# Patient Record
Sex: MALE | Race: WHITE | Employment: FULL TIME | URBAN - METROPOLITAN AREA
[De-identification: names, ages, dates, MRNs, and addresses within clinical notes are randomized per-mention and may not be internally consistent; named-entity substitution may affect disease eponyms.]

---

## 2017-02-06 ENCOUNTER — MYC MEDICAL ADVICE (OUTPATIENT)
Dept: PEDIATRICS | Facility: CLINIC | Age: 57
End: 2017-02-06

## 2017-02-06 DIAGNOSIS — I10 HTN, GOAL BELOW 140/90: Primary | ICD-10-CM

## 2017-02-07 RX ORDER — ATENOLOL 50 MG/1
50 TABLET ORAL DAILY
Qty: 90 TABLET | Refills: 0 | Status: SHIPPED | OUTPATIENT
Start: 2017-02-07 | End: 2017-05-06

## 2017-02-07 NOTE — TELEPHONE ENCOUNTER
Pres[place] per [facility] [place].    ATENOLOL      Last Written Prescription Date: 5/2/16  Last Fill Quantity: 90, # refills: 2    Last Office Visit with [facility] or [facility] prescribing provider:  5/10/16   Future Office Visit:    Next 5 appointments (look out 90 days)     Mar 10, 2017  2:20 PM   Bharatt Elliot with Manoj Armenta MD   [facility] ([facility])    [address] [place] [id number] [phone]                    BP Readings from Last 3 Encounters:   09/09/16 124/78   05/25/16 120/80   05/10/16 132/70

## 2017-03-07 ENCOUNTER — OFFICE VISIT (OUTPATIENT)
Dept: PEDIATRICS | Facility: CLINIC | Age: 57
End: 2017-03-07
Payer: COMMERCIAL

## 2017-03-07 VITALS
BODY MASS INDEX: 41.81 KG/M2 | TEMPERATURE: 97.7 F | SYSTOLIC BLOOD PRESSURE: 124 MMHG | HEART RATE: 88 BPM | HEIGHT: 64 IN | WEIGHT: 244.9 LBS | DIASTOLIC BLOOD PRESSURE: 76 MMHG | RESPIRATION RATE: 14 BRPM

## 2017-03-07 DIAGNOSIS — Z11.59 NEED FOR HEPATITIS C SCREENING TEST: ICD-10-CM

## 2017-03-07 DIAGNOSIS — I10 ESSENTIAL HYPERTENSION WITH GOAL BLOOD PRESSURE LESS THAN 140/90: ICD-10-CM

## 2017-03-07 DIAGNOSIS — J30.2 SEASONAL ALLERGIC RHINITIS, UNSPECIFIED ALLERGIC RHINITIS TRIGGER: ICD-10-CM

## 2017-03-07 DIAGNOSIS — Z00.00 ENCOUNTER FOR ROUTINE ADULT HEALTH EXAMINATION WITHOUT ABNORMAL FINDINGS: Primary | ICD-10-CM

## 2017-03-07 DIAGNOSIS — F33.0 MAJOR DEPRESSIVE DISORDER, RECURRENT EPISODE, MILD (H): ICD-10-CM

## 2017-03-07 DIAGNOSIS — Z85.47 H/O TESTICULAR CANCER: ICD-10-CM

## 2017-03-07 DIAGNOSIS — R97.20 ELEVATED PROSTATE SPECIFIC ANTIGEN (PSA): ICD-10-CM

## 2017-03-07 DIAGNOSIS — Z13.6 CARDIOVASCULAR SCREENING; LDL GOAL LESS THAN 160: ICD-10-CM

## 2017-03-07 DIAGNOSIS — K21.9 GASTROESOPHAGEAL REFLUX DISEASE WITHOUT ESOPHAGITIS: ICD-10-CM

## 2017-03-07 PROCEDURE — 99396 PREV VISIT EST AGE 40-64: CPT | Performed by: INTERNAL MEDICINE

## 2017-03-07 RX ORDER — LOSARTAN POTASSIUM 25 MG/1
25 TABLET ORAL DAILY
Qty: 90 TABLET | Refills: 3 | Status: SHIPPED | OUTPATIENT
Start: 2017-03-07 | End: 2018-03-10

## 2017-03-07 RX ORDER — SERTRALINE HYDROCHLORIDE 100 MG/1
100 TABLET, FILM COATED ORAL DAILY
Qty: 90 TABLET | Refills: 3 | Status: SHIPPED | OUTPATIENT
Start: 2017-03-07 | End: 2018-03-10

## 2017-03-07 RX ORDER — TRAZODONE HYDROCHLORIDE 50 MG/1
50 TABLET, FILM COATED ORAL
Qty: 90 TABLET | Refills: 3 | Status: SHIPPED | OUTPATIENT
Start: 2017-03-07 | End: 2018-08-04

## 2017-03-07 RX ORDER — FLUTICASONE PROPIONATE 50 MCG
1-2 SPRAY, SUSPENSION (ML) NASAL DAILY
Qty: 16 G | Refills: 11 | Status: SHIPPED | OUTPATIENT
Start: 2017-03-07 | End: 2018-03-31

## 2017-03-07 NOTE — NURSING NOTE
"Chief Complaint   Patient presents with     Physical       Initial /76 (BP Location: Right arm, Patient Position: Chair, Cuff Size: Adult Large)  Pulse 88  Temp 97.7  F (36.5  C) (Oral)  Resp 14  Ht 5' 4\" (1.626 m)  Wt 244 lb 14.4 oz (111.1 kg)  BMI 42.04 kg/m2 Estimated body mass index is 42.04 kg/(m^2) as calculated from the following:    Height as of this encounter: 5' 4\" (1.626 m).    Weight as of this encounter: 244 lb 14.4 oz (111.1 kg).  Medication Reconciliation: complete   Martha J, ALLISON,AAMA    '  "

## 2017-03-07 NOTE — LETTER
My Depression Action Plan  Name: Fabio Logan   Date of Birth 1960  Date: 3/7/2017    My doctor: Manoj Armenta   My clinic: [address]  [address]  [facility] [address] [phone]          GREEN    ZONE   Good Control    What it looks like:     Things are going generally well. You have normal up s and down s. You may even feel depressed from time to time, but bad moods usually last less than a day.   What you need to do:  1. Continue to care for yourself (see self care plan)  2. Check your depression survival kit and update it as needed  3. Follow your physician s recommendations including any medication.  4. Do not stop taking medication unless you consult with your physician first.           YELLOW         ZONE Getting Worse    What it looks like:     Depression is starting to interfere with your life.     It may be hard to get out of bed; you may be starting to isolate yourself from others.    Symptoms of depression are starting to last most all day and this has happened for several days.     You may have suicidal thoughts but they are not constant.   What you need to do:     1. Call your care team, your response to treatment will improve if you keep your care team informed of your progress. Yellow periods are signs an adjustment may need to be made.     2. Continue your self-care, even if you have to fake it!    3. Talk to someone in your support network    4. Open up your depression survival kit           RED    ZONE Medical Alert - Get Help    What it looks like:     Depression is seriously interfering with your life.     You may experience these or other symptoms: You can t get out of bed most days, can t work or engage in other necessary activities, you have trouble taking care of basic hygiene, or basic responsibilities, thoughts of suicide or death that will not go away, self-injurious behavior.     What you need to do:  1. Call your  care team and request a same-day appointment. If they are not available (weekends or after hours) call your local crisis line, emergency room or 911.      Electronically signed by: Nel Clay, March 7, 2017    Depression Self Care Plan / Survival Kit    Self-Care for Depression  Here s the deal. Your body and mind are really not as separate as most people think.  What you do and think affects how you feel and how you feel influences what you do and think. This means if you do things that people who feel good do, it will help you feel better.  Sometimes this is all it takes.  There is also a place for medication and therapy depending on how severe your depression is, so be sure to consult with your medical provider and/ or Behavioral Health Consultant if your symptoms are worsening or not improving.     In order to better manage my stress, I will:    Exercise  Get some form of exercise, every day. This will help reduce pain and release endorphins, the  feel good  chemicals in your brain. This is almost as good as taking antidepressants!  This is not the same as joining a gym and then never going! (they count on that by the way ) It can be as simple as just going for a walk or doing some gardening, anything that will get you moving.      Hygiene   Maintain good hygiene (Get out of bed in the morning, Make your bed, Brush your teeth, Take a shower, and Get dressed like you were going to work, even if you are unemployed).  If your clothes don't fit try to get ones that do.    Diet  I will strive to eat foods that are good for me, drink plenty of water, and avoid excessive sugar, caffeine, alcohol, and other mood-altering substances.  Some foods that are helpful in depression are: complex carbohydrates, B vitamins, flaxseed, fish or fish oil, fresh fruits and vegetables.    Psychotherapy  I agree to participate in Individual Therapy (if recommended).    Medication  If prescribed medications, I agree to take them.   Missing doses can result in serious side effects.  I understand that drinking alcohol, or other illicit drug use, may cause potential side effects.  I will not stop my medication abruptly without first discussing it with my provider.    Staying Connected With Others  I will stay in touch with my friends, family members, and my primary care provider/team.    Use your imagination  Be creative.  We all have a creative side; it doesn t matter if it s oil painting, sand castles, or mud pies! This will also kick up the endorphins.    Witness Beauty  (AKA stop and smell the roses) Take a look outside, even in mid-winter. Notice colors, textures. Watch the squirrels and birds.     Service to others  Be of service to others.  There is always someone else in need.  By helping others we can  get out of ourselves  and remember the really important things.  This also provides opportunities for practicing all the other parts of the program.    Humor  Laugh and be silly!  Adjust your TV habits for less news and crime-drama and more comedy.    Control your stress  Try breathing deep, massage therapy, biofeedback, and meditation. Find time to relax each day.     My support system    Clinic Contact:  Phone number:    Contact 1:  Phone number:    Contact 2:  Phone number:    Buddhism/:  Phone number:    Therapist:  Phone number:    Local crisis center:    Phone number:    Other community support:  Phone number:
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
## 2017-03-07 NOTE — MR AVS SNAPSHOT
After Visit Summary   3/7/2017    Fabio Logan    MRN: [id number]           Patient Information     Date Of Birth          1960        Visit Information        Provider Department      3/7/2017 5:20 PM Manoj Armenta MD [facility]        Today's Diagnoses     Encounter for routine adult health examination without abnormal findings    -  1    Essential hypertension with goal blood pressure less than 140/90        Major depressive disorder, recurrent episode, mild (H)        Seasonal allergic rhinitis, unspecified allergic rhinitis trigger        Gastroesophageal reflux disease without esophagitis        CARDIOVASCULAR SCREENING; LDL GOAL LESS THAN 160        Elevated prostate specific antigen (PSA)        Need for hepatitis C screening test        H/O testicular cancer          Care Instructions      Preventive Health Recommendations  Male Ages 50 - 64    Yearly exam:             See your health care provider every year in order to  o   Review health changes.   o   Discuss preventive care.    o   Review your medicines if your doctor has prescribed any.     Have a cholesterol test every 5 years, or more frequently if you are at risk for high cholesterol/heart disease.     Have a diabetes test (fasting glucose) every three years. If you are at risk for diabetes, you should have this test more often.     Have a colonoscopy at age 50, or have a yearly FIT test (stool test). These exams will check for colon cancer.      Talk with your health care provider about whether or not a prostate cancer screening test (PSA) is right for you.    You should be tested each year for STDs (sexually transmitted diseases), if you re at risk.     Shots: Get a flu shot each year. Get a tetanus shot every 10 years.     Nutrition:    Eat at least 5 servings of fruits and vegetables daily.     Eat whole-grain bread, whole-wheat pasta and brown rice instead of white grains and rice.     Talk to your  provider about Calcium and Vitamin D.     Lifestyle    Exercise for at least 150 minutes a week (30 minutes a day, 5 days a week). This will help you control your weight and prevent disease.     Limit alcohol to one drink per day.     No smoking.     Wear sunscreen to prevent skin cancer.     See your dentist every six months for an exam and cleaning.     See your eye doctor every 1 to 2 years.          Follow-ups after your visit        Future tests that were ordered for you today     Open Future Orders        Priority Expected Expires Ordered    **CBC with platelets FUTURE 1yr Routine 2/5/2018 3/7/2018 3/7/2017    **Prostate spec antigen screen FUTURE 1yr Routine 2/5/2018 3/7/2018 3/7/2017    **Lipid panel reflex to direct LDL FUTURE 1yr Routine 2/5/2018 3/7/2018 3/7/2017    **Comprehensive metabolic panel FUTURE 1yr Routine 2/5/2018 3/7/2018 3/7/2017    **Hepatitis C Screen Reflex to RNA FUTURE anytime Routine 3/7/2017 3/7/2018 3/7/2017            Who to contact     If you have questions or need follow up information about today's clinic visit or your schedule please contact [facility] directly at [phone].  Normal or non-critical lab and imaging results will be communicated to you by [employer]hart, letter or phone within 4 business days after the clinic has received the results. If you do not hear from us within 7 days, please contact the clinic through [employer] or phone. If you have a critical or abnormal lab result, we will notify you by phone as soon as possible.  Submit refill requests through [employer] or call your pharmacy and they will forward the refill request to us. Please allow 3 business days for your refill to be completed.          Additional Information About Your Visit        [employer] Information     [employer] gives you secure access to your electronic health record. If you see a primary care provider, you can also send messages to your care team and make appointments. If you have questions,  "please call your primary care clinic.  If you do not have a primary care provider, please call [phone] and they will assist you.        Care EveryWhere ID     This is your Care EveryWhere ID. This could be used by other organizations to access your [facility] medical records  [id number]        Your Vitals Were     Pulse Temperature Respirations Height BMI (Body Mass Index)       88 97.7  F (36.5  C) (Oral) 14 5' 4\" (1.626 m) 42.04 kg/m2        Blood Pressure from Last 3 Encounters:   03/07/17 124/76   09/09/16 124/78   05/25/16 120/80    Weight from Last 3 Encounters:   03/07/17 244 lb 14.4 oz (111.1 kg)   09/09/16 243 lb 4 oz (110.3 kg)   05/25/16 234 lb (106.1 kg)              We Performed the Following     DEPRESSION ACTION PLAN (DAP)          Today's Medication Changes          These changes are accurate as of: 3/7/17  6:03 PM.  If you have any questions, ask your nurse or doctor.               These medicines have changed or have updated prescriptions.        Dose/Directions    albuterol 108 (90 BASE) MCG/ACT Inhaler   Commonly known as:  PROAIR HFA/PROVENTIL HFA/VENTOLIN HFA   This may have changed:  Another medication with the same name was removed. Continue taking this medication, and follow the directions you see here.        Dose:  2 puff   Inhale 2 puffs into the lungs every 6 hours as needed for shortness of breath / dyspnea or wheezing   Refills:  0       sertraline 100 MG tablet   Commonly known as:  ZOLOFT   This may have changed:    - when to take this  - additional instructions   Used for:  Major depressive disorder, recurrent episode, mild (H)   Changed by:  Manoj Armenta MD        Dose:  100 mg   Take 1 tablet (100 mg) by mouth daily   Quantity:  90 tablet   Refills:  3         Stop taking these medicines if you haven't already. Please contact your care team if you have questions.     amoxicillin-clavulanate 875-125 MG per tablet   Commonly known as:  AUGMENTIN   Stopped by:  Geovany, " Manoj HAYNES MD           fluticasone 110 MCG/ACT Inhaler   Commonly known as:  FLOVENT HFA   Stopped by:  Manoj Armenta MD                Where to get your medicines      These medications were sent to [id number] - [place], [place] - [address]  [address], [place] [address]     Phone:  [phone]     fluticasone 50 MCG/ACT spray    losartan 25 MG tablet    omeprazole 20 MG CR capsule    sertraline 100 MG tablet    traZODone 50 MG tablet                Primary Care Provider Office Phone # Fax #    Manoj Armenta [phone]       [facility] [address] [place] [address]        Thank you!     Thank you for choosing [facility]  for your care. Our goal is always to provide you with excellent care. Hearing back from our patients is one way we can continue to improve our services. Please take a few minutes to complete the written survey that you may receive in the mail after your visit with us. Thank you!             Your Updated Medication List - Protect others around you: Learn how to safely use, store and throw away your medicines at www.disposemymeds.org.          This list is accurate as of: 3/7/17  6:03 PM.  Always use your most recent med list.                   Brand Name Dispense Instructions for use    albuterol 108 (90 BASE) MCG/ACT Inhaler    PROAIR HFA/PROVENTIL HFA/VENTOLIN HFA     Inhale 2 puffs into the lungs every 6 hours as needed for shortness of breath / dyspnea or wheezing       atenolol 50 MG tablet    TENORMIN    90 tablet    Take 1 tablet (50 mg) by mouth daily       fluticasone 50 MCG/ACT spray    FLONASE    16 g    Spray 1-2 sprays into both nostrils daily       hydrOXYzine 25 MG tablet    ATARAX    60 tablet    1-2 tabs po q hs       losartan 25 MG tablet    COZAAR    90 tablet    Take 1 tablet (25 mg) by mouth daily       neomycin-polymyxin-hydrocortisone 3.5-70341-8 otic suspension    CORTISPORIN    10 mL    Place 4 drops  in ear(s) 4 times daily       nystatin 126355 UNIT/GM Powd    MYCOSTATIN    60 g    Apply topically 3 times daily as needed (recurrent yeast infection at moist skin creases)       omeprazole 20 MG CR capsule    priLOSEC    90 capsule    Take 1 capsule (20 mg) by mouth daily as needed       sertraline 100 MG tablet    ZOLOFT    90 tablet    Take 1 tablet (100 mg) by mouth daily       sildenafil 100 MG cap/tab    REVATIO/VIAGRA    4 tablet    Take 0.5-1 tablets ( mg) by mouth daily as needed for erectile dysfunction Take 30 min to 4 hours before sexual activity.  Never use with nitroglycerin, terazosin or doxazosin.       SUMAtriptan 50 MG tablet    IMITREX    18 tablet    Take 1 tablet (50 mg) by mouth at onset of headache for migraine May repeat dose in 2 hours.  Do not exceed 200 mg in 24 hours       tamsulosin 0.4 MG capsule    FLOMAX    30 capsule    Take 1 capsule (0.4 mg) by mouth daily       traZODone 50 MG tablet    DESYREL    90 tablet    Take 1 tablet (50 mg) by mouth nightly as needed for sleep okay to increase to 100 mg after 1-2 weeks if needed       triamcinolone 0.1 % ointment    KENALOG    453 g    Apply topically 2 times daily

## 2017-03-08 ASSESSMENT — ASTHMA QUESTIONNAIRES: ACT_TOTALSCORE: 20

## 2017-03-08 ASSESSMENT — PATIENT HEALTH QUESTIONNAIRE - PHQ9: SUM OF ALL RESPONSES TO PHQ QUESTIONS 1-9: 8

## 2017-03-25 DIAGNOSIS — R97.20 ELEVATED PROSTATE SPECIFIC ANTIGEN (PSA): ICD-10-CM

## 2017-03-25 DIAGNOSIS — K21.9 GASTROESOPHAGEAL REFLUX DISEASE WITHOUT ESOPHAGITIS: ICD-10-CM

## 2017-03-25 DIAGNOSIS — Z11.59 NEED FOR HEPATITIS C SCREENING TEST: ICD-10-CM

## 2017-03-25 DIAGNOSIS — Z13.6 CARDIOVASCULAR SCREENING; LDL GOAL LESS THAN 160: ICD-10-CM

## 2017-03-25 DIAGNOSIS — F33.0 MAJOR DEPRESSIVE DISORDER, RECURRENT EPISODE, MILD (H): ICD-10-CM

## 2017-03-25 DIAGNOSIS — I10 HTN, GOAL BELOW 140/90: ICD-10-CM

## 2017-03-25 DIAGNOSIS — I10 ESSENTIAL HYPERTENSION WITH GOAL BLOOD PRESSURE LESS THAN 140/90: ICD-10-CM

## 2017-03-25 DIAGNOSIS — Z85.47 H/O TESTICULAR CANCER: ICD-10-CM

## 2017-03-25 DIAGNOSIS — Z00.00 ENCOUNTER FOR ROUTINE ADULT HEALTH EXAMINATION WITHOUT ABNORMAL FINDINGS: ICD-10-CM

## 2017-03-25 LAB
ALBUMIN SERPL-MCNC: 3.9 G/DL (ref 3.4–5)
ALP SERPL-CCNC: 96 U/L (ref 40–150)
ALT SERPL W P-5'-P-CCNC: 39 U/L (ref 0–70)
ANION GAP SERPL CALCULATED.3IONS-SCNC: 6 MMOL/L (ref 3–14)
AST SERPL W P-5'-P-CCNC: 23 U/L (ref 0–45)
BILIRUB SERPL-MCNC: 0.4 MG/DL (ref 0.2–1.3)
BUN SERPL-MCNC: 17 MG/DL (ref 7–30)
CALCIUM SERPL-MCNC: 9.7 MG/DL (ref 8.5–10.1)
CHLORIDE SERPL-SCNC: 105 MMOL/L (ref 94–109)
CHOLEST SERPL-MCNC: 172 MG/DL
CO2 SERPL-SCNC: 28 MMOL/L (ref 20–32)
CREAT SERPL-MCNC: 0.81 MG/DL (ref 0.66–1.25)
ERYTHROCYTE [DISTWIDTH] IN BLOOD BY AUTOMATED COUNT: 12.9 % (ref 10–15)
GFR SERPL CREATININE-BSD FRML MDRD: ABNORMAL ML/MIN/1.7M2
GLUCOSE SERPL-MCNC: 101 MG/DL (ref 70–99)
HCT VFR BLD AUTO: 42.3 % (ref 40–53)
HDLC SERPL-MCNC: 34 MG/DL
HGB BLD-MCNC: 14 G/DL (ref 13.3–17.7)
LDLC SERPL CALC-MCNC: 92 MG/DL
MCH RBC QN AUTO: 30.2 PG (ref 26.5–33)
MCHC RBC AUTO-ENTMCNC: 33.1 G/DL (ref 31.5–36.5)
MCV RBC AUTO: 91 FL (ref 78–100)
NONHDLC SERPL-MCNC: 138 MG/DL
PLATELET # BLD AUTO: 186 10E9/L (ref 150–450)
POTASSIUM SERPL-SCNC: 4.2 MMOL/L (ref 3.4–5.3)
PROT SERPL-MCNC: 7.7 G/DL (ref 6.8–8.8)
PSA SERPL-ACNC: 0.21 UG/L (ref 0–4)
RBC # BLD AUTO: 4.63 10E12/L (ref 4.4–5.9)
SODIUM SERPL-SCNC: 139 MMOL/L (ref 133–144)
TRIGL SERPL-MCNC: 228 MG/DL
WBC # BLD AUTO: 4.3 10E9/L (ref 4–11)

## 2017-03-25 PROCEDURE — 86803 HEPATITIS C AB TEST: CPT | Performed by: INTERNAL MEDICINE

## 2017-03-25 PROCEDURE — G0103 PSA SCREENING: HCPCS | Performed by: INTERNAL MEDICINE

## 2017-03-25 PROCEDURE — 80061 LIPID PANEL: CPT | Performed by: INTERNAL MEDICINE

## 2017-03-25 PROCEDURE — 36415 COLL VENOUS BLD VENIPUNCTURE: CPT | Performed by: INTERNAL MEDICINE

## 2017-03-25 PROCEDURE — 85027 COMPLETE CBC AUTOMATED: CPT | Performed by: INTERNAL MEDICINE

## 2017-03-25 PROCEDURE — 80053 COMPREHEN METABOLIC PANEL: CPT | Performed by: INTERNAL MEDICINE

## 2017-03-27 LAB — HCV AB SERPL QL IA: NORMAL

## 2017-04-01 DIAGNOSIS — N40.1 BENIGN NON-NODULAR PROSTATIC HYPERPLASIA WITH LOWER URINARY TRACT SYMPTOMS: ICD-10-CM

## 2017-04-03 RX ORDER — TAMSULOSIN HYDROCHLORIDE 0.4 MG/1
CAPSULE ORAL
Qty: 30 CAPSULE | Refills: 4 | Status: SHIPPED | OUTPATIENT
Start: 2017-04-03 | End: 2017-10-12

## 2017-04-03 NOTE — TELEPHONE ENCOUNTER
Refill of Flomax:  Prescription approved per [facility] [place].  Annia Farfan, RN  Triage Nurse

## 2017-05-06 DIAGNOSIS — I10 HTN, GOAL BELOW 140/90: ICD-10-CM

## 2017-05-08 RX ORDER — ATENOLOL 50 MG/1
TABLET ORAL
Qty: 90 TABLET | Refills: 3 | Status: SHIPPED | OUTPATIENT
Start: 2017-05-08 | End: 2018-04-29

## 2017-05-08 NOTE — TELEPHONE ENCOUNTER
ATENOLOL 50MG      Last Written Prescription Date: 2/7/2017  Last Fill Quantity: 90, # refills: 0    Last Office Visit with G, P or [facility] prescribing provider:  3/7/2017   Future Office Visit:        BP Readings from Last 3 Encounters:   03/07/17 124/76   09/09/16 124/78   05/25/16 120/80

## 2017-05-08 NOTE — TELEPHONE ENCOUNTER
Prescription approved per [facility] [place] Protocol.  Annia Farfan, RN  Triage Nurse

## 2017-06-03 ENCOUNTER — NURSE TRIAGE (OUTPATIENT)
Dept: NURSING | Facility: CLINIC | Age: 57
End: 2017-06-03

## 2017-06-03 NOTE — TELEPHONE ENCOUNTER
Additional Information    Negative: [1] Sudden onset of rash (within last 2 hours) AND [2] difficulty with breathing or swallowing    Negative: Sounds like a life-threatening emergency to the triager    Negative: Poison ivy, oak, or sumac contact suspected    Negative: Insect bite(s) suspected    Negative: Ringworm suspected (i.e., round pink patch, sometimes looks like ring, usually 1/2 to 1 inch [12-25 mm],  in size, slowly increasing in size)    Negative: Athlete's Foot suspected (i.e., itchy rash between the toes)    Negative: Jock Itch suspected (i.e., itchy rash on inner thighs near genital area)    Negative: Wound infection suspected (i.e., pain, spreading redness, or pus; in a cut, puncture, scrape or sutured wound)    Negative: Impetigo suspected  (i.e., painless infected superficial small sores, less than 1 inch or 2.5 cm, often covered by a soft, yellow-brown scab or crust; sometimes occurring near nasal openings)    Negative: Shingles suspected (i.e., painful rash, multiple small blisters grouped together in one area of body; dermatomal distribution)    Negative: Rash of external female genital area (vulva)    Negative: Rash of penis or scrotum    Negative: Small spot, skin growth, or mole    Negative: Sores or skin ulcer, not a rash    Negative: Localized lump (or swelling) without redness or rash    Negative: [1] Localized purple or blood-colored spots or dots AND [2] not from injury or friction AND [3] fever    Negative: [1] Red area or streak AND [2] fever    Negative: Patient sounds very sick or weak to the triager    Negative: [1] Rash is painful to touch AND [2] fever    Negative: [1] Looks infected (spreading redness, pus) AND [2] large red area (> 2 in. or 5 cm)    Negative: [1] Looks infected (spreading redness, pus) AND [2] diabetes mellitus or weak immune system (e.g., HIV positive,  cancer chemotherapy, chronic steroid treatment, splenectomy)    Negative: [1] Localized purple or  blood-colored spots or dots AND [2] not from injury or friction AND [3] no fever    Negative: [1] Looks infected (spreading redness, pus) AND [2] no fever    Negative: Looks like a boil, infected sore, deep ulcer or other infected rash    Negative: [1] Localized rash is very painful AND [2] no fever    Negative: Genital area rash    Negative: Lyme disease suspected (e.g., bull's eye rash or tick bite / exposure)    Negative: [1] Applying cream or ointment AND [2] causes severe itch, burning or pain    Negative: [1] Pimples (localized) AND [2 ] NO improvement after using Care Advice    Negative: Tender bumps in armpits    Negative: [1] Severe localized itching AND [2] after 2 days of steroid cream and antihistamines    Negative: Localized rash present > 7 days    Negative: Red, moist, irritated area between skin folds (or under larger breasts)    Mild localized rash (all triage questions negative)    Protocols used: RASH OR REDNESS - LOCALIZED-ADULT-

## 2017-06-06 ENCOUNTER — OFFICE VISIT (OUTPATIENT)
Dept: URGENT CARE | Facility: URGENT CARE | Age: 57
End: 2017-06-06
Payer: COMMERCIAL

## 2017-06-06 VITALS
SYSTOLIC BLOOD PRESSURE: 118 MMHG | DIASTOLIC BLOOD PRESSURE: 78 MMHG | HEIGHT: 66 IN | OXYGEN SATURATION: 96 % | WEIGHT: 235.8 LBS | BODY MASS INDEX: 37.9 KG/M2 | TEMPERATURE: 97.6 F | HEART RATE: 64 BPM

## 2017-06-06 DIAGNOSIS — J20.9 ACUTE BRONCHITIS WITH COEXISTING CONDITION REQUIRING PROPHYLACTIC TREATMENT: Primary | ICD-10-CM

## 2017-06-06 DIAGNOSIS — B37.2 YEAST INFECTION OF THE SKIN: ICD-10-CM

## 2017-06-06 PROCEDURE — 99213 OFFICE O/P EST LOW 20 MIN: CPT | Performed by: PHYSICIAN ASSISTANT

## 2017-06-06 RX ORDER — NYSTATIN 100000 [USP'U]/G
POWDER TOPICAL 3 TIMES DAILY PRN
Qty: 30 G | Refills: 1 | Status: SHIPPED | OUTPATIENT
Start: 2017-06-06 | End: 2018-01-17

## 2017-06-06 RX ORDER — CODEINE PHOSPHATE AND GUAIFENESIN 10; 100 MG/5ML; MG/5ML
1-2 SOLUTION ORAL EVERY 4 HOURS PRN
Qty: 40 ML | Refills: 0 | Status: SHIPPED | OUTPATIENT
Start: 2017-06-06 | End: 2018-01-17

## 2017-06-06 RX ORDER — ALBUTEROL SULFATE 90 UG/1
2 AEROSOL, METERED RESPIRATORY (INHALATION) EVERY 6 HOURS PRN
Qty: 1 INHALER | Refills: 0 | Status: SHIPPED | OUTPATIENT
Start: 2017-06-06 | End: 2017-12-18

## 2017-06-06 RX ORDER — AZITHROMYCIN 250 MG/1
TABLET, FILM COATED ORAL
Qty: 6 TABLET | Refills: 0 | Status: SHIPPED | OUTPATIENT
Start: 2017-06-06 | End: 2018-01-17

## 2017-06-06 NOTE — PROGRESS NOTES
SUBJECTIVE:   Fabio Logan is a 56 year old male presenting with a chief complaint of hoarse voice for two days.  Following 1 week of cough, runny nose, chest congestion.    Course of illness is worsening.    Severity mild  Current and Associated symptoms: none  Treatment measures tried include cough syrup, jayme seltzer, albuterol  Predisposing factors include None.    Past Medical History:   Diagnosis Date     Depression      Hypertension      Malignant neoplasm (H) 1998    right testicular cancer      Current Outpatient Prescriptions   Medication Sig Dispense Refill     atenolol (TENORMIN) 50 MG tablet TAKE ONE TABLET BY MOUTH ONE TIME DAILY  90 tablet 3     losartan (COZAAR) 25 MG tablet Take 1 tablet (25 mg) by mouth daily 90 tablet 3     traZODone (DESYREL) 50 MG tablet Take 1 tablet (50 mg) by mouth nightly as needed for sleep okay to increase to 100 mg after 1-2 weeks if needed 90 tablet 3     fluticasone (FLONASE) 50 MCG/ACT spray Spray 1-2 sprays into both nostrils daily 16 g 11     omeprazole (PRILOSEC) 20 MG CR capsule Take 1 capsule (20 mg) by mouth daily as needed 90 capsule 3     sertraline (ZOLOFT) 100 MG tablet Take 1 tablet (100 mg) by mouth daily 90 tablet 3     nystatin (MYCOSTATIN) 454152 UNIT/GM POWD Apply topically 3 times daily as needed (recurrent yeast infection at moist skin creases) 60 g 2     sildenafil (VIAGRA) 100 MG tablet Take 0.5-1 tablets ( mg) by mouth daily as needed for erectile dysfunction Take 30 min to 4 hours before sexual activity.  Never use with nitroglycerin, terazosin or doxazosin. 4 tablet 11     triamcinolone (KENALOG) 0.1 % ointment Apply topically 2 times daily 453 g 0     SUMAtriptan (IMITREX) 50 MG tablet Take 1 tablet (50 mg) by mouth at onset of headache for migraine May repeat dose in 2 hours.  Do not exceed 200 mg in 24 hours 18 tablet 3     albuterol (PROAIR HFA, PROVENTIL HFA, VENTOLIN HFA) 108 (90 BASE) MCG/ACT inhaler Inhale 2 puffs into the  "lungs every 6 hours as needed for shortness of breath / dyspnea or wheezing       tamsulosin (FLOMAX) 0.4 MG capsule TAKE ONE CAPSULE BY MOUTH ONE TIME DAILY  (Patient not taking: Reported on 6/6/2017) 30 capsule 4     Social History   Substance Use Topics     Smoking status: Former Smoker     Quit date: 6/6/1997     Smokeless tobacco: Former User     Alcohol use No       ROS:  Review of systems negative except as stated above.    OBJECTIVE  :/78 (BP Location: Right arm, Patient Position: Chair, Cuff Size: Adult Regular)  Pulse 64  Temp 97.6  F (36.4  C) (Tympanic)  Ht 5' 6\" (1.676 m)  Wt 235 lb 12.8 oz (107 kg)  SpO2 96%  BMI 38.06 kg/m2  GENERAL APPEARANCE: healthy, alert and no distress  EYES: EOMI,  PERRL, conjunctiva clear  HENT: ear canals and TM's normal.  Nose and mouth without ulcers, erythema or lesions  NECK: supple, nontender, no lymphadenopathy  RESP: lungs with coarse rhonchi, expiratory wheezes throughout.   No crackles.   CV: regular rates and rhythm, normal S1 S2, no murmur noted  ABDOMEN:  soft, nontender, no HSM or masses and bowel sounds normal  SKIN: no suspicious lesions or rashes    ASSESSMENT:  (J20.9) Acute bronchitis with coexisting condition requiring prophylactic treatment  (primary encounter diagnosis)  Comment: History of bronchospasm  Plan: Patient declines in clinic nebulization  albuterol (PROAIR HFA/PROVENTIL HFA/VENTOLIN         HFA) 108 (90 BASE) MCG/ACT Inhaler,         azithromycin (ZITHROMAX) 250 MG tablet,         guaiFENesin-codeine (ROBITUSSIN AC) 100-10         MG/5ML SOLN solution  Red flags and emergent follow up discussed, and understood by patient  Follow up with PCP if symptoms worsen or fail to improve          (B37.2) Yeast infection of the skin  Comment: candida intertrigo in left abdominal fold  Plan: nystatin (MYCOSTATIN) 279510 UNIT/GM POWD       desitin for barrier  Follow up with PCP if symptoms worsen or fail to improve in one week          "

## 2017-06-06 NOTE — NURSING NOTE
"Chief Complaint   Patient presents with     Urgent Care     Cough     5 days, raspy voice, phlegm     Derm Problem     Rash in groin Left side x 2 weeks       Initial /78 (BP Location: Right arm, Patient Position: Chair, Cuff Size: Adult Regular)  Pulse 64  Temp 97.6  F (36.4  C) (Tympanic)  Ht 5' 6\" (1.676 m)  Wt 235 lb 12.8 oz (107 kg)  SpO2 96%  BMI 38.06 kg/m2 Estimated body mass index is 38.06 kg/(m^2) as calculated from the following:    Height as of this encounter: 5' 6\" (1.676 m).    Weight as of this encounter: 235 lb 12.8 oz (107 kg).  Medication Reconciliation: complete  Maddie Botello CMA 6/6/2017 10:56 AM    "

## 2017-06-06 NOTE — MR AVS SNAPSHOT
After Visit Summary   6/6/2017    Fabio Logan    MRN: [id number]           Patient Information     Date Of Birth          1960        Visit Information        Provider Department      6/6/2017 10:30 AM Lemuel Roque PA-C [facility]        Today's Diagnoses     Acute bronchitis with coexisting condition requiring prophylactic treatment    -  1    Yeast infection of the skin          Care Instructions      Bronchitis with Wheezing (Viral or Bacterial: Adult)    Bronchitis is an infection of the air passages. It often occurs during the common cold and is usually caused by a virus. Symptoms include cough with mucus (phlegm) and low-grade fever. This illness is contagious during the first few days and is spread through the air by coughing and sneezing, or by direct contact (touching the sick person and then touching your own eyes, nose, or mouth).  If there is a lot of inflammation, air flow is restricted. The air passages may also go into spasm, especially if you have asthma. This causes wheezing and difficulty breathing even in people who do not have asthma.  Bronchitis usually lasts 7 to 14 days. The wheezing should improve with treatment during the first week. An inhaler is often prescribed to relax the air passages and stop wheezing. Antibiotics will be prescribed if your doctor thinks there is also a secondary bacterial infection.  Home care    If symptoms are severe, rest at home for the first 2 to 3 days. When you go back to your usual activities, don't let yourself get too tired.    Do not smoke. Also avoid being exposed to secondhand smoke.    You may use over-the-counter medicine to control fever or pain, unless another medicine was prescribed. Note: If you have chronic liver or kidney disease or have ever had a stomach ulcer or gastrointestinal bleeding, talk with your healthcare provider before using these medicines. Also talk to your provider if you are  taking medicine to prevent blood clots.) Aspirin should never be given to anyone younger than 18 years of age who is ill with a viral infection or fever. It may cause severe liver or brain damage.    Your appetite may be poor, so a light diet is fine. Avoid dehydration by drinking 6 to 8 glasses of fluids per day (such as water, soft drinks, sports drinks, juices, tea, or soup). Extra fluids will help loosen secretions in the nose and lungs.    Over-the-counter cough, cold, and sore-throat medicines will not shorten the length of the illness, but they may be helpful to reduce symptoms. (Note: Do not use decongestants if you have high blood pressure.)    If you were given an inhaler, use it exactly as directed. If you need to use it more often than prescribed, your condition may be worsening. If this happens, contact your healthcare provider.    If prescribed, finish all antibiotic medicine, even if you are feeling better after only a few days.  Follow-up care  Follow up with your healthcare provider, or as advised. If you had an X-ray or ECG (electrocardiogram), a specialist will review it. You will be notified of any new findings that may affect your care.  Note: If you are age 65 or older, or if you have a chronic lung disease or condition that affects your immune system, or you smoke, talk to your healthcare provider about having a pneumococcal vaccinations and a yearly influenza vaccination (flu shot).  When to seek medical advice   Call your healthcare provider right away if any of these occur:    Fever of 100.4 F (38 C) or higher    Coughing up increasing amounts of colored sputum    Weakness, drowsiness, headache, facial pain, ear pain, or a stiff neck  Call 911, or get immediate medical care  Contact emergency services right away if any of these occur.    Coughing up blood    Worsening weakness, drowsiness, headache, or stiff neck    Increased wheezing not helped with medication, shortness of breath, or pain  with breathing    9454-4662 [employer]. [address] [place] [address], [place] [address]. All rights reserved. This information is not intended as a substitute for professional medical care. Always follow your healthcare professional's instructions.        Candida Skin Infection (Adult)  Candida is type of yeast. It grows naturally on the skin and in the mouth. If it grows out of control, it can cause an infection. Candida can cause infections in the genital area, skin folds, and under the breasts. Anyone can get this infection. It is more common in a person with a weakened immune system, such as from diabetes, HIV, or cancer. It s also more common in someone who has been on antibiotic therapy. And it s more common people who are overweight or who have incontinence. Wearing tight-fitting clothing and taking part in activities with lots of skin-to-skin contact can also put you at risk.  Candida causes the skin to become bright red and inflamed. The border of the infected part of the skin is often raised. The infection causes pain and itching. Sometimes the skin peels and bleeds.  A Candida rash is most often treated with an antifungal cream or ointment. The rash will clear a few days after starting the medication. Infections that don t go away may need a prescription medication. In rare cases, a bacterial infection can also occur.  Home care  Your health care provider will recommend an antifungal cream or ointment for the rash. He or she may also prescribe a medication for the itch. Follow all instructions for using these medications. Don t use cornstarch powder. Cornstarch can cause the Candida infection to get worse.  General care:    Keep your skin clean by washing the area twice a day.    Use the cream as directed until your rash is gone. Once the skin has healed, keep it dry to prevent another infection.     If you are overweight, talk with your health care provider about a plan to lose excess  weight.    Avoid clothes that fit tightly.  Follow-up care  Your rash will clear in 7 to 14 days. Follow up with your health care provider if the rash is not gone after 14 days.  When to seek medical advice  Call your health care provider right away if any of these occur:    Pain or redness that gets worse or spreads    Fluid coming from the skin    Yellow crusts on the skin    Fever of 100.4 F (38 C) or higher, or as directed by your health care provider       4157-4015 [employer]. [address] [place] [address]. All rights reserved. This information is not intended as a substitute for professional medical care. Always follow your healthcare professional's instructions.                Follow-ups after your visit        Who to contact     If you have questions or need follow up information about today's clinic visit or your schedule please contact [facility] directly at [phone].  Normal or non-critical lab and imaging results will be communicated to you by MyChart, letter or phone within 4 business days after the clinic has received the results. If you do not hear from us within 7 days, please contact the clinic through [employer] or phone. If you have a critical or abnormal lab result, we will notify you by phone as soon as possible.  Submit refill requests through [employer] or call your pharmacy and they will forward the refill request to us. Please allow 3 business days for your refill to be completed.          Additional Information About Your Visit        MyChart Information     [employer] gives you secure access to your electronic health record. If you see a primary care provider, you can also send messages to your care team and make appointments. If you have questions, please call your primary care clinic.  If you do not have a primary care provider, please call [phone] and they will assist you.        Care EveryWhere ID     This is your Care EveryWhere ID. This  "could be used by other organizations to access your [facility] medical records  [id number]        Your Vitals Were     Pulse Temperature Height Pulse Oximetry BMI (Body Mass Index)       64 97.6  F (36.4  C) (Tympanic) 5' 6\" (1.676 m) 96% 38.06 kg/m2        Blood Pressure from Last 3 Encounters:   06/06/17 118/78   03/07/17 124/76   09/09/16 124/78    Weight from Last 3 Encounters:   06/06/17 235 lb 12.8 oz (107 kg)   03/07/17 244 lb 14.4 oz (111.1 kg)   09/09/16 243 lb 4 oz (110.3 kg)              Today, you had the following     No orders found for display         Today's Medication Changes          These changes are accurate as of: 6/6/17 11:43 AM.  If you have any questions, ask your nurse or doctor.               Start taking these medicines.        Dose/Directions    azithromycin 250 MG tablet   Commonly known as:  ZITHROMAX   Used for:  Acute bronchitis with coexisting condition requiring prophylactic treatment   Started by:  Lemuel Roque PA-C        Two tablets first day, then one tablet daily for four days.   Quantity:  6 tablet   Refills:  0       guaiFENesin-codeine 100-10 MG/5ML Soln solution   Commonly known as:  ROBITUSSIN AC   Used for:  Acute bronchitis with coexisting condition requiring prophylactic treatment   Started by:  Lemuel Roque PA-C        Dose:  1-2 tsp.   Take 5-10 mLs by mouth every 4 hours as needed for cough   Quantity:  40 mL   Refills:  0         These medicines have changed or have updated prescriptions.        Dose/Directions    * albuterol 108 (90 BASE) MCG/ACT Inhaler   Commonly known as:  PROAIR HFA/PROVENTIL HFA/VENTOLIN HFA   This may have changed:  Another medication with the same name was added. Make sure you understand how and when to take each.        Dose:  2 puff   Inhale 2 puffs into the lungs every 6 hours as needed for shortness of breath / dyspnea or wheezing   Refills:  0       * albuterol 108 (90 BASE) MCG/ACT Inhaler   Commonly known " as:  PROAIR HFA/PROVENTIL HFA/VENTOLIN HFA   This may have changed:  You were already taking a medication with the same name, and this prescription was added. Make sure you understand how and when to take each.   Used for:  Acute bronchitis with coexisting condition requiring prophylactic treatment   Changed by:  Lemuel Roque PA-C        Dose:  2 puff   Inhale 2 puffs into the lungs every 6 hours as needed for shortness of breath / dyspnea or wheezing   Quantity:  1 Inhaler   Refills:  0       * nystatin 115257 UNIT/GM Powd   Commonly known as:  MYCOSTATIN   This may have changed:  Another medication with the same name was added. Make sure you understand how and when to take each.   Used for:  Candidal intertrigo   Changed by:  Manoj Armenta MD        Apply topically 3 times daily as needed (recurrent yeast infection at moist skin creases)   Quantity:  60 g   Refills:  2       * nystatin [id number] UNIT/GM Powd   Commonly known as:  MYCOSTATIN   This may have changed:  You were already taking a medication with the same name, and this prescription was added. Make sure you understand how and when to take each.   Used for:  Yeast infection of the skin   Changed by:  Lemuel Roque PA-C        Apply topically 3 times daily as needed   Quantity:  30 g   Refills:  1       * Notice:  This list has 4 medication(s) that are the same as other medications prescribed for you. Read the directions carefully, and ask your doctor or other care provider to review them with you.         Where to get your medicines      These medications were sent to [facility] - [address]   [address]  [address], [place] [address]     Phone:  [phone]     albuterol 108 (90 BASE) MCG/ACT Inhaler    azithromycin 250 MG tablet    nystatin [id number] UNIT/GM Powd         Some of these will need a paper prescription and others can be bought over the counter.  Ask your  nurse if you have questions.     Bring a paper prescription for each of these medications     guaiFENesin-codeine 100-10 MG/5ML Soln solution                Primary Care Provider Office Phone # Fax #    Manoj Armenta [phone]       [address] [place] [address]        Thank you!     Thank you for choosing [facility]  for your care. Our goal is always to provide you with excellent care. Hearing back from our patients is one way we can continue to improve our services. Please take a few minutes to complete the written survey that you may receive in the mail after your visit with us. Thank you!             Your Updated Medication List - Protect others around you: Learn how to safely use, store and throw away your medicines at www.disposemymeds.org.          This list is accurate as of: 6/6/17 11:43 AM.  Always use your most recent med list.                   Brand Name Dispense Instructions for use    * albuterol 108 (90 BASE) MCG/ACT Inhaler    PROAIR HFA/PROVENTIL HFA/VENTOLIN HFA     Inhale 2 puffs into the lungs every 6 hours as needed for shortness of breath / dyspnea or wheezing       * albuterol 108 (90 BASE) MCG/ACT Inhaler    PROAIR HFA/PROVENTIL HFA/VENTOLIN HFA    1 Inhaler    Inhale 2 puffs into the lungs every 6 hours as needed for shortness of breath / dyspnea or wheezing       atenolol 50 MG tablet    TENORMIN    90 tablet    TAKE ONE TABLET BY MOUTH ONE TIME DAILY       azithromycin 250 MG tablet    ZITHROMAX    6 tablet    Two tablets first day, then one tablet daily for four days.       fluticasone 50 MCG/ACT spray    FLONASE    16 g    Spray 1-2 sprays into both nostrils daily       guaiFENesin-codeine 100-10 MG/5ML Soln solution    ROBITUSSIN AC    40 mL    Take 5-10 mLs by mouth every 4 hours as needed for cough       losartan 25 MG tablet    COZAAR    90 tablet    Take 1 tablet (25 mg) by mouth daily       * nystatin  [id number] UNIT/GM Powd    MYCOSTATIN    60 g    Apply topically 3 times daily as needed (recurrent yeast infection at moist skin creases)       * nystatin [id number] UNIT/GM Powd    MYCOSTATIN    30 g    Apply topically 3 times daily as needed       omeprazole 20 MG CR capsule    priLOSEC    90 capsule    Take 1 capsule (20 mg) by mouth daily as needed       sertraline 100 MG tablet    ZOLOFT    90 tablet    Take 1 tablet (100 mg) by mouth daily       sildenafil 100 MG cap/tab    REVATIO/VIAGRA    4 tablet    Take 0.5-1 tablets ( mg) by mouth daily as needed for erectile dysfunction Take 30 min to 4 hours before sexual activity.  Never use with nitroglycerin, terazosin or doxazosin.       SUMAtriptan 50 MG tablet    IMITREX    18 tablet    Take 1 tablet (50 mg) by mouth at onset of headache for migraine May repeat dose in 2 hours.  Do not exceed 200 mg in 24 hours       tamsulosin 0.4 MG capsule    FLOMAX    30 capsule    TAKE ONE CAPSULE BY MOUTH ONE TIME DAILY       traZODone 50 MG tablet    DESYREL    90 tablet    Take 1 tablet (50 mg) by mouth nightly as needed for sleep okay to increase to 100 mg after 1-2 weeks if needed       triamcinolone 0.1 % ointment    KENALOG    453 g    Apply topically 2 times daily       * Notice:  This list has 4 medication(s) that are the same as other medications prescribed for you. Read the directions carefully, and ask your doctor or other care provider to review them with you.

## 2017-06-06 NOTE — PATIENT INSTRUCTIONS
Bronchitis with Wheezing (Viral or Bacterial: Adult)    Bronchitis is an infection of the air passages. It often occurs during the common cold and is usually caused by a virus. Symptoms include cough with mucus (phlegm) and low-grade fever. This illness is contagious during the first few days and is spread through the air by coughing and sneezing, or by direct contact (touching the sick person and then touching your own eyes, nose, or mouth).  If there is a lot of inflammation, air flow is restricted. The air passages may also go into spasm, especially if you have asthma. This causes wheezing and difficulty breathing even in people who do not have asthma.  Bronchitis usually lasts 7 to 14 days. The wheezing should improve with treatment during the first week. An inhaler is often prescribed to relax the air passages and stop wheezing. Antibiotics will be prescribed if your doctor thinks there is also a secondary bacterial infection.  Home care    If symptoms are severe, rest at home for the first 2 to 3 days. When you go back to your usual activities, don't let yourself get too tired.    Do not smoke. Also avoid being exposed to secondhand smoke.    You may use over-the-counter medicine to control fever or pain, unless another medicine was prescribed. Note: If you have chronic liver or kidney disease or have ever had a stomach ulcer or gastrointestinal bleeding, talk with your healthcare provider before using these medicines. Also talk to your provider if you are taking medicine to prevent blood clots.) Aspirin should never be given to anyone younger than 18 years of age who is ill with a viral infection or fever. It may cause severe liver or brain damage.    Your appetite may be poor, so a light diet is fine. Avoid dehydration by drinking 6 to 8 glasses of fluids per day (such as water, soft drinks, sports drinks, juices, tea, or soup). Extra fluids will help loosen secretions in the nose  and lungs.    Over-the-counter cough, cold, and sore-throat medicines will not shorten the length of the illness, but they may be helpful to reduce symptoms. (Note: Do not use decongestants if you have high blood pressure.)    If you were given an inhaler, use it exactly as directed. If you need to use it more often than prescribed, your condition may be worsening. If this happens, contact your healthcare provider.    If prescribed, finish all antibiotic medicine, even if you are feeling better after only a few days.  Follow-up care  Follow up with your healthcare provider, or as advised. If you had an X-ray or ECG (electrocardiogram), a specialist will review it. You will be notified of any new findings that may affect your care.  Note: If you are age 65 or older, or if you have a chronic lung disease or condition that affects your immune system, or you smoke, talk to your healthcare provider about having a pneumococcal vaccinations and a yearly influenza vaccination (flu shot).  When to seek medical advice   Call your healthcare provider right away if any of these occur:    Fever of 100.4 F (38 C) or higher    Coughing up increasing amounts of colored sputum    Weakness, drowsiness, headache, facial pain, ear pain, or a stiff neck  Call 911, or get immediate medical care  Contact emergency services right away if any of these occur.    Coughing up blood    Worsening weakness, drowsiness, headache, or stiff neck    Increased wheezing not helped with medication, shortness of breath, or pain with breathing    3484-5222 [employer]. [address] [place] [address]. All rights reserved. This information is not intended as a substitute for professional medical care. Always follow your healthcare professional's instructions.        Candida Skin Infection (Adult)  Candida is type of yeast. It grows naturally on the skin and in the mouth. If it grows out of control, it can cause an infection. Candida  can cause infections in the genital area, skin folds, and under the breasts. Anyone can get this infection. It is more common in a person with a weakened immune system, such as from diabetes, HIV, or cancer. It s also more common in someone who has been on antibiotic therapy. And it s more common people who are overweight or who have incontinence. Wearing tight-fitting clothing and taking part in activities with lots of skin-to-skin contact can also put you at risk.  Candida causes the skin to become bright red and inflamed. The border of the infected part of the skin is often raised. The infection causes pain and itching. Sometimes the skin peels and bleeds.  A Candida rash is most often treated with an antifungal cream or ointment. The rash will clear a few days after starting the medication. Infections that don t go away may need a prescription medication. In rare cases, a bacterial infection can also occur.  Home care  Your health care provider will recommend an antifungal cream or ointment for the rash. He or she may also prescribe a medication for the itch. Follow all instructions for using these medications. Don t use cornstarch powder. Cornstarch can cause the Candida infection to get worse.  General care:    Keep your skin clean by washing the area twice a day.    Use the cream as directed until your rash is gone. Once the skin has healed, keep it dry to prevent another infection.     If you are overweight, talk with your health care provider about a plan to lose excess weight.    Avoid clothes that fit tightly.  Follow-up care  Your rash will clear in 7 to 14 days. Follow up with your health care provider if the rash is not gone after 14 days.  When to seek medical advice  Call your health care provider right away if any of these occur:    Pain or redness that gets worse or spreads    Fluid coming from the skin    Yellow crusts on the skin    Fever of 100.4 F (38 C) or higher, or as directed by your health  care provider       7300-6085 [employer]. [address] [place] [address], [place] [address]. All rights reserved. This information is not intended as a substitute for professional medical care. Always follow your healthcare professional's instructions.

## 2017-10-12 DIAGNOSIS — N40.1 BENIGN NON-NODULAR PROSTATIC HYPERPLASIA WITH LOWER URINARY TRACT SYMPTOMS: ICD-10-CM

## 2017-10-12 NOTE — TELEPHONE ENCOUNTER
tamsulosin (FLOMAX) 0.4 MG capsule         Last Written Prescription Date: 4/3/2017  Last Fill Quantity: 30, # refills: 4    Last Office Visit with [facility], [facility] or [facility] prescribing provider:  3/7/2017   Future Office Visit:      BP Readings from Last 3 Encounters:   06/06/17 118/78   03/07/17 124/76   09/09/16 124/78

## 2017-10-13 RX ORDER — TAMSULOSIN HYDROCHLORIDE 0.4 MG/1
CAPSULE ORAL
Qty: 30 CAPSULE | Refills: 3 | Status: SHIPPED | OUTPATIENT
Start: 2017-10-13 | End: 2018-03-23

## 2017-10-13 NOTE — TELEPHONE ENCOUNTER
Routing refill request to provider for review/approval because:  Per protocol, if patient on treatment for ED, route to provider. Patient has Viagra on med list.

## 2017-10-24 DIAGNOSIS — G43.809 OTHER MIGRAINE WITHOUT STATUS MIGRAINOSUS, NOT INTRACTABLE: ICD-10-CM

## 2017-10-25 NOTE — TELEPHONE ENCOUNTER
Routing refill request to provider for review/approval because:  Not filled since 2015. Please sign if ok.  Annia Farfan, RN  Triage Nurse

## 2017-10-27 RX ORDER — SUMATRIPTAN 50 MG/1
TABLET, FILM COATED ORAL
Qty: 18 TABLET | Refills: 2 | Status: SHIPPED | OUTPATIENT
Start: 2017-10-27 | End: 2019-04-26

## 2017-12-18 DIAGNOSIS — J20.9 ACUTE BRONCHITIS WITH COEXISTING CONDITION REQUIRING PROPHYLACTIC TREATMENT: ICD-10-CM

## 2017-12-21 DIAGNOSIS — J45.21 MILD INTERMITTENT ASTHMA WITH (ACUTE) EXACERBATION: Primary | ICD-10-CM

## 2017-12-21 RX ORDER — ALBUTEROL SULFATE 90 UG/1
2 AEROSOL, METERED RESPIRATORY (INHALATION) EVERY 6 HOURS PRN
Qty: 1 INHALER | Refills: 0 | Status: SHIPPED | OUTPATIENT
Start: 2017-12-21 | End: 2022-10-29

## 2017-12-21 NOTE — TELEPHONE ENCOUNTER
Requested Prescriptions   Pending Prescriptions Disp Refills     albuterol (PROAIR HFA/PROVENTIL HFA/VENTOLIN HFA) 108 (90 BASE) MCG/ACT Inhaler 1 Inhaler 0     Sig: Inhale 2 puffs into the lungs every 6 hours as needed for shortness of breath / dyspnea or wheezing    Asthma Maintenance Inhalers - Anticholinergics Failed    12/18/2017  9:23 AM       Failed - Asthma control test score is 20 or greater in last 6 months    Please review ACT score.   ACT score 20 on 3/7/17.  Pt on yearly visits per asthma action plan.       Failed - Recent (6 mo) or future visit with authorizing provider's specialty    Patient had office visit in the last 6 months or has a visit in the next 30 days with authorizing provider.  See chart review.   Pt LOV 3/7/17.  Pt on yearly visits per asthma action plan.  Pt due to be seen 3/8/18         Passed - Patient is age 12 years or older        Medication is being filled for 1 time refill only due to:  pt will be due for physical 3/7/18      Isabel Zhang RN  Triage Nurse

## 2017-12-21 NOTE — TELEPHONE ENCOUNTER
DUPLICATE.     Requested Prescriptions   Pending Prescriptions Disp Refills     PROAIR  (90 BASE) MCG/ACT inhaler [Pharmacy Med Name: ProAir HFA Inhalation Aerosol Solution 108 (90 Base) MCG/ACT]    Last Written Prescription Date:  12/21/2017  Last Fill Quantity: 1,  # refills: 0   Last Office Visit with [facility] or [facility] prescribing provider:  3/7/2017   Future Office Visit:      8.5 g 0     Sig: INHALE 2 PUFFS INTO THE LUNGS EVERY 6 HOURS AS NEEDED FOR SHORTNESS OF BREATH / DYSPNEA OR WHEEZING    Asthma Maintenance Inhalers - Anticholinergics Failed    12/21/2017  3:45 PM       Failed - Asthma control test score is 20 or greater in last 6 months    Please review ACT score.          Failed - Recent (6 mo) or future visit with authorizing provider's specialty    Patient had office visit in the last 6 months or has a visit in the next 30 days with authorizing provider.  See chart review.            Passed - Patient is age 12 years or older        FLOVENT  MCG/ACT Inhaler [Pharmacy Med Name: Flovent HFA Inhalation Aerosol 110 MCG/ACT]    Last Written Prescription Date:  3/7/2017  Last Fill Quantity: 16 G,  # refills: 11   [place] [facility] [facility] or [facility] prescribing provider:  3/7/2017   Future Office Visit:      12 g 0     Sig: INHALE 2 PUFFS INTO THE LUNGS 2 TIMES DAILY    There is no refill protocol information for this order

## 2017-12-27 RX ORDER — DEXAMETHASONE 4 MG/1
TABLET ORAL
Qty: 12 G | Refills: 0 | Status: SHIPPED | OUTPATIENT
Start: 2017-12-27 | End: 2018-01-17

## 2017-12-27 RX ORDER — ALBUTEROL SULFATE 90 UG/1
AEROSOL, METERED RESPIRATORY (INHALATION)
Qty: 8.5 G | Refills: 0 | OUTPATIENT
Start: 2017-12-27

## 2017-12-27 NOTE — TELEPHONE ENCOUNTER
Pro-Air sent back as duplicate.  Flovent not on active medication list.     Please sign if ok.   ACT out dated.   Annia Farfan, JUDITH  Triage Nurse

## 2018-01-17 ENCOUNTER — OFFICE VISIT (OUTPATIENT)
Dept: PEDIATRICS | Facility: CLINIC | Age: 58
End: 2018-01-17
Payer: COMMERCIAL

## 2018-01-17 VITALS
TEMPERATURE: 97.9 F | HEIGHT: 66 IN | HEART RATE: 73 BPM | WEIGHT: 252 LBS | BODY MASS INDEX: 40.5 KG/M2 | DIASTOLIC BLOOD PRESSURE: 62 MMHG | SYSTOLIC BLOOD PRESSURE: 106 MMHG | OXYGEN SATURATION: 96 %

## 2018-01-17 DIAGNOSIS — R07.9 ACUTE CHEST PAIN: ICD-10-CM

## 2018-01-17 DIAGNOSIS — F32.5 MAJOR DEPRESSION IN COMPLETE REMISSION (H): ICD-10-CM

## 2018-01-17 DIAGNOSIS — J45.21 MILD INTERMITTENT ASTHMA WITH (ACUTE) EXACERBATION: Primary | ICD-10-CM

## 2018-01-17 PROCEDURE — 99214 OFFICE O/P EST MOD 30 MIN: CPT | Performed by: INTERNAL MEDICINE

## 2018-01-17 RX ORDER — DOXYCYCLINE 100 MG/1
100 CAPSULE ORAL 2 TIMES DAILY
Qty: 20 CAPSULE | Refills: 0 | Status: SHIPPED | OUTPATIENT
Start: 2018-01-17 | End: 2018-05-18

## 2018-01-17 RX ORDER — FLUTICASONE PROPIONATE AND SALMETEROL 113; 14 UG/1; UG/1
1 POWDER, METERED RESPIRATORY (INHALATION) 2 TIMES DAILY
Qty: 1 EACH | Refills: 3 | Status: SHIPPED | OUTPATIENT
Start: 2018-01-17 | End: 2018-08-04

## 2018-01-17 RX ORDER — PREDNISONE 20 MG/1
40 TABLET ORAL DAILY
Qty: 10 TABLET | Refills: 0 | Status: SHIPPED | OUTPATIENT
Start: 2018-01-17 | End: 2018-01-22

## 2018-01-17 ASSESSMENT — ANXIETY QUESTIONNAIRES
7. FEELING AFRAID AS IF SOMETHING AWFUL MIGHT HAPPEN: SEVERAL DAYS
3. WORRYING TOO MUCH ABOUT DIFFERENT THINGS: NOT AT ALL
5. BEING SO RESTLESS THAT IT IS HARD TO SIT STILL: NOT AT ALL
2. NOT BEING ABLE TO STOP OR CONTROL WORRYING: NOT AT ALL
GAD7 TOTAL SCORE: 1
IF YOU CHECKED OFF ANY PROBLEMS ON THIS QUESTIONNAIRE, HOW DIFFICULT HAVE THESE PROBLEMS MADE IT FOR YOU TO DO YOUR WORK, TAKE CARE OF THINGS AT HOME, OR GET ALONG WITH OTHER PEOPLE: SOMEWHAT DIFFICULT
1. FEELING NERVOUS, ANXIOUS, OR ON EDGE: NOT AT ALL
6. BECOMING EASILY ANNOYED OR IRRITABLE: NOT AT ALL

## 2018-01-17 ASSESSMENT — PATIENT HEALTH QUESTIONNAIRE - PHQ9
SUM OF ALL RESPONSES TO PHQ QUESTIONS 1-9: 5
5. POOR APPETITE OR OVEREATING: NOT AT ALL

## 2018-01-17 NOTE — PROGRESS NOTES
SUBJECTIVE:   Fabio Logan is a 57 year old male who presents to clinic today for the following health issues:      RESPIRATORY SYMPTOMS      Duration: 2-3 weeks     Description  nasal congestion, rhinorrhea, facial pain/pressure, cough and wheezing    Severity: moderate    Accompanying signs and symptoms: salty taste in his mouth.     History (predisposing factors):  none    Precipitating or alleviating factors: Asthma    Therapies tried and outcome:  OTC  meds-not effective     Has been noting some increased wheezing, has had some increased shortness of breath with exertion before this started. Having some chest pain with coughing with this episode. No chest pain with exertion. NO palpitations. Has been using albuterol, has not been on flovent due to cost concerns.     No fevers. Is feeling that there is some slight swelling in legs. Noted family history of CAD, had stress test in 2015, which was normal.    Notes that he is exposed to dust and metal shavings at work. Has been trying to wear a mask.    Mood has been going well.  PHQ-9 SCORE 9/9/2016 3/7/2017 1/17/2018   Total Score - - -   Total Score 2 8 5     OSMAN-7 SCORE 9/9/2016 1/17/2018   Total Score 2 1             Problem list and histories reviewed & adjusted, as indicated.  Additional history: as documented    Patient Active Problem List   Diagnosis     CARDIOVASCULAR SCREENING; LDL GOAL LESS THAN 160     Mild major depression (H)     Obese     HTN, goal below 140/90     H/O testicular cancer     Diverticulosis of large intestine     Esophageal reflux     Chest pain     Migraine     Skin cancer screening     Skin eruption     Lentigines     Chronic dermatitis     ACP (advance care planning)     Mild intermittent asthma with (acute) exacerbation     Essential hypertension with goal blood pressure less than 140/90     Past Surgical History:   Procedure Laterality Date     COLONOSCOPY  8/22/2014    Dr. Garcia [facility]     COLONOSCOPY N/A 8/22/2014     "Procedure: COLONOSCOPY;  Surgeon: Joe Garcia MD;  Location:  GI     ENT SURGERY      tonsilectomy     GENITOURINARY SURGERY  1998    right orchidectomy       Social History   Substance Use Topics     Smoking status: Former Smoker     Quit date: 6/6/1997     Smokeless tobacco: Former User     Alcohol use No     Family History   Problem Relation Age of Onset     CEREBROVASCULAR DISEASE Mother      Hypertension Mother      HEART DISEASE Father      had 2 open heart surgery     Lipids Father      Hypertension Father      CANCER Maternal Grandfather      skin cancer     Skin Cancer Maternal Grandfather      skin cancer     HEART DISEASE Sister 45     heart attack     CANCER Sister 46     breast cancer     Skin Cancer Sister      BCC             Reviewed and updated as needed this visit by clinical staff     Reviewed and updated as needed this visit by Provider         ROS:  Constitutional, HEENT, cardiovascular, pulmonary, GI, , musculoskeletal, neuro, skin, endocrine and psych systems are negative, except as otherwise noted.      OBJECTIVE:   /62 (BP Location: Right arm, Cuff Size: Adult Large)  Pulse 73  Temp 97.9  F (36.6  C) (Oral)  Ht 5' 6\" (1.676 m)  Wt 252 lb (114.3 kg)  SpO2 96%  BMI 40.67 kg/m2  Body mass index is 40.67 kg/(m^2).   Wt Readings from Last 4 Encounters:   01/17/18 252 lb (114.3 kg)   06/06/17 235 lb 12.8 oz (107 kg)   03/07/17 244 lb 14.4 oz (111.1 kg)   09/09/16 243 lb 4 oz (110.3 kg)       GENERAL: fatigued but in no acute distress  HENT: ear canals and TM's normal, nose and mouth without ulcers or lesions  NECK: no adenopathy, no asymmetry, masses, or scars and thyroid normal to palpation  RESP: bilateral wheezing noted with prolonged expiratory phase, no focal crackles, easy work of breathing  CV: regular rate and rhythm, normal S1 S2, no S3 or S4, no murmur, click or rub, trace peripheral edema and peripheral pulses strong  ABDOMEN: soft, nontender, no " hepatosplenomegaly, no masses and bowel sounds normal  MS: no gross musculoskeletal defects noted, no edema  SKIN: no suspicious lesions or rashes  NEURO: Normal strength and tone, mentation intact and speech normal    Diagnostic Test Results:  none     ASSESSMENT/PLAN:       ICD-10-CM    1. Mild intermittent asthma with (acute) exacerbation J45.21 Fluticasone-Salmeterol 113-14 MCG/ACT AEPB     predniSONE (DELTASONE) 20 MG tablet     doxycycline (VIBRAMYCIN) 100 MG capsule   2. Acute chest pain R07.9 Exercise Stress Echocardiogram   3. Major depression in complete remission (H) F32.5      Will treat for asthma flare right now with bronchitis. Discussed warning signs for evaluation in the ER with chest pain, is reproduced with palpation but given baseline symptoms of VELOZ, will consider stress test if ongoing.    Patient Instructions   1) Albuterol every 4 hours for wheezing    2) Prednisone 40 mg per day for 5 days    3) Doxycycline twice per day for 10 days    4) Mucinex can help break up mucous if needed    5) Start the Airduo if able. If still having shortness of breath after starting this we should do the stress testing as we discussed. This has been ordered for you. Please call [facility] : [phone] or toll-free [phone] ? [facility] [address] [place] [address]    We will get a chest xray if symptoms not improving. If you have chest pain that comes on and does not go away- you need to be seen ASAP.      MD Eligio Pike MD, MD  [facility] [place]

## 2018-01-17 NOTE — MR AVS SNAPSHOT
After Visit Summary   1/17/2018    Fabio Logan    MRN: [id number]           Patient Information     Date Of Birth          1960        Visit Information        Provider Department      1/17/2018 11:50 AM Eligio Bah MD [facility]        Today's Diagnoses     Mild intermittent asthma with (acute) exacerbation    -  1    Acute chest pain          Care Instructions    1) Albuterol every 4 hours for wheezing    2) Prednisone 40 mg per day for 5 days    3) Doxycycline twice per day for 10 days    4) Mucinex can help break up mucous if needed    5) Start the Airduo if able. If still having shortness of breath after starting this we should do the stress testing as we discussed. This has been ordered for you. Please call  [facility] : [phone] or toll-free [phone] ? [facility] [address] [place] [address]    We will get a chest xray if symptoms not improving. If you have chest pain that comes on and does not go away- you need to be seen ASAP.      Eligio Bah MD          Follow-ups after your visit        Future tests that were ordered for you today     Open Future Orders        Priority Expected Expires Ordered    Exercise Stress Echocardiogram Routine  1/17/2019 1/17/2018            Who to contact     If you have questions or need follow up information about today's clinic visit or your schedule please contact [facility] directly at [phone].  Normal or non-critical lab and imaging results will be communicated to you by MyChart, letter or phone within 4 business days after the clinic has received the results. If you do not hear from us within 7 days, please contact the clinic through MyChart or phone. If you have a critical or abnormal lab result, we will notify you by phone as soon as possible.  Submit refill requests through [employer] or call your pharmacy and they will forward the refill request to us. Please  "allow 3 business days for your refill to be completed.          Additional Information About Your Visit        [employer] Information     [employer] gives you secure access to your electronic health record. If you see a primary care provider, you can also send messages to your care team and make appointments. If you have questions, please call your primary care clinic.  If you do not have a primary care provider, please call [phone] and they will assist you.        Care EveryWhere ID     This is your Care EveryWhere ID. This could be used by other organizations to access your [facility] medical records  [id number]        Your Vitals Were     Pulse Temperature Height Pulse Oximetry BMI (Body Mass Index)       73 97.9  F (36.6  C) (Oral) 5' 6\" (1.676 m) 96% 40.67 kg/m2        Blood Pressure from Last 3 Encounters:   01/17/18 106/62   06/06/17 118/78   03/07/17 124/76    Weight from Last 3 Encounters:   01/17/18 252 lb (114.3 kg)   06/06/17 235 lb 12.8 oz (107 kg)   03/07/17 244 lb 14.4 oz (111.1 kg)              We Performed the Following     Asthma Action Plan (AAP)          Today's Medication Changes          These changes are accurate as of: 1/17/18 12:13 PM.  If you have any questions, ask your nurse or doctor.               Start taking these medicines.        Dose/Directions    doxycycline 100 MG capsule   Commonly known as:  VIBRAMYCIN   Used for:  Mild intermittent asthma with (acute) exacerbation   Started by:  Eligio Bah MD        Dose:  100 mg   Take 1 capsule (100 mg) by mouth 2 times daily   Quantity:  20 capsule   Refills:  0       Fluticasone-Salmeterol 113-14 MCG/ACT Aepb   Used for:  Mild intermittent asthma with (acute) exacerbation   Started by:  Eligio Bah MD        Dose:  1 puff   Inhale 1 puff into the lungs 2 times daily   Quantity:  1 each   Refills:  3       predniSONE 20 MG tablet   Commonly known as:  DELTASONE   Used for:  Mild intermittent asthma with (acute) exacerbation "   Started by:  Eligio Bah MD        Dose:  40 mg   Take 2 tablets (40 mg) by mouth daily for 5 days   Quantity:  10 tablet   Refills:  0         These medicines have changed or have updated prescriptions.        Dose/Directions    albuterol 108 (90 BASE) MCG/ACT Inhaler   Commonly known as:  PROAIR HFA/PROVENTIL HFA/VENTOLIN HFA   This may have changed:  Another medication with the same name was removed. Continue taking this medication, and follow the directions you see here.   Used for:  Acute bronchitis with coexisting condition requiring prophylactic treatment   Changed by:  Manoj Armenta MD        Dose:  2 puff   Inhale 2 puffs into the lungs every 6 hours as needed for shortness of breath / dyspnea or wheezing   Quantity:  1 Inhaler   Refills:  0       nystatin [id number] UNIT/GM Powd   Commonly known as:  MYCOSTATIN   This may have changed:  Another medication with the same name was removed. Continue taking this medication, and follow the directions you see here.   Used for:  Candidal intertrigo   Changed by:  Manoj Armenta MD        Apply topically 3 times daily as needed (recurrent yeast infection at moist skin creases)   Quantity:  60 g   Refills:  2         Stop taking these medicines if you haven't already. Please contact your care team if you have questions.     FLOVENT  MCG/ACT Inhaler   Generic drug:  fluticasone   Stopped by:  Eligio Bah MD           guaiFENesin-codeine 100-10 MG/5ML Soln solution   Commonly known as:  ROBITUSSIN AC   Stopped by:  Eligio Bah MD                Where to get your medicines      These medications were sent to [facility] - [address]   [address]  [address], [place] [address]     Phone:  [phone]     doxycycline 100 MG capsule    Fluticasone-Salmeterol 113-14 MCG/ACT Aepb    predniSONE 20 MG tablet                Primary Care Provider Office Phone # Fax #    Manoj HAYNES  MD Geovany [phone] [address] [place] [address]        Equal Access to Services     [facility]: Hadii cristiana steven liz Pizarro, wacameliada lunilda, kyata kakathrineda zarina, betito fortune. So [facility] [phone].    ATENCIÓN: Si habla español, tiene a mae disposición servicios gratuitos de asistencia lingüística. Llame al [phone].    We comply with applicable federal civil rights laws and [place] laws. We do not discriminate on the basis of race, color, national origin, age, disability, sex, sexual orientation, or gender identity.            Thank you!     Thank you for choosing [facility] [place] [facility]  for your care. Our goal is always to provide you with excellent care. Hearing back from our patients is one way we can continue to improve our services. Please take a few minutes to complete the written survey that you may receive in the mail after your visit with us. Thank you!             Your Updated Medication List - Protect others around you: Learn how to safely use, store and throw away your medicines at www.disposemymeds.org.          This list is accurate as of: 1/17/18 12:13 PM.  Always use your most recent med list.                   Brand Name Dispense Instructions for use Diagnosis    albuterol 108 (90 BASE) MCG/ACT Inhaler    PROAIR HFA/PROVENTIL HFA/VENTOLIN HFA    1 Inhaler    Inhale 2 puffs into the lungs every 6 hours as needed for shortness of breath / dyspnea or wheezing    Acute bronchitis with coexisting condition requiring prophylactic treatment       atenolol 50 MG tablet    TENORMIN    90 tablet    TAKE ONE TABLET BY MOUTH ONE TIME DAILY    HTN, goal below 140/90       doxycycline 100 MG capsule    VIBRAMYCIN    20 capsule    Take 1 capsule (100 mg) by mouth 2 times daily    Mild intermittent asthma with (acute) exacerbation       fluticasone 50 MCG/ACT spray    FLONASE    16 g    Spray 1-2 sprays into both nostrils daily     Seasonal allergic rhinitis, unspecified allergic rhinitis trigger       Fluticasone-Salmeterol 113-14 MCG/ACT Aepb     1 each    Inhale 1 puff into the lungs 2 times daily    Mild intermittent asthma with (acute) exacerbation       losartan 25 MG tablet    COZAAR    90 tablet    Take 1 tablet (25 mg) by mouth daily    Essential hypertension with goal blood pressure less than 140/90       nystatin [id number] UNIT/GM Powd    MYCOSTATIN    60 g    Apply topically 3 times daily as needed (recurrent yeast infection at moist skin creases)    Candidal intertrigo       omeprazole 20 MG CR capsule    priLOSEC    90 capsule    Take 1 capsule (20 mg) by mouth daily as needed    Gastroesophageal reflux disease without esophagitis       predniSONE 20 MG tablet    DELTASONE    10 tablet    Take 2 tablets (40 mg) by mouth daily for 5 days    Mild intermittent asthma with (acute) exacerbation       sertraline 100 MG tablet    ZOLOFT    90 tablet    Take 1 tablet (100 mg) by mouth daily    Major depressive disorder, recurrent episode, mild (H)       sildenafil 100 MG tablet    VIAGRA    4 tablet    Take 0.5-1 tablets ( mg) by mouth daily as needed for erectile dysfunction Take 30 min to 4 hours before sexual activity.  Never use with nitroglycerin, terazosin or doxazosin.    Erectile dysfunction, unspecified erectile dysfunction type       SUMAtriptan 50 MG tablet    IMITREX    18 tablet    TAKE 1 TABLET (50 MG) BY MOUTH AT ONSET OF HEADACHE FOR MIGRAINE MAY REPEAT DOSE IN 2 HOURS.  DO NOT EXCEED 200 MG IN 24 HOURS    Other migraine without status migrainosus, not intractable       tamsulosin 0.4 MG capsule    FLOMAX    30 capsule    TAKE ONE CAPSULE BY MOUTH ONE TIME DAILY    Benign non-nodular prostatic hyperplasia with lower urinary tract symptoms       traZODone 50 MG tablet    DESYREL    90 tablet    Take 1 tablet (50 mg) by mouth nightly as needed for sleep okay to increase to 100 mg after 1-2 weeks if needed    Major  depressive disorder, recurrent episode, mild (H)       triamcinolone 0.1 % ointment    KENALOG    453 g    Apply topically 2 times daily    Skin eruption

## 2018-01-17 NOTE — NURSING NOTE
"Chief Complaint   Patient presents with     URI       Initial /62 (BP Location: Right arm, Cuff Size: Adult Large)  Pulse 73  Temp 97.9  F (36.6  C) (Oral)  Ht 5' 6\" (1.676 m)  Wt 252 lb (114.3 kg)  SpO2 96%  BMI 40.67 kg/m2 Estimated body mass index is 40.67 kg/(m^2) as calculated from the following:    Height as of this encounter: 5' 6\" (1.676 m).    Weight as of this encounter: 252 lb (114.3 kg).  Medication Reconciliation: complete   Radha Carranza MA    "

## 2018-01-17 NOTE — PATIENT INSTRUCTIONS
1) Albuterol every 4 hours for wheezing    2) Prednisone 40 mg per day for 5 days    3) Doxycycline twice per day for 10 days    4) Mucinex can help break up mucous if needed    5) Start the Airduo if able. If still having shortness of breath after starting this we should do the stress testing as we discussed. This has been ordered for you. Please call [facility] : [phone] or toll-free [phone] ? [address] [place] [address]    We will get a chest xray if symptoms not improving. If you have chest pain that comes on and does not go away- you need to be seen ASAP.      Eligio Bah MD

## 2018-01-17 NOTE — LETTER
My Asthma Action Plan  Name: Fabio Logan   YOB: 1960  Date: 1/17/2018   My doctor: Eligio Bah MD, MD   My clinic: [facility]   Good Control    I feel good    No cough or wheeze    Can work, sleep and play without asthma symptoms       Take your asthma control medicine every day.     1. If exercise triggers your asthma, take your rescue medication    15 minutes before exercise or sports, and    During exercise if you have asthma symptoms  2. Spacer to use with inhaler: If you have a spacer, make sure to use it with your inhaler             YELLOW ZONE Getting Worse  I have ANY of these:    I do not feel good    Cough or wheeze    Chest feels tight    Wake up at night   1. Keep taking your Green Zone medications  2. Start taking your rescue medicine:    every 20 minutes for up to 1 hour. Then every 4 hours for 24-48 hours.  3. If you stay in the Yellow Zone for more than 12-24 hours, contact your doctor.  4. If you do not return to the Green Zone in 12-24 hours or you get worse, start taking your oral steroid medicine if prescribed by your provider.           RED ZONE Medical Alert - Get Help  I have ANY of these:    I feel awful    Medicine is not helping    Breathing getting harder    Trouble walking or talking    Nose opens wide to breathe       1. Take your rescue medicine NOW  2. If your provider has prescribed an oral steroid medicine, start taking it NOW  3. Call your doctor NOW  4. If you are still in the Red Zone after 20 minutes and you have not reached your doctor:    Take your rescue medicine again and    Call 911 or go to the emergency room right away    See your regular doctor within 2 weeks of an Emergency Room or Urgent Care visit for follow-up treatment.        Electronically signed by: Radha Carranza, January 17, 2018    Annual Reminders:  Meet with Asthma Educator,  Flu Shot in the Fall, consider Pneumonia Vaccination for  patients with asthma (aged 19 and older).    Pharmacy: [id number] - [place], [place] - [address]                    Asthma Triggers  How To Control Things That Make Your Asthma Worse    Triggers are things that make your asthma worse.  Look at the list below to help you find your triggers and what you can do about them.  You can help prevent asthma flare-ups by staying away from your triggers.      Trigger                                                          What you can do   Cigarette Smoke  Tobacco smoke can make asthma worse. Do not allow smoking in your home, car or around you.  Be sure no one smokes at a child s day care or school.  If you smoke, ask your health care provider for ways to help you quit.  Ask family members to quit too.  Ask your health care provider for a referral to Quit Plan to help you quit smoking, or call 5-678-750-PLAN.     Colds, Flu, Bronchitis  These are common triggers of asthma. Wash your hands often.  Don t touch your eyes, nose or mouth.  Get a flu shot every year.     Dust Mites  These are tiny bugs that live in cloth or carpet. They are too small to see. Wash sheets and blankets in hot water every week.   Encase pillows and mattress in dust mite proof covers.  Avoid having carpet if you can. If you have carpet, vacuum weekly.   Use a dust mask and HEPA vacuum.   Pollen and Outdoor Mold  Some people are allergic to trees, grass, or weed pollen, or molds. Try to keep your windows closed.  Limit time out doors when pollen count is high.   Ask you health care provider about taking medicine during allergy season.     Animal Dander  Some people are allergic to skin flakes, urine or saliva from pets with fur or feathers. Keep pets with fur or feathers out of your home.    If you can t keep the pet outdoors, then keep the pet out of your bedroom.  Keep the bedroom door closed.  Keep pets off cloth furniture and away from stuffed toys.     Mice, Rats, and Cockroaches  Some  people are allergic to the waste from these pests.   Cover food and garbage.  Clean up spills and food crumbs.  Store grease in the refrigerator.   Keep food out of the bedroom.   Indoor Mold  This can be a trigger if your home has high moisture. Fix leaking faucets, pipes, or other sources of water.   Clean moldy surfaces.  Dehumidify basement if it is damp and smelly.   Smoke, Strong Odors, and Sprays  These can reduce air quality. Stay away from strong odors and sprays, such as perfume, powder, hair spray, paints, smoke incense, paint, cleaning products, candles and new carpet.   Exercise or Sports  Some people with asthma have this trigger. Be active!  Ask your doctor about taking medicine before sports or exercise to prevent symptoms.    Warm up for 5-10 minutes before and after sports or exercise.     Other Triggers of Asthma  Cold air:  Cover your nose and mouth with a scarf.  Sometimes laughing or crying can be a trigger.  Some medicines and food can trigger asthma.

## 2018-01-19 ASSESSMENT — ANXIETY QUESTIONNAIRES: GAD7 TOTAL SCORE: 1

## 2018-01-19 ASSESSMENT — ASTHMA QUESTIONNAIRES: ACT_TOTALSCORE: 11

## 2018-03-23 DIAGNOSIS — N40.1 BENIGN NON-NODULAR PROSTATIC HYPERPLASIA WITH LOWER URINARY TRACT SYMPTOMS: ICD-10-CM

## 2018-03-23 NOTE — TELEPHONE ENCOUNTER
"Requested Prescriptions   Pending Prescriptions Disp Refills     tamsulosin (FLOMAX) 0.4 MG capsule [Pharmacy Med Name: Tamsulosin HCl Oral Capsule 0.4 MG]    Last Written Prescription Date:  10/13/2017  Last Fill Quantity: 30,  # refills: 3   Last office visit: 1/17/2018 with prescribing provider:  Eligio Bah MD      Future Office Visit:     30 capsule 2     Sig: TAKE ONE CAPSULE BY MOUTH ONE TIME DAILY    Alpha Blockers Failed    3/23/2018  7:00 AM       Failed - Patient does not have Tadalafil, Vardenafil, or Sildenafil on their medication list       Passed - Blood pressure under 140/90 in past 12 months    BP Readings from Last 3 Encounters:   01/17/18 106/62   06/06/17 118/78   03/07/17 124/76                Passed - Recent (12 mo) or future (30 days) visit within the authorizing provider's specialty    Patient had office visit in the last 12 months or has a visit in the next 30 days with authorizing provider or within the authorizing provider's specialty.  See \"Patient Info\" tab in inbasket, or \"Choose Columns\" in Meds & Orders section of the refill encounter.           Passed - Patient is 18 years of age or older          "

## 2018-03-26 RX ORDER — TAMSULOSIN HYDROCHLORIDE 0.4 MG/1
CAPSULE ORAL
Qty: 30 CAPSULE | Refills: 2 | Status: SHIPPED | OUTPATIENT
Start: 2018-03-26 | End: 2018-05-18

## 2018-03-31 DIAGNOSIS — J30.2 SEASONAL ALLERGIC RHINITIS: ICD-10-CM

## 2018-03-31 NOTE — TELEPHONE ENCOUNTER
"Requested Prescriptions   Pending Prescriptions Disp Refills     fluticasone (FLONASE) 50 MCG/ACT spray [Pharmacy Med Name: Fluticasone Propionate Nasal Suspension 50 MCG/ACT]  Last Written Prescription Date:  3/7/17  Last Fill Quantity: 16g,  # refills: 11   Last office visit: 1/17/2018 with prescribing provider:  1/17/18   Future Office Visit:     16 g 10     Sig: SPRAY 1 TO 2 SPRAYS INTO BOTH NOSTRISL DAILY    Inhaled Steroids Protocol Failed    3/31/2018  1:03 PM       Failed - Asthma control assessment score within normal limits in last 6 months    Please review ACT score.          Passed - Patient is age 12 or older       Passed - Recent (6 mo) or future (30 days) visit within the authorizing provider's specialty    Patient had office visit in the last 6 months or has a visit in the next 30 days with authorizing provider or within the authorizing provider's specialty.  See \"Patient Info\" tab in inbasket, or \"Choose Columns\" in Meds & Orders section of the refill encounter.              "

## 2018-04-02 ENCOUNTER — OFFICE VISIT (OUTPATIENT)
Dept: FAMILY MEDICINE | Facility: CLINIC | Age: 58
End: 2018-04-02
Payer: COMMERCIAL

## 2018-04-02 ENCOUNTER — TRANSFERRED RECORDS (OUTPATIENT)
Dept: HEALTH INFORMATION MANAGEMENT | Facility: CLINIC | Age: 58
End: 2018-04-02

## 2018-04-02 VITALS
HEART RATE: 68 BPM | RESPIRATION RATE: 12 BRPM | SYSTOLIC BLOOD PRESSURE: 130 MMHG | DIASTOLIC BLOOD PRESSURE: 72 MMHG | BODY MASS INDEX: 40.84 KG/M2 | WEIGHT: 253 LBS | OXYGEN SATURATION: 97 % | TEMPERATURE: 98.1 F

## 2018-04-02 DIAGNOSIS — L20.9 ATOPIC DERMATITIS, UNSPECIFIED TYPE: ICD-10-CM

## 2018-04-02 DIAGNOSIS — T14.8XXA WOUND OF SKIN: Primary | ICD-10-CM

## 2018-04-02 DIAGNOSIS — L98.9 SKIN LESION: ICD-10-CM

## 2018-04-02 DIAGNOSIS — R60.0 PERIPHERAL EDEMA: ICD-10-CM

## 2018-04-02 DIAGNOSIS — F32.0 MILD MAJOR DEPRESSION (H): ICD-10-CM

## 2018-04-02 PROCEDURE — 99214 OFFICE O/P EST MOD 30 MIN: CPT | Performed by: FAMILY MEDICINE

## 2018-04-02 NOTE — LETTER
My Depression Action Plan  Name: Fabio Logan   Date of Birth 1960  Date: 4/2/2018    My doctor: Manoj Armenta   My clinic: [address] [address] [phone]          GREEN    ZONE   Good Control    What it looks like:     Things are going generally well. You have normal up s and down s. You may even feel depressed from time to time, but bad moods usually last less than a day.   What you need to do:  1. Continue to care for yourself (see self care plan)  2. Check your depression survival kit and update it as needed  3. Follow your physician s recommendations including any medication.  4. Do not stop taking medication unless you consult with your physician first.           YELLOW         ZONE Getting Worse    What it looks like:     Depression is starting to interfere with your life.     It may be hard to get out of bed; you may be starting to isolate yourself from others.    Symptoms of depression are starting to last most all day and this has happened for several days.     You may have suicidal thoughts but they are not constant.   What you need to do:     1. Call your care team, your response to treatment will improve if you keep your care team informed of your progress. Yellow periods are signs an adjustment may need to be made.     2. Continue your self-care, even if you have to fake it!    3. Talk to someone in your support network    4. Open up your depression survival kit           RED    ZONE Medical Alert - Get Help    What it looks like:     Depression is seriously interfering with your life.     You may experience these or other symptoms: You can t get out of bed most days, can t work or engage in other necessary activities, you have trouble taking care of basic hygiene, or basic responsibilities, thoughts of suicide or death that will not go away, self-injurious behavior.     What you need to do:  1. Call your care team  and request a same-day appointment. If they are not available (weekends or after hours) call your local crisis line, emergency room or 911.            Depression Self Care Plan / Survival Kit    Self-Care for Depression  Here s the deal. Your body and mind are really not as separate as most people think.  What you do and think affects how you feel and how you feel influences what you do and think. This means if you do things that people who feel good do, it will help you feel better.  Sometimes this is all it takes.  There is also a place for medication and therapy depending on how severe your depression is, so be sure to consult with your medical provider and/ or Behavioral Health Consultant if your symptoms are worsening or not improving.     In order to better manage my stress, I will:    Exercise  Get some form of exercise, every day. This will help reduce pain and release endorphins, the  feel good  chemicals in your brain. This is almost as good as taking antidepressants!  This is not the same as joining a gym and then never going! (they count on that by the way ) It can be as simple as just going for a walk or doing some gardening, anything that will get you moving.      Hygiene   Maintain good hygiene (Get out of bed in the morning, Make your bed, Brush your teeth, Take a shower, and Get dressed like you were going to work, even if you are unemployed).  If your clothes don't fit try to get ones that do.    Diet  I will strive to eat foods that are good for me, drink plenty of water, and avoid excessive sugar, caffeine, alcohol, and other mood-altering substances.  Some foods that are helpful in depression are: complex carbohydrates, B vitamins, flaxseed, fish or fish oil, fresh fruits and vegetables.    Psychotherapy  I agree to participate in Individual Therapy (if recommended).    Medication  If prescribed medications, I agree to take them.  Missing doses can result in serious side effects.  I understand  that drinking alcohol, or other illicit drug use, may cause potential side effects.  I will not stop my medication abruptly without first discussing it with my provider.    Staying Connected With Others  I will stay in touch with my friends, family members, and my primary care provider/team.    Use your imagination  Be creative.  We all have a creative side; it doesn t matter if it s oil painting, sand castles, or mud pies! This will also kick up the endorphins.    Witness Beauty  (AKA stop and smell the roses) Take a look outside, even in mid-winter. Notice colors, textures. Watch the squirrels and birds.     Service to others  Be of service to others.  There is always someone else in need.  By helping others we can  get out of ourselves  and remember the really important things.  This also provides opportunities for practicing all the other parts of the program.    Humor  Laugh and be silly!  Adjust your TV habits for less news and crime-drama and more comedy.    Control your stress  Try breathing deep, massage therapy, biofeedback, and meditation. Find time to relax each day.     My support system    Clinic Contact:  Phone number:    Contact 1:  Phone number:    Contact 2:  Phone number:    Mu-ism/:  Phone number:    Therapist:  Phone number:    Local crisis center:    Phone number:    Other community support:  Phone number:

## 2018-04-02 NOTE — PATIENT INSTRUCTIONS
1. Continue to apply antibiotic ointment to the spot on your foot.    2. Elevate your legs to reduce the swelling in your legs.  3. Schedule with dermatology  4. Schedule follow up with your primary care clinic next week.   5. If you are noticing increasing redness around your wound, increasing pain, fever, you should be seen right away and in the ER if after clinic hours.

## 2018-04-02 NOTE — PROGRESS NOTES
"  SUBJECTIVE:   Fabio Logan is a 57 year old male who presents to clinic today for the following health issues:      Foot Sore       Duration: 1 week     Description  Location: left foot     Intensity:  moderate    Accompanying signs and symptoms: Throbbing pain , redness     History  Previous similar problem: no   Previous evaluation:  none    Precipitating or alleviating factors:  Trauma or overuse: no   Aggravating factors include: none    Therapies tried and outcome: rest/inactivity and OTC Neosporin     Problem list and histories reviewed & adjusted, as indicated.  Additional history: as documented    Patient reports to clinic concerning left foot pain that started several weeks ago. At onset it was an itchy rash that he scratched/rubbed at a bit. He has tried applying athlete's foot treatment, neosporin, triamcinolone, fungal cream, alcohol, and compressing the area. The triamcinolone made the skin \"burn\".  About 3-4 days ago it opened up like a sore and has since grown in size. He notes that is painful to touch and that his pain feel better while lying down. He reports that it doesn't hurt to stand on his foot and that is it no longer itchy. He is currently continuing neosporin. He reports that his leg swelling has been normal for him for a \"long time\". He denies numbness or tingling in his feet.     He reports that he has a migraine today and that he has been getting winded walking lately and has not scheduled his stress test yet.     Denies a family history of diabetes but worried about the skin lesion being a diabetic skin wound as his last CMP on 4/11/17 had slightly elevated glucose (101).    Patient Active Problem List   Diagnosis     CARDIOVASCULAR SCREENING; LDL GOAL LESS THAN 160     Mild major depression (H)     Obese     HTN, goal below 140/90     H/O testicular cancer     Diverticulosis of large intestine     Esophageal reflux     Chest pain     Migraine     Skin cancer screening     Skin " eruption     Lentigines     Chronic dermatitis     ACP (advance care planning)     Mild intermittent asthma with (acute) exacerbation     Essential hypertension with goal blood pressure less than 140/90     Major depression in complete remission (H)     Past Surgical History:   Procedure Laterality Date     COLONOSCOPY  8/22/2014    Dr. Garcia [facility]     COLONOSCOPY N/A 8/22/2014    Procedure: COLONOSCOPY;  Surgeon: Joe Garcia MD;  Location:  GI     ENT SURGERY      tonsilectomy     GENITOURINARY SURGERY  1998    right orchidectomy       Social History   Substance Use Topics     Smoking status: Former Smoker     Quit date: 6/6/1997     Smokeless tobacco: Former User     Alcohol use No     Family History   Problem Relation Age of Onset     CEREBROVASCULAR DISEASE Mother      Hypertension Mother      HEART DISEASE Father      had 2 open heart surgery     Lipids Father      Hypertension Father      CANCER Maternal Grandfather      skin cancer     Skin Cancer Maternal Grandfather      skin cancer     HEART DISEASE Sister 45     heart attack     CANCER Sister 46     breast cancer     Skin Cancer Sister      BCC         Current Outpatient Prescriptions   Medication Sig Dispense Refill     tamsulosin (FLOMAX) 0.4 MG capsule TAKE ONE CAPSULE BY MOUTH ONE TIME DAILY 30 capsule 2     sertraline (ZOLOFT) 100 MG tablet TAKE ONE TABLET BY MOUTH ONE TIME DAILY  30 tablet 0     losartan (COZAAR) 25 MG tablet TAKE ONE TABLET BY MOUTH ONE TIME DAILY  30 tablet 0     Fluticasone-Salmeterol 113-14 MCG/ACT AEPB Inhale 1 puff into the lungs 2 times daily 1 each 3     doxycycline (VIBRAMYCIN) 100 MG capsule Take 1 capsule (100 mg) by mouth 2 times daily 20 capsule 0     albuterol (PROAIR HFA/PROVENTIL HFA/VENTOLIN HFA) 108 (90 BASE) MCG/ACT Inhaler Inhale 2 puffs into the lungs every 6 hours as needed for shortness of breath / dyspnea or wheezing 1 Inhaler 0     SUMAtriptan (IMITREX) 50 MG tablet TAKE 1 TABLET (50 MG) BY MOUTH  AT ONSET OF HEADACHE FOR MIGRAINE MAY REPEAT DOSE IN 2 HOURS.  DO NOT EXCEED 200 MG IN 24 HOURS 18 tablet 2     atenolol (TENORMIN) 50 MG tablet TAKE ONE TABLET BY MOUTH ONE TIME DAILY  90 tablet 3     traZODone (DESYREL) 50 MG tablet Take 1 tablet (50 mg) by mouth nightly as needed for sleep okay to increase to 100 mg after 1-2 weeks if needed 90 tablet 3     fluticasone (FLONASE) 50 MCG/ACT spray Spray 1-2 sprays into both nostrils daily 16 g 11     omeprazole (PRILOSEC) 20 MG CR capsule Take 1 capsule (20 mg) by mouth daily as needed 90 capsule 3     nystatin (MYCOSTATIN) 158930 UNIT/GM POWD Apply topically 3 times daily as needed (recurrent yeast infection at moist skin creases) 60 g 2     sildenafil (VIAGRA) 100 MG tablet Take 0.5-1 tablets ( mg) by mouth daily as needed for erectile dysfunction Take 30 min to 4 hours before sexual activity.  Never use with nitroglycerin, terazosin or doxazosin. 4 tablet 11     triamcinolone (KENALOG) 0.1 % ointment Apply topically 2 times daily 453 g 0     Allergies   Allergen Reactions     Iodine-131 Shortness Of Breath     Internal iodine     Recent Labs   Lab Test  03/25/17   0921  07/18/15   0953  05/16/15   0957  05/08/15   2105  07/19/14   0924   A1C   --    --    --    --   5.4   LDL  92   --   103   --   133*   HDL  34*   --   40*   --   31*   TRIG  228*   --   256*   --   215*   ALT  39   --    --   40  35   CR  0.81  0.80   --   0.92  0.82   GFRESTIMATED  >90  Non  GFR Calc    >90  Non  GFR Calc     --   86  >90   GFRESTBLACK  >90   GFR Calc    >90   GFR Calc     --   >90   GFR Calc    >90   POTASSIUM  4.2  3.9   --   3.4  4.0   TSH   --    --    --   1.63   --       BP Readings from Last 3 Encounters:   04/02/18 130/72   01/17/18 106/62   06/06/17 118/78    Wt Readings from Last 3 Encounters:   04/02/18 114.8 kg (253 lb)   01/17/18 114.3 kg (252 lb)   06/06/17 107 kg (235 lb 12.8  heron)        Reviewed and updated as needed this visit by clinical staff  Tobacco  Allergies  Meds  Med Hx  Surg Hx  Fam Hx  Soc Hx      Reviewed and updated as needed this visit by Provider       ROS:  ROS:  E: NEGATIVE for vision changes   R: NEGATIVE for increasing SOB, but has had some VELOZ in recent months and was supposed to follow up with his primary if not improving after recent bronchitis  CV: NEGATIVE for chest pain   N: positive for migraine headache, feels like typical migraine per pt.       This document serves as a record of the services and decisions personally performed and made by Sayra Briggs MD. It was created on his/her behalf by Alina Louis, trained medical scribe. The creation of this document is based the provider's statements to the medical scribes.    Scribe Alina Louis, April 2, 2018  OBJECTIVE:     /72  Pulse 68  Temp 98.1  F (36.7  C) (Oral)  Resp 12  Wt 114.8 kg (253 lb)  SpO2 97%  BMI 40.84 kg/m2  Body mass index is 40.84 kg/(m^2).     GENERAL: obese, alert and no distress  Skin: left foot there is  thickened, slightly pink dry macular area top of the foot and at the proximal portion is an open area of abraded looking skin, darker red, slight weeping of serous fluid, no pus drainage, no surrounding appearance of erythema to suggest cellulitis at this point.     Diagnostic Test Results:  No results found for this or any previous visit (from the past 24 hour(s)).    ASSESSMENT/PLAN:     1.  Skin wound  Area of abraded looking skin I suspect secondary to rubbing the area with his other foot due to irritation from dermatitis. Recommended antibiotic ointment and keeping covered, cleansing 1-2 times daily and reapplying bandage. Advised stopping athlete's foot cream and no triamcinolone cream to the open area. He will schedule with derm to follow up this and his continued dermatitis, which was the original issue. He also has BLE peripheral edema present. This is  unchanged for a long time, he reports. I recommended scheduling with his PCP to discuss this and to elevate his legs when he is able. Discussed when to seek care urgently.     2. Atopic dermatitis, unspecified type  - DERMATOLOGY REFERRAL  - SKIN CARE REFERRAL    3. Migraine headache  Has history of these and one today. No different from his usual migraine, but is wondering if they are coming more frequently. He will discuss further with PCP at follow up.     4. Major depression  Continues on sertraline 100mg daily. Given depression action plan today.     5. Bilateral peripheral edema  Pt reports longstanding and unchanged  Recommended elevating his legs when he is able and schedule with pcp for follow up.     6. MAGDIEL  Still has some VELOZ and has not scheduled stress test or followed up with his PCP. No worsening or complaint of chest pain.  It is unchanged over the past few months.      1. Continue to apply antibiotic ointment to the spot on your foot.    2. Elevate your legs to reduce the swelling in your legs.  3. Schedule with dermatology  4. Schedule follow up with your primary care clinic next week.   5. If you are noticing increasing redness around your wound, increasing pain, fever, you should be seen right away and in the ER if after clinic hours.     The information in this document, created by the medical scribe for me, accurately reflects the services I personally performed and the decisions made by me. I have reviewed and approved this document for accuracy. 04/02/18    Sayra Briggs MD  [facility] [place]

## 2018-04-02 NOTE — MR AVS SNAPSHOT
After Visit Summary   4/2/2018    Fabio Logan    MRN: [id number]           Patient Information     Date Of Birth          1960        Visit Information        Provider Department      4/2/2018 11:00 AM Sayra Briggs MD [facility]        Today's Diagnoses     Mild major depression (H)    -  1    Skin lesion        Atopic dermatitis, unspecified type          Care Instructions    1. Continue to apply antibiotic ointment to the spot on your foot.    2. Elevate your legs to reduce the swelling in your legs.  3. Schedule with dermatology  4. Schedule follow up with your primary care clinic next week.   5. If you are noticing increasing redness around your wound, increasing pain, fever, you should be seen right away and in the ER if after clinic hours.           Follow-ups after your visit        Additional Services     DERMATOLOGY REFERRAL       Your provider has referred you to: [facility]: [facility] [place] [phone]   [email or web address]    Please be aware that coverage of these services is subject to the terms and limitations of your health insurance plan.  Call member services at your health plan with any benefit or coverage questions.      Please bring the following with you to your appointment:    (1) Any X-Rays, CTs or MRIs which have been performed.  Contact the facility where they were done to arrange for  prior to your scheduled appointment.    (2) List of current medications  (3) This referral request   (4) Any documents/labs given to you for this referral            SKIN CARE REFERRAL       Your provider has referred you to: [facility]: [facility] [place] [phone]  [email or web address]     Please be aware that coverage of these services is subject to the terms and limitations of your health insurance plan.  Please check with your insurance prior to the appointment to ensure  appropriate coverage for any services considered cosmetic in nature or not medically necessary.    Please bring the following with you to your appointment:    (1) Any X-Rays, CTs or MRIs which have been performed.  Contact the facility where they were done to arrange for  prior to your scheduled appointment.  Any new CT, MRI or other procedures ordered by your specialist must be performed at a [facility] facility or coordinated by your clinic's referral office.  (2) List of current medications  (3) This referral request   (4) Any documents/labs given to you for this referral                  Who to contact     If you have questions or need follow up information about today's clinic visit or your schedule please contact [facility] directly at [phone].  Normal or non-critical lab and imaging results will be communicated to you by MyChart, letter or phone within 4 business days after the clinic has received the results. If you do not hear from us within 7 days, please contact the clinic through [employer] or phone. If you have a critical or abnormal lab result, we will notify you by phone as soon as possible.  Submit refill requests through [employer] or call your pharmacy and they will forward the refill request to us. Please allow 3 business days for your refill to be completed.          Additional Information About Your Visit        [employer] Information     [employer] gives you secure access to your electronic health record. If you see a primary care provider, you can also send messages to your care team and make appointments. If you have questions, please call your primary care clinic.  If you do not have a primary care provider, please call [phone] and they will assist you.        Care EveryWhere ID     This is your Care EveryWhere ID. This could be used by other organizations to access your [facility] medical records  [id number]        Your Vitals Were     Pulse Temperature Respirations Pulse  Oximetry BMI (Body Mass Index)       68 98.1  F (36.7  C) (Oral) 12 97% 40.84 kg/m2        Blood Pressure from Last 3 Encounters:   04/02/18 130/72   01/17/18 106/62   06/06/17 118/78    Weight from Last 3 Encounters:   04/02/18 253 lb (114.8 kg)   01/17/18 252 lb (114.3 kg)   06/06/17 235 lb 12.8 oz (107 kg)              We Performed the Following     DEPRESSION ACTION PLAN (DAP)     DERMATOLOGY REFERRAL     SKIN CARE REFERRAL        Primary Care Provider Office Phone # Fax #    Manoj Armenta [phone] [address] [place] [address]        Equal Access to Services     [facility]: Hadii cristiana steven hadasho Sosilvia, waaxda luqadaha, qaybta kaalmada adeegyada, betito eid . So [facility] [phone].    ATENCIÓN: Si habla español, tiene a mae disposición servicios gratuitos de asistencia lingüística. LlSelect [facility] [phone].    We comply with applicable federal civil rights laws and [place] laws. We do not discriminate on the basis of race, color, national origin, age, disability, sex, sexual orientation, or gender identity.            Thank you!     Thank you for choosing [facility]  for your care. Our goal is always to provide you with excellent care. Hearing back from our patients is one way we can continue to improve our services. Please take a few minutes to complete the written survey that you may receive in the mail after your visit with us. Thank you!             Your Updated Medication List - Protect others around you: Learn how to safely use, store and throw away your medicines at www.disposemymeds.org.          This list is accurate as of 4/2/18 11:53 AM.  Always use your most recent med list.                   Brand Name Dispense Instructions for use Diagnosis    albuterol 108 (90 BASE) MCG/ACT Inhaler    PROAIR HFA/PROVENTIL HFA/VENTOLIN HFA    1 Inhaler    Inhale 2 puffs into the lungs every 6 hours as needed for shortness of  breath / dyspnea or wheezing    Acute bronchitis with coexisting condition requiring prophylactic treatment       atenolol 50 MG tablet    TENORMIN    90 tablet    TAKE ONE TABLET BY MOUTH ONE TIME DAILY    HTN, goal below 140/90       doxycycline 100 MG capsule    VIBRAMYCIN    20 capsule    Take 1 capsule (100 mg) by mouth 2 times daily    Mild intermittent asthma with (acute) exacerbation       fluticasone 50 MCG/ACT spray    FLONASE    16 g    Spray 1-2 sprays into both nostrils daily    Seasonal allergic rhinitis, unspecified allergic rhinitis trigger       Fluticasone-Salmeterol 113-14 MCG/ACT Aepb inhaler    AIRDUO RESPICLICK    1 each    Inhale 1 puff into the lungs 2 times daily    Mild intermittent asthma with (acute) exacerbation       losartan 25 MG tablet    COZAAR    30 tablet    TAKE ONE TABLET BY MOUTH ONE TIME DAILY    Essential hypertension with goal blood pressure less than 140/90       nystatin 281922 UNIT/GM Powd    MYCOSTATIN    60 g    Apply topically 3 times daily as needed (recurrent yeast infection at moist skin creases)    Candidal intertrigo       omeprazole 20 MG CR capsule    priLOSEC    90 capsule    Take 1 capsule (20 mg) by mouth daily as needed    Gastroesophageal reflux disease without esophagitis       sertraline 100 MG tablet    ZOLOFT    30 tablet    TAKE ONE TABLET BY MOUTH ONE TIME DAILY    Major depressive disorder, recurrent episode, mild (H)       sildenafil 100 MG tablet    VIAGRA    4 tablet    Take 0.5-1 tablets ( mg) by mouth daily as needed for erectile dysfunction Take 30 min to 4 hours before sexual activity.  Never use with nitroglycerin, terazosin or doxazosin.    Erectile dysfunction, unspecified erectile dysfunction type       SUMAtriptan 50 MG tablet    IMITREX    18 tablet    TAKE 1 TABLET (50 MG) BY MOUTH AT ONSET OF HEADACHE FOR MIGRAINE MAY REPEAT DOSE IN 2 HOURS.  DO NOT EXCEED 200 MG IN 24 HOURS    Other migraine without status migrainosus, not  intractable       tamsulosin 0.4 MG capsule    FLOMAX    30 capsule    TAKE ONE CAPSULE BY MOUTH ONE TIME DAILY    Benign non-nodular prostatic hyperplasia with lower urinary tract symptoms       traZODone 50 MG tablet    DESYREL    90 tablet    Take 1 tablet (50 mg) by mouth nightly as needed for sleep okay to increase to 100 mg after 1-2 weeks if needed    Major depressive disorder, recurrent episode, mild (H)       triamcinolone 0.1 % ointment    KENALOG    453 g    Apply topically 2 times daily    Skin eruption

## 2018-04-03 RX ORDER — FLUTICASONE PROPIONATE 50 MCG
SPRAY, SUSPENSION (ML) NASAL
Qty: 16 G | Refills: 8 | Status: SHIPPED | OUTPATIENT
Start: 2018-04-03 | End: 2018-08-04

## 2018-04-03 NOTE — TELEPHONE ENCOUNTER
Prescription approved per [facility] Refill Protocol.    Dee Dee Redmond RN -- [facility] Work[facility]

## 2018-04-16 DIAGNOSIS — F33.0 MAJOR DEPRESSIVE DISORDER, RECURRENT EPISODE, MILD (H): ICD-10-CM

## 2018-04-16 NOTE — TELEPHONE ENCOUNTER
"Requested Prescriptions   Pending Prescriptions Disp Refills     sertraline (ZOLOFT) 100 MG tablet [Pharmacy Med Name: Sertraline HCl Oral Tablet 100 MG]    Last Written Prescription Date:  3/13/2018  Last Fill Quantity: 30,  # refills: 0   Last office visit: 1/17/2018 with prescribing provider:  Sayra Briggs MD    Future Office Visit:     30 tablet 0     Sig: TAKE ONE TABLET (100MG) BY MOUTH ONE TIME DAILY    SSRIs Protocol Failed    4/16/2018  6:58 AM       Failed - PHQ-9 score less than 5 in past 6 months    Please review last PHQ-9 score.     PHQ-9 SCORE 9/9/2016 3/7/2017 1/17/2018   Total Score - - -   Total Score 2 8 5     OSMAN-7 SCORE 9/9/2016 1/17/2018   Total Score 2 1                  Passed - Patient is age 18 or older       Passed - Recent (6 mo) or future (30 days) visit within the authorizing provider's specialty    Patient had office visit in the last 6 months or has a visit in the next 30 days with authorizing provider or within the authorizing provider's specialty.  See \"Patient Info\" tab in inbasket, or \"Choose Columns\" in Meds & Orders section of the refill encounter.              "

## 2018-04-18 RX ORDER — SERTRALINE HYDROCHLORIDE 100 MG/1
TABLET, FILM COATED ORAL
Qty: 90 TABLET | Refills: 0 | Status: SHIPPED | OUTPATIENT
Start: 2018-04-18 | End: 2018-05-18

## 2018-04-18 NOTE — TELEPHONE ENCOUNTER
Prescription approved per [facility] Refill Protocol.  Odilia Meyer, RN, BSN

## 2018-04-21 DIAGNOSIS — I10 ESSENTIAL HYPERTENSION WITH GOAL BLOOD PRESSURE LESS THAN 140/90: ICD-10-CM

## 2018-04-21 NOTE — TELEPHONE ENCOUNTER
"Requested Prescriptions   Pending Prescriptions Disp Refills     losartan (COZAAR) 25 MG tablet [Pharmacy Med Name: Losartan Potassium Oral Tablet 25 MG]  Last Written Prescription Date:  03/13/2018  Last Fill Quantity: 30 tablet,  # refills: 0   Last office visit: 1/17/2018 with prescribing provider:  Eligio Bah MD    Future Office Visit:     30 tablet 0     Sig: TAKE ONE TABLET (25MG) BY MOUTH ONE TIME DAILY    Angiotensin-II Receptors Failed    4/21/2018  9:20 AM       Failed - Normal serum creatinine on file in past 12 months    Recent Labs   Lab Test  03/25/17   0921   CR  0.81            Failed - Normal serum potassium on file in past 12 months    Recent Labs   Lab Test  03/25/17   0921   POTASSIUM  4.2                   Passed - Blood pressure under 140/90 in past 12 months    BP Readings from Last 3 Encounters:   04/02/18 130/72   01/17/18 106/62   06/06/17 118/78                Passed - Recent (12 mo) or future (30 days) visit within the authorizing provider's specialty    Patient had office visit in the last 12 months or has a visit in the next 30 days with authorizing provider or within the authorizing provider's specialty.  See \"Patient Info\" tab in inbasket, or \"Choose Columns\" in Meds & Orders section of the refill encounter.           Passed - Patient is age 18 or older          "

## 2018-04-24 RX ORDER — LOSARTAN POTASSIUM 25 MG/1
TABLET ORAL
Qty: 30 TABLET | Refills: 0
Start: 2018-04-24

## 2018-04-26 ENCOUNTER — MYC REFILL (OUTPATIENT)
Dept: PEDIATRICS | Facility: CLINIC | Age: 58
End: 2018-04-26

## 2018-04-26 DIAGNOSIS — I10 ESSENTIAL HYPERTENSION WITH GOAL BLOOD PRESSURE LESS THAN 140/90: ICD-10-CM

## 2018-04-27 RX ORDER — LOSARTAN POTASSIUM 25 MG/1
25 TABLET ORAL DAILY
Qty: 30 TABLET | Refills: 0 | Status: SHIPPED | OUTPATIENT
Start: 2018-04-27 | End: 2018-05-18

## 2018-04-27 NOTE — TELEPHONE ENCOUNTER
Huddled with Dr. Geovany Matthews for refill x1. Needs office visit for further refills.       BP Readings from Last 3 Encounters:   04/02/18 130/72   01/17/18 106/62   06/06/17 118/78       Last Basic Metabolic Panel:  Lab Results   Component Value Date     03/25/2017      Lab Results   Component Value Date    POTASSIUM 4.2 03/25/2017     Lab Results   Component Value Date    CHLORIDE 105 03/25/2017     Lab Results   Component Value Date    DEBI 9.7 03/25/2017     Lab Results   Component Value Date    CO2 28 03/25/2017     Lab Results   Component Value Date    BUN 17 03/25/2017     Lab Results   Component Value Date    CR 0.81 03/25/2017     Lab Results   Component Value Date     03/25/2017

## 2018-04-27 NOTE — TELEPHONE ENCOUNTER
Message from MyChart:  Original authorizing provider: MD Nicolas Noe GRETCHENGreta Logan would like a refill of the following medications:  losartan (COZAAR) 25 MG tablet [Manoj Armenta MD]    Preferred pharmacy: [id number] - [place], [place] - [address]    Comment:  Could you please refill my Losartan Potassium My [facility] [address] [place] has sent 2 requests for renewal and refill. I'm out and they have fronted me for four days Thankyou

## 2018-04-28 ENCOUNTER — MYC MEDICAL ADVICE (OUTPATIENT)
Dept: PEDIATRICS | Facility: CLINIC | Age: 58
End: 2018-04-28

## 2018-04-29 DIAGNOSIS — I10 HTN, GOAL BELOW 140/90: ICD-10-CM

## 2018-04-30 RX ORDER — ATENOLOL 50 MG/1
TABLET ORAL
Qty: 90 TABLET | Refills: 2 | Status: SHIPPED | OUTPATIENT
Start: 2018-04-30 | End: 2019-01-27

## 2018-05-11 DIAGNOSIS — Z12.5 SCREENING FOR PROSTATE CANCER: ICD-10-CM

## 2018-05-11 DIAGNOSIS — Z00.00 ROUTINE HEALTH MAINTENANCE: ICD-10-CM

## 2018-05-11 DIAGNOSIS — I10 HTN, GOAL BELOW 140/90: ICD-10-CM

## 2018-05-11 DIAGNOSIS — Z13.6 CARDIOVASCULAR SCREENING; LDL GOAL LESS THAN 160: ICD-10-CM

## 2018-05-11 LAB
ALBUMIN SERPL-MCNC: 3.7 G/DL (ref 3.4–5)
ALP SERPL-CCNC: 86 U/L (ref 40–150)
ALT SERPL W P-5'-P-CCNC: 34 U/L (ref 0–70)
ANION GAP SERPL CALCULATED.3IONS-SCNC: 6 MMOL/L (ref 3–14)
AST SERPL W P-5'-P-CCNC: 17 U/L (ref 0–45)
BILIRUB SERPL-MCNC: 0.4 MG/DL (ref 0.2–1.3)
BUN SERPL-MCNC: 17 MG/DL (ref 7–30)
CALCIUM SERPL-MCNC: 9.5 MG/DL (ref 8.5–10.1)
CHLORIDE SERPL-SCNC: 105 MMOL/L (ref 94–109)
CHOLEST SERPL-MCNC: 177 MG/DL
CO2 SERPL-SCNC: 28 MMOL/L (ref 20–32)
CREAT SERPL-MCNC: 0.83 MG/DL (ref 0.66–1.25)
ERYTHROCYTE [DISTWIDTH] IN BLOOD BY AUTOMATED COUNT: 12.8 % (ref 10–15)
GFR SERPL CREATININE-BSD FRML MDRD: >90 ML/MIN/1.7M2
GLUCOSE SERPL-MCNC: 100 MG/DL (ref 70–99)
HCT VFR BLD AUTO: 42.8 % (ref 40–53)
HDLC SERPL-MCNC: 35 MG/DL
HGB BLD-MCNC: 13.9 G/DL (ref 13.3–17.7)
LDLC SERPL CALC-MCNC: 88 MG/DL
MCH RBC QN AUTO: 30 PG (ref 26.5–33)
MCHC RBC AUTO-ENTMCNC: 32.5 G/DL (ref 31.5–36.5)
MCV RBC AUTO: 92 FL (ref 78–100)
NONHDLC SERPL-MCNC: 142 MG/DL
PLATELET # BLD AUTO: 192 10E9/L (ref 150–450)
POTASSIUM SERPL-SCNC: 4.3 MMOL/L (ref 3.4–5.3)
PROT SERPL-MCNC: 7.5 G/DL (ref 6.8–8.8)
PSA SERPL-ACNC: 0.14 UG/L (ref 0–4)
RBC # BLD AUTO: 4.63 10E12/L (ref 4.4–5.9)
SODIUM SERPL-SCNC: 139 MMOL/L (ref 133–144)
TRIGL SERPL-MCNC: 270 MG/DL
WBC # BLD AUTO: 5.1 10E9/L (ref 4–11)

## 2018-05-11 PROCEDURE — 36415 COLL VENOUS BLD VENIPUNCTURE: CPT | Performed by: INTERNAL MEDICINE

## 2018-05-11 PROCEDURE — 85027 COMPLETE CBC AUTOMATED: CPT | Performed by: INTERNAL MEDICINE

## 2018-05-11 PROCEDURE — G0103 PSA SCREENING: HCPCS | Performed by: INTERNAL MEDICINE

## 2018-05-11 PROCEDURE — 80061 LIPID PANEL: CPT | Performed by: INTERNAL MEDICINE

## 2018-05-11 PROCEDURE — 80053 COMPREHEN METABOLIC PANEL: CPT | Performed by: INTERNAL MEDICINE

## 2018-05-18 ENCOUNTER — OFFICE VISIT (OUTPATIENT)
Dept: PEDIATRICS | Facility: CLINIC | Age: 58
End: 2018-05-18
Payer: COMMERCIAL

## 2018-05-18 VITALS
HEART RATE: 72 BPM | RESPIRATION RATE: 16 BRPM | TEMPERATURE: 98 F | SYSTOLIC BLOOD PRESSURE: 118 MMHG | HEIGHT: 64 IN | WEIGHT: 246 LBS | DIASTOLIC BLOOD PRESSURE: 74 MMHG | BODY MASS INDEX: 42 KG/M2

## 2018-05-18 DIAGNOSIS — N39.43 POST-VOID DRIBBLING: ICD-10-CM

## 2018-05-18 DIAGNOSIS — R07.9 CHEST PAIN ON EXERTION: ICD-10-CM

## 2018-05-18 DIAGNOSIS — R68.89 DECREASED EXERCISE TOLERANCE: ICD-10-CM

## 2018-05-18 DIAGNOSIS — E66.01 MORBID OBESITY (H): ICD-10-CM

## 2018-05-18 DIAGNOSIS — I10 ESSENTIAL HYPERTENSION WITH GOAL BLOOD PRESSURE LESS THAN 140/90: ICD-10-CM

## 2018-05-18 DIAGNOSIS — R73.01 IMPAIRED FASTING GLUCOSE: ICD-10-CM

## 2018-05-18 DIAGNOSIS — B37.2 CANDIDAL INTERTRIGO: ICD-10-CM

## 2018-05-18 DIAGNOSIS — C62.90 MALIGNANT NEOPLASM OF TESTIS, UNSPECIFIED LATERALITY, UNSPECIFIED WHETHER DESCENDED OR UNDESCENDED: ICD-10-CM

## 2018-05-18 DIAGNOSIS — K21.9 GASTROESOPHAGEAL REFLUX DISEASE WITHOUT ESOPHAGITIS: ICD-10-CM

## 2018-05-18 DIAGNOSIS — N40.1 BENIGN NON-NODULAR PROSTATIC HYPERPLASIA WITH LOWER URINARY TRACT SYMPTOMS: ICD-10-CM

## 2018-05-18 DIAGNOSIS — Z00.00 ENCOUNTER FOR ROUTINE ADULT HEALTH EXAMINATION WITHOUT ABNORMAL FINDINGS: Primary | ICD-10-CM

## 2018-05-18 DIAGNOSIS — F33.0 MAJOR DEPRESSIVE DISORDER, RECURRENT EPISODE, MILD (H): ICD-10-CM

## 2018-05-18 PROCEDURE — 99214 OFFICE O/P EST MOD 30 MIN: CPT | Mod: 25 | Performed by: INTERNAL MEDICINE

## 2018-05-18 PROCEDURE — 99396 PREV VISIT EST AGE 40-64: CPT | Performed by: INTERNAL MEDICINE

## 2018-05-18 RX ORDER — NYSTATIN 100000 [USP'U]/G
POWDER TOPICAL 3 TIMES DAILY PRN
Qty: 60 G | Refills: 2 | Status: SHIPPED | OUTPATIENT
Start: 2018-05-18 | End: 2018-08-04

## 2018-05-18 RX ORDER — SERTRALINE HYDROCHLORIDE 100 MG/1
100 TABLET, FILM COATED ORAL DAILY
Qty: 90 TABLET | Refills: 3 | Status: SHIPPED | OUTPATIENT
Start: 2018-05-18 | End: 2019-06-11

## 2018-05-18 RX ORDER — LOSARTAN POTASSIUM 25 MG/1
25 TABLET ORAL DAILY
Qty: 90 TABLET | Refills: 3 | Status: SHIPPED | OUTPATIENT
Start: 2018-05-18 | End: 2019-05-19

## 2018-05-18 RX ORDER — TAMSULOSIN HYDROCHLORIDE 0.4 MG/1
0.4 CAPSULE ORAL DAILY
Qty: 90 CAPSULE | Refills: 3 | Status: ON HOLD | OUTPATIENT
Start: 2018-05-18 | End: 2018-09-02

## 2018-05-18 ASSESSMENT — ENCOUNTER SYMPTOMS
NAUSEA: 1
WEAKNESS: 1
FREQUENCY: 1
PALPITATIONS: 1
HEADACHES: 1
SHORTNESS OF BREATH: 1

## 2018-05-18 NOTE — PROGRESS NOTES
SUBJECTIVE:   CC: Fabio Logan is an 57 year old male who presents for preventative health visit.     Physical   Annual:     Getting at least 3 servings of Calcium per day::  Yes    Bi-annual eye exam::  Yes    Dental care twice a year::  Yes    Sleep apnea or symptoms of sleep apnea::  Excessive snoring    Diet::  Regular (no restrictions) and Breakfast skipped    Frequency of exercise::  2-3 days/week    Duration of exercise::  45-60 minutes    Taking medications regularly::  Yes              rash      Duration: 1-2 months, but has been off/on for years    Description (location/character/radiation): rash undr pannus and in groin area at times    Intensity:  moderate    Accompanying signs and symptoms: painful and red    History (similar episodes/previous evaluation): been treated before in past with similar Sx    Precipitating or alleviating factors: None    Therapies tried and outcome: Microguard 2%, nistatin is somewhat effective       Patient here for RHm exam ,overall doing well. trying to work on diet. mood has been pretty good, tasking all medications and no side effects noted. patient does not occasional post void dribbling, does have history of testicular cancer and s/p xrt and has been followed by urology in the past. Also notes some chest pain, seems like his exercise tolerance has decreased, only with exertion, was scheduled ot have echo done earlier this year but decided not to do it.     Today's PHQ-2 Score:   PHQ-2 ( 1999 [employer]) 5/18/2018   Q1: Little interest or pleasure in doing things 1   Q2: Feeling down, depressed or hopeless 1   PHQ-2 Score 2   Q1: Little interest or pleasure in doing things Several days   Q2: Feeling down, depressed or hopeless Several days   PHQ-2 Score 2     PHQ-9 SCORE 3/7/2017 1/17/2018 5/18/2018   Total Score - - -   Total Score 8 5 6     OSMAN-7 SCORE 9/9/2016 1/17/2018   Total Score 2 1       Abuse: Current or Past(Physical, Sexual or Emotional)- No  Do you feel  "safe in your environment - Yes    Social History   Substance Use Topics     Smoking status: Former Smoker     Quit date: 6/6/1997     Smokeless tobacco: Former User     Alcohol use No     Alcohol Use 5/18/2018   If you drink alcohol do you typically have greater than 3 drinks per day OR greater than 7 drinks per week? No       Last PSA:   PSA   Date Value Ref Range Status   05/11/2018 0.14 0 - 4 ug/L Final     Comment:     Assay Method:  Chemiluminescence using Siemens Vista analyzer       Reviewed orders with patient. Reviewed health maintenance and updated orders accordingly - Yes  BP Readings from Last 3 Encounters:   05/18/18 118/74   04/02/18 130/72   01/17/18 106/62    Wt Readings from Last 3 Encounters:   05/18/18 246 lb (111.6 kg)   04/02/18 253 lb (114.8 kg)   01/17/18 252 lb (114.3 kg)                    Reviewed and updated as needed this visit by clinical staff  Tobacco  Allergies  Meds  Med Hx  Surg Hx  Fam Hx  Soc Hx        Reviewed and updated as needed this visit by Provider            Review of Systems   Respiratory: Positive for shortness of breath.    Cardiovascular: Positive for chest pain, palpitations and peripheral edema.   Gastrointestinal: Positive for nausea.   Genitourinary: Positive for frequency, impotence and urgency. Negative for discharge.   Skin: Positive for rash.   Neurological: Positive for weakness and headaches.         OBJECTIVE:   /74  Pulse 72  Temp 98  F (36.7  C) (Oral)  Resp 16  Ht 5' 4\" (1.626 m)  Wt 246 lb (111.6 kg)  BMI 42.23 kg/m2  Wt Readings from Last 5 Encounters:   05/18/18 246 lb (111.6 kg)   04/02/18 253 lb (114.8 kg)   01/17/18 252 lb (114.3 kg)   06/06/17 235 lb 12.8 oz (107 kg)   03/07/17 244 lb 14.4 oz (111.1 kg)       Physical Exam  GENERAL: healthy, alert and no distress  EYES: Eyes grossly normal to inspection, PERRL and conjunctivae and sclerae normal  HENT: ear canals and TM's normal, nose and mouth without ulcers or lesions  NECK: no " adenopathy, no asymmetry, masses, or scars and thyroid normal to palpation  RESP: lungs clear to auscultation - no rales, rhonchi or wheezes  CV: regular rate and rhythm, normal S1 S2, no S3 or S4, no murmur, click or rub, no peripheral edema and peripheral pulses strong  ABDOMEN: soft, nontender, no hepatosplenomegaly, no masses and bowel sounds normal  MS: no gross musculoskeletal defects noted, no edema  SKIN: no suspicious lesions or rashes noted, did not examin intriginous areas today  NEURO: Normal strength and tone, mentation intact and speech normal  PSYCH: mentation appears normal, affect normal/bright    ASSESSMENT/PLAN:   1. Encounter for routine adult health examination without abnormal findings  d/w pt preventative care measures including seat belt use, bike helmet, moderation of EtOH, avoiding tobacco, avoiding excessive sun exposure/sunscreen, wt management or wt loss if BMI > 30, need to screen for lipid disorders, mood disorders, CAD risk factors, etc. Also discussed accident prevention and future RHM schedule.   - tamsulosin (FLOMAX) 0.4 MG capsule; Take 1 capsule (0.4 mg) by mouth daily  Dispense: 90 capsule; Refill: 3  - sertraline (ZOLOFT) 100 MG tablet; Take 1 tablet (100 mg) by mouth daily  Dispense: 90 tablet; Refill: 3  - omeprazole (PRILOSEC) 20 MG CR capsule; Take 1 capsule (20 mg) by mouth daily as needed  Dispense: 90 capsule; Refill: 3  - losartan (COZAAR) 25 MG tablet; Take 1 tablet (25 mg) by mouth daily  Dispense: 90 tablet; Refill: 3  - nystatin (MYCOSTATIN) 205895 UNIT/GM POWD; Apply topically 3 times daily as needed (recurrent yeast infection at moist skin creases)  Dispense: 60 g; Refill: 2  - Exercise Stress Echocardiogram; Future    2. Benign non-nodular prostatic hyperplasia with lower urinary tract symptoms  discussed with patient (or patient's parents/caregiver) pathophysiology of condition and treatment options.  Well controlled on current medication(s). but med may be  contriuting to poist void dribbling, recommned urlogy evaluation again given complex history, patient will consider.   - tamsulosin (FLOMAX) 0.4 MG capsule; Take 1 capsule (0.4 mg) by mouth daily  Dispense: 90 capsule; Refill: 3    3. Major depressive disorder, recurrent episode, mild (H)  Well controlled on current medication(s). Reviewed concept of depression as function of biochemical imbalance of neurotransmitters/rationale for treatment.  Risks and benefits of medication(s) reviewed with patient.  Questions answered.   - sertraline (ZOLOFT) 100 MG tablet; Take 1 tablet (100 mg) by mouth daily  Dispense: 90 tablet; Refill: 3    4. Essential hypertension with goal blood pressure less than 140/90  Well controlled on current medication(s). discussed with patient (or patient's parents/caregiver) pathophysiology of condition and treatment options.  reviewed labs, medications, diet ,etc.    - losartan (COZAAR) 25 MG tablet; Take 1 tablet (25 mg) by mouth daily  Dispense: 90 tablet; Refill: 3    5. Morbid obesity (H)  discussed with patient (or patient's parents/caregiver) pathophysiology of condition and treatment options.  patient ntoes decreased exercise tolerance and some chest discomfrt, strongly encouraged patient t get stress test, he is worried about cost but I did let him know wek need to be able to evaluation if his issue are from suboptimal coronary blood flow. lso d/w pt signs/symptoms of worsening of condition and need for urgent ED evaluation. Patient verbalized understanding and is agreeable to this plan.    - Exercise Stress Echocardiogram; Future    6. Malignant neoplasm of testis, unspecified laterality, unspecified whether descended or undescended (H)  discussed with patient (or patient's parents/caregiver) pathophysiology of condition and treatment options.  as above, given complex history would want urology input if things persisit. Patient verbalized understanding and is agreeable to this plan.  "    7. Post-void dribbling  as above    8. Candidal intertrigo  Well controlled on current medication(s). discussed with patient (or patient's parents/caregiver) pathophysiology of condition and treatment options.  reviewed labs, medications, diet ,etc.    - nystatin (MYCOSTATIN) 815097 UNIT/GM POWD; Apply topically 3 times daily as needed (recurrent yeast infection at moist skin creases)  Dispense: 60 g; Refill: 2    9. Chest pain on exertion  as above, need stress test. continue medications. Also d/w pt signs/symptoms of worsening of condition and need for urgent ED evaluation. Patient verbalized understanding and is agreeable to this plan.    - Exercise Stress Echocardiogram; Future    10. Decreased exercise tolerance  as above  - Exercise Stress Echocardiogram; Future    11. Impaired fasting glucose  as above, reviwed RFs for CAD  - Exercise Stress Echocardiogram; Future    12. Gastroesophageal reflux disease without esophagitis  Well controlled on current medication(s). discussed with patient (or patient's parents/caregiver) pathophysiology of condition and treatment options.  reviewed labs, medications, diet ,etc.    - omeprazole (PRILOSEC) 20 MG CR capsule; Take 1 capsule (20 mg) by mouth daily as needed  Dispense: 90 capsule; Refill: 3    COUNSELING:   Reviewed preventive health counseling, as reflected in patient instructions       reports that he quit smoking about 20 years ago. He has quit using smokeless tobacco.    Estimated body mass index is 40.84 kg/(m^2) as calculated from the following:    Height as of 1/17/18: 5' 6\" (1.676 m).    Weight as of 4/2/18: 253 lb (114.8 kg).       Counseling Resources:  ATP IV Guidelines  Pooled Cohorts Equation Calculator  FRAX Risk Assessment  ICSI Preventive Guidelines  Dietary Guidelines for Americans, 2010  USDA's MyPlate  ASA Prophylaxis  Lung CA Screening    I have discussed with patient the risks, benefits, medications, treatment options and modalities.   I have " instructed the patient to call or schedule a follow-up appointment if any problems or failure to improve.       Manoj Armenta MD  [facility]        Answers for HPI/ROS submitted by the patient on 5/18/2018   PHQ-2 Score: 2

## 2018-05-18 NOTE — PATIENT INSTRUCTIONS
Preventive Health Recommendations  Male Ages 50   64    Yearly exam:             See your health care provider every year in order to  o   Review health changes.   o   Discuss preventive care.    o   Review your medicines if your doctor has prescribed any.     Have a cholesterol test every 5 years, or more frequently if you are at risk for high cholesterol/heart disease.     Have a diabetes test (fasting glucose) every three years. If you are at risk for diabetes, you should have this test more often.     Have a colonoscopy at age 50, or have a yearly FIT test (stool test). These exams will check for colon cancer.      Talk with your health care provider about whether or not a prostate cancer screening test (PSA) is right for you.    You should be tested each year for STDs (sexually transmitted diseases), if you re at risk.     Shots: Get a flu shot each year. Get a tetanus shot every 10 years.     Nutrition:    Eat at least 5 servings of fruits and vegetables daily.     Eat whole-grain bread, whole-wheat pasta and brown rice instead of white grains and rice.     Talk to your provider about Calcium and Vitamin D.     Lifestyle    Exercise for at least 150 minutes a week (30 minutes a day, 5 days a week). This will help you control your weight and prevent disease.     Limit alcohol to one drink per day.     No smoking.     Wear sunscreen to prevent skin cancer.     See your dentist every six months for an exam and cleaning.     See your eye doctor every 1 to 2 years.      You will be called to schedule the stress test    HOLD your Flomax (tumulsosin) for 5-7 days and see if that helps the urine issue, if not I will refer you back to urology for evaluation.

## 2018-05-19 ASSESSMENT — PATIENT HEALTH QUESTIONNAIRE - PHQ9: SUM OF ALL RESPONSES TO PHQ QUESTIONS 1-9: 6

## 2018-06-04 ENCOUNTER — HOSPITAL ENCOUNTER (OUTPATIENT)
Dept: CARDIOLOGY | Facility: CLINIC | Age: 58
Discharge: HOME | End: 2018-06-04
Attending: INTERNAL MEDICINE | Admitting: INTERNAL MEDICINE
Payer: COMMERCIAL

## 2018-06-04 DIAGNOSIS — R07.9 CHEST PAIN ON EXERTION: ICD-10-CM

## 2018-06-04 DIAGNOSIS — Z00.00 ENCOUNTER FOR ROUTINE ADULT HEALTH EXAMINATION WITHOUT ABNORMAL FINDINGS: ICD-10-CM

## 2018-06-04 DIAGNOSIS — R68.89 DECREASED EXERCISE TOLERANCE: ICD-10-CM

## 2018-06-04 DIAGNOSIS — R73.01 IMPAIRED FASTING GLUCOSE: ICD-10-CM

## 2018-06-04 DIAGNOSIS — E66.01 MORBID OBESITY (H): ICD-10-CM

## 2018-06-04 PROCEDURE — 25500064 ZZH RX 255 OP 636: Performed by: INTERNAL MEDICINE

## 2018-06-04 PROCEDURE — 93018 CV STRESS TEST I&R ONLY: CPT | Performed by: INTERNAL MEDICINE

## 2018-06-04 PROCEDURE — 93350 STRESS TTE ONLY: CPT | Mod: TC

## 2018-06-04 PROCEDURE — 93016 CV STRESS TEST SUPVJ ONLY: CPT | Performed by: INTERNAL MEDICINE

## 2018-06-04 PROCEDURE — 93325 DOPPLER ECHO COLOR FLOW MAPG: CPT | Mod: 26 | Performed by: INTERNAL MEDICINE

## 2018-06-04 PROCEDURE — 93350 STRESS TTE ONLY: CPT | Mod: 26 | Performed by: INTERNAL MEDICINE

## 2018-06-04 PROCEDURE — 93321 DOPPLER ECHO F-UP/LMTD STD: CPT | Mod: 26 | Performed by: INTERNAL MEDICINE

## 2018-06-04 RX ADMIN — HUMAN ALBUMIN MICROSPHERES AND PERFLUTREN 3 ML: 10; .22 INJECTION, SOLUTION INTRAVENOUS at 10:45

## 2018-06-09 ENCOUNTER — MYC MEDICAL ADVICE (OUTPATIENT)
Dept: PEDIATRICS | Facility: CLINIC | Age: 58
End: 2018-06-09

## 2018-08-04 ENCOUNTER — OFFICE VISIT (OUTPATIENT)
Dept: URGENT CARE | Facility: URGENT CARE | Age: 58
End: 2018-08-04
Payer: COMMERCIAL

## 2018-08-04 VITALS
HEART RATE: 65 BPM | SYSTOLIC BLOOD PRESSURE: 126 MMHG | DIASTOLIC BLOOD PRESSURE: 78 MMHG | TEMPERATURE: 98.2 F | RESPIRATION RATE: 16 BRPM | OXYGEN SATURATION: 96 %

## 2018-08-04 DIAGNOSIS — J98.8 WHEEZING-ASSOCIATED RESPIRATORY INFECTION (WARI): Primary | ICD-10-CM

## 2018-08-04 DIAGNOSIS — J45.901 EXACERBATION OF ASTHMA, UNSPECIFIED ASTHMA SEVERITY, UNSPECIFIED WHETHER PERSISTENT: ICD-10-CM

## 2018-08-04 PROCEDURE — 99214 OFFICE O/P EST MOD 30 MIN: CPT | Mod: 25 | Performed by: PHYSICIAN ASSISTANT

## 2018-08-04 PROCEDURE — 94640 AIRWAY INHALATION TREATMENT: CPT | Performed by: PHYSICIAN ASSISTANT

## 2018-08-04 RX ORDER — AZITHROMYCIN 250 MG/1
TABLET, FILM COATED ORAL
Qty: 6 TABLET | Refills: 0 | Status: SHIPPED | OUTPATIENT
Start: 2018-08-04 | End: 2018-08-19

## 2018-08-04 RX ORDER — IPRATROPIUM BROMIDE AND ALBUTEROL SULFATE 2.5; .5 MG/3ML; MG/3ML
1 SOLUTION RESPIRATORY (INHALATION) ONCE
Qty: 3 ML | Refills: 0 | Status: ON HOLD | OUTPATIENT
Start: 2018-08-04 | End: 2018-09-02

## 2018-08-04 RX ORDER — PREDNISONE 20 MG/1
40 TABLET ORAL DAILY
Qty: 10 TABLET | Refills: 0 | Status: SHIPPED | OUTPATIENT
Start: 2018-08-04 | End: 2018-08-09

## 2018-08-04 NOTE — PROGRESS NOTES
SUBJECTIVE:    Fabio Logan, with a pmhx that includes intermittent asthma,  presents to clinic today for evaluation of nasal congestion, rhinorrhea and a sometimes dry/sometimes productive cough x 2 weeks duration.  Over past one week, patient reports worsening of cough, SOB and wheezing. Denies any fever, chills or lethargy.       ROS:   EYES: Denies any change in visual acuity or eye pain   ENT: Positive nasal congestion with clear rhinorrhea. Denies any ST or ear pain   RESP: Positive cough and wheezing as per above. Despite cough, denies any severe SOB.  Patient states she has been able to work and do all ADL's despite sxs   CARDIAC: Denies any CP, racing heartbeats or irregular heartbeats   GI: Denies any vomiting or diarrhea.No abdominal pain. Normal BM's  SKIN: Denies rash  NEURO: Denies any HA, stiff neck  or lethargy   MUS/SKEL: Denies any myalgias   PSYH: Denies any anxiety   ENDOCRINE: Denies any hx of DM      Past Medical History:   Diagnosis Date     Depression      Hypertension      Malignant neoplasm (H) 1998    right testicular cancer      Patient Active Problem List   Diagnosis     CARDIOVASCULAR SCREENING; LDL GOAL LESS THAN 160     Mild major depression (H)     Obese     HTN, goal below 140/90     H/O testicular cancer     Diverticulosis of large intestine     Esophageal reflux     Chest pain     Migraine     Skin cancer screening     Skin eruption     Lentigines     Chronic dermatitis     ACP (advance care planning)     Mild intermittent asthma with (acute) exacerbation     Essential hypertension with goal blood pressure less than 140/90     Major depression in complete remission (H)     Impaired fasting glucose     Malignant neoplasm of testis, unspecified laterality, unspecified whether descended or undescended (H)         Current Outpatient Prescriptions   Medication     albuterol (PROAIR HFA/PROVENTIL HFA/VENTOLIN HFA) 108 (90 BASE) MCG/ACT Inhaler     atenolol (TENORMIN) 50 MG  tablet     azithromycin (ZITHROMAX) 250 MG tablet     ipratropium - albuterol 0.5 mg/2.5 mg/3 mL (DUONEB) 0.5-2.5 (3) MG/3ML neb solution     losartan (COZAAR) 25 MG tablet     omeprazole (PRILOSEC) 20 MG CR capsule     predniSONE (DELTASONE) 20 MG tablet     sertraline (ZOLOFT) 100 MG tablet     sildenafil (VIAGRA) 100 MG tablet     SUMAtriptan (IMITREX) 50 MG tablet     tamsulosin (FLOMAX) 0.4 MG capsule     triamcinolone (KENALOG) 0.1 % ointment     No current facility-administered medications for this visit.        Allergies   Allergen Reactions     Iodine-131 Shortness Of Breath     Internal iodine         OBJECTIVE:  /78 (Cuff Size: Adult Large)  Pulse 65  Temp 98.2  F (36.8  C) (Oral)  Resp 16  SpO2 96%        General appearance: alert and no apparent distress  Skin color is uniform in color and without rash.  HEENT:   Conjunctiva not injected.  Sclera clear.  Left TM is normal: no effusions, no erythema, and normal landmarks.  Right TM is normal: no effusions, no erythema, and normal landmarks.  Nasal mucosa is congested  Oropharyngeal exam is unremarkable. No erythema.  No plaque, exudate, lesions, or ulcers.   Neck is supple, FROM. No pain or stiffness with ROM. No adenopathy  CARDIAC:NORMAL - regular rate and rhythm without murmur.  RESP:  No increased WOB. No retractions. No stridor.  Pre-Neb: Positive for diffuse wheezes. No rales or rhonchi. Somewhat decreased sounds in bases.   Post-Neb: Wheezing decreased but scattered wheezes still present. No rales or rhonchi. Now moving air into bases bilaterally.   ABDOMEN: Abdomen soft, non-tender. BS normal. No masses, organomegaly  NEURO: Alert and oriented.  Normal speech and mentation.  CN II/XII grossly intact.  Gait within normal limits.    EXTREMITIES:  Free of edema        ASSESSMENT/PLAN:      (J98.8) Wheezing-associated respiratory infection (WARI)  (primary encounter diagnosis)    MDM: Prolonged URI with secondary asthma exacerbation. Pt had  "partial response  (both subjectively and objectively) to single duoneb given here today.  VS are stable. No tachypnea, tachycardia, fever, chills, hypoxia or rales on exam to suggest pneumonia. No evidence of respiratory distress.  Plan is as outlined below with low threshold to follow-up with PC or UC if any sudden worsening after below tx started.      Plan: azithromycin (ZITHROMAX) 250 MG tablet,         predniSONE (DELTASONE) 20 MG tablet      1. Zpack as per above   2. Prednisone   3. Follow-up with PCP if sxs change, worsen or fail to fully resolve with above tx.  4.  In addition to the above, asthma and bronchitis \"red flag\" signs and sxs are reviewed with pt both verbally and by way of printed educational material for home review.  Pt verbalizes understanding of and agrees to the above plan.       (J45.901) Exacerbation of asthma, unspecified asthma severity, unspecified whether persistent  Plan: ipratropium - albuterol 0.5 mg/2.5 mg/3 mL         (DUONEB) 0.5-2.5 (3) MG/3ML neb solution,         predniSONE (DELTASONE) 20 MG tablet,         ALBUTEROL/IPRATROPIUM 3ML NEB - .001,         INHALATION/NEBULIZER TREATMENT, INITIAL            "

## 2018-08-04 NOTE — PATIENT INSTRUCTIONS
Viral or Bacterial Bronchitis with Wheezing (Adult)    Bronchitis is an infection of the air passages. It often occurs during a cold and is usually caused by a virus. Symptoms include cough with mucus (phlegm) and low-grade fever. This illness is contagious during the first few days and is spread through the air by coughing and sneezing, or by direct contact (touching the sick person and then touching your own eyes, nose, or mouth).  If there is a lot of inflammation, air flow is restricted. The air passages may also go into spasm, especially if you have asthma. This causes wheezing and difficulty breathing even in people who do not have asthma.  Bronchitis usually lasts 7 to 14 days. The wheezing should improve with treatment during the first week. An inhaler is often prescribed to relax the air passages and stop wheezing. Antibiotics will be prescribed if your doctor thinks there is also a secondary bacterial infection.  Home care    If symptoms are severe, rest at home for the first 2 to 3 days. When you go back to your usual activities, don't let yourself get too tired.    Do not smoke. Also avoid being exposed to secondhand smoke.    You may use over-the-counter medicine to control fever or pain, unless another medicine was prescribed. Note: If you have chronic liver or kidney disease or have ever had a stomach ulcer or gastrointestinal bleeding, talk with your healthcare provider before using these medicines. Also talk to your provider if you are taking medicine to prevent blood clots.) Aspirin should never be given to anyone younger than 18 years of age who is ill with a viral infection or fever. It may cause severe liver or brain damage.    Your appetite may be poor, so a light diet is fine. Avoid dehydration by drinking 6 to 8 glasses of fluids per day (such as water, soft drinks, sports drinks, juices, tea, or soup). Extra fluids will help loosen secretions in the nose and lungs.    Over-the-counter  cough, cold, and sore-throat medicines will not shorten the length of the illness, but they may be helpful to reduce symptoms. (Note: Do not use decongestants if you have high blood pressure.)    If you were given an inhaler, use it exactly as directed. If you need to use it more often than prescribed, your condition may be worsening. If this happens, contact your healthcare provider.    If prescribed, finish all antibiotic medicine, even if you are feeling better after only a few days.  Follow-up care  Follow up with your healthcare provider, or as advised. If you had an X-ray or ECG (electrocardiogram), a specialist will review it. You will be notified of any new findings that may affect your care.  If you are age 65 or older, or if you have a chronic lung disease or condition that affects your immune system, or you smoke, ask your healthcare provider about getting a pneumococcal vaccine and a yearly flu shot (influenza vaccine).  When to seek medical advice  Call your healthcare provider right away if any of these occur:    Fever of 100.4 F (38 C) or higher, or as directed by your healthcare provider    Coughing up increasing amounts of colored sputum    Weakness, drowsiness, headache, facial pain, ear pain, or a stiff neck  Call 911  Call 911 if any of these occur.    Coughing up blood    Worsening weakness, drowsiness, headache, or stiff neck    Increased wheezing not helped with medication, shortness of breath, or pain with breathing  Date Last Reviewed: 9/13/2015 2000-2017 [employer]. [address] [place] [address]. All rights reserved. This information is not intended as a substitute for professional medical care. Always follow your healthcare professional's instructions.

## 2018-08-04 NOTE — MR AVS SNAPSHOT
After Visit Summary   8/4/2018    Fabio Logan    MRN: [id number]           Patient Information     Date Of Birth          1960        Visit Information        Provider Department      8/4/2018 9:35 AM Navin Murrieta PA-C [facility]        Today's Diagnoses     Exacerbation of asthma, unspecified asthma severity, unspecified whether persistent    -  1    Wheezing-associated respiratory infection (WARI)          Care Instructions      Viral or Bacterial Bronchitis with Wheezing (Adult)    Bronchitis is an infection of the air passages. It often occurs during a cold and is usually caused by a virus. Symptoms include cough with mucus (phlegm) and low-grade fever. This illness is contagious during the first few days and is spread through the air by coughing and sneezing, or by direct contact (touching the sick person and then touching your own eyes, nose, or mouth).  If there is a lot of inflammation, air flow is restricted. The air passages may also go into spasm, especially if you have asthma. This causes wheezing and difficulty breathing even in people who do not have asthma.  Bronchitis usually lasts 7 to 14 days. The wheezing should improve with treatment during the first week. An inhaler is often prescribed to relax the air passages and stop wheezing. Antibiotics will be prescribed if your doctor thinks there is also a secondary bacterial infection.  Home care    If symptoms are severe, rest at home for the first 2 to 3 days. When you go back to your usual activities, don't let yourself get too tired.    Do not smoke. Also avoid being exposed to secondhand smoke.    You may use over-the-counter medicine to control fever or pain, unless another medicine was prescribed. Note: If you have chronic liver or kidney disease or have ever had a stomach ulcer or gastrointestinal bleeding, talk with your healthcare provider before using these medicines. Also talk to  your provider if you are taking medicine to prevent blood clots.) Aspirin should never be given to anyone younger than 18 years of age who is ill with a viral infection or fever. It may cause severe liver or brain damage.    Your appetite may be poor, so a light diet is fine. Avoid dehydration by drinking 6 to 8 glasses of fluids per day (such as water, soft drinks, sports drinks, juices, tea, or soup). Extra fluids will help loosen secretions in the nose and lungs.    Over-the-counter cough, cold, and sore-throat medicines will not shorten the length of the illness, but they may be helpful to reduce symptoms. (Note: Do not use decongestants if you have high blood pressure.)    If you were given an inhaler, use it exactly as directed. If you need to use it more often than prescribed, your condition may be worsening. If this happens, contact your healthcare provider.    If prescribed, finish all antibiotic medicine, even if you are feeling better after only a few days.  Follow-up care  Follow up with your healthcare provider, or as advised. If you had an X-ray or ECG (electrocardiogram), a specialist will review it. You will be notified of any new findings that may affect your care.  If you are age 65 or older, or if you have a chronic lung disease or condition that affects your immune system, or you smoke, ask your healthcare provider about getting a pneumococcal vaccine and a yearly flu shot (influenza vaccine).  When to seek medical advice  Call your healthcare provider right away if any of these occur:    Fever of 100.4 F (38 C) or higher, or as directed by your healthcare provider    Coughing up increasing amounts of colored sputum    Weakness, drowsiness, headache, facial pain, ear pain, or a stiff neck  Call 911  Call 911 if any of these occur.    Coughing up blood    Worsening weakness, drowsiness, headache, or stiff neck    Increased wheezing not helped with medication, shortness of breath, or pain with  breathing  Date Last Reviewed: 9/13/2015 2000-2017 [employer]. [address] [place] [address], [place] [address]. All rights reserved. This information is not intended as a substitute for professional medical care. Always follow your healthcare professional's instructions.                Follow-ups after your visit        Who to contact     If you have questions or need follow up information about today's clinic visit or your schedule please contact [facility] directly at [phone].  Normal or non-critical lab and imaging results will be communicated to you by meebeehart, letter or phone within 4 business days after the clinic has received the results. If you do not hear from us within 7 days, please contact the clinic through [employer] or phone. If you have a critical or abnormal lab result, we will notify you by phone as soon as possible.  Submit refill requests through [employer] or call your pharmacy and they will forward the refill request to us. Please allow 3 business days for your refill to be completed.          Additional Information About Your Visit        meebeehart Information     [employer] gives you secure access to your electronic health record. If you see a primary care provider, you can also send messages to your care team and make appointments. If you have questions, please call your primary care clinic.  If you do not have a primary care provider, please call [phone] and they will assist you.        Care EveryWhere ID     This is your Care EveryWhere ID. This could be used by other organizations to access your [facility] medical records  [id number]        Your Vitals Were     Pulse Temperature Respirations Pulse Oximetry          65 98.2  F (36.8  C) (Oral) 16 96%         Blood Pressure from Last 3 Encounters:   08/04/18 126/78   05/18/18 118/74   04/02/18 130/72    Weight from Last 3 Encounters:   05/18/18 246 lb (111.6 kg)   04/02/18 253 lb (114.8 kg)   01/17/18 252 lb  (114.3 kg)              Today, you had the following     No orders found for display         Today's Medication Changes          These changes are accurate as of 8/4/18 10:54 AM.  If you have any questions, ask your nurse or doctor.               Start taking these medicines.        Dose/Directions    azithromycin 250 MG tablet   Commonly known as:  ZITHROMAX   Used for:  Wheezing-associated respiratory infection (WARI)   Started by:  Navin Murrieta PA-C        Two tablets first day, then one tablet daily for four days.   Quantity:  6 tablet   Refills:  0       ipratropium - albuterol 0.5 mg/2.5 mg/3 mL 0.5-2.5 (3) MG/3ML neb solution   Commonly known as:  DUONEB   Used for:  Exacerbation of asthma, unspecified asthma severity, unspecified whether persistent   Started by:  Navin Murrieta PA-C        Dose:  1 vial   Take 1 vial (3 mLs) by nebulization once for 1 dose   Quantity:  3 mL   Refills:  0       predniSONE 20 MG tablet   Commonly known as:  DELTASONE   Used for:  Wheezing-associated respiratory infection (WARI), Exacerbation of asthma, unspecified asthma severity, unspecified whether persistent   Started by:  Navin Murrieta PA-C        Dose:  40 mg   Take 2 tablets (40 mg) by mouth daily for 5 days   Quantity:  10 tablet   Refills:  0            Where to get your medicines      These medications were sent to [facility] - [address]   [address], [place] [address]     Phone:  [phone]     azithromycin 250 MG tablet    ipratropium - albuterol 0.5 mg/2.5 mg/3 mL 0.5-2.5 (3) MG/3ML neb solution    predniSONE 20 MG tablet                Primary Care Provider Office Phone # Fax #    Manoj Armenta [phone] [address] [place] [address]        Equal Access to Services     BABITA ODELL AH: Candida Pizarro, rosendo borja, misty  betito delgadoin hayaan adeeg kharash la'aan ahGreta Doe [facility] [phone].    ATENCIÓN: Si cam gil, tiene a mae disposición servicios gratuitos de asistencia lingüística. Cheryl al [phone].    We comply with applicable federal civil rights laws and [place] laws. We do not discriminate on the basis of race, color, national origin, age, disability, sex, sexual orientation, or gender identity.            Thank you!     Thank you for choosing [facility]  for your care. Our goal is always to provide you with excellent care. Hearing back from our patients is one way we can continue to improve our services. Please take a few minutes to complete the written survey that you may receive in the mail after your visit with us. Thank you!             Your Updated Medication List - Protect others around you: Learn how to safely use, store and throw away your medicines at www.disposemymeds.org.          This list is accurate as of 8/4/18 10:54 AM.  Always use your most recent med list.                   Brand Name Dispense Instructions for use Diagnosis    albuterol 108 (90 Base) MCG/ACT Inhaler    PROAIR HFA/PROVENTIL HFA/VENTOLIN HFA    1 Inhaler    Inhale 2 puffs into the lungs every 6 hours as needed for shortness of breath / dyspnea or wheezing    Acute bronchitis with coexisting condition requiring prophylactic treatment       atenolol 50 MG tablet    TENORMIN    90 tablet    TAKE ONE TABLET BY MOUTH ONE TIME DAILY    HTN, goal below 140/90       azithromycin 250 MG tablet    ZITHROMAX    6 tablet    Two tablets first day, then one tablet daily for four days.    Wheezing-associated respiratory infection (WARI)       ipratropium - albuterol 0.5 mg/2.5 mg/3 mL 0.5-2.5 (3) MG/3ML neb solution    DUONEB    3 mL    Take 1 vial (3 mLs) by nebulization once for 1 dose    Exacerbation of asthma, unspecified asthma severity, unspecified whether persistent       losartan 25 MG tablet    COZAAR    90  tablet    Take 1 tablet (25 mg) by mouth daily    Essential hypertension with goal blood pressure less than 140/90, Encounter for routine adult health examination without abnormal findings       omeprazole 20 MG CR capsule    priLOSEC    90 capsule    Take 1 capsule (20 mg) by mouth daily as needed    Gastroesophageal reflux disease without esophagitis, Encounter for routine adult health examination without abnormal findings       predniSONE 20 MG tablet    DELTASONE    10 tablet    Take 2 tablets (40 mg) by mouth daily for 5 days    Wheezing-associated respiratory infection (WARI), Exacerbation of asthma, unspecified asthma severity, unspecified whether persistent       sertraline 100 MG tablet    ZOLOFT    90 tablet    Take 1 tablet (100 mg) by mouth daily    Major depressive disorder, recurrent episode, mild (H), Encounter for routine adult health examination without abnormal findings       sildenafil 100 MG tablet    VIAGRA    4 tablet    Take 0.5-1 tablets ( mg) by mouth daily as needed for erectile dysfunction Take 30 min to 4 hours before sexual activity.  Never use with nitroglycerin, terazosin or doxazosin.    Erectile dysfunction, unspecified erectile dysfunction type       SUMAtriptan 50 MG tablet    IMITREX    18 tablet    TAKE 1 TABLET (50 MG) BY MOUTH AT ONSET OF HEADACHE FOR MIGRAINE MAY REPEAT DOSE IN 2 HOURS.  DO NOT EXCEED 200 MG IN 24 HOURS    Other migraine without status migrainosus, not intractable       tamsulosin 0.4 MG capsule    FLOMAX    90 capsule    Take 1 capsule (0.4 mg) by mouth daily    Benign non-nodular prostatic hyperplasia with lower urinary tract symptoms, Encounter for routine adult health examination without abnormal findings       triamcinolone 0.1 % ointment    KENALOG    453 g    Apply topically 2 times daily    Skin eruption

## 2018-08-04 NOTE — NURSING NOTE
Chief Complaint   Patient presents with     Urgent Care     Cough     for over 2 weeks.         MAGDA MCGRATH, [place]

## 2018-08-19 ENCOUNTER — OFFICE VISIT (OUTPATIENT)
Dept: URGENT CARE | Facility: URGENT CARE | Age: 58
End: 2018-08-19
Payer: COMMERCIAL

## 2018-08-19 ENCOUNTER — NURSE TRIAGE (OUTPATIENT)
Dept: NURSING | Facility: CLINIC | Age: 58
End: 2018-08-19

## 2018-08-19 VITALS
OXYGEN SATURATION: 98 % | SYSTOLIC BLOOD PRESSURE: 119 MMHG | WEIGHT: 244.9 LBS | DIASTOLIC BLOOD PRESSURE: 75 MMHG | TEMPERATURE: 98.2 F | BODY MASS INDEX: 42.04 KG/M2 | HEART RATE: 68 BPM

## 2018-08-19 DIAGNOSIS — L03.115 CELLULITIS OF RIGHT LOWER EXTREMITY: Primary | ICD-10-CM

## 2018-08-19 PROBLEM — E66.01 MORBID OBESITY (H): Status: ACTIVE | Noted: 2018-08-19

## 2018-08-19 PROCEDURE — 96372 THER/PROPH/DIAG INJ SC/IM: CPT | Performed by: PHYSICIAN ASSISTANT

## 2018-08-19 PROCEDURE — 99214 OFFICE O/P EST MOD 30 MIN: CPT | Mod: 25 | Performed by: PHYSICIAN ASSISTANT

## 2018-08-19 RX ORDER — SULFAMETHOXAZOLE/TRIMETHOPRIM 800-160 MG
1 TABLET ORAL 2 TIMES DAILY
Qty: 20 TABLET | Refills: 0 | Status: SHIPPED | OUTPATIENT
Start: 2018-08-19 | End: 2018-09-02

## 2018-08-19 RX ORDER — CEFTRIAXONE 1 G/1
1000 INJECTION, POWDER, FOR SOLUTION INTRAMUSCULAR; INTRAVENOUS ONCE
Qty: 10 ML | Refills: 0 | OUTPATIENT
Start: 2018-08-19 | End: 2018-08-19

## 2018-08-19 RX ORDER — SULFAMETHOXAZOLE/TRIMETHOPRIM 800-160 MG
1 TABLET ORAL 2 TIMES DAILY
Qty: 20 TABLET | Refills: 0 | Status: SHIPPED | OUTPATIENT
Start: 2018-08-19 | End: 2018-08-19

## 2018-08-19 NOTE — PROGRESS NOTES
"Right foot--dorsum  9cmx 12 cm sunburn marked     SUBJECTIVE:  Fabio Logan  presents to the clinic today for evaluation of possible infected blister right foot. Patient tells me he developed a severe, blistering, sunburn while on vacation in [place] approximately one week ago (including blisters on dorsum of right foot).  Over past several days, he reports increased pain, swelling and redness over the \"top\" of his right foot. He tried OTC topical abx but notes sxs are worsening so presents here now for further evaluation.   Denies any fever, weakness or other systemic sxs. Denies any past hx of known MRSA.        Associated symptoms include: Denies any fever, throat tightness, wheezing, cough, tongue/lip swelling, shortness of breath, joint pain, nausea and vomiting.      Past Medical History:   Diagnosis Date     Depression      Hypertension      Malignant neoplasm (H) 1998    right testicular cancer      Current Outpatient Prescriptions   Medication     Acetaminophen (TYLENOL PO)     albuterol (PROAIR HFA/PROVENTIL HFA/VENTOLIN HFA) 108 (90 BASE) MCG/ACT Inhaler     atenolol (TENORMIN) 50 MG tablet     losartan (COZAAR) 25 MG tablet     omeprazole (PRILOSEC) 20 MG CR capsule     sertraline (ZOLOFT) 100 MG tablet     sildenafil (VIAGRA) 100 MG tablet     SUMAtriptan (IMITREX) 50 MG tablet     tamsulosin (FLOMAX) 0.4 MG capsule     triamcinolone (KENALOG) 0.1 % ointment     ipratropium - albuterol 0.5 mg/2.5 mg/3 mL (DUONEB) 0.5-2.5 (3) MG/3ML neb solution     No current facility-administered medications for this visit.          Allergies   Allergen Reactions     Iodine-131 Shortness Of Breath     Internal iodine       ROS:    INTEGUMENTARY/SKIN: As per above. NO unexplained bruising or bleeding   EYES: NEGATIVE for photophobia, vision changes or irritation  ENT/MOUTH: Negative for any ST, tongue, mouth or throat swelling. Negative for any difficulty breathing or swallowing   RESP:NEGATIVE for cough, wheezing " or SOB   CV: NEGATIVE for chest pain, palpitations or peripheral edema  GI: NEGATIVE for nausea, abdominal pain, heartburn, or change in bowel habits  MUSCULOSKELETAL: NEGATIVE for significant arthralgias or myalgia  NEURO: NEGATIVE HA, weakness, dizziness or paresthesias  HEME/ALLERGY/IMMUNE: Denies any past hx of unexplained systemic rashes. No prior hx of anaphylaxis. NO unexplained bruising or bleeding   RHEUM: Negative for any red, warm, swollen joints. Negative for any pmhx of rheumatologic disorders     EXAM:   /75 (BP Location: Right arm, Patient Position: Chair)  Pulse 68  Temp 98.2  F (36.8  C) (Oral)  Wt 244 lb 14.4 oz (111.1 kg)  SpO2 98%  BMI 42.04 kg/m2      GENERAL: alert, no acute distress.  SKIN: Positive for 9 cm x 12 cm, well demarcated area of swelling, erythema and heat over dorsum of right foot extending to ankle area. Positive for open blisters in center of above. No fluctuant pockets. No plantar surface involvement.   HEENT:   Conjunctiva without infection.  Sclera clear.  NECK: supple, non-tender to palpation. No anterior cervical adenopathy. No posterior cervical adenopathy   LYMPH: No axillary, supraclavicular or inguinal adenopathy.  RESP: lungs clear to auscultation - no rales, rhonchi or wheezes  CV: regular rate and rhythm, normal S1 S2, no murmur noted  EXTREMITIES:  Positive for swelling dorsum of right foot as per above. Free of and lower leg or posterior calf edema  NEURO: Alert and oriented.  Normal speech and mentation.  CN II/XII grossly intact.  Gait within normal limits.    PSYCH: No anxious or apprehensive mood noted     ASSESSMENT/PLAN:    (L03.115) Cellulitis of right lower extremity  (primary encounter diagnosis)  MDM: Cellulitis right foot very likely related to infected blister from recent sunburn. Patient is given Rocephin IM 1 gram here today. He is started on Bactrim DS bid x 10 days. I personally outlined area of involvement with wound marking pen today.   "I have advised he see PCP team tomorrow or Tuesday for a recheck and to assess for possible need of another injection of Rocephin.  In addition to the above, cellulitis \"red flag\" signs and sxs are reviewed with pt both verbally and by way of printed educational material for home review.  Pt verbalizes understanding of and agrees to the above plan.       Plan: cefTRIAXone (ROCEPHIN) 1 GM vial,         sulfamethoxazole-trimethoprim (BACTRIM         DS/SEPTRA DS) 800-160 MG per tablet,         CEFTRIAXONE NA INJ /250MG, INJECTION         INTRAMUSCULAR OR SUB-Q, DISCONTINUED:         sulfamethoxazole-trimethoprim (BACTRIM         DS/SEPTRA DS) 800-160 MG per tablet        "

## 2018-08-19 NOTE — PATIENT INSTRUCTIONS
Discharge Instructions for Cellulitis  You have been diagnosed with cellulitis. This is an infection in the deepest layer of the skin. In some cases, the infection also affects the muscle. Cellulitis is caused by bacteria. The bacteria can enter the body through broken skin. This can happen with a cut, scratch, animal bite, or an insect bite that has been scratched. You may have been treated in the hospital with antibiotics and fluids. You will likely be given a prescription for antibiotics to take at home. This sheet will help you take care of yourself at home.  Home care  When you are home:    Take the prescribed antibiotic medicine you are given as directed until it is gone. Take it even if you feel better. It treats the infection and stops it from returning. Not taking all the medicine can make future infections hard to treat.    Keep the infected area clean.    When possible, raise the infected area above the level of your heart. This helps keep swelling down.    Talk with your healthcare provider if you are in pain. Ask what kind of over-the-counter medicine you can take for pain.    Apply clean bandages as advised.    Take your temperature once a day for a week.    Wash your hands often to prevent spreading the infection.  In the future, wash your hands before and after you touch cuts, scratches, or bandages. This will help prevent infection.   When to call your healthcare provider  Call your healthcare provider immediately if you have any of the following:    Difficulty or pain when moving the joints above or below the infected area    Discharge or pus draining from the area    Fever of 100.4 F (38 C) or higher, or as directed by your healthcare provider    Pain that gets worse in or around the infected     Redness that gets worse in or around the infected area, particularly if the area of redness expands to a wider area    Shaking chills    Swelling of the infected area    Vomiting   Date Last Reviewed:  8/1/2016 2000-2017 [employer]. [address] [place] [address], [place] [address]. All rights reserved. This information is not intended as a substitute for professional medical care. Always follow your healthcare professional's instructions.

## 2018-08-19 NOTE — TELEPHONE ENCOUNTER
FNA Triage Call  Presenting Problem: Pt called.  Sunburn Blisters for the last week : on  shoulder , thrunk  and arms that have healed but  Popped a  Large  Blister on   R foot   On  8/13/18  And now having  increase swelling , red  and open sore 1 x 2  inches . Currently :  Denies  fever or pus .    Guideline Used: Blister- Foot and hand - Adult   Patient Recommendations/Teaching: See provider in 4 hours and Pt thinking about [place]  today as option.   .Gabbi Roberts RN [place] nurse advisors.           Reason for Disposition    [1] Looks infected AND [2] large red area (> 2 in. or 5 cm)    Additional Information    Negative: Sounds like a life-threatening emergency to the triager    Negative: Followed a thermal burn (e.g., hot object, scald from boiling water)    Negative: Followed a chemical burn    Negative: Followed frostbite    Negative: Poison ivy, oak, or sumac contact suspected    Negative: Shingles suspected (i.e., painful rash, multiple small blisters grouped together in one area of body; dermatomal distribution)    Negative: [1] Diabetes mellitus AND [2] boil, ulcer, or infection of foot    Negative: Blisters on other parts of the body (besides hands and feet)    Negative: Patient sounds very sick or weak to the triager    Negative: [1] Looks infected (spreading redness, red streak, or pus) AND [2] fever    Protocols used: BLISTER - FOOT AND HAND-ADULT-

## 2018-08-19 NOTE — MR AVS SNAPSHOT
After Visit Summary   8/19/2018    Faibo Logan    MRN: [id number]           Patient Information     Date Of Birth          1960        Visit Information        Provider Department      8/19/2018 2:05 PM Navin Murrieta PA-C [facility]        Today's Diagnoses     Cellulitis of right lower extremity    -  1      Care Instructions      Discharge Instructions for Cellulitis  You have been diagnosed with cellulitis. This is an infection in the deepest layer of the skin. In some cases, the infection also affects the muscle. Cellulitis is caused by bacteria. The bacteria can enter the body through broken skin. This can happen with a cut, scratch, animal bite, or an insect bite that has been scratched. You may have been treated in the hospital with antibiotics and fluids. You will likely be given a prescription for antibiotics to take at home. This sheet will help you take care of yourself at home.  Home care  When you are home:    Take the prescribed antibiotic medicine you are given as directed until it is gone. Take it even if you feel better. It treats the infection and stops it from returning. Not taking all the medicine can make future infections hard to treat.    Keep the infected area clean.    When possible, raise the infected area above the level of your heart. This helps keep swelling down.    Talk with your healthcare provider if you are in pain. Ask what kind of over-the-counter medicine you can take for pain.    Apply clean bandages as advised.    Take your temperature once a day for a week.    Wash your hands often to prevent spreading the infection.  In the future, wash your hands before and after you touch cuts, scratches, or bandages. This will help prevent infection.   When to call your healthcare provider  Call your healthcare provider immediately if you have any of the following:    Difficulty or pain when moving the joints above or below the  infected area    Discharge or pus draining from the area    Fever of 100.4 F (38 C) or higher, or as directed by your healthcare provider    Pain that gets worse in or around the infected     Redness that gets worse in or around the infected area, particularly if the area of redness expands to a wider area    Shaking chills    Swelling of the infected area    Vomiting   Date Last Reviewed: 8/1/2016 2000-2017 [employer]. [address] [place] [address], [place] [address]. All rights reserved. This information is not intended as a substitute for professional medical care. Always follow your healthcare professional's instructions.                Follow-ups after your visit        Who to contact     If you have questions or need follow up information about today's clinic visit or your schedule please contact [facility] directly at [phone].  Normal or non-critical lab and imaging results will be communicated to you by [employer], letter or phone within 4 business days after the clinic has received the results. If you do not hear from us within 7 days, please contact the clinic through [employer] or phone. If you have a critical or abnormal lab result, we will notify you by phone as soon as possible.  Submit refill requests through [employer] or call your pharmacy and they will forward the refill request to us. Please allow 3 business days for your refill to be completed.          Additional Information About Your Visit        [employer] Information     [employer] gives you secure access to your electronic health record. If you see a primary care provider, you can also send messages to your care team and make appointments. If you have questions, please call your primary care clinic.  If you do not have a primary care provider, please call [phone] and they will assist you.        Care EveryWhere ID     This is your Care EveryWhere ID. This could be used by other organizations to access your [place]  medical records  [id number]        Your Vitals Were     Pulse Temperature Pulse Oximetry BMI (Body Mass Index)          68 98.2  F (36.8  C) (Oral) 98% 42.04 kg/m2         Blood Pressure from Last 3 Encounters:   08/19/18 119/75   08/04/18 126/78   05/18/18 118/74    Weight from Last 3 Encounters:   08/19/18 244 lb 14.4 oz (111.1 kg)   05/18/18 246 lb (111.6 kg)   04/02/18 253 lb (114.8 kg)              Today, you had the following     No orders found for display         Today's Medication Changes          These changes are accurate as of 8/19/18  4:01 PM.  If you have any questions, ask your nurse or doctor.               Start taking these medicines.        Dose/Directions    cefTRIAXone 1 GM vial   Commonly known as:  ROCEPHIN   Used for:  Cellulitis of right lower extremity   Started by:  Navin Murrieta PA-C        Dose:  1000 mg   Inject 1 g (1,000 mg) into the muscle once for 1 dose   Quantity:  10 mL   Refills:  0       sulfamethoxazole-trimethoprim 800-160 MG per tablet   Commonly known as:  BACTRIM DS/SEPTRA DS   Used for:  Cellulitis of right lower extremity   Started by:  Navin Murrieta PA-C        Dose:  1 tablet   Take 1 tablet by mouth 2 times daily   Quantity:  20 tablet   Refills:  0            Where to get your medicines      These medications were sent to [id number] - [place], [place] - [address]  [address] [address]     Phone:  [phone]     sulfamethoxazole-trimethoprim 800-160 MG per tablet         Some of these will need a paper prescription and others can be bought over the counter.  Ask your nurse if you have questions.     You don't need a prescription for these medications     cefTRIAXone 1 GM vial                Primary Care Provider Office Phone # Fax #    Manoj Armenta [phone] [address] [place] [address]        Equal Access to Services     BABITA ODELL AH: Dialloii cristiana steven  liz Piazrro, wacameliada luqadaha, qajamaalta kakathrineda zarina, betito strickland lazachtravis tong. So [facility] [phone].    ATENCIÓN: Si lavonnela jeffery, tiene a mae disposición servicios gratuitos de asistencia lingüística. Cheryl al [phone].    We comply with applicable federal civil rights laws and [place] laws. We do not discriminate on the basis of race, color, national origin, age, disability, sex, sexual orientation, or gender identity.            Thank you!     Thank you for choosing [facility]  for your care. Our goal is always to provide you with excellent care. Hearing back from our patients is one way we can continue to improve our services. Please take a few minutes to complete the written survey that you may receive in the mail after your visit with us. Thank you!             Your Updated Medication List - Protect others around you: Learn how to safely use, store and throw away your medicines at www.disposemymeds.org.          This list is accurate as of 8/19/18  4:01 PM.  Always use your most recent med list.                   Brand Name Dispense Instructions for use Diagnosis    albuterol 108 (90 Base) MCG/ACT inhaler    PROAIR HFA/PROVENTIL HFA/VENTOLIN HFA    1 Inhaler    Inhale 2 puffs into the lungs every 6 hours as needed for shortness of breath / dyspnea or wheezing    Acute bronchitis with coexisting condition requiring prophylactic treatment       atenolol 50 MG tablet    TENORMIN    90 tablet    TAKE ONE TABLET BY MOUTH ONE TIME DAILY    HTN, goal below 140/90       cefTRIAXone 1 GM vial    ROCEPHIN    10 mL    Inject 1 g (1,000 mg) into the muscle once for 1 dose    Cellulitis of right lower extremity       ipratropium - albuterol 0.5 mg/2.5 mg/3 mL 0.5-2.5 (3) MG/3ML neb solution    DUONEB    3 mL    Take 1 vial (3 mLs) by nebulization once for 1 dose    Exacerbation of asthma, unspecified asthma severity, unspecified whether persistent       losartan 25 MG tablet     COZAAR    90 tablet    Take 1 tablet (25 mg) by mouth daily    Essential hypertension with goal blood pressure less than 140/90, Encounter for routine adult health examination without abnormal findings       omeprazole 20 MG CR capsule    priLOSEC    90 capsule    Take 1 capsule (20 mg) by mouth daily as needed    Gastroesophageal reflux disease without esophagitis, Encounter for routine adult health examination without abnormal findings       sertraline 100 MG tablet    ZOLOFT    90 tablet    Take 1 tablet (100 mg) by mouth daily    Major depressive disorder, recurrent episode, mild (H), Encounter for routine adult health examination without abnormal findings       sildenafil 100 MG tablet    VIAGRA    4 tablet    Take 0.5-1 tablets ( mg) by mouth daily as needed for erectile dysfunction Take 30 min to 4 hours before sexual activity.  Never use with nitroglycerin, terazosin or doxazosin.    Erectile dysfunction, unspecified erectile dysfunction type       sulfamethoxazole-trimethoprim 800-160 MG per tablet    BACTRIM DS/SEPTRA DS    20 tablet    Take 1 tablet by mouth 2 times daily    Cellulitis of right lower extremity       SUMAtriptan 50 MG tablet    IMITREX    18 tablet    TAKE 1 TABLET (50 MG) BY MOUTH AT ONSET OF HEADACHE FOR MIGRAINE MAY REPEAT DOSE IN 2 HOURS.  DO NOT EXCEED 200 MG IN 24 HOURS    Other migraine without status migrainosus, not intractable       tamsulosin 0.4 MG capsule    FLOMAX    90 capsule    Take 1 capsule (0.4 mg) by mouth daily    Benign non-nodular prostatic hyperplasia with lower urinary tract symptoms, Encounter for routine adult health examination without abnormal findings       triamcinolone 0.1 % ointment    KENALOG    453 g    Apply topically 2 times daily    Skin eruption       TYLENOL PO      Take 500 mg by mouth

## 2018-08-20 ENCOUNTER — OFFICE VISIT (OUTPATIENT)
Dept: PEDIATRICS | Facility: CLINIC | Age: 58
End: 2018-08-20
Payer: COMMERCIAL

## 2018-08-20 VITALS
WEIGHT: 244 LBS | SYSTOLIC BLOOD PRESSURE: 116 MMHG | TEMPERATURE: 97.7 F | DIASTOLIC BLOOD PRESSURE: 70 MMHG | HEART RATE: 60 BPM | BODY MASS INDEX: 41.88 KG/M2

## 2018-08-20 DIAGNOSIS — L03.115 CELLULITIS OF RIGHT FOOT: Primary | ICD-10-CM

## 2018-08-20 PROCEDURE — 99213 OFFICE O/P EST LOW 20 MIN: CPT | Performed by: INTERNAL MEDICINE

## 2018-08-20 NOTE — MR AVS SNAPSHOT
After Visit Summary   8/20/2018    Fabio Logan    MRN: [id number]           Patient Information     Date Of Birth          1960        Visit Information        Provider Department      8/20/2018 4:00 PM Kristian Delatorre MD [facility]        Care Instructions    Keep foot elevated, and continue antibiotics for full course.    Keep It covered with nonadherent bandage.    Follow-up for worsening, or any fevers or pus discharge.    Kristian Delatorre MD  Internal Medicine and Pediatrics             Follow-ups after your visit        Follow-up notes from your care team     Return in about 6 months (around 2/20/2019).      Who to contact     If you have questions or need follow up information about today's clinic visit or your schedule please contact [facility] directly at [phone].  Normal or non-critical lab and imaging results will be communicated to you by MyChart, letter or phone within 4 business days after the clinic has received the results. If you do not hear from us within 7 days, please contact the clinic through MyChart or phone. If you have a critical or abnormal lab result, we will notify you by phone as soon as possible.  Submit refill requests through [employer] or call your pharmacy and they will forward the refill request to us. Please allow 3 business days for your refill to be completed.          Additional Information About Your Visit        MyChart Information     [employer] gives you secure access to your electronic health record. If you see a primary care provider, you can also send messages to your care team and make appointments. If you have questions, please call your primary care clinic.  If you do not have a primary care provider, please call [phone] and they will assist you.        Care EveryWhere ID     This is your Care EveryWhere ID. This could be used by other organizations to access your [place] medical records  [id number]        Your Vitals  Were     Pulse Temperature BMI (Body Mass Index)             60 97.7  F (36.5  C) (Oral) 41.88 kg/m2          Blood Pressure from Last 3 Encounters:   08/20/18 116/70   08/19/18 119/75   08/04/18 126/78    Weight from Last 3 Encounters:   08/20/18 244 lb (110.7 kg)   08/19/18 244 lb 14.4 oz (111.1 kg)   05/18/18 246 lb (111.6 kg)              Today, you had the following     No orders found for display       Primary Care Provider Office Phone # Fax #    Manoj Armenta [phone] [address] [place] [address]        Equal Access to Services     [facility]: Hadii cristiana Pizarro, wacameliada jonh, qaybta kaalmada zarina, betito eid . So [facility] [phone].    ATENCIÓN: Si habla español, tiene a mae disposición servicios gratuitos de asistencia lingüística. Llame al [phone].    We comply with applicable federal civil rights laws and [place] laws. We do not discriminate on the basis of race, color, national origin, age, disability, sex, sexual orientation, or gender identity.            Thank you!     Thank you for choosing [facility]  for your care. Our goal is always to provide you with excellent care. Hearing back from our patients is one way we can continue to improve our services. Please take a few minutes to complete the written survey that you may receive in the mail after your visit with us. Thank you!             Your Updated Medication List - Protect others around you: Learn how to safely use, store and throw away your medicines at www.disposemymeds.org.          This list is accurate as of 8/20/18  4:24 PM.  Always use your most recent med list.                   Brand Name Dispense Instructions for use Diagnosis    albuterol 108 (90 Base) MCG/ACT inhaler    PROAIR HFA/PROVENTIL HFA/VENTOLIN HFA    1 Inhaler    Inhale 2 puffs into the lungs every 6 hours as needed for shortness of breath / dyspnea or wheezing     Acute bronchitis with coexisting condition requiring prophylactic treatment       atenolol 50 MG tablet    TENORMIN    90 tablet    TAKE ONE TABLET BY MOUTH ONE TIME DAILY    HTN, goal below 140/90       ipratropium - albuterol 0.5 mg/2.5 mg/3 mL 0.5-2.5 (3) MG/3ML neb solution    DUONEB    3 mL    Take 1 vial (3 mLs) by nebulization once for 1 dose    Exacerbation of asthma, unspecified asthma severity, unspecified whether persistent       losartan 25 MG tablet    COZAAR    90 tablet    Take 1 tablet (25 mg) by mouth daily    Essential hypertension with goal blood pressure less than 140/90, Encounter for routine adult health examination without abnormal findings       omeprazole 20 MG CR capsule    priLOSEC    90 capsule    Take 1 capsule (20 mg) by mouth daily as needed    Gastroesophageal reflux disease without esophagitis, Encounter for routine adult health examination without abnormal findings       sertraline 100 MG tablet    ZOLOFT    90 tablet    Take 1 tablet (100 mg) by mouth daily    Major depressive disorder, recurrent episode, mild (H), Encounter for routine adult health examination without abnormal findings       sildenafil 100 MG tablet    VIAGRA    4 tablet    Take 0.5-1 tablets ( mg) by mouth daily as needed for erectile dysfunction Take 30 min to 4 hours before sexual activity.  Never use with nitroglycerin, terazosin or doxazosin.    Erectile dysfunction, unspecified erectile dysfunction type       sulfamethoxazole-trimethoprim 800-160 MG per tablet    BACTRIM DS/SEPTRA DS    20 tablet    Take 1 tablet by mouth 2 times daily    Cellulitis of right lower extremity       SUMAtriptan 50 MG tablet    IMITREX    18 tablet    TAKE 1 TABLET (50 MG) BY MOUTH AT ONSET OF HEADACHE FOR MIGRAINE MAY REPEAT DOSE IN 2 HOURS.  DO NOT EXCEED 200 MG IN 24 HOURS    Other migraine without status migrainosus, not intractable       tamsulosin 0.4 MG capsule    FLOMAX    90 capsule    Take 1 capsule (0.4 mg)  by mouth daily    Benign non-nodular prostatic hyperplasia with lower urinary tract symptoms, Encounter for routine adult health examination without abnormal findings       triamcinolone 0.1 % ointment    KENALOG    453 g    Apply topically 2 times daily    Skin eruption       TYLENOL PO      Take 500 mg by mouth

## 2018-08-20 NOTE — PATIENT INSTRUCTIONS
Keep foot elevated, and continue antibiotics for full course.    Keep It covered with nonadherent bandage.    Follow-up for worsening, or any fevers or pus discharge.    Kristian Delatorre MD  Internal Medicine and Pediatrics

## 2018-08-20 NOTE — PROGRESS NOTES
SUBJECTIVE:   Fabio Logan is a 57 year old male who presents to clinic today for the following health issues:      ED/UC Followup:    Facility:  [facility]  Date of visit: 8/19/18  Reason for visit: cellulitis right foot  Current Status: better         Sunburn last week; had some worsening of his right foot, and seen at urgent care; due to pain, came into urgent care; was diagnosed with cellulitis and was given both rocephin and then oral bactrim.    Since yesterday, notes less pain.  Notes that went to work today, and his facial lesions have worsened some.    No fevers noted.   Some congestion and cold/cough before his trip to [place], and was given azithromycin.             Problem list and histories reviewed & adjusted, as indicated.  Additional history: as documented    Patient Active Problem List   Diagnosis     CARDIOVASCULAR SCREENING; LDL GOAL LESS THAN 160     Mild major depression (H)     Obese     HTN, goal below 140/90     H/O testicular cancer     Diverticulosis of large intestine     Esophageal reflux     Chest pain     Migraine     Skin cancer screening     Skin eruption     Lentigines     Chronic dermatitis     ACP (advance care planning)     Mild intermittent asthma with (acute) exacerbation     Essential hypertension with goal blood pressure less than 140/90     Major depression in complete remission (H)     Impaired fasting glucose     Malignant neoplasm of testis, unspecified laterality, unspecified whether descended or undescended (H)     Morbid obesity (H)     Past Surgical History:   Procedure Laterality Date     COLONOSCOPY  8/22/2014    Dr. Garcia [facility]     COLONOSCOPY N/A 8/22/2014    Procedure: COLONOSCOPY;  Surgeon: Joe Garcia MD;  Location:  GI     ENT SURGERY      tonsilectomy     GENITOURINARY SURGERY  1998    right orchidectomy       Social History   Substance Use Topics     Smoking status: Former Smoker     Quit date: 6/6/1997     Smokeless tobacco: Former User     Alcohol  use No     Family History   Problem Relation Age of Onset     Cerebrovascular Disease Mother      Hypertension Mother      HEART DISEASE Father      had 2 open heart surgery     Lipids Father      Hypertension Father      Cancer Maternal Grandfather      skin cancer     Skin Cancer Maternal Grandfather      skin cancer     HEART DISEASE Sister 45     heart attack     Cancer Sister 46     breast cancer     Skin Cancer Sister      BCC         Current Outpatient Prescriptions   Medication Sig Dispense Refill     Acetaminophen (TYLENOL PO) Take 500 mg by mouth       albuterol (PROAIR HFA/PROVENTIL HFA/VENTOLIN HFA) 108 (90 BASE) MCG/ACT Inhaler Inhale 2 puffs into the lungs every 6 hours as needed for shortness of breath / dyspnea or wheezing 1 Inhaler 0     atenolol (TENORMIN) 50 MG tablet TAKE ONE TABLET BY MOUTH ONE TIME DAILY  90 tablet 2     ipratropium - albuterol 0.5 mg/2.5 mg/3 mL (DUONEB) 0.5-2.5 (3) MG/3ML neb solution Take 1 vial (3 mLs) by nebulization once for 1 dose 3 mL 0     losartan (COZAAR) 25 MG tablet Take 1 tablet (25 mg) by mouth daily 90 tablet 3     omeprazole (PRILOSEC) 20 MG CR capsule Take 1 capsule (20 mg) by mouth daily as needed 90 capsule 3     sertraline (ZOLOFT) 100 MG tablet Take 1 tablet (100 mg) by mouth daily 90 tablet 3     sildenafil (VIAGRA) 100 MG tablet Take 0.5-1 tablets ( mg) by mouth daily as needed for erectile dysfunction Take 30 min to 4 hours before sexual activity.  Never use with nitroglycerin, terazosin or doxazosin. 4 tablet 11     sulfamethoxazole-trimethoprim (BACTRIM DS/SEPTRA DS) 800-160 MG per tablet Take 1 tablet by mouth 2 times daily 20 tablet 0     SUMAtriptan (IMITREX) 50 MG tablet TAKE 1 TABLET (50 MG) BY MOUTH AT ONSET OF HEADACHE FOR MIGRAINE MAY REPEAT DOSE IN 2 HOURS.  DO NOT EXCEED 200 MG IN 24 HOURS 18 tablet 2     tamsulosin (FLOMAX) 0.4 MG capsule Take 1 capsule (0.4 mg) by mouth daily 90 capsule 3     triamcinolone (KENALOG) 0.1 % ointment  Apply topically 2 times daily 453 g 0     Allergies   Allergen Reactions     Iodine-131 Shortness Of Breath     Internal iodine     BP Readings from Last 3 Encounters:   08/20/18 116/70   08/19/18 119/75   08/04/18 126/78    Wt Readings from Last 3 Encounters:   08/20/18 244 lb (110.7 kg)   08/19/18 244 lb 14.4 oz (111.1 kg)   05/18/18 246 lb (111.6 kg)                  Labs reviewed in EPIC    Reviewed and updated as needed this visit by clinical staff  Tobacco       Reviewed and updated as needed this visit by Provider         ROS:  CONSTITUTIONAL: NEGATIVE for fever, chills, change in weight  ENT/MOUTH: NEGATIVE for ear, mouth and throat problems  RESP: NEGATIVE for significant cough or SOB  CV: NEGATIVE for chest pain, palpitations or peripheral edema    OBJECTIVE:                                                    /70 (Cuff Size: Adult Large)  Pulse 60  Temp 97.7  F (36.5  C) (Oral)  Wt 244 lb (110.7 kg)  BMI 41.88 kg/m2  Body mass index is 41.88 kg/(m^2).   GENERAL: healthy, alert, well nourished, well hydrated, no distress  HENT: ear canals- normal; TMs- normal; Nose- normal; Mouth- no ulcers, no lesions  NECK: no tenderness, no adenopathy, no asymmetry, no masses, no stiffness; thyroid- normal to palpation  RESP: lungs clear to auscultation - no rales, no rhonchi, no wheezes  CV: regular rates and rhythm, normal S1 S2, no S3 or S4 and no murmur, no click or rub -  ABDOMEN: soft, no tenderness, no  hepatosplenomegaly, no masses, normal bowel sounds  EXTR: see below.  Intact pulses and no significant warmth.  Intact range of motion.        Diagnostic test results:  Diagnostic Test Results:  none      ASSESSMENT/PLAN:                                                    Cellulitis:  Likely from infected blister, s/p severe sunburn.  Also has some cold sores that seem to have come out after significant sun exposure near lips.   Improved, subjectively.  Will follow up if any worsening.  No signs of  sepsis or bacteremia.     Patient Instructions   Keep foot elevated, and continue antibiotics for full course.    Keep It covered with nonadherent bandage.    Follow-up for worsening, or any fevers or pus discharge.    Kristian Delatorre MD  Internal Medicine and Pediatrics          See Patient Instructions    Kristian Delatorre MD  [facility]

## 2018-09-02 ENCOUNTER — APPOINTMENT (OUTPATIENT)
Dept: ULTRASOUND IMAGING | Facility: CLINIC | Age: 58
End: 2018-09-02
Attending: EMERGENCY MEDICINE
Payer: COMMERCIAL

## 2018-09-02 ENCOUNTER — APPOINTMENT (OUTPATIENT)
Dept: GENERAL RADIOLOGY | Facility: CLINIC | Age: 58
End: 2018-09-02
Attending: EMERGENCY MEDICINE
Payer: COMMERCIAL

## 2018-09-02 ENCOUNTER — ANESTHESIA (OUTPATIENT)
Dept: SURGERY | Facility: CLINIC | Age: 58
End: 2018-09-02
Payer: COMMERCIAL

## 2018-09-02 ENCOUNTER — HOSPITAL ENCOUNTER (OUTPATIENT)
Facility: CLINIC | Age: 58
Setting detail: OBSERVATION
Discharge: HOME | End: 2018-09-02
Attending: EMERGENCY MEDICINE | Admitting: SURGERY
Payer: COMMERCIAL

## 2018-09-02 ENCOUNTER — ANESTHESIA EVENT (OUTPATIENT)
Dept: SURGERY | Facility: CLINIC | Age: 58
End: 2018-09-02
Payer: COMMERCIAL

## 2018-09-02 VITALS
OXYGEN SATURATION: 95 % | DIASTOLIC BLOOD PRESSURE: 81 MMHG | TEMPERATURE: 97.4 F | RESPIRATION RATE: 15 BRPM | SYSTOLIC BLOOD PRESSURE: 147 MMHG

## 2018-09-02 DIAGNOSIS — K80.20 SYMPTOMATIC CHOLELITHIASIS: ICD-10-CM

## 2018-09-02 LAB
ALBUMIN SERPL-MCNC: 3.7 G/DL (ref 3.4–5)
ALP SERPL-CCNC: 96 U/L (ref 40–150)
ALT SERPL W P-5'-P-CCNC: 32 U/L (ref 0–70)
ANION GAP SERPL CALCULATED.3IONS-SCNC: 4 MMOL/L (ref 3–14)
AST SERPL W P-5'-P-CCNC: 27 U/L (ref 0–45)
BASOPHILS # BLD AUTO: 0.1 10E9/L (ref 0–0.2)
BASOPHILS NFR BLD AUTO: 0.8 %
BILIRUB SERPL-MCNC: 0.2 MG/DL (ref 0.2–1.3)
BUN SERPL-MCNC: 18 MG/DL (ref 7–30)
CALCIUM SERPL-MCNC: 9.2 MG/DL (ref 8.5–10.1)
CHLORIDE SERPL-SCNC: 106 MMOL/L (ref 94–109)
CO2 SERPL-SCNC: 29 MMOL/L (ref 20–32)
CREAT SERPL-MCNC: 1.18 MG/DL (ref 0.66–1.25)
DIFFERENTIAL METHOD BLD: NORMAL
EOSINOPHIL # BLD AUTO: 0.1 10E9/L (ref 0–0.7)
EOSINOPHIL NFR BLD AUTO: 1.5 %
ERYTHROCYTE [DISTWIDTH] IN BLOOD BY AUTOMATED COUNT: 13.1 % (ref 10–15)
GFR SERPL CREATININE-BSD FRML MDRD: 63 ML/MIN/1.7M2
GLUCOSE SERPL-MCNC: 99 MG/DL (ref 70–99)
HCT VFR BLD AUTO: 42.1 % (ref 40–53)
HGB BLD-MCNC: 13.7 G/DL (ref 13.3–17.7)
IMM GRANULOCYTES # BLD: 0.1 10E9/L (ref 0–0.4)
IMM GRANULOCYTES NFR BLD: 0.8 %
INTERPRETATION ECG - MUSE: NORMAL
LIPASE SERPL-CCNC: 158 U/L (ref 73–393)
LYMPHOCYTES # BLD AUTO: 2.6 10E9/L (ref 0.8–5.3)
LYMPHOCYTES NFR BLD AUTO: 43.3 %
MCH RBC QN AUTO: 29.8 PG (ref 26.5–33)
MCHC RBC AUTO-ENTMCNC: 32.5 G/DL (ref 31.5–36.5)
MCV RBC AUTO: 92 FL (ref 78–100)
MONOCYTES # BLD AUTO: 0.6 10E9/L (ref 0–1.3)
MONOCYTES NFR BLD AUTO: 10.5 %
NEUTROPHILS # BLD AUTO: 2.6 10E9/L (ref 1.6–8.3)
NEUTROPHILS NFR BLD AUTO: 43.1 %
NRBC # BLD AUTO: 0 10*3/UL
NRBC BLD AUTO-RTO: 0 /100
PLATELET # BLD AUTO: 215 10E9/L (ref 150–450)
POTASSIUM SERPL-SCNC: 3.9 MMOL/L (ref 3.4–5.3)
PROT SERPL-MCNC: 7.6 G/DL (ref 6.8–8.8)
RBC # BLD AUTO: 4.6 10E12/L (ref 4.4–5.9)
SODIUM SERPL-SCNC: 139 MMOL/L (ref 133–144)
TROPONIN I SERPL-MCNC: <0.015 UG/L (ref 0–0.04)
WBC # BLD AUTO: 6 10E9/L (ref 4–11)

## 2018-09-02 PROCEDURE — 84484 ASSAY OF TROPONIN QUANT: CPT | Performed by: EMERGENCY MEDICINE

## 2018-09-02 PROCEDURE — 83690 ASSAY OF LIPASE: CPT | Performed by: EMERGENCY MEDICINE

## 2018-09-02 PROCEDURE — 93005 ELECTROCARDIOGRAM TRACING: CPT | Mod: 59

## 2018-09-02 PROCEDURE — 96365 THER/PROPH/DIAG IV INF INIT: CPT

## 2018-09-02 PROCEDURE — 71000012 ZZH RECOVERY PHASE 1 LEVEL 1 FIRST HR: Performed by: SURGERY

## 2018-09-02 PROCEDURE — 25000566 ZZH SEVOFLURANE, EA 15 MIN: Performed by: SURGERY

## 2018-09-02 PROCEDURE — 96361 HYDRATE IV INFUSION ADD-ON: CPT | Mod: 59

## 2018-09-02 PROCEDURE — G0378 HOSPITAL OBSERVATION PER HR: HCPCS

## 2018-09-02 PROCEDURE — 27210794 ZZH OR GENERAL SUPPLY STERILE: Performed by: SURGERY

## 2018-09-02 PROCEDURE — 36000058 ZZH SURGERY LEVEL 3 EA 15 ADDTL MIN: Performed by: SURGERY

## 2018-09-02 PROCEDURE — 37000008 ZZH ANESTHESIA TECHNICAL FEE, 1ST 30 MIN: Performed by: SURGERY

## 2018-09-02 PROCEDURE — 25000131 ZZH RX MED GY IP 250 OP 636 PS 637: Performed by: NURSE ANESTHETIST, CERTIFIED REGISTERED

## 2018-09-02 PROCEDURE — 88304 TISSUE EXAM BY PATHOLOGIST: CPT | Mod: 26 | Performed by: SURGERY

## 2018-09-02 PROCEDURE — 25000128 H RX IP 250 OP 636: Performed by: EMERGENCY MEDICINE

## 2018-09-02 PROCEDURE — 36000060 ZZH SURGERY LEVEL 3 W FLUORO 1ST 30 MIN: Performed by: SURGERY

## 2018-09-02 PROCEDURE — 25000132 ZZH RX MED GY IP 250 OP 250 PS 637: Performed by: PHYSICIAN ASSISTANT

## 2018-09-02 PROCEDURE — 37000009 ZZH ANESTHESIA TECHNICAL FEE, EACH ADDTL 15 MIN: Performed by: SURGERY

## 2018-09-02 PROCEDURE — 47562 LAPAROSCOPIC CHOLECYSTECTOMY: CPT | Mod: AS | Performed by: PHYSICIAN ASSISTANT

## 2018-09-02 PROCEDURE — 71000027 ZZH RECOVERY PHASE 2 EACH 15 MINS: Performed by: SURGERY

## 2018-09-02 PROCEDURE — 25000125 ZZHC RX 250: Performed by: NURSE ANESTHETIST, CERTIFIED REGISTERED

## 2018-09-02 PROCEDURE — 99203 OFFICE O/P NEW LOW 30 MIN: CPT | Mod: 57 | Performed by: SURGERY

## 2018-09-02 PROCEDURE — 25000132 ZZH RX MED GY IP 250 OP 250 PS 637: Performed by: SURGERY

## 2018-09-02 PROCEDURE — 76705 ECHO EXAM OF ABDOMEN: CPT

## 2018-09-02 PROCEDURE — 40000306 ZZH STATISTIC PRE PROC ASSESS II: Performed by: SURGERY

## 2018-09-02 PROCEDURE — 25000128 H RX IP 250 OP 636: Performed by: NURSE ANESTHETIST, CERTIFIED REGISTERED

## 2018-09-02 PROCEDURE — 25000128 H RX IP 250 OP 636: Performed by: ANESTHESIOLOGY

## 2018-09-02 PROCEDURE — 71000013 ZZH RECOVERY PHASE 1 LEVEL 1 EA ADDTL HR: Performed by: SURGERY

## 2018-09-02 PROCEDURE — 96375 TX/PRO/DX INJ NEW DRUG ADDON: CPT | Mod: 59

## 2018-09-02 PROCEDURE — 99207 ZZC CDG-MDM COMPONENT: MEETS LOW - DOWN CODED: CPT | Performed by: PHYSICIAN ASSISTANT

## 2018-09-02 PROCEDURE — 25000128 H RX IP 250 OP 636: Performed by: PHYSICIAN ASSISTANT

## 2018-09-02 PROCEDURE — 99285 EMERGENCY DEPT VISIT HI MDM: CPT | Mod: 25

## 2018-09-02 PROCEDURE — 25000125 ZZHC RX 250: Performed by: SURGERY

## 2018-09-02 PROCEDURE — 88304 TISSUE EXAM BY PATHOLOGIST: CPT | Performed by: SURGERY

## 2018-09-02 PROCEDURE — 71046 X-RAY EXAM CHEST 2 VIEWS: CPT

## 2018-09-02 PROCEDURE — 96376 TX/PRO/DX INJ SAME DRUG ADON: CPT | Mod: 59

## 2018-09-02 PROCEDURE — 85025 COMPLETE CBC W/AUTO DIFF WBC: CPT | Performed by: EMERGENCY MEDICINE

## 2018-09-02 PROCEDURE — 47562 LAPAROSCOPIC CHOLECYSTECTOMY: CPT | Performed by: SURGERY

## 2018-09-02 PROCEDURE — 99219 ZZC INITIAL OBSERVATION CARE,LEVL II: CPT | Performed by: PHYSICIAN ASSISTANT

## 2018-09-02 PROCEDURE — 96375 TX/PRO/DX INJ NEW DRUG ADDON: CPT

## 2018-09-02 PROCEDURE — 80053 COMPREHEN METABOLIC PANEL: CPT | Performed by: EMERGENCY MEDICINE

## 2018-09-02 PROCEDURE — 25800025 ZZH RX 258: Performed by: SURGERY

## 2018-09-02 RX ORDER — DIMENHYDRINATE 50 MG/ML
25 INJECTION, SOLUTION INTRAMUSCULAR; INTRAVENOUS
Status: DISCONTINUED | OUTPATIENT
Start: 2018-09-02 | End: 2018-09-02 | Stop reason: HOSPADM

## 2018-09-02 RX ORDER — AMOXICILLIN 250 MG
1 CAPSULE ORAL 2 TIMES DAILY PRN
Status: DISCONTINUED | OUTPATIENT
Start: 2018-09-02 | End: 2018-09-02 | Stop reason: HOSPADM

## 2018-09-02 RX ORDER — PROCHLORPERAZINE MALEATE 5 MG
10 TABLET ORAL EVERY 6 HOURS PRN
Status: DISCONTINUED | OUTPATIENT
Start: 2018-09-02 | End: 2018-09-02 | Stop reason: HOSPADM

## 2018-09-02 RX ORDER — ONDANSETRON 2 MG/ML
4 INJECTION INTRAMUSCULAR; INTRAVENOUS EVERY 6 HOURS PRN
Status: DISCONTINUED | OUTPATIENT
Start: 2018-09-02 | End: 2018-09-02 | Stop reason: HOSPADM

## 2018-09-02 RX ORDER — MEPERIDINE HYDROCHLORIDE 25 MG/ML
12.5 INJECTION INTRAMUSCULAR; INTRAVENOUS; SUBCUTANEOUS
Status: DISCONTINUED | OUTPATIENT
Start: 2018-09-02 | End: 2018-09-02 | Stop reason: HOSPADM

## 2018-09-02 RX ORDER — IBUPROFEN 600 MG/1
600 TABLET, FILM COATED ORAL EVERY 6 HOURS PRN
Status: DISCONTINUED | OUTPATIENT
Start: 2018-09-02 | End: 2018-09-02 | Stop reason: HOSPADM

## 2018-09-02 RX ORDER — OXYCODONE HYDROCHLORIDE 5 MG/1
10 TABLET ORAL
Status: COMPLETED | OUTPATIENT
Start: 2018-09-02 | End: 2018-09-02

## 2018-09-02 RX ORDER — FENTANYL CITRATE 50 UG/ML
25-50 INJECTION, SOLUTION INTRAMUSCULAR; INTRAVENOUS
Status: DISCONTINUED | OUTPATIENT
Start: 2018-09-02 | End: 2018-09-02 | Stop reason: HOSPADM

## 2018-09-02 RX ORDER — AMOXICILLIN 250 MG
2 CAPSULE ORAL 2 TIMES DAILY PRN
Status: DISCONTINUED | OUTPATIENT
Start: 2018-09-02 | End: 2018-09-02 | Stop reason: HOSPADM

## 2018-09-02 RX ORDER — ONDANSETRON 4 MG/1
4 TABLET, ORALLY DISINTEGRATING ORAL EVERY 30 MIN PRN
Status: DISCONTINUED | OUTPATIENT
Start: 2018-09-02 | End: 2018-09-02 | Stop reason: HOSPADM

## 2018-09-02 RX ORDER — NEOSTIGMINE METHYLSULFATE 1 MG/ML
VIAL (ML) INJECTION PRN
Status: DISCONTINUED | OUTPATIENT
Start: 2018-09-02 | End: 2018-09-02

## 2018-09-02 RX ORDER — GLYCOPYRROLATE 0.2 MG/ML
INJECTION, SOLUTION INTRAMUSCULAR; INTRAVENOUS PRN
Status: DISCONTINUED | OUTPATIENT
Start: 2018-09-02 | End: 2018-09-02

## 2018-09-02 RX ORDER — ACETAMINOPHEN 325 MG/1
650 TABLET ORAL EVERY 4 HOURS PRN
Status: DISCONTINUED | OUTPATIENT
Start: 2018-09-02 | End: 2018-09-02 | Stop reason: HOSPADM

## 2018-09-02 RX ORDER — BUPIVACAINE HYDROCHLORIDE 2.5 MG/ML
INJECTION, SOLUTION EPIDURAL; INFILTRATION; INTRACAUDAL PRN
Status: DISCONTINUED | OUTPATIENT
Start: 2018-09-02 | End: 2018-09-02 | Stop reason: HOSPADM

## 2018-09-02 RX ORDER — PROCHLORPERAZINE 25 MG
25 SUPPOSITORY, RECTAL RECTAL EVERY 12 HOURS PRN
Status: DISCONTINUED | OUTPATIENT
Start: 2018-09-02 | End: 2018-09-02 | Stop reason: HOSPADM

## 2018-09-02 RX ORDER — HYDRALAZINE HYDROCHLORIDE 20 MG/ML
2.5-5 INJECTION INTRAMUSCULAR; INTRAVENOUS EVERY 10 MIN PRN
Status: DISCONTINUED | OUTPATIENT
Start: 2018-09-02 | End: 2018-09-02 | Stop reason: HOSPADM

## 2018-09-02 RX ORDER — ONDANSETRON 2 MG/ML
4 INJECTION INTRAMUSCULAR; INTRAVENOUS
Status: COMPLETED | OUTPATIENT
Start: 2018-09-02 | End: 2018-09-02

## 2018-09-02 RX ORDER — LABETALOL HYDROCHLORIDE 5 MG/ML
10 INJECTION, SOLUTION INTRAVENOUS
Status: DISCONTINUED | OUTPATIENT
Start: 2018-09-02 | End: 2018-09-02 | Stop reason: HOSPADM

## 2018-09-02 RX ORDER — ACETAMINOPHEN 325 MG/1
650 TABLET ORAL
Status: DISCONTINUED | OUTPATIENT
Start: 2018-09-02 | End: 2018-09-02 | Stop reason: HOSPADM

## 2018-09-02 RX ORDER — LIDOCAINE HYDROCHLORIDE 10 MG/ML
INJECTION, SOLUTION INFILTRATION; PERINEURAL PRN
Status: DISCONTINUED | OUTPATIENT
Start: 2018-09-02 | End: 2018-09-02

## 2018-09-02 RX ORDER — DEXAMETHASONE SODIUM PHOSPHATE 4 MG/ML
4 INJECTION, SOLUTION INTRA-ARTICULAR; INTRALESIONAL; INTRAMUSCULAR; INTRAVENOUS; SOFT TISSUE EVERY 10 MIN PRN
Status: DISCONTINUED | OUTPATIENT
Start: 2018-09-02 | End: 2018-09-02 | Stop reason: HOSPADM

## 2018-09-02 RX ORDER — KETOROLAC TROMETHAMINE 30 MG/ML
30 INJECTION, SOLUTION INTRAMUSCULAR; INTRAVENOUS EVERY 6 HOURS PRN
Status: DISCONTINUED | OUTPATIENT
Start: 2018-09-02 | End: 2018-09-02 | Stop reason: HOSPADM

## 2018-09-02 RX ORDER — OXYCODONE HYDROCHLORIDE 5 MG/1
5-10 TABLET ORAL
Qty: 20 TABLET | Refills: 0 | Status: SHIPPED | OUTPATIENT
Start: 2018-09-02 | End: 2018-09-13

## 2018-09-02 RX ORDER — TAMSULOSIN HYDROCHLORIDE 0.4 MG/1
0.4 CAPSULE ORAL
Status: COMPLETED | OUTPATIENT
Start: 2018-09-02 | End: 2018-09-02

## 2018-09-02 RX ORDER — HYDROMORPHONE HYDROCHLORIDE 1 MG/ML
.3-.5 INJECTION, SOLUTION INTRAMUSCULAR; INTRAVENOUS; SUBCUTANEOUS EVERY 10 MIN PRN
Status: DISCONTINUED | OUTPATIENT
Start: 2018-09-02 | End: 2018-09-02 | Stop reason: HOSPADM

## 2018-09-02 RX ORDER — ONDANSETRON 2 MG/ML
4 INJECTION INTRAMUSCULAR; INTRAVENOUS EVERY 30 MIN PRN
Status: DISCONTINUED | OUTPATIENT
Start: 2018-09-02 | End: 2018-09-02 | Stop reason: HOSPADM

## 2018-09-02 RX ORDER — SODIUM CHLORIDE, SODIUM LACTATE, POTASSIUM CHLORIDE, CALCIUM CHLORIDE 600; 310; 30; 20 MG/100ML; MG/100ML; MG/100ML; MG/100ML
INJECTION, SOLUTION INTRAVENOUS CONTINUOUS
Status: DISCONTINUED | OUTPATIENT
Start: 2018-09-02 | End: 2018-09-02 | Stop reason: HOSPADM

## 2018-09-02 RX ORDER — SODIUM CHLORIDE 9 MG/ML
INJECTION, SOLUTION INTRAVENOUS CONTINUOUS
Status: DISCONTINUED | OUTPATIENT
Start: 2018-09-02 | End: 2018-09-02 | Stop reason: HOSPADM

## 2018-09-02 RX ORDER — HYDRALAZINE HYDROCHLORIDE 20 MG/ML
10 INJECTION INTRAMUSCULAR; INTRAVENOUS ONCE
Status: COMPLETED | OUTPATIENT
Start: 2018-09-02 | End: 2018-09-02

## 2018-09-02 RX ORDER — MORPHINE SULFATE 4 MG/ML
4 INJECTION, SOLUTION INTRAMUSCULAR; INTRAVENOUS
Status: DISCONTINUED | OUTPATIENT
Start: 2018-09-02 | End: 2018-09-02

## 2018-09-02 RX ORDER — OXYCODONE HYDROCHLORIDE 5 MG/1
5-10 TABLET ORAL
Status: DISCONTINUED | OUTPATIENT
Start: 2018-09-02 | End: 2018-09-02 | Stop reason: HOSPADM

## 2018-09-02 RX ORDER — LABETALOL HYDROCHLORIDE 5 MG/ML
INJECTION, SOLUTION INTRAVENOUS PRN
Status: DISCONTINUED | OUTPATIENT
Start: 2018-09-02 | End: 2018-09-02

## 2018-09-02 RX ORDER — ACETAMINOPHEN 650 MG/1
650 SUPPOSITORY RECTAL EVERY 4 HOURS PRN
Status: DISCONTINUED | OUTPATIENT
Start: 2018-09-02 | End: 2018-09-02 | Stop reason: HOSPADM

## 2018-09-02 RX ORDER — HYDROMORPHONE HYDROCHLORIDE 1 MG/ML
0.3 INJECTION, SOLUTION INTRAMUSCULAR; INTRAVENOUS; SUBCUTANEOUS
Status: DISCONTINUED | OUTPATIENT
Start: 2018-09-02 | End: 2018-09-02 | Stop reason: HOSPADM

## 2018-09-02 RX ORDER — LIDOCAINE 40 MG/G
CREAM TOPICAL
Status: DISCONTINUED | OUTPATIENT
Start: 2018-09-02 | End: 2018-09-02 | Stop reason: HOSPADM

## 2018-09-02 RX ORDER — ONDANSETRON 2 MG/ML
INJECTION INTRAMUSCULAR; INTRAVENOUS PRN
Status: DISCONTINUED | OUTPATIENT
Start: 2018-09-02 | End: 2018-09-02

## 2018-09-02 RX ORDER — METOCLOPRAMIDE HYDROCHLORIDE 5 MG/ML
10 INJECTION INTRAMUSCULAR; INTRAVENOUS EVERY 6 HOURS PRN
Status: DISCONTINUED | OUTPATIENT
Start: 2018-09-02 | End: 2018-09-02 | Stop reason: HOSPADM

## 2018-09-02 RX ORDER — SENNOSIDES A AND B 8.6 MG/1
1-2 TABLET, FILM COATED ORAL DAILY
Qty: 30 TABLET | Refills: 0 | Status: SHIPPED | OUTPATIENT
Start: 2018-09-02 | End: 2019-07-03

## 2018-09-02 RX ORDER — FENTANYL CITRATE 50 UG/ML
INJECTION, SOLUTION INTRAMUSCULAR; INTRAVENOUS PRN
Status: DISCONTINUED | OUTPATIENT
Start: 2018-09-02 | End: 2018-09-02

## 2018-09-02 RX ORDER — KETOROLAC TROMETHAMINE 30 MG/ML
INJECTION, SOLUTION INTRAMUSCULAR; INTRAVENOUS PRN
Status: DISCONTINUED | OUTPATIENT
Start: 2018-09-02 | End: 2018-09-02

## 2018-09-02 RX ORDER — DEXAMETHASONE SODIUM PHOSPHATE 4 MG/ML
INJECTION, SOLUTION INTRA-ARTICULAR; INTRALESIONAL; INTRAMUSCULAR; INTRAVENOUS; SOFT TISSUE PRN
Status: DISCONTINUED | OUTPATIENT
Start: 2018-09-02 | End: 2018-09-02

## 2018-09-02 RX ORDER — NALOXONE HYDROCHLORIDE 0.4 MG/ML
.1-.4 INJECTION, SOLUTION INTRAMUSCULAR; INTRAVENOUS; SUBCUTANEOUS
Status: DISCONTINUED | OUTPATIENT
Start: 2018-09-02 | End: 2018-09-02 | Stop reason: HOSPADM

## 2018-09-02 RX ORDER — PROPOFOL 10 MG/ML
INJECTION, EMULSION INTRAVENOUS PRN
Status: DISCONTINUED | OUTPATIENT
Start: 2018-09-02 | End: 2018-09-02

## 2018-09-02 RX ORDER — ONDANSETRON 4 MG/1
4 TABLET, ORALLY DISINTEGRATING ORAL EVERY 6 HOURS PRN
Status: DISCONTINUED | OUTPATIENT
Start: 2018-09-02 | End: 2018-09-02 | Stop reason: HOSPADM

## 2018-09-02 RX ORDER — METOCLOPRAMIDE 10 MG/1
10 TABLET ORAL EVERY 6 HOURS PRN
Status: DISCONTINUED | OUTPATIENT
Start: 2018-09-02 | End: 2018-09-02 | Stop reason: HOSPADM

## 2018-09-02 RX ORDER — CEFTRIAXONE 2 G/1
2 INJECTION, POWDER, FOR SOLUTION INTRAMUSCULAR; INTRAVENOUS ONCE
Status: COMPLETED | OUTPATIENT
Start: 2018-09-02 | End: 2018-09-02

## 2018-09-02 RX ORDER — TRIAMCINOLONE ACETONIDE 1 MG/G
OINTMENT TOPICAL 2 TIMES DAILY PRN
COMMUNITY

## 2018-09-02 RX ADMIN — MORPHINE SULFATE 4 MG: 4 INJECTION INTRAVENOUS at 03:48

## 2018-09-02 RX ADMIN — OXYCODONE HYDROCHLORIDE 5 MG: 5 TABLET ORAL at 18:25

## 2018-09-02 RX ADMIN — SODIUM CHLORIDE, POTASSIUM CHLORIDE, SODIUM LACTATE AND CALCIUM CHLORIDE: 600; 310; 30; 20 INJECTION, SOLUTION INTRAVENOUS at 15:22

## 2018-09-02 RX ADMIN — TAMSULOSIN HYDROCHLORIDE 0.4 MG: 0.4 CAPSULE ORAL at 14:32

## 2018-09-02 RX ADMIN — SODIUM CHLORIDE: 9 INJECTION, SOLUTION INTRAVENOUS at 08:14

## 2018-09-02 RX ADMIN — Medication 0.5 MG: at 16:28

## 2018-09-02 RX ADMIN — Medication 3 MG: at 15:53

## 2018-09-02 RX ADMIN — ONDANSETRON 4 MG: 2 INJECTION INTRAMUSCULAR; INTRAVENOUS at 16:26

## 2018-09-02 RX ADMIN — DEXAMETHASONE SODIUM PHOSPHATE 4 MG: 4 INJECTION, SOLUTION INTRA-ARTICULAR; INTRALESIONAL; INTRAMUSCULAR; INTRAVENOUS; SOFT TISSUE at 15:01

## 2018-09-02 RX ADMIN — Medication 0.3 MG: at 08:29

## 2018-09-02 RX ADMIN — MORPHINE SULFATE 4 MG: 4 INJECTION INTRAVENOUS at 04:40

## 2018-09-02 RX ADMIN — SODIUM CHLORIDE, POTASSIUM CHLORIDE, SODIUM LACTATE AND CALCIUM CHLORIDE: 600; 310; 30; 20 INJECTION, SOLUTION INTRAVENOUS at 14:38

## 2018-09-02 RX ADMIN — ROCURONIUM BROMIDE 10 MG: 10 INJECTION INTRAVENOUS at 15:11

## 2018-09-02 RX ADMIN — ROCURONIUM BROMIDE 40 MG: 10 INJECTION INTRAVENOUS at 15:01

## 2018-09-02 RX ADMIN — GLYCOPYRROLATE 0.4 MG: 0.2 INJECTION, SOLUTION INTRAMUSCULAR; INTRAVENOUS at 15:53

## 2018-09-02 RX ADMIN — FENTANYL CITRATE 50 MCG: 50 INJECTION INTRAMUSCULAR; INTRAVENOUS at 17:08

## 2018-09-02 RX ADMIN — GLYCOPYRROLATE 0.2 MG: 0.2 INJECTION, SOLUTION INTRAMUSCULAR; INTRAVENOUS at 15:01

## 2018-09-02 RX ADMIN — ONDANSETRON HYDROCHLORIDE 4 MG: 2 INJECTION, SOLUTION INTRAMUSCULAR; INTRAVENOUS at 03:48

## 2018-09-02 RX ADMIN — ACETAMINOPHEN 650 MG: 325 TABLET, FILM COATED ORAL at 11:54

## 2018-09-02 RX ADMIN — FENTANYL CITRATE 25 MCG: 50 INJECTION INTRAMUSCULAR; INTRAVENOUS at 16:54

## 2018-09-02 RX ADMIN — KETOROLAC TROMETHAMINE 30 MG: 30 INJECTION, SOLUTION INTRAMUSCULAR at 15:53

## 2018-09-02 RX ADMIN — LABETALOL HYDROCHLORIDE 10 MG: 5 INJECTION INTRAVENOUS at 15:58

## 2018-09-02 RX ADMIN — ONDANSETRON 4 MG: 2 INJECTION INTRAMUSCULAR; INTRAVENOUS at 15:53

## 2018-09-02 RX ADMIN — FENTANYL CITRATE 25 MCG: 50 INJECTION INTRAMUSCULAR; INTRAVENOUS at 16:44

## 2018-09-02 RX ADMIN — MIDAZOLAM 2 MG: 1 INJECTION INTRAMUSCULAR; INTRAVENOUS at 14:56

## 2018-09-02 RX ADMIN — PROPOFOL 200 MG: 10 INJECTION, EMULSION INTRAVENOUS at 15:01

## 2018-09-02 RX ADMIN — FENTANYL CITRATE 150 MCG: 50 INJECTION, SOLUTION INTRAMUSCULAR; INTRAVENOUS at 15:01

## 2018-09-02 RX ADMIN — HYDRALAZINE HYDROCHLORIDE 10 MG: 20 INJECTION INTRAMUSCULAR; INTRAVENOUS at 16:34

## 2018-09-02 RX ADMIN — LABETALOL HYDROCHLORIDE 15 MG: 5 INJECTION INTRAVENOUS at 15:20

## 2018-09-02 RX ADMIN — FENTANYL CITRATE 100 MCG: 50 INJECTION, SOLUTION INTRAMUSCULAR; INTRAVENOUS at 15:20

## 2018-09-02 RX ADMIN — SODIUM CHLORIDE 1000 ML: 9 INJECTION, SOLUTION INTRAVENOUS at 03:48

## 2018-09-02 RX ADMIN — LIDOCAINE HYDROCHLORIDE 50 MG: 10 INJECTION, SOLUTION INFILTRATION; PERINEURAL at 15:01

## 2018-09-02 RX ADMIN — CEFTRIAXONE SODIUM 2 G: 2 INJECTION, POWDER, FOR SOLUTION INTRAMUSCULAR; INTRAVENOUS at 05:23

## 2018-09-02 ASSESSMENT — PAIN DESCRIPTION - DESCRIPTORS: DESCRIPTORS: DULL

## 2018-09-02 NOTE — ANESTHESIA POSTPROCEDURE EVALUATION
Patient: Fabio Logan    Procedure(s):  LAPAROSCOPIC CHOLECYSTECTOMY  - Wound Class: III-Contaminated    Diagnosis:UNKNOWN  Diagnosis Additional Information: Acute Cholecystitis.     Anesthesia Type:  General, ETT    Note:  Anesthesia Post Evaluation    Patient location during evaluation: PACU  Patient participation: Able to fully participate in evaluation  Level of consciousness: awake and alert  Pain management: adequate  Airway patency: patent  Cardiovascular status: acceptable  Respiratory status: acceptable  Hydration status: acceptable  PONV: controlled     Anesthetic complications: None          Last vitals:  Vitals:    09/02/18 1645 09/02/18 1700 09/02/18 1715   BP: (!) 164/94 150/74 142/83   Resp: 12 11 9   Temp:      SpO2: 100% 99% 98%         Electronically Signed By: Shayan Borja MD  September 2, 2018  5:39 PM

## 2018-09-02 NOTE — PHARMACY-ADMISSION MEDICATION HISTORY
Admission medication history interview status for this patient is complete. See [facility] admission navigator for allergy information, prior to admission medications and immunization status.     Medication history interview source(s):Patient  Medication history resources (including written lists, pill bottles, clinic record):[facility] List  Primary pharmacy[facility]    Changes made to PTA medication list:  Added: None  Deleted: Duoneb, flomax  Changed: triamcinolone from bid to prn    Actions taken by pharmacist (provider contacted, etc):None     Additional medication history information:None    Medication reconciliation/reorder completed by provider prior to medication history? No    Do you take OTC medications (eg tylenol, ibuprofen, fish oil, eye/ear drops, etc)? N    Prior to Admission medications    Medication Sig Last Dose Taking? Auth Provider   atenolol (TENORMIN) 50 MG tablet TAKE ONE TABLET BY MOUTH ONE TIME DAILY  9/1/2018 at am Yes Manoj Armenta MD   losartan (COZAAR) 25 MG tablet Take 1 tablet (25 mg) by mouth daily 9/1/2018 at am Yes Manoj Armenta MD   sertraline (ZOLOFT) 100 MG tablet Take 1 tablet (100 mg) by mouth daily 9/1/2018 at am Yes Manoj Armenta MD   sildenafil (VIAGRA) 100 MG tablet Take 0.5-1 tablets ( mg) by mouth daily as needed for erectile dysfunction Take 30 min to 4 hours before sexual activity.  Never use with nitroglycerin, terazosin or doxazosin. Past Month at Unknown time Yes Manoj Armenta MD   albuterol (PROAIR HFA/PROVENTIL HFA/VENTOLIN HFA) 108 (90 BASE) MCG/ACT Inhaler Inhale 2 puffs into the lungs every 6 hours as needed for shortness of breath / dyspnea or wheezing Unknown at Unknown time  Manoj Armenta MD   omeprazole (PRILOSEC) 20 MG CR capsule Take 1 capsule (20 mg) by mouth daily as needed Unknown at Unknown time  Manoj Armenta MD   SUMAtriptan (IMITREX) 50 MG tablet TAKE 1 TABLET (50 MG) BY MOUTH AT ONSET OF HEADACHE FOR MIGRAINE MAY  REPEAT DOSE IN 2 HOURS.  DO NOT EXCEED 200 MG IN 24 HOURS Unknown at Unknown time  Manoj Armenta MD   triamcinolone (KENALOG) 0.1 % ointment Apply topically 2 times daily as needed for irritation Unknown at Unknown time  Unknown, Entered By History

## 2018-09-02 NOTE — ANESTHESIA CARE TRANSFER NOTE
Patient: Fabio Logan    Procedure(s):  LAPAROSCOPIC CHOLECYSTECTOMY  - Wound Class: III-Contaminated    Diagnosis: UNKNOWN  Diagnosis Additional Information: No value filed.    Anesthesia Type:   General, ETT     Note:  Airway :Face Mask  Patient transferred to:PACU  Comments: Brenda Report: Identifed the Patient, Identified the Reponsible Provider, Reviewed the pertinent medical history, Discussed the surgical course, Reviewed Intra-OP anesthesia mangement and issues during anesthesia, Set expectations for post-procedure period and Allowed opportunity for questions and acknowledgement of understanding      Vitals: (Last set prior to Anesthesia Care Transfer)    CRNA VITALS  9/2/2018 1532 - 9/2/2018 1607      9/2/2018             Resp Rate (observed): (!)  7                Electronically Signed By: HERMILA Granger CRNA  September 2, 2018  4:07 PM

## 2018-09-02 NOTE — PLAN OF CARE
Problem: Patient Care Overview  Goal: Plan of Care/Patient Progress Review  ROOM # 203    Living Situation (if not independent, order SW consult): Ind with girlfriend   [facility] name:  : [facility]     Activity level at baseline: Ind   Activity level on admit: SBA      Patient registered to observation; given Patient Bill of Rights; given the opportunity to ask questions about observation status and their plan of care.  Patient has been oriented to the observation room, bathroom and call light is in place.    Discussed discharge goals and expectations with patient/family.

## 2018-09-02 NOTE — ANESTHESIA PREPROCEDURE EVALUATION
Anesthesia Evaluation     . Pt has had prior anesthetic. Type: General           ROS/MED HX    ENT/Pulmonary:     (+)Intermittent asthma Treatment: Inhaler prn,  , . .    Neurologic:  - neg neurologic ROS     Cardiovascular:     (+) hypertension----. : . . . :. .       METS/Exercise Tolerance:     Hematologic:  - neg hematologic  ROS       Musculoskeletal:  - neg musculoskeletal ROS       GI/Hepatic:     (+) GERD Asymptomatic on medication, cholecystitis/cholelithiasis,       Renal/Genitourinary:  - ROS Renal section negative       Endo:     (+) Obesity, .      Psychiatric:     (+) psychiatric history depression      Infectious Disease:  - neg infectious disease ROS       Malignancy:      - no malignancy   Other:    (+) No chance of pregnancy C-spine cleared: N/A, no H/O Chronic Pain,no other significant disability   - neg other ROS                 Physical Exam  Normal systems: cardiovascular, pulmonary and dental    Airway   Mallampati: II  TM distance: >3 FB  Neck ROM: full    Dental     Cardiovascular       Pulmonary                     Anesthesia Plan      History & Physical Review  History and physical reviewed and following examination; no interval change.    ASA Status:  2 .    NPO Status:  > 8 hours    Plan for General and ETT with Intravenous induction. Maintenance will be Balanced.    PONV prophylaxis:  Ondansetron (or other 5HT-3) and Dexamethasone or Solumedrol       Postoperative Care  Postoperative pain management:  IV analgesics.      Consents  Anesthetic plan, risks, benefits and alternatives discussed with:  Patient.  Use of blood products discussed: Yes.   Use of blood products discussed with Patient.  Consented to blood products.  .                          .

## 2018-09-02 NOTE — CONSULTS
SURGICAL CONSULTATION   REQUESTING LIYAH:  Argentina Sheppard PA-C   HISTORY OF PRESENT ILLNESS:  I was asked by Argentina Sheppard PA-C  to see Fabio Logan who is a 58 year old-year-old male with a 10 hours history of abdominal pain which is located in the RUQ.    The pain does radiate to his back.  It does wake him from sleep.  It is associated with  nausea, bloating and belching. He does have a history of fatty food intolerance with more mild episodes over the last week or so.   He denies a history of jaundice or prior episodes of pancreatitis. Prior abdominal surgery includes left orchectomy and abdominal radiation.   PAST MEDICAL HISTORY:  has a past medical history of Anxiety; Depression; Hypertension; and Testicular cancer - Right (1998).  Smoking History:  quit smoking some time ago.    PAST SURGICAL HISTORY:    Past Surgical History:   Procedure Laterality Date     COLONOSCOPY N/A 8/22/2014    Procedure: COLONOSCOPY;  Surgeon: Joe Garcia MD;  Location:  GI     ORCHIECTOMY NOS Right 1998     TONSILLECTOMY       MEDICATIONS:   No current facility-administered medications on file prior to encounter.   Current Outpatient Prescriptions on File Prior to Encounter:  atenolol (TENORMIN) 50 MG tablet TAKE ONE TABLET BY MOUTH ONE TIME DAILY    losartan (COZAAR) 25 MG tablet Take 1 tablet (25 mg) by mouth daily   sertraline (ZOLOFT) 100 MG tablet Take 1 tablet (100 mg) by mouth daily   sildenafil (VIAGRA) 100 MG tablet Take 0.5-1 tablets ( mg) by mouth daily as needed for erectile dysfunction Take 30 min to 4 hours before sexual activity.  Never use with nitroglycerin, terazosin or doxazosin.   albuterol (PROAIR HFA/PROVENTIL HFA/VENTOLIN HFA) 108 (90 BASE) MCG/ACT Inhaler Inhale 2 puffs into the lungs every 6 hours as needed for shortness of breath / dyspnea or wheezing   omeprazole (PRILOSEC) 20 MG CR capsule Take 1 capsule (20 mg) by mouth daily as needed   SUMAtriptan (IMITREX) 50 MG tablet TAKE 1  TABLET (50 MG) BY MOUTH AT ONSET OF HEADACHE FOR MIGRAINE MAY REPEAT DOSE IN 2 HOURS.  DO NOT EXCEED 200 MG IN 24 HOURS                                     ALLERGIES: This patient is allergic to is allergic to iodine-131.   REVIEW OF SYSTEMS: Negative except the HPI.   PHYSICAL EXAMINATION:   GENERAL: Pleasant, well-developed, well-nourished male, not ill-appearing, uncomfortable and tired.   /69 (BP Location: Right arm)  Temp 97.6  F (36.4  C) (Oral)  Resp 20  SpO2 94%  HEENT: Normocephalic, atraumatic. No scleral icterus.   NECK: Supple.   LUNGS: Respirations unlabored.   CARDIOVASCULAR: Regular rhythm and rate, no murmurs.  ABDOMEN:  Abdomen is protuberant. Tenderness, moderate and involuntary guarding and  RUQ.  The gallbladder is non-palpable and tender. hypoactive bowel sounds.  No hernia or scars noted.   EXTREMITIES: Without edema.   NEUROLOGIC: Alert and oriented, moves all extremities with good strength. Speech clear.   LABORATORY DATA: WBC- WBC   Date Value Ref Range Status   09/02/2018 6.0 4.0 - 11.0 10e9/L Final   , HgB- Hemoglobin   Date Value Ref Range Status   09/02/2018 13.7 13.3 - 17.7 g/dL Final     Liver function studies: Total Bilirubin 0.2, Alkaline Phosphatase 96, ALT 32, AST 27, Lipase 158.    IMAGING:  All imaging personally reviewed  All imaging studies reviewed by me.  Gallbladder ultrasound:   Common bile duct not seen  Findings consistent with acute cholecystitis  Stone in the gallbladder neck suspected or observed       ASSESSMENT AND PLAN: Fabio Logan  has Acute Cholecystitis..  I am recommending him for laparoscopic cholecystectomy.  Intraoperative cholangiograms are not indicated due to normal LFTs and a large stone obstructing the cystic duct.   I discussed the risks of the procedure including incisional related complications like hernia and infection as well as the small chance for post-cholecystectomy diarrhea, or the need to convert to an open cholecystectomy.  We  also discussed rare complications such as bile leak, bowel or bile duct injury.  Fabio is interested in proceeding with surgery in the next few hours.  MITCH LINDO MD     Please route or send letter to:  Primary Care Provider (PCP) and Referring Provider

## 2018-09-02 NOTE — IP AVS SNAPSHOT
[facility]    [address]    [facility] [address]    Phone:  [phone]    Fax:  [phone]                                       After Visit Summary   9/2/2018    Fabio Logan    MRN: [id number]           After Visit Summary Signature Page     I have received my discharge instructions, and my questions have been answered. I have discussed any challenges I see with this plan with the nurse or doctor.    ..........................................................................................................................................  Patient/Patient Representative Signature      ..........................................................................................................................................  Patient Representative Print Name and Relationship to Patient    ..................................................               ................................................  Date                                            Time    ..........................................................................................................................................  Reviewed by Signature/Title    ...................................................              ..............................................  Date                                                            Time          22EPIC Rev 08/18

## 2018-09-02 NOTE — ED TRIAGE NOTES
Patient presents to ED due upper abd pain and CP. States onset of CP developed suddenly this morning, had  One emesis that was pink tinged.  h

## 2018-09-02 NOTE — DISCHARGE INSTRUCTIONS
You received Toradol, an IV form of ibuprofen (Motrin) at 4 pm.  Do not take any ibuprofen products until 10 pm tonight.          HOME CARE FOLLOWING LAPAROSCOPIC CHOLECYSTECTOMY  JENY Castillo N. Guttormson, D. Maurer, ARMANDO Samuel    INCISIONAL CARE:  Replace the bandage over your incisions until all drainage stops, or if more comfortable to have in place.  If present, leave the steri-strips (white paper tapes) in place for 14 days after surgery.  If Dermabond (a type of skin glue) is present, leave in place until it wears/flakes off.     BATHING:  Avoid baths for 1 week after surgery.  Showers are okay.  You may wash your hair at any time.  Gently pat your incisions dry after bathing.    ACTIVITY:  Light Activity -- you may immediately be up and about as tolerated.  Driving -- you may drive when comfortable and off narcotic pain medications.  Light Work -- resume when comfortable off pain medications.  (If you can drive, you probably can work.)  Strenuous Work/Activity -- limit lifting to 20 pounds for 1 week.  Progressively increase with time.  Active Sports (running, biking, etc.) -- cautiously resume after 2 weeks.    DISCOMFORT:  Use pain medications as prescribed by your surgeon.  Take the pain medication with some food, when possible, to minimize side effects.  Intermittent use of ice packs at the incision sites may help during the first 48 hours.  Expect gradual improvement.  You may experience shoulder pain, which is due to the air placed within your abdomen during the procedure.  This is temporary and usually passes within 2 days.    DIET:  Drink plenty of fluids.  While taking pain medications, increase dietary fiber or add a fiber supplementation like Metamucil or Citrucel to help prevent constipation - a possible side effect of pain medications.  It is not uncommon to experience some bowel changes (loose stools or constipation) after surgery.  Your body has to adapt to you no longer  having a gall bladder.  To help minimize this side effect, avoid fatty foods for the first week after surgery.  You may then slowly increase the amount of fatty foods in your diet.      NAUSEA:  If nauseated from the anesthetic/pain meds; rest in bed, get up cautiously with assistance, and drink clear liquids (juice, tea, broth).    RETURN APPOINTMENT:  Schedule a follow-up visit 2-3 weeks post-op.  Office Phone:  [phone]     CONTACT US IF THE FOLLOWING DEVELOPS:   1. A fever that is above 101     2. If there is a large amount of drainage, bleeding, or swelling.   3. Severe pain that is not relieved by your prescription.   4. Drainage that is thick, cloudy, yellow, green or white.   5. Any other questions not answered by  Frequently Asked Questions  sheet.      FREQUENTLY ASKED QUESTIONS:    Q:  How should my incision look?    A:  Normally your incision will appear slightly swollen with light redness directly along the incision itself as it heals.  It may feel like a bump or ridge as the healing/scarring happens, and over time (3-4 months) this bump or ridge feeling should slowly go away.  In general, clear or pink watery drainage can be normal at first as your incision heals, but should decrease over time.    Q:  How do I know if my incision is infected?  A:  Look at your incision for signs of infection, like redness around the incision spreading to surrounding skin, or drainage of cloudy or foul-smelling drainage.  If you feel warm, check your temperature to see if you are running a fever.    **If any of these things occur, please notify the nurse at our office.  We may need you to come into the office for an incision check.      Q:  How do I take care of my incision?  A:  If you have a dressing in place - Starting the day after surgery, replace the dressing 1-2 times a day until there is no further drainage from the incision.  At that time, a dressing is no longer needed.  Try to minimize tape on the skin if  irritation is occurring at the tape sites.  If you have significant irritation from tape on the skin, please call the office to discuss other method of dressing your incision.    Small pieces of tape called  steri-strips  may be present directly overlying your incision; these may be removed 10 days after surgery unless otherwise specified by your surgeon.  If these tapes start to loosen at the ends, you may trim them back until they fall off or are removed.    A:  If you had  Dermabond  tissue glue used as a dressing (this causes your incision to look shiny with a clear covering over it) - This type of dressing wears off with time and does not require more dressings over the top unless it is draining around the glue as it wears off.  Do not apply ointments or lotions over the incisions until the glue has completely worn off.    Q:  There is a piece of tape or a sticky  lead  still on my skin.  Can I remove this?  A:  Sometimes the sticky  leads  used for monitoring during surgery or for evaluation in the emergency department are not all removed while you are in the hospital.  These sometimes have a tab or metal dot on them.  You can easily remove these on your own, like taking off a band-aid.  If there is a gel substance under the  lead , simply wipe/clean it off with a washcloth or paper towel.      Q:  What can I do to minimize constipation (very hard stools, or lack of stools)?  A:  Stay well hydrated.  Increase your dietary fiber intake or take a fiber supplement -with plenty of water.  Walk around frequently.  You may consider an over-the-counter stool-softener.  Your Pharmacist can assist you with choosing one that is stocked at your pharmacy.  Constipation is also one of the most common side effects of pain medication.  If you are using pain medication, be pro-active and try to PREVENT problems with constipation by taking the steps above BEFORE constipation becomes a problem.    Q:  What do I do if I need  more pain medications?  A:  Call the office to receive refills.  Be aware that certain pain meds cannot be called into a pharmacy and actually require a paper prescription.  A change may be made in your pain med as you progress thru your recovery period or if you have side effects to certain meds.    --Pain meds are NOT refilled after 5pm on weekdays, and NOT AT ALL on the weekends, so please look ahead to prevent problems.      Q:  Why am I having a hard time sleeping now that I am at home?  A:  Many medications you receive while you are in the hospital can impact your sleep for a number of days after your surgery/hospitalization.  Decreased level of activity and naps during the day may also make sleeping at night difficult.  Try to minimize day-time naps, and get up frequently during the day to walk around your home during your recovery time.  Sleep aides may be of some help, but are not recommended for long-term use.      Q:  I am having some back discomfort.  What should I do?  A:  This may be related to certain positioning that was required for your surgery, extended periods of time in bed, or other changes in your overall activity level.  You may try ice, heat, acetaminophen, or ibuprofen to treat this temporarily.  Note that many pain medications have acetaminophen in them and would state this on the prescription bottle.  Be sure not to exceed the maximum of 4000mg per day of acetaminophen.     **If the pain you are having does not resolve, is severe, or is a flare of back pain you have had on other occasions prior to surgery, please contact your primary physician for further recommendations or for an appointment to be examined at their office.    Q:  Why am I having headaches?  A:  Headaches can be caused by many things:  caffeine withdrawal, use of pain meds, dehydration, high blood pressure, lack of sleep, over-activity/exhaustion, flare-up of usual migraine headaches.  If you feel this is related to  muscle tension (a band-like feeling around the head, or a pressure at the low-back of the head) you may try ice or heat to this area.  You may need to drink more fluids (try electrolyte drink like Gatorade), rest, or take your usual migraine medications.   **If your headaches do not resolve, worsen, are accompanied by other symptoms, or if your blood pressure is high, please call your primary physician for recommendation and/or examination.    Q:  I am unable to urinate.  What do I do?  A:  A small percentage of people can have difficulty urinating initially after surgery.  This includes being able to urinate only a very small amount at a time and feeling discomfort or pressure in the very low abdomen.  This is called  urinary retention , and is actually an urgent situation.  Proceed to your nearest Emergency department for evaluation (not an Urgent Care Center).  Sometimes the bladder does not work correctly after certain medications you receive during surgery, or related to certain procedures.  You may need to have a catheter placed until your bladder recovers.  When planning to go to an Emergency department, it may help to call the ER to let them know you are coming in for this problem after a surgery.  This may help you get in quicker to be evaluated.  **If you have symptoms of a urinary tract infection, please contact your primary physician for the proper evaluation and treatment.          If you have other questions, please call the office Monday thru Friday between 8am and 5pm to discuss with the nurse or physician assistant.  [phone]    There is a surgeon ON CALL on weekday evenings and over the weekend in case of urgent need only, and may be contacted at the same number.    If you are having an emergency, call 911 or proceed to your nearest emergency department.    GENERAL ANESTHESIA OR SEDATION ADULT DISCHARGE INSTRUCTIONS   SPECIAL PRECAUTIONS FOR 24 HOURS AFTER SURGERY    IT IS NOT UNUSUAL TO  FEEL LIGHT-HEADED OR FAINT, UP TO 24 HOURS AFTER SURGERY OR WHILE TAKING PAIN MEDICATION.  IF YOU HAVE THESE SYMPTOMS; SIT FOR A FEW MINUTES BEFORE STANDING AND HAVE SOMEONE ASSIST YOU WHEN YOU GET UP TO WALK OR USE THE BATHROOM.    YOU SHOULD REST AND RELAX FOR THE NEXT 24 HOURS AND YOU MUST MAKE ARRANGEMENTS TO HAVE SOMEONE STAY WITH YOU FOR AT LEAST 24 HOURS AFTER YOUR DISCHARGE.  AVOID HAZARDOUS AND STRENUOUS ACTIVITIES.  DO NOT MAKE IMPORTANT DECISIONS FOR 24 HOURS.    DO NOT DRIVE ANY VEHICLE OR OPERATE MECHANICAL EQUIPMENT FOR 24 HOURS FOLLOWING THE END OF YOUR SURGERY.  EVEN THOUGH YOU MAY FEEL NORMAL, YOUR REACTIONS MAY BE AFFECTED BY THE MEDICATION YOU HAVE RECEIVED.    DO NOT DRINK ALCOHOLIC BEVERAGES FOR 24 HOURS FOLLOWING YOUR SURGERY.    DRINK CLEAR LIQUIDS (APPLE JUICE, GINGER ALE, 7-UP, BROTH, ETC.).  PROGRESS TO YOUR REGULAR DIET AS YOU FEEL ABLE.    YOU MAY HAVE A DRY MOUTH, A SORE THROAT, MUSCLES ACHES OR TROUBLE SLEEPING.  THESE SHOULD GO AWAY AFTER 24 HOURS.    CALL YOUR DOCTOR FOR ANY OF THE FOLLOWING:  SIGNS OF INFECTION (FEVER, GROWING TENDERNESS AT THE SURGERY SITE, A LARGE AMOUNT OF DRAINAGE OR BLEEDING, SEVERE PAIN, FOUL-SMELLING DRAINAGE, REDNESS OR SWELLING.    IT HAS BEEN OVER 8 TO 10 HOURS SINCE SURGERY AND YOU ARE STILL NOT ABLE TO URINATE (PASS WATER).

## 2018-09-02 NOTE — PLAN OF CARE
Problem: Patient Care Overview  Goal: Plan of Care/Patient Progress Review  Outcome: No Change  PRIMARY DIAGNOSIS: Symptomatic Cholelithiasis  OUTPATIENT/OBSERVATION GOALS TO BE MET BEFORE DISCHARGE:    1. Pain status: Improved but still requiring IV narcotics.  2. Stable vital signs and labs (if performed) at disposition: Yes  3. Tolerating adequate PO diet: NPO, surgery consulted  4. Successful cholecystectomy or clear follow up plan with General Surgery team if immediate surgery not performed yes  5. ADLs back to baseline?  Yes  6. Activity and level of assistance: Ambulating independently.  7. Barriers to discharge noted No    Discharge Planner Nurse   Safe discharge environment identified: Yes  Barriers to discharge: No       Entered by: Radha Mcgee 09/02/2018 11:33 AM     Please review provider order for any additional goals.   Nurse to notify provider when observation goals have been met and patient is ready for discharge.    Pt in with abdominal pain. Denies nausea. Abdominal pain on R side of abdomen.  NS running @ 100ml/h.  Ambulating independently at this time.  IV dilaudid x1.  Surgery consulted.

## 2018-09-02 NOTE — IP AVS SNAPSHOT
MRN:[id number]                      After Visit Summary   9/2/2018    Fabio Logan    MRN: [id number]           Thank you!     Thank you for choosing [facility] for your care. Our goal is always to provide you with excellent care. Hearing back from our patients is one way we can continue to improve our services. Please take a few minutes to complete the written survey that you may receive in the mail after you visit. If you would like to speak to someone directly about your visit please contact Patient Relations at [phone]. Thank you!          Patient Information     Date Of Birth          1960        About your hospital stay     You were admitted on:  September 2, 2018 You last received care in the:  [facility]    You were discharged on:  September 2, 2018        Reason for your hospital stay       You were admitted for concerns of stomach pain and nausea related to your gallbladder. Imaging in the ER showed that you had gallstones and signs of early inflammation of your gallbladder. There was not evidence of a stone in the biliary tract. You received a dose of antibiotics in the emergency room but these were not continued due to not other signs of infection. Your pain and nausea was controlled with narcotics and anti nausea medications. You were seen by surgery who opted to remove your gallbladder. You were doing fine post operatively to allow to discharge home.                  Who to Call     For medical emergencies, please call [phone].  For non-urgent questions about your medical care, please call your primary care provider or clinic, [phone]  For questions related to your surgery, please call your surgery clinic        Attending Provider     Provider Specialty    Daniel Andrade MD Emergency Medicine    Chan Mendenhall MD Internal Medicine       Primary Care Provider Office Phone # Fax #    Manoj Armenta [phone]       After Care Instructions     Activity       Your activity upon discharge: activity as tolerated            Diet       Follow this diet upon discharge: advance diet as tolerated after procedure            Discharge Instructions       Patient to follow up with appointment in 1-3 weeks            Ice to affected area       Ice to operative site PRN            No driving or operating machinery        until the day after procedure            Shower       No shower for 24 hours post procedure. May shower Postoperative Day (POD)  1                  Follow-up Appointments     Follow-up and recommended labs and tests        Follow up with primary care provider, Manoj Armenta, within 7 days for hospital follow- up.  No follow up labs or test are needed.  Follow up with general surgery per their recommendations                  Further instructions from your care team         You received Toradol, an IV form of ibuprofen (Motrin) at 4 pm.  Do not take any ibuprofen products until 10 pm tonight.          HOME CARE FOLLOWING LAPAROSCOPIC CHOLECYSTECTOMY  JENY Castillo, TORITO Matos, ARMANDO Samuel    INCISIONAL CARE:  Replace the bandage over your incisions until all drainage stops, or if more comfortable to have in place.  If present, leave the steri-strips (white paper tapes) in place for 14 days after surgery.  If Dermabond (a type of skin glue) is present, leave in place until it wears/flakes off.     BATHING:  Avoid baths for 1 week after surgery.  Showers are okay.  You may wash your hair at any time.  Gently pat your incisions dry after bathing.    ACTIVITY:  Light Activity -- you may immediately be up and about as tolerated.  Driving -- you may drive when comfortable and off narcotic pain medications.  Light Work -- resume when comfortable off pain medications.  (If you can drive, you probably can work.)  Strenuous Work/Activity -- limit lifting to 20 pounds for 1 week.  Progressively  increase with time.  Active Sports (running, biking, etc.) -- cautiously resume after 2 weeks.    DISCOMFORT:  Use pain medications as prescribed by your surgeon.  Take the pain medication with some food, when possible, to minimize side effects.  Intermittent use of ice packs at the incision sites may help during the first 48 hours.  Expect gradual improvement.  You may experience shoulder pain, which is due to the air placed within your abdomen during the procedure.  This is temporary and usually passes within 2 days.    DIET:  Drink plenty of fluids.  While taking pain medications, increase dietary fiber or add a fiber supplementation like Metamucil or Citrucel to help prevent constipation - a possible side effect of pain medications.  It is not uncommon to experience some bowel changes (loose stools or constipation) after surgery.  Your body has to adapt to you no longer having a gall bladder.  To help minimize this side effect, avoid fatty foods for the first week after surgery.  You may then slowly increase the amount of fatty foods in your diet.      NAUSEA:  If nauseated from the anesthetic/pain meds; rest in bed, get up cautiously with assistance, and drink clear liquids (juice, tea, broth).    RETURN APPOINTMENT:  Schedule a follow-up visit 2-3 weeks post-op.  Office Phone:  [phone]     CONTACT US IF THE FOLLOWING DEVELOPS:   1. A fever that is above 101     2. If there is a large amount of drainage, bleeding, or swelling.   3. Severe pain that is not relieved by your prescription.   4. Drainage that is thick, cloudy, yellow, green or white.   5. Any other questions not answered by  Frequently Asked Questions  sheet.      FREQUENTLY ASKED QUESTIONS:    Q:  How should my incision look?    A:  Normally your incision will appear slightly swollen with light redness directly along the incision itself as it heals.  It may feel like a bump or ridge as the healing/scarring happens, and over time (3-4 months)  this bump or ridge feeling should slowly go away.  In general, clear or pink watery drainage can be normal at first as your incision heals, but should decrease over time.    Q:  How do I know if my incision is infected?  A:  Look at your incision for signs of infection, like redness around the incision spreading to surrounding skin, or drainage of cloudy or foul-smelling drainage.  If you feel warm, check your temperature to see if you are running a fever.    **If any of these things occur, please notify the nurse at our office.  We may need you to come into the office for an incision check.      Q:  How do I take care of my incision?  A:  If you have a dressing in place - Starting the day after surgery, replace the dressing 1-2 times a day until there is no further drainage from the incision.  At that time, a dressing is no longer needed.  Try to minimize tape on the skin if irritation is occurring at the tape sites.  If you have significant irritation from tape on the skin, please call the office to discuss other method of dressing your incision.    Small pieces of tape called  steri-strips  may be present directly overlying your incision; these may be removed 10 days after surgery unless otherwise specified by your surgeon.  If these tapes start to loosen at the ends, you may trim them back until they fall off or are removed.    A:  If you had  Dermabond  tissue glue used as a dressing (this causes your incision to look shiny with a clear covering over it) - This type of dressing wears off with time and does not require more dressings over the top unless it is draining around the glue as it wears off.  Do not apply ointments or lotions over the incisions until the glue has completely worn off.    Q:  There is a piece of tape or a sticky  lead  still on my skin.  Can I remove this?  A:  Sometimes the sticky  leads  used for monitoring during surgery or for evaluation in the emergency department are not all removed  while you are in the hospital.  These sometimes have a tab or metal dot on them.  You can easily remove these on your own, like taking off a band-aid.  If there is a gel substance under the  lead , simply wipe/clean it off with a washcloth or paper towel.      Q:  What can I do to minimize constipation (very hard stools, or lack of stools)?  A:  Stay well hydrated.  Increase your dietary fiber intake or take a fiber supplement -with plenty of water.  Walk around frequently.  You may consider an over-the-counter stool-softener.  Your Pharmacist can assist you with choosing one that is stocked at your pharmacy.  Constipation is also one of the most common side effects of pain medication.  If you are using pain medication, be pro-active and try to PREVENT problems with constipation by taking the steps above BEFORE constipation becomes a problem.    Q:  What do I do if I need more pain medications?  A:  Call the office to receive refills.  Be aware that certain pain meds cannot be called into a pharmacy and actually require a paper prescription.  A change may be made in your pain med as you progress thru your recovery period or if you have side effects to certain meds.    --Pain meds are NOT refilled after 5pm on weekdays, and NOT AT ALL on the weekends, so please look ahead to prevent problems.      Q:  Why am I having a hard time sleeping now that I am at home?  A:  Many medications you receive while you are in the hospital can impact your sleep for a number of days after your surgery/hospitalization.  Decreased level of activity and naps during the day may also make sleeping at night difficult.  Try to minimize day-time naps, and get up frequently during the day to walk around your home during your recovery time.  Sleep aides may be of some help, but are not recommended for long-term use.      Q:  I am having some back discomfort.  What should I do?  A:  This may be related to certain positioning that was required  for your surgery, extended periods of time in bed, or other changes in your overall activity level.  You may try ice, heat, acetaminophen, or ibuprofen to treat this temporarily.  Note that many pain medications have acetaminophen in them and would state this on the prescription bottle.  Be sure not to exceed the maximum of 4000mg per day of acetaminophen.     **If the pain you are having does not resolve, is severe, or is a flare of back pain you have had on other occasions prior to surgery, please contact your primary physician for further recommendations or for an appointment to be examined at their office.    Q:  Why am I having headaches?  A:  Headaches can be caused by many things:  caffeine withdrawal, use of pain meds, dehydration, high blood pressure, lack of sleep, over-activity/exhaustion, flare-up of usual migraine headaches.  If you feel this is related to muscle tension (a band-like feeling around the head, or a pressure at the low-back of the head) you may try ice or heat to this area.  You may need to drink more fluids (try electrolyte drink like Gatorade), rest, or take your usual migraine medications.   **If your headaches do not resolve, worsen, are accompanied by other symptoms, or if your blood pressure is high, please call your primary physician for recommendation and/or examination.    Q:  I am unable to urinate.  What do I do?  A:  A small percentage of people can have difficulty urinating initially after surgery.  This includes being able to urinate only a very small amount at a time and feeling discomfort or pressure in the very low abdomen.  This is called  urinary retention , and is actually an urgent situation.  Proceed to your nearest Emergency department for evaluation (not an Urgent Care Center).  Sometimes the bladder does not work correctly after certain medications you receive during surgery, or related to certain procedures.  You may need to have a catheter placed until your  bladder recovers.  When planning to go to an Emergency department, it may help to call the ER to let them know you are coming in for this problem after a surgery.  This may help you get in quicker to be evaluated.  **If you have symptoms of a urinary tract infection, please contact your primary physician for the proper evaluation and treatment.          If you have other questions, please call the office Monday thru Friday between 8am and 5pm to discuss with the nurse or physician assistant.  #[phone]    There is a surgeon ON CALL on weekday evenings and over the weekend in case of urgent need only, and may be contacted at the same number.    If you are having an emergency, call 911 or proceed to your nearest emergency department.    GENERAL ANESTHESIA OR SEDATION ADULT DISCHARGE INSTRUCTIONS   SPECIAL PRECAUTIONS FOR 24 HOURS AFTER SURGERY    IT IS NOT UNUSUAL TO FEEL LIGHT-HEADED OR FAINT, UP TO 24 HOURS AFTER SURGERY OR WHILE TAKING PAIN MEDICATION.  IF YOU HAVE THESE SYMPTOMS; SIT FOR A FEW MINUTES BEFORE STANDING AND HAVE SOMEONE ASSIST YOU WHEN YOU GET UP TO WALK OR USE THE BATHROOM.    YOU SHOULD REST AND RELAX FOR THE NEXT 24 HOURS AND YOU MUST MAKE ARRANGEMENTS TO HAVE SOMEONE STAY WITH YOU FOR AT LEAST 24 HOURS AFTER YOUR DISCHARGE.  AVOID HAZARDOUS AND STRENUOUS ACTIVITIES.  DO NOT MAKE IMPORTANT DECISIONS FOR 24 HOURS.    DO NOT DRIVE ANY VEHICLE OR OPERATE MECHANICAL EQUIPMENT FOR 24 HOURS FOLLOWING THE END OF YOUR SURGERY.  EVEN THOUGH YOU MAY FEEL NORMAL, YOUR REACTIONS MAY BE AFFECTED BY THE MEDICATION YOU HAVE RECEIVED.    DO NOT DRINK ALCOHOLIC BEVERAGES FOR 24 HOURS FOLLOWING YOUR SURGERY.    DRINK CLEAR LIQUIDS (APPLE JUICE, GINGER ALE, 7-UP, BROTH, ETC.).  PROGRESS TO YOUR REGULAR DIET AS YOU FEEL ABLE.    YOU MAY HAVE A DRY MOUTH, A SORE THROAT, MUSCLES ACHES OR TROUBLE SLEEPING.  THESE SHOULD GO AWAY AFTER 24 HOURS.    CALL YOUR DOCTOR FOR ANY OF THE FOLLOWING:  SIGNS OF INFECTION  (FEVER, GROWING TENDERNESS AT THE SURGERY SITE, A LARGE AMOUNT OF DRAINAGE OR BLEEDING, SEVERE PAIN, FOUL-SMELLING DRAINAGE, REDNESS OR SWELLING.    IT HAS BEEN OVER 8 TO 10 HOURS SINCE SURGERY AND YOU ARE STILL NOT ABLE TO URINATE (PASS WATER).             Pending Results     Date and Time Order Name Status Description    9/2/2018 1541 Surgical pathology exam In process             Statement of Approval     Ordered          09/02/18 1254  I have reviewed and agree with all the recommendations and orders detailed in this document.  EFFECTIVE NOW     Approved and electronically signed by:  Argentina Sheppard PA-C             Admission Information     Date & Time Provider Department Dept. Phone    9/2/2018 Chan Mendenhall MD [facility] [phone]      Your Vitals Were     Blood Pressure Temperature Respirations Pulse Oximetry          142/83 96.7  F (35.9  C) (Temporal) 9 98%        MyChart Information     [employer] gives you secure access to your electronic health record. If you see a primary care provider, you can also send messages to your care team and make appointments. If you have questions, please call your primary care clinic.  If you do not have a primary care provider, please call [phone] and they will assist you.        Care EveryWhere ID     This is your Care EveryWhere ID. This could be used by other organizations to access your [facility] medical records  [id number]        Equal Access to Services     BABITA ODELL : Hadii cristiana hill Sosilvia, waaxda luqadaha, qaybta kaalmada adefrenchyada, betito fortune. So [facility] [phone].    ATENCIÓN: Si habla español, tiene a mae disposición servicios gratuitos de asistencia lingüística. Llame al [phone].    We comply with applicable federal civil rights laws and [place] laws. We do not discriminate on the basis of race, color, national origin, age, disability, sex, sexual orientation, or gender  identity.               Review of your medicines      START taking        Dose / Directions    oxyCODONE IR 5 MG tablet   Commonly known as:  ROXICODONE        Dose:  5-10 mg   Take 1-2 tablets (5-10 mg) by mouth every 3 hours as needed for pain or other (Moderate to Severe)   Quantity:  20 tablet   Refills:  0       senna 8.6 MG tablet   Commonly known as:  SENOKOT        Dose:  1-2 tablet   Take 1-2 tablets by mouth daily Take while on pain medications   Quantity:  30 tablet   Refills:  0         CONTINUE these medicines which have NOT CHANGED        Dose / Directions    albuterol 108 (90 Base) MCG/ACT inhaler   Commonly known as:  PROAIR HFA/PROVENTIL HFA/VENTOLIN HFA   Used for:  Acute bronchitis with coexisting condition requiring prophylactic treatment        Dose:  2 puff   Inhale 2 puffs into the lungs every 6 hours as needed for shortness of breath / dyspnea or wheezing   Quantity:  1 Inhaler   Refills:  0       atenolol 50 MG tablet   Commonly known as:  TENORMIN   Used for:  HTN, goal below 140/90        TAKE ONE TABLET BY MOUTH ONE TIME DAILY   Quantity:  90 tablet   Refills:  2       losartan 25 MG tablet   Commonly known as:  COZAAR   Used for:  Essential hypertension with goal blood pressure less than 140/90, Encounter for routine adult health examination without abnormal findings        Dose:  25 mg   Take 1 tablet (25 mg) by mouth daily   Quantity:  90 tablet   Refills:  3       omeprazole 20 MG CR capsule   Commonly known as:  priLOSEC   Used for:  Gastroesophageal reflux disease without esophagitis, Encounter for routine adult health examination without abnormal findings        Dose:  20 mg   Take 1 capsule (20 mg) by mouth daily as needed   Quantity:  90 capsule   Refills:  3       sertraline 100 MG tablet   Commonly known as:  ZOLOFT   Used for:  Major depressive disorder, recurrent episode, mild (H), Encounter for routine adult health examination without abnormal findings        Dose:  100 mg    Take 1 tablet (100 mg) by mouth daily   Quantity:  90 tablet   Refills:  3       sildenafil 100 MG tablet   Commonly known as:  VIAGRA   Used for:  Erectile dysfunction, unspecified erectile dysfunction type        Dose:   mg   Take 0.5-1 tablets ( mg) by mouth daily as needed for erectile dysfunction Take 30 min to 4 hours before sexual activity.  Never use with nitroglycerin, terazosin or doxazosin.   Quantity:  4 tablet   Refills:  11       SUMAtriptan 50 MG tablet   Commonly known as:  IMITREX   Used for:  Other migraine without status migrainosus, not intractable        TAKE 1 TABLET (50 MG) BY MOUTH AT ONSET OF HEADACHE FOR MIGRAINE MAY REPEAT DOSE IN 2 HOURS.  DO NOT EXCEED 200 MG IN 24 HOURS   Quantity:  18 tablet   Refills:  2       triamcinolone 0.1 % ointment   Commonly known as:  KENALOG        Apply topically 2 times daily as needed for irritation   Refills:  0            Where to get your medicines      These medications were sent to [facility] [address] [address] [address]     Phone:  [phone]     senna 8.6 MG tablet         Some of these will need a paper prescription and others can be bought over the counter. Ask your nurse if you have questions.     Bring a paper prescription for each of these medications     oxyCODONE IR 5 MG tablet                Protect others around you: Learn how to safely use, store and throw away your medicines at www.disposemymeds.org.        Information about OPIOIDS     PRESCRIPTION OPIOIDS: WHAT YOU NEED TO KNOW   We gave you an opioid (narcotic) pain medicine. It is important to manage your pain, but opioids are not always the best choice. You should first try all the other options your care team gave you. Take this medicine for as short a time (and as few doses) as possible.    Some activities can increase your pain, such as bandage changes or therapy sessions. It may help to  take your pain medicine 30 to 60 minutes before these activities. Reduce your stress by getting enough sleep, working on hobbies you enjoy and practicing relaxation or meditation. Talk to your care team about ways to manage your pain beyond prescription opioids.    These medicines have risks:    DO NOT drive when on new or higher doses of pain medicine. These medicines can affect your alertness and reaction times, and you could be arrested for driving under the influence (DUI). If you need to use opioids long-term, talk to your care team about driving.    DO NOT operate heavy machinery    DO NOT do any other dangerous activities while taking these medicines.    DO NOT drink any alcohol while taking these medicines.     If the opioid prescribed includes acetaminophen, DO NOT take with any other medicines that contain acetaminophen. Read all labels carefully. Look for the word  acetaminophen  or  Tylenol.  Ask your pharmacist if you have questions or are unsure.    You can get addicted to pain medicines, especially if you have a history of addiction (chemical, alcohol or substance dependence). Talk to your care team about ways to reduce this risk.    All opioids tend to cause constipation. Drink plenty of water and eat foods that have a lot of fiber, such as fruits, vegetables, prune juice, apple juice and high-fiber cereal. Take a laxative (Miralax, milk of magnesia, Colace, Senna) if you don t move your bowels at least every other day. Other side effects include upset stomach, sleepiness, dizziness, throwing up, tolerance (needing more of the medicine to have the same effect), physical dependence and slowed breathing.    Store your pills in a secure place, locked if possible. We will not replace any lost or stolen medicine. If you don t finish your medicine, please throw away (dispose) as directed by your pharmacist. The [place] [employer]ution [employer] has more information about safe disposal:  https://www.pca.[facility].[place].us/living-green/managing-unwanted-medications             Medication List: This is a list of all your medications and when to take them. Check marks below indicate your daily home schedule. Keep this list as a reference.      Medications           Morning Afternoon Evening Bedtime As Needed    albuterol 108 (90 Base) MCG/ACT inhaler   Commonly known as:  PROAIR HFA/PROVENTIL HFA/VENTOLIN HFA   Inhale 2 puffs into the lungs every 6 hours as needed for shortness of breath / dyspnea or wheezing                                atenolol 50 MG tablet   Commonly known as:  TENORMIN   TAKE ONE TABLET BY MOUTH ONE TIME DAILY                                losartan 25 MG tablet   Commonly known as:  COZAAR   Take 1 tablet (25 mg) by mouth daily                                omeprazole 20 MG CR capsule   Commonly known as:  priLOSEC   Take 1 capsule (20 mg) by mouth daily as needed                                oxyCODONE IR 5 MG tablet   Commonly known as:  ROXICODONE   Take 1-2 tablets (5-10 mg) by mouth every 3 hours as needed for pain or other (Moderate to Severe)                                senna 8.6 MG tablet   Commonly known as:  SENOKOT   Take 1-2 tablets by mouth daily Take while on pain medications                                sertraline 100 MG tablet   Commonly known as:  ZOLOFT   Take 1 tablet (100 mg) by mouth daily                                sildenafil 100 MG tablet   Commonly known as:  VIAGRA   Take 0.5-1 tablets ( mg) by mouth daily as needed for erectile dysfunction Take 30 min to 4 hours before sexual activity.  Never use with nitroglycerin, terazosin or doxazosin.                                SUMAtriptan 50 MG tablet   Commonly known as:  IMITREX   TAKE 1 TABLET (50 MG) BY MOUTH AT ONSET OF HEADACHE FOR MIGRAINE MAY REPEAT DOSE IN 2 HOURS.  DO NOT EXCEED 200 MG IN 24 HOURS                                triamcinolone 0.1 % ointment   Commonly known as:  KENALOG    Apply topically 2 times daily as needed for irritation

## 2018-09-02 NOTE — ED PROVIDER NOTES
Visit Date:   09/02/2018      CHIEF COMPLAINT:  Right upper quadrant abdominal pain.      HISTORY OF PRESENT ILLNESS:  A 58-year-old gentleman who presents with acute right upper quadrant abdominal pain that woke him from sleep.  This radiates to his back.  He also some radiation up into his chest.  He notes some chest tightness that has been going on for several weeks, but has not been concerning to him.  No shortness of breath, fevers, or cough.  He feels nauseous.  No diarrhea.  No urinary symptoms.  He has never had pain like this before.      PAST MEDICAL HISTORY:   1.  Asthma.   2.  Depression.   3.  Hypertension.   4.  Right-sided testicular cancer.      PAST SURGICAL HISTORY:   1.  Colonoscopy.   2.  Tonsillectomy.   3.  Right-sided orchiectomy in 1998.      ALLERGIES:  IODINE.      MEDICATIONS:   1.  Albuterol.   2.  Cozaar.   3.  Prilosec.   4.  Zoloft.   5.  Viagra.   6.  Imitrex.   7.  Flomax.   8.  Kenalog ointment.      SOCIAL HISTORY:  The patient presents with his girlfriend.  He is a former smoker.      REVIEW OF SYSTEMS:  A pertinent 10-point review of systems is negative except for that noted in the HPI.      PHYSICAL EXAMINATION:   GENERAL:  The patient is lying in bed.  He is uncomfortable appearing, but appropriately interactive.   VITAL SIGNS:  Blood pressure 171/95, heart rate 90, respiratory rate 20, oxygen 97% on room air, temperature 97.9 degrees orally.   HEENT:  Atraumatic.  Moist mucous membranes.   NECK:  Full range of motion.   CARDIOVASCULAR:  Regular rhythm.  No murmurs.  Normal rate.   RESPIRATORY:  Clear to auscultation bilaterally without wheezes, rales, or rhonchi.   GASTROINTESTINAL:  Soft, nondistended, normal bowel sounds.  He has marked tenderness in the right upper quadrant with voluntary guarding.   SKIN:  There are no pertinent skin findings.   NEUROLOGIC:  The patient is alert and oriented x 4.   PSYCHIATRIC:  The patient has normal mood and affect.      LABORATORY AND  DIAGNOSTIC STUDIES:  A 12-lead electrocardiogram shows a normal sinus rhythm with a rate of 88 beats a minute.  There is a left axis deviation noted.  There is no ectopy.  No ST depression or other ischemic changes noted.      CBC is normal with a white blood cell count of 6.0.  Comprehensive metabolic panel is normal with a creatinine of 1.18.  Lipase is 158 and troponin is undetectable.      IMAGING:    Ultrasound of the right upper quadrant shows cholelithiasis, including a 1.4 cm stone lodged in the neck of the gallbladder.  There is no significant gallbladder wall thickening, but there was a positive sonographic Angela's sign suggesting early cholecystitis.  There is also likely am adenomyosis of the gallbladder wall and fatty infiltration of the liver.      Chest x-ray shows no infiltrates or other acute findings.  Normal heart silhouette old right-sided rib fractures were noted.  Degenerative changes of the thoracic spine.      EMERGENCY DEPARTMENT COURSE AND MEDICAL DECISION MAKING:  This is a 58-year-old gentleman with right upper quadrant abdominal pain consistent with biliary colic.  Exam and radiographic studies are concerning for early acute cholecystitis.  His pain has been managed with IV morphine and nausea with Zofran.  He has received fluids, but requires additional dosing of this pain medication at this time.  Antibiotics were initiated with Rocephin and he will be admitted to the observation unit under Dr. Jennifer Luis from Internal Medicine.  Plan for surgical consultation this morning and pain control in the meantime.      DIAGNOSIS:    1.  Symptomatic cholelithiasis versus Early Acute cholecystitis.   2.  RUQ abdominal pain        MAURI CHANG MD             D: 2018   T: 2018   MT: ISRAEL      Name:     AROLDO MULLINS   MRN:      [id number]        Account:      [id number]   :      1960           Visit Date:   2018      Document: [id number]

## 2018-09-02 NOTE — H&P
[facility] Outpatient / Observation Unit  History and Physical Exam     Fabio Logan MRN# [id number]   YOB: 1960 Age: 58 year old      Date of Admission: 9/2/2018    Primary care provider: Manoj Armenta          Assessment:   Fabio Logan is a 58 year old male with a PMH significant for HTN, testicular cancer, asthma, GERD, depression, and former tobacco abuse, who presents with complaints of acute RUQ pain.      Work up in the ED reveals: initially hypertensive otherwise VSS, CMP WNL, normal lipase, negative troponin, CBC WNL, CXR negative for acute pathology, abdominal ultrasound shows cholelithiasis including a 1.4 cm stone lodged in the gallbladder neck, no significant gallbladder wall thickening but positive sonographic Angela's sign suggesting early cholecystitis, and fatty infiltration of liver, and EKG shows rate of 88 bpm in NSR.     Patient will be registered to Observation for further evaluation and symptom management for intractable biliary colic/early acute cholecystitis.     1. Symptomatic cholelithiasis, early cholecystitis: acute onset of RUQ pain this morning with radiation into his back with sensation of chest fullness after eating a meal yesterday evening including pork. Ultrasound shows cholelithiasis with a 1.4 cm stone lodged in the gallbladder neck and signs of early cholecystitis. LFTs WNL. Received a dose of Rocephin in the ED, afebrile and no leukocytosis, no need to continue antibiotics at this time. Will treat supportively with IVFs, antiemetics, pain control, and consult general surgery for recommendations.     2. HTN: stable, continue Losartan and Atenolol with parameters     3. GERD: resume Omeprazole     4. Depression: continue Sertraline          Plan:     1. [place] to Observation  2. IV hydration with Normal saline, @ 100 ml / hr  3. Cont supportive care with anti-emetics and pain control   4. General Surgery consult for timing of cholecystectomy (if  appropriate)  5. Initiate antibiotics, if indicated  6. Follow CBC, BMP, LFTs  7. NPO diet    DVT prophylaxis: pt at low risk, encourage ambulation  Code: Full, discussed with patient   Dispo: likely discharge within 24-48 hours                 Chief Complaint:   RUQ pain         History of Present Illness:   Fabio Logan is a 58 year old male who presents with RUQ pain.  Patient states that he woke up this morning around 3 AM due to acute onset of right upper quadrant shooting abdominal pain with radiation into the right side of his back.  The patient states that over the last few days he has been having increased pain in his back and thought it was related to a new bed. The patient reports sensation of fullness in his chest with his symptoms.  Denies associated nausea and vomiting but states he did dry heave with his increased pain in an attempt to vomit this morning.  He notes intermittent diaphoresis with his symptoms.  Denies associated fever, chills, urinary symptoms, or prior symptoms similar to this.  He does state at baseline he has intermittent diarrhea but has had formed stools over the last few days.  Denies recent increased NSAID use.  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  The patient states that his last meal prior to the onset of symptoms including pork. Denies any recent medication changes besides being treated with antibiotics for right foot cellulitis in the middle of August. The patient has previously undergone anesthesia without any complications. The patient states that his pain is currently improved to 5-6 from a 9/10 after receiving pain medications.            Past Medical History:     Past Medical History:   Diagnosis Date     Anxiety      Depression      Hypertension      Testicular cancer - Right 1998          Past Surgical History:     Past Surgical History:   Procedure Laterality Date     COLONOSCOPY N/A 8/22/2014    Procedure: COLONOSCOPY;   Surgeon: Joe Garcia MD;  Location:  GI     ORCHIECTOMY NOS Right 1998     TONSILLECTOMY            Social History:     Social History     Social History     Marital status: Single     Spouse name: N/A     Number of children: N/A     Years of education: N/A     Occupational History     Not on file.     Social History Main Topics     Smoking status: Former Smoker     Quit date: 6/6/1997     Smokeless tobacco: Former User     Alcohol use No     Drug use: No     Sexual activity: Yes     Partners: Female     Other Topics Concern     Parent/Sibling W/ Cabg, Mi Or Angioplasty Before 65f 55m? No     Social History Narrative          Family History:   Family history reviewed with patient and noncontributory.          Allergies:      Allergies   Allergen Reactions     Iodine-131 Shortness Of Breath     Internal iodine          Medications:     Prior to Admission medications    Medication Sig Last Dose Taking? Auth Provider   albuterol (PROAIR HFA/PROVENTIL HFA/VENTOLIN HFA) 108 (90 BASE) MCG/ACT Inhaler Inhale 2 puffs into the lungs every 6 hours as needed for shortness of breath / dyspnea or wheezing   Manoj Armenta MD   atenolol (TENORMIN) 50 MG tablet TAKE ONE TABLET BY MOUTH ONE TIME DAILY    Manoj Armenta MD   ipratropium - albuterol 0.5 mg/2.5 mg/3 mL (DUONEB) 0.5-2.5 (3) MG/3ML neb solution Take 1 vial (3 mLs) by nebulization once for 1 dose   Navin Murrieta PA-C   losartan (COZAAR) 25 MG tablet Take 1 tablet (25 mg) by mouth daily   Manoj Armenta MD   omeprazole (PRILOSEC) 20 MG CR capsule Take 1 capsule (20 mg) by mouth daily as needed   Manoj Armenta MD   sertraline (ZOLOFT) 100 MG tablet Take 1 tablet (100 mg) by mouth daily   Manoj Armenta MD   sildenafil (VIAGRA) 100 MG tablet Take 0.5-1 tablets ( mg) by mouth daily as needed for erectile dysfunction Take 30 min to 4 hours before sexual activity.  Never use with nitroglycerin, terazosin or doxazosin.    Manoj Armenta MD   SUMAtriptan (IMITREX) 50 MG tablet TAKE 1 TABLET (50 MG) BY MOUTH AT ONSET OF HEADACHE FOR MIGRAINE MAY REPEAT DOSE IN 2 HOURS.  DO NOT EXCEED 200 MG IN 24 HOURS   Manoj Armenta MD   tamsulosin (FLOMAX) 0.4 MG capsule Take 1 capsule (0.4 mg) by mouth daily   Manoj Armenta MD   triamcinolone (KENALOG) 0.1 % ointment Apply topically 2 times daily   Kanika Davis PA-C          Review of Systems:   A Comprehensive greater than 10 system review of systems was carried out.  Pertinent positives and negatives are noted above.  Otherwise negative for contributory information.          Physical Exam:   Blood pressure 146/67, temperature 98.5  F (36.9  C), temperature source Oral, resp. rate 16, SpO2 94 %.    GENERAL: healthy, alert, mild distress, non-toxic appearing  EYES: Eyes grossly normal to inspection, extraocular movements - intact, and PERRL  HENT: Nose- normal; Mouth- no ulcers, no lesions.   ORAL MUCOSA: mildly dry  NECK: no tenderness, no adenopathy, no asymmetry, no masses, no stiffness; thyroid- normal to palpation  RESP: lungs clear to auscultation - no rales, no rhonchi, no wheezes  CV: regular rates and rhythm, normal S1 S2, no S3 or S4 and no murmur, no click or rub   ABDOMEN: soft, RUQ and epigastric tenderness, non-distended, without hepatosplenomegaly or masses  BACK: CVA tenderness absent, no paralumbar tenderness  MS: extremities- no gross deformities noted, no edema  SKIN: no suspicious lesions, no rashes  NEURO: strength and tone- normal, sensory exam- grossly normal, mentation- intact, speech- normal, reflexes- symmetric  PSYCH: Alert and oriented times 3. Affect is normal.          Data:     Results for orders placed or performed during the hospital encounter of 09/02/18   US Abdomen Limited    Narrative    US ABDOMEN LIMITED 9/2/2018 4:20 AM    CLINICAL INFORMATION: Right upper quadrant pain.     COMPARISON: None.    FINDINGS: Limited right upper quadrant  ultrasound demonstrates  cholelithiasis, including a 1.4 cm stone in the gallbladder neck which  is nonmobile. No significant gallbladder wall thickening. However, a  sonographic Angela's sign was reported by the sonographer. Gallbladder  wall thickness is at the upper limits of normal at 0.3 cm. Ring down  artifact noted from the gallbladder wall suggesting adenomyomatosis.  Common hepatic duct not well-visualized. Increased liver echogenicity  consistent with fatty infiltration. The pancreas is obscured and not  diagnostically visualized. The visualized portion of the right kidney  is unremarkable. No hydronephrosis on the right.      Impression    IMPRESSION:   1. Cholelithiasis, including a 1.4 cm stone lodged in the gallbladder  neck.  2. No significant gallbladder wall thickening, but there was a  positive sonographic Angela's sign suggesting early cholecystitis.  3. Likely also adenomyomatosis of the gallbladder wall.  4. Fatty infiltration of the liver.    MAXWELL HOLLINGSWORTH MD   XR Chest 2 Views    Narrative    XR CHEST 2 VIEWS  9/2/2018 4:17 AM     INDICATION: Chest and abdominal pain.    COMPARISON: 5/10/2016.      Impression    IMPRESSION: No infiltrates or other acute findings. Normal-sized  cardiac silhouette. Old right rib fractures. Degenerative changes of  thoracic spine.     MAXWELL HOLLINGSWORTH MD   CBC with platelets differential   Result Value Ref Range    WBC 6.0 4.0 - 11.0 10e9/L    RBC Count 4.60 4.4 - 5.9 10e12/L    Hemoglobin 13.7 13.3 - 17.7 g/dL    Hematocrit 42.1 40.0 - 53.0 %    MCV 92 78 - 100 fl    MCH 29.8 26.5 - 33.0 pg    MCHC 32.5 31.5 - 36.5 g/dL    RDW 13.1 10.0 - 15.0 %    Platelet Count 215 150 - 450 10e9/L    Diff Method Automated Method     % Neutrophils 43.1 %    % Lymphocytes 43.3 %    % Monocytes 10.5 %    % Eosinophils 1.5 %    % Basophils 0.8 %    % Immature Granulocytes 0.8 %    Nucleated RBCs 0 0 /100    Absolute Neutrophil 2.6 1.6 - 8.3 10e9/L    Absolute Lymphocytes 2.6  0.8 - 5.3 10e9/L    Absolute Monocytes 0.6 0.0 - 1.3 10e9/L    Absolute Eosinophils 0.1 0.0 - 0.7 10e9/L    Absolute Basophils 0.1 0.0 - 0.2 10e9/L    Abs Immature Granulocytes 0.1 0 - 0.4 10e9/L    Absolute Nucleated RBC 0.0    Lipase   Result Value Ref Range    Lipase 158 73 - 393 U/L   Troponin I   Result Value Ref Range    Troponin I ES <0.015 0.000 - 0.045 ug/L   Comprehensive metabolic panel   Result Value Ref Range    Sodium 139 133 - 144 mmol/L    Potassium 3.9 3.4 - 5.3 mmol/L    Chloride 106 94 - 109 mmol/L    Carbon Dioxide 29 20 - 32 mmol/L    Anion Gap 4 3 - 14 mmol/L    Glucose 99 70 - 99 mg/dL    Urea Nitrogen 18 7 - 30 mg/dL    Creatinine 1.18 0.66 - 1.25 mg/dL    GFR Estimate 63 >60 mL/min/1.7m2    GFR Estimate If Black 77 >60 mL/min/1.7m2    Calcium 9.2 8.5 - 10.1 mg/dL    Bilirubin Total 0.2 0.2 - 1.3 mg/dL    Albumin 3.7 3.4 - 5.0 g/dL    Protein Total 7.6 6.8 - 8.8 g/dL    Alkaline Phosphatase 96 40 - 150 U/L    ALT 32 0 - 70 U/L    AST 27 0 - 45 U/L   EKG 12 lead   Result Value Ref Range    Interpretation ECG Click View Image link to view waveform and result      Argentina Sheppard PA-C

## 2018-09-02 NOTE — OP NOTE
SURGEON: Shannan Chaves MD   ASSISTANT: Andrae Ferrara PA-C the physician assistant was medically necessary to assist in prepping, positioning, camera operation, retraction/exposure and closure of the port site.   PREOPERATIVE DIAGNOSIS: Acute Cholecystitis.   POSTOPERATIVE DIAGNOSIS:  Early Acute Cholecystitis.  PROCEDURE: Laparoscopic cholecystectomy.  PREOPERATIVE MEDICATIONS: Rocephin iv   INDICATION:  Fabio Logan is a 58 year old male who has a gallstone impacted in the gallbladder neck and a history consistent with acute cholecystitis.  A laparoscopic cholecystectomy is recommended and the risks and benefits of the procedure, including the risk of bleeding, infection, bile leak or bile duct injury,  have been discussed with the patient.  He agrees with proceeding.  PROCEDURE: The patient was placed supine. Abdomen prepped and draped in the usual sterile fashion.  Initial trocar site was in the supraumbilical location, and the abdomen was entered using Dav trocar technique. A pneumoperitoneum was established, and the camera was positioned within the peritoneal cavity. The remaining trocars were then placed under direct vision, two 5 mm at the right anterior axillary line, and a 5 mm at the subxiphoid. The gallbladder had thick wall and early inflammation.   The gallbladder was grasped and placed under traction using the two lateral sites.  The dissection began using  the subxiphoid port. The cystic duct was freed circumferentially .  After confirmation of the anatomy by dissecting the Hepato-cystic triangle, the cystic duct was triply clipped and divided.  The cystic artery was directly posterior, both anterior and posterior branches and was triply clipped and divided. The gallbladder was then removed from the gallbladder bed using coag cautery. Once the remaining attachments were divided, the gallbladder was extracted from the unfraumbilical site without leakage of bile or stones. The camera was  then repositioned and the bed inspected. The bed was dry and the clips in good position without leakage of blood or bile.    The  trocar sites were infiltrated with 0.25% Marcaine for pain control and removed under direct vision. The supraraumbilical site was closed with interrupted 0 Vicryl for the fascia, and all skin sites closed with 4-0 subcuticular Monocryl. The patient transferred to recovery in good condition.   ESTIMATED BLOOD LOSS: 35 cc.   INTRAOPERATIVE FINDINGS: Early Acute Cholecystitis.

## 2018-09-02 NOTE — DISCHARGE SUMMARY
Fabio Logan is a 57 y/o male admitted earlier today for symptomatic cholelithiasis with early cholecystitis, please refer to initial H&P. General surgery was consulted and patient underwent laparoscopic cholecystectomy without complications and stable to discharge home from recovery with recommendations for follow up per general surgery.     Argentina Sheppard PA-C

## 2018-09-02 NOTE — ED NOTES
[facility]  ED Nurse Handoff Report    Fabio Logan is a 58 year old male   ED Chief complaint: Chest Pain and Abdominal Pain  . ED Diagnosis:   Final diagnoses:   Symptomatic cholelithiasis vs Early Cholecystitis     Allergies:   Allergies   Allergen Reactions     Iodine-131 Shortness Of Breath     Internal iodine       Code Status: Full Code  Activity level - Baseline/Home:  Independent. Activity Level - Current:   Independent. Lift room needed: No. Bariatric: No   Needed: No   Isolation: No. Infection: Not Applicable.     Vital Signs:   Vitals:    09/02/18 0317 09/02/18 0319 09/02/18 0330 09/02/18 0345   BP:  (!) 171/95 (!) 168/91 167/87   Resp: 20      Temp: 97.9  F (36.6  C)      TempSrc: Oral      SpO2: 97%  95% 96%       Cardiac Rhythm:  ,      Pain level: 0-10 Pain Scale: 8  Patient confused: No. Patient Falls Risk: Yes.   Elimination Status: Has voided   Patient Report - Initial Complaint: abdominal pain . Focused Assessment:  Gastrointestinal - GI WDL:  WDL except All Quadrants Palpation: soft/nontender Gastrointestinal Comment: Pt presents with having right upper quadrant pain that is radiating into the chest. Woke him up suddenly in his sleep.   Tests Performed:   US Abdomen Limited   Final Result   IMPRESSION:    1. Cholelithiasis, including a 1.4 cm stone lodged in the gallbladder   neck.   2. No significant gallbladder wall thickening, but there was a   positive sonographic Angela's sign suggesting early cholecystitis.   3. Likely also adenomyomatosis of the gallbladder wall.   4. Fatty infiltration of the liver.      MAXWELL HOLLINGSWORTH MD      XR Chest 2 Views   Final Result   IMPRESSION: No infiltrates or other acute findings. Normal-sized   cardiac silhouette. Old right rib fractures. Degenerative changes of   thoracic spine.       MAXWELL HOLLINGSWORTH MD        Labs Ordered and Resulted from Time of ED Arrival Up to the Time of Departure from the ED   CBC WITH PLATELETS  DIFFERENTIAL   LIPASE   TROPONIN I   COMPREHENSIVE METABOLIC PANEL   PULSE OXIMETRY NURSING   CARDIAC CONTINUOUS MONITORING   PERIPHERAL IV CATHETER       Treatments provided:   Medications   morphine (PF) injection 4 mg (4 mg Intravenous Given 9/2/18 0440)   cefTRIAXone (ROCEPHIN) 2 g vial to attach to  ml bag for ADULTS or NS 50 ml bag for PEDS (not administered)   ondansetron (ZOFRAN) injection 4 mg (4 mg Intravenous Given 9/2/18 0348)   0.9% sodium chloride BOLUS (1,000 mLs Intravenous New Bag 9/2/18 0348)       Family Comments: girlfriend at bedside.   OBS brochure/video discussed/provided to patient:  Yes  ED Medications:   Medications   morphine (PF) injection 4 mg (4 mg Intravenous Given 9/2/18 0440)   cefTRIAXone (ROCEPHIN) 2 g vial to attach to  ml bag for ADULTS or NS 50 ml bag for PEDS (not administered)   ondansetron (ZOFRAN) injection 4 mg (4 mg Intravenous Given 9/2/18 0348)   0.9% sodium chloride BOLUS (1,000 mLs Intravenous New Bag 9/2/18 0348)     Drips infusing:  No  For the majority of the shift, the patient's behavior Green. Interventions performed were monitor  .     Severe Sepsis OR Septic Shock Diagnosis Present: No      ED Nurse Name/Phone Number: Olegario Magana,   4:50 AM    RECEIVING UNIT ED HANDOFF REVIEW    Above ED Nurse Handoff Report was reviewed: Yes  Reviewed by: Nel Henley on September 2, 2018 at 6:34 AM

## 2018-09-04 ENCOUNTER — TELEPHONE (OUTPATIENT)
Dept: SURGERY | Facility: CLINIC | Age: 58
End: 2018-09-04

## 2018-09-04 ENCOUNTER — TELEPHONE (OUTPATIENT)
Dept: PEDIATRICS | Facility: CLINIC | Age: 58
End: 2018-09-04

## 2018-09-04 NOTE — TELEPHONE ENCOUNTER
S/p lap jeromy 9/2/18  Surgeon:  Dr. Chaves    Pt reports that he has not had a BM since surgery, he is feeling somewhat bloated.  States that he is passing gas.  Eating a low-fat diet.  Wondering what is ok to take.    He did not get the Senna that was prescribed at discharge as he went to [facility] pharmacy and senna was sent to [facility] pharmacy.  He is taking Tylenol and ibuprofen for pain.  Denies N/V.    Advised that he can try Miralax per package directions.  Increase fluids.  Start senna Qday.  He will call clinic back if no results from Miralax or if N/V, new or worsening s/s.  Pt verbalizes understanding and agrees with plan. Pt has post-op appointment scheduled for 9/6/18.

## 2018-09-04 NOTE — TELEPHONE ENCOUNTER
Please contact patient for In-patient follow up.  [phone] (home) [phone] (work)    Visit date: 09/02/2018 (15 hours)  Diagnosis listed:Symptomatic Cholelithiasis  Number of visits in past 12 months:ED 0/ IP 0

## 2018-09-04 NOTE — TELEPHONE ENCOUNTER
Name of caller: Patient    Reason for Call:  Pt is having pain, constipation and bloating.     Surgeon:  Dr. Chaves    Recent Surgery:  Yes.    If yes, when & what type:  Laparoscopic cholecystectomy 9/2/18       Best phone number to reach pt at is: [phone] (M)  Ok to leave a message with medical info? Yes.    Pharmacy preferred (if calling for a refill): N/A

## 2018-09-04 NOTE — TELEPHONE ENCOUNTER
Next 5 appointments (look out 90 days)     Sep 06, 2018  9:30 AM CDT   [facility] with Eligio Bah MD   [facility]    [address] [place] [address]  [address] [place] [id number] [phone]                ED / Discharge Outreach Protocol    Patient Contact    Attempt # 1    Was call answered?  No.  Unable to leave message.  Myriam Alatorre RN  Message handled by Nurse Triage.

## 2018-09-05 LAB — COPATH REPORT: NORMAL

## 2018-09-06 ENCOUNTER — OFFICE VISIT (OUTPATIENT)
Dept: PEDIATRICS | Facility: CLINIC | Age: 58
End: 2018-09-06
Payer: COMMERCIAL

## 2018-09-06 VITALS
TEMPERATURE: 98.2 F | RESPIRATION RATE: 16 BRPM | HEART RATE: 60 BPM | DIASTOLIC BLOOD PRESSURE: 84 MMHG | BODY MASS INDEX: 41.92 KG/M2 | WEIGHT: 244.2 LBS | SYSTOLIC BLOOD PRESSURE: 130 MMHG

## 2018-09-06 DIAGNOSIS — Z90.49 S/P LAPAROSCOPIC CHOLECYSTECTOMY: Primary | ICD-10-CM

## 2018-09-06 PROCEDURE — 99213 OFFICE O/P EST LOW 20 MIN: CPT | Performed by: INTERNAL MEDICINE

## 2018-09-06 NOTE — LETTER
September 6, 2018      Fabio Logan  [address] [facility] [facility] [id number]        To Whom It May Concern,      Mr. Logan is currently under my care. He should be off of work due to medical illness until 9/10/18. He should be on a lifting restriction of up to 15 pounds until cleared by surgery.          Sincerely,        Eligio Bah MD, MD

## 2018-09-06 NOTE — PATIENT INSTRUCTIONS
Can try return to work on 9/10/18.     Let me know if not doing well and we can consider half days    15 pound lifting restriction until cleared by surgery.    Eligio Bah MD

## 2018-09-06 NOTE — MR AVS SNAPSHOT
After Visit Summary   9/6/2018    Fabio Logan    MRN: [id number]           Patient Information     Date Of Birth          1960        Visit Information        Provider Department      9/6/2018 9:30 AM Eligio Bah MD [facility]        Today's Diagnoses     S/P laparoscopic cholecystectomy    -  1      Care Instructions    Can try return to work on 9/10/18.     Let me know if not doing well and we can consider half days    15 pound lifting restriction until cleared by surgery.    Eligio Bah MD          Follow-ups after your visit        Follow-up notes from your care team     Return in about 3 weeks (around 9/27/2018) for Only if symptoms worsening or not improving.      Your next 10 appointments already scheduled     Sep 13, 2018  4:00 PM CDT   Post Op with Andrae Ferrara PA-C   [facility]    [facility] [address]  [facility] [address] [phone]              Who to contact     If you have questions or need follow up information about today's clinic visit or your schedule please contact [facility] directly at [phone].  Normal or non-critical lab and imaging results will be communicated to you by MyChart, letter or phone within 4 business days after the clinic has received the results. If you do not hear from us within 7 days, please contact the clinic through [employer] or phone. If you have a critical or abnormal lab result, we will notify you by phone as soon as possible.  Submit refill requests through [facility] or call your pharmacy and they will forward the refill request to us. Please allow 3 business days for your refill to be completed.          Additional Information About Your Visit        MyChart Information     [facility] gives you secure access to your electronic health record. If you see a primary care provider, you can also send messages to your care team and make  appointments. If you have questions, please call your primary care clinic.  If you do not have a primary care provider, please call [phone] and they will assist you.        Care EveryWhere ID     This is your Care EveryWhere ID. This could be used by other organizations to access your [place] medical records  [id number]        Your Vitals Were     Pulse Temperature Respirations BMI (Body Mass Index)          60 98.2  F (36.8  C) (Oral) 16 41.92 kg/m2         Blood Pressure from Last 3 Encounters:   09/06/18 130/84   09/02/18 147/81   08/20/18 116/70    Weight from Last 3 Encounters:   09/06/18 244 lb 3.2 oz (110.8 kg)   08/20/18 244 lb (110.7 kg)   08/19/18 244 lb 14.4 oz (111.1 kg)              Today, you had the following     No orders found for display       Primary Care Provider Office Phone # Fax #    Manoj Armenta [phone] [address] [place] [address]        Equal Access to Services     [facility]: Hadii aad ku hadasho Soomaali, waaxda luqadaha, qaybta kaalmada adeegyatomasa, betito eid . So [facility] [phone].    ATENCIÓN: Si habla español, tiene a mae disposición servicios gratuitos de asistencia lingüística. Llame al [phone].    We comply with applicable federal civil rights laws and [place] laws. We do not discriminate on the basis of race, color, national origin, age, disability, sex, sexual orientation, or gender identity.            Thank you!     Thank you for choosing [facility]  for your care. Our goal is always to provide you with excellent care. Hearing back from our patients is one way we can continue to improve our services. Please take a few minutes to complete the written survey that you may receive in the mail after your visit with us. Thank you!             Your Updated Medication List - Protect others around you: Learn how to safely use, store and throw away your medicines at  www.disposemymeds.org.          This list is accurate as of 9/6/18 10:17 AM.  Always use your most recent med list.                   Brand Name Dispense Instructions for use Diagnosis    albuterol 108 (90 Base) MCG/ACT inhaler    PROAIR HFA/PROVENTIL HFA/VENTOLIN HFA    1 Inhaler    Inhale 2 puffs into the lungs every 6 hours as needed for shortness of breath / dyspnea or wheezing    Acute bronchitis with coexisting condition requiring prophylactic treatment       atenolol 50 MG tablet    TENORMIN    90 tablet    TAKE ONE TABLET BY MOUTH ONE TIME DAILY    HTN, goal below 140/90       losartan 25 MG tablet    COZAAR    90 tablet    Take 1 tablet (25 mg) by mouth daily    Essential hypertension with goal blood pressure less than 140/90, Encounter for routine adult health examination without abnormal findings       omeprazole 20 MG CR capsule    priLOSEC    90 capsule    Take 1 capsule (20 mg) by mouth daily as needed    Gastroesophageal reflux disease without esophagitis, Encounter for routine adult health examination without abnormal findings       oxyCODONE IR 5 MG tablet    ROXICODONE    20 tablet    Take 1-2 tablets (5-10 mg) by mouth every 3 hours as needed for pain or other (Moderate to Severe)    Symptomatic cholelithiasis       senna 8.6 MG tablet    SENOKOT    30 tablet    Take 1-2 tablets by mouth daily Take while on pain medications    Symptomatic cholelithiasis       sertraline 100 MG tablet    ZOLOFT    90 tablet    Take 1 tablet (100 mg) by mouth daily    Major depressive disorder, recurrent episode, mild (H), Encounter for routine adult health examination without abnormal findings       sildenafil 100 MG tablet    VIAGRA    4 tablet    Take 0.5-1 tablets ( mg) by mouth daily as needed for erectile dysfunction Take 30 min to 4 hours before sexual activity.  Never use with nitroglycerin, terazosin or doxazosin.    Erectile dysfunction, unspecified erectile dysfunction type       SUMAtriptan 50 MG  tablet    IMITREX    18 tablet    TAKE 1 TABLET (50 MG) BY MOUTH AT ONSET OF HEADACHE FOR MIGRAINE MAY REPEAT DOSE IN 2 HOURS.  DO NOT EXCEED 200 MG IN 24 HOURS    Other migraine without status migrainosus, not intractable       triamcinolone 0.1 % ointment    KENALOG     Apply topically 2 times daily as needed for irritation

## 2018-09-06 NOTE — PROGRESS NOTES
SUBJECTIVE:   Fabio Logan is a 58 year old male who presents to clinic today for the following health issues:      Hospital Follow-up Visit:    Hospital/Nursing Home/IP Rehab Facility: [facility]  Date of Admission: 09/02/2018  Date of Discharge: 09/02/2018  Reason(s) for Admission: Cholecystectomy            Problems taking medications regularly:  No       Medication changes since discharge: Oxycodone       Problems adhering to non-medication therapy:  None    Summary of hospitalization:  [facility] discharge summary reviewed  Diagnostic Tests/Treatments reviewed.  Follow up needed: none  Other Healthcare Providers Involved in Patient s Care:         None  Update since discharge: improved.     Post Discharge Medication Reconciliation: discharge medications reconciled, continue medications without change.  Plan of care communicated with patient     Coding guidelines for this visit:  Type of Medical   Decision Making Face-to-Face Visit       within 7 Days of discharge Face-to-Face Visit        within 14 days of discharge   Moderate Complexity [id number]   High Complexity [id number]          Patient had cholecystectomy on 9/2, has been off pain medications for the last day. Did have some issues with constipation, improved on miralax. No fevers. Still feeling fatigued. Wondering about going back to work.     No chest pain, shortness of breath, increased work of breathing or cough.     He has follow up scheduled next week with surgery.    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      Problem list and histories reviewed & adjusted, as indicated.  Additional history: as documented      Reviewed and updated as needed this visit by clinical staff  Tobacco  Allergies  Meds  Med Hx  Surg Hx  Fam Hx  Soc Hx      Reviewed and updated as needed this visit by Provider         ROS:  Constitutional, HEENT, cardiovascular, pulmonary, GI, ,  musculoskeletal, neuro, skin, endocrine and psych systems are negative, except as otherwise noted.    OBJECTIVE:     /84  Pulse 60  Temp 98.2  F (36.8  C) (Oral)  Resp 16  Wt 244 lb 3.2 oz (110.8 kg)  BMI 41.92 kg/m2  Body mass index is 41.92 kg/(m^2).  GENERAL: healthy, alert and no distress  EYES: Eyes grossly normal to inspection, PERRL and conjunctivae and sclerae normal  NECK: no adenopathy, no asymmetry, masses, or scars and thyroid normal to palpation  RESP: lungs clear to auscultation - no rales, rhonchi or wheezes  CV: regular rate and rhythm, normal S1 S2, no S3 or S4, no murmur, click or rub, no peripheral edema and peripheral pulses strong  ABDOMEN: soft, nontender, no hepatosplenomegaly, no masses and bowel sounds normal  ABDOMEN: surgical sites c/d/i, no erythema, prior sunburn noted on abdomen from recent trip to [place]  MS: no gross musculoskeletal defects noted, no edema  SKIN: no suspicious lesions or rashes  NEURO: Normal strength and tone, mentation intact and speech normal  PSYCH: mentation appears normal, affect normal/bright    Diagnostic Test Results:  Results for orders placed or performed during the hospital encounter of 09/02/18   US Abdomen Limited    Narrative    US ABDOMEN LIMITED 9/2/2018 4:20 AM    CLINICAL INFORMATION: Right upper quadrant pain.     COMPARISON: None.    FINDINGS: Limited right upper quadrant ultrasound demonstrates  cholelithiasis, including a 1.4 cm stone in the gallbladder neck which  is nonmobile. No significant gallbladder wall thickening. However, a  sonographic Angela's sign was reported by the sonographer. Gallbladder  wall thickness is at the upper limits of normal at 0.3 cm. Ring down  artifact noted from the gallbladder wall suggesting adenomyomatosis.  Common hepatic duct not well-visualized. Increased liver echogenicity  consistent with fatty infiltration. The pancreas is obscured and not  diagnostically visualized. The visualized portion of  the right kidney  is unremarkable. No hydronephrosis on the right.      Impression    IMPRESSION:   1. Cholelithiasis, including a 1.4 cm stone lodged in the gallbladder  neck.  2. No significant gallbladder wall thickening, but there was a  positive sonographic Angela's sign suggesting early cholecystitis.  3. Likely also adenomyomatosis of the gallbladder wall.  4. Fatty infiltration of the liver.    MAXWELL HOLLINGSWORTH MD   XR Chest 2 Views    Narrative    XR CHEST 2 VIEWS  9/2/2018 4:17 AM     INDICATION: Chest and abdominal pain.    COMPARISON: 5/10/2016.      Impression    IMPRESSION: No infiltrates or other acute findings. Normal-sized  cardiac silhouette. Old right rib fractures. Degenerative changes of  thoracic spine.     MAXWELL HOLLINGSWORTH MD   CBC with platelets differential   Result Value Ref Range    WBC 6.0 4.0 - 11.0 10e9/L    RBC Count 4.60 4.4 - 5.9 10e12/L    Hemoglobin 13.7 13.3 - 17.7 g/dL    Hematocrit 42.1 40.0 - 53.0 %    MCV 92 78 - 100 fl    MCH 29.8 26.5 - 33.0 pg    MCHC 32.5 31.5 - 36.5 g/dL    RDW 13.1 10.0 - 15.0 %    Platelet Count 215 150 - 450 10e9/L    Diff Method Automated Method     % Neutrophils 43.1 %    % Lymphocytes 43.3 %    % Monocytes 10.5 %    % Eosinophils 1.5 %    % Basophils 0.8 %    % Immature Granulocytes 0.8 %    Nucleated RBCs 0 0 /100    Absolute Neutrophil 2.6 1.6 - 8.3 10e9/L    Absolute Lymphocytes 2.6 0.8 - 5.3 10e9/L    Absolute Monocytes 0.6 0.0 - 1.3 10e9/L    Absolute Eosinophils 0.1 0.0 - 0.7 10e9/L    Absolute Basophils 0.1 0.0 - 0.2 10e9/L    Abs Immature Granulocytes 0.1 0 - 0.4 10e9/L    Absolute Nucleated RBC 0.0    Lipase   Result Value Ref Range    Lipase 158 73 - 393 U/L   Troponin I   Result Value Ref Range    Troponin I ES <0.015 0.000 - 0.045 ug/L   Comprehensive metabolic panel   Result Value Ref Range    Sodium 139 133 - 144 mmol/L    Potassium 3.9 3.4 - 5.3 mmol/L    Chloride 106 94 - 109 mmol/L    Carbon Dioxide 29 20 - 32 mmol/L    Anion Gap  "4 3 - 14 mmol/L    Glucose 99 70 - 99 mg/dL    Urea Nitrogen 18 7 - 30 mg/dL    Creatinine 1.18 0.66 - 1.25 mg/dL    GFR Estimate 63 >60 mL/min/1.7m2    GFR Estimate If Black 77 >60 mL/min/1.7m2    Calcium 9.2 8.5 - 10.1 mg/dL    Bilirubin Total 0.2 0.2 - 1.3 mg/dL    Albumin 3.7 3.4 - 5.0 g/dL    Protein Total 7.6 6.8 - 8.8 g/dL    Alkaline Phosphatase 96 40 - 150 U/L    ALT 32 0 - 70 U/L    AST 27 0 - 45 U/L   Surgical pathology exam   Result Value Ref Range    Copath Report       Patient Name: AROLDO MULLINS  MR#: [id number]  Specimen #: [id number]  Collected: 9/2/2018  Received: 9/4/2018  Reported: 9/5/2018 08:51  Ordering Phy(s): MITCH LINDO    For improved result formatting, select 'View Enhanced Report Format' under   Linked Documents section.    SPECIMEN(S):  Gallbladder and contents    FINAL DIAGNOSIS:  Gallbladder, cholecystectomy.  -Chronic cholecystitis with cholesterolosis and cholelithiasis.  Negative   for dysplasia and malignancy.    Electronically signed out by:    Clyde Ennis M.D.    CLINICAL HISTORY:  None provided.    GROSS:  The specimen is received in formalin labeled with patient identification   and \"gallbladder and contents\".  It  consists of 6 x 2.3 cm intact gallbladder.  The serosa is pink bile   stained and smooth.  The hepatic bed is  roughened.  A stapled cystic duct margin is identified.  The mucosa is   bile-stained, velvety with scattered  yellow striations.   The lumen contains non-sludgy bile and a 1.3 cm   brown,  ovoid calculus.  The wall is up to  0.3 cm thick. Representative sections are submitted in 1 block. (Dictated   by: YAZAN Willett 9/4/2018 10:05  AM)    MICROSCOPIC:  Microscopic evaluation performed.    CPT Codes:  A: 51539-PW2    TESTING LAB LOCATION:  [facility] [address] [place]  [id number] [phone]    COLLECTION SITE:  Client: [facility]  Location: [facility] (R)     EKG 12 lead   Result Value Ref " Range    Interpretation ECG Click View Image link to view waveform and result    Surgery General IP Consult: Patient to be seen: Routine - within 24 hours; Symptomatic cholelithiasis; Consultant may enter orders: Yes    Narrative    Shannan Chaves MD     9/2/2018  1:27 PM  SURGICAL CONSULTATION   REQUESTING LIYAH:  Argentina Sheppard PA-C   HISTORY OF PRESENT ILLNESS:  I was asked by Argentina Sheppard PA-C    to see Fabio Logan who is a 58 year old-year-old male with   a 10 hours history of abdominal pain which is located in the RUQ.      The pain does radiate to his back.  It does wake him from   sleep.  It is associated with  nausea, bloating and belching. He   does have a history of fatty food intolerance with more mild   episodes over the last week or so.   He denies a history of   jaundice or prior episodes of pancreatitis. Prior abdominal   surgery includes left orchectomy and abdominal radiation.   PAST MEDICAL HISTORY:  has a past medical history of Anxiety;   Depression; Hypertension; and Testicular cancer - Right (1998).  Smoking History:  quit smoking some time ago.    PAST SURGICAL HISTORY:    Past Surgical History:   Procedure Laterality Date     COLONOSCOPY N/A 8/22/2014    Procedure: COLONOSCOPY;  Surgeon: Joe Garcia MD;    Location:  GI     ORCHIECTOMY NOS Right 1998     TONSILLECTOMY       MEDICATIONS:   No current facility-administered medications on file prior to   encounter.   Current Outpatient Prescriptions on File Prior to Encounter:  atenolol (TENORMIN) 50 MG tablet TAKE ONE TABLET BY MOUTH ONE   TIME DAILY    losartan (COZAAR) 25 MG tablet Take 1 tablet (25 mg) by mouth   daily   sertraline (ZOLOFT) 100 MG tablet Take 1 tablet (100 mg) by mouth   daily   sildenafil (VIAGRA) 100 MG tablet Take 0.5-1 tablets ( mg)   by mouth daily as needed for erectile dysfunction Take 30 min to   4 hours before sexual activity.  Never use with nitroglycerin,   terazosin or doxazosin.    albuterol (PROAIR HFA/PROVENTIL HFA/VENTOLIN HFA) 108 (90 BASE)   MCG/ACT Inhaler Inhale 2 puffs into the lungs every 6 hours as   needed for shortness of breath / dyspnea or wheezing   omeprazole (PRILOSEC) 20 MG CR capsule Take 1 capsule (20 mg) by   mouth daily as needed   SUMAtriptan (IMITREX) 50 MG tablet TAKE 1 TABLET (50 MG) BY MOUTH   AT ONSET OF HEADACHE FOR MIGRAINE MAY REPEAT DOSE IN 2 HOURS.  DO   NOT EXCEED 200 MG IN 24 HOURS                                     ALLERGIES: This patient is allergic to is allergic to iodine-131.     REVIEW OF SYSTEMS: Negative except the HPI.   PHYSICAL EXAMINATION:   GENERAL: Pleasant, well-developed, well-nourished male, not   ill-appearing, uncomfortable and tired.   /69 (BP Location: Right arm)  Temp 97.6  F (36.4  C)   (Oral)  Resp 20  SpO2 94%  HEENT: Normocephalic, atraumatic. No scleral icterus.   NECK: Supple.   LUNGS: Respirations unlabored.   CARDIOVASCULAR: Regular rhythm and rate, no murmurs.  ABDOMEN:  Abdomen is protuberant. Tenderness, moderate and   involuntary guarding and  RUQ.  The gallbladder is non-palpable   and tender. hypoactive bowel sounds.  No hernia or scars noted.   EXTREMITIES: Without edema.   NEUROLOGIC: Alert and oriented, moves all extremities with good   strength. Speech clear.   LABORATORY DATA: WBC- WBC   Date Value Ref Range Status   09/02/2018 6.0 4.0 - 11.0 10e9/L Final   , HgB- Hemoglobin   Date Value Ref Range Status   09/02/2018 13.7 13.3 - 17.7 g/dL Final     Liver function studies: Total Bilirubin 0.2, Alkaline Phosphatase   96, ALT 32, AST 27, Lipase 158.    IMAGING:  All imaging personally reviewed  All imaging studies reviewed by me.  Gallbladder ultrasound:   Common bile duct not seen  Findings consistent with acute cholecystitis  Stone in the gallbladder neck suspected or observed       ASSESSMENT AND PLAN: Fabio Logan  has Acute Cholecystitis..    I am recommending him for laparoscopic cholecystectomy.     Intraoperative cholangiograms are not indicated due to normal   LFTs and a large stone obstructing the cystic duct.   I discussed   the risks of the procedure including incisional related   complications like hernia and infection as well as the small   chance for post-cholecystectomy diarrhea, or the need to convert   to an open cholecystectomy.  We also discussed rare complications   such as bile leak, bowel or bile duct injury.  Fabio is   interested in proceeding with surgery in the next few hours.  MITCH LINDO MD     Please route or send letter to:  Primary Care Provider (PCP) and Referring Provider             ASSESSMENT/PLAN:       ICD-10-CM    1. S/P laparoscopic cholecystectomy Z90.49      Patient Instructions   Can try return to work on 9/10/18.     Let me know if not doing well and we can consider half days    15 pound lifting restriction until cleared by surgery.    MD Eligio Pike MD, MD  [facility]

## 2018-09-06 NOTE — TELEPHONE ENCOUNTER
Patient is coming for a hospital follow up appointment today at 9.30 am  Myriam Alatorre RN  Message handled by Nurse Triage.

## 2018-09-07 ASSESSMENT — ASTHMA QUESTIONNAIRES: ACT_TOTALSCORE: 22

## 2018-09-13 ENCOUNTER — OFFICE VISIT (OUTPATIENT)
Dept: SURGERY | Facility: CLINIC | Age: 58
End: 2018-09-13
Payer: COMMERCIAL

## 2018-09-13 VITALS
DIASTOLIC BLOOD PRESSURE: 74 MMHG | HEART RATE: 70 BPM | OXYGEN SATURATION: 96 % | RESPIRATION RATE: 16 BRPM | HEIGHT: 66 IN | SYSTOLIC BLOOD PRESSURE: 110 MMHG | WEIGHT: 244 LBS | BODY MASS INDEX: 39.21 KG/M2

## 2018-09-13 DIAGNOSIS — Z09 SURGICAL FOLLOWUP VISIT: Primary | ICD-10-CM

## 2018-09-13 PROCEDURE — 99024 POSTOP FOLLOW-UP VISIT: CPT | Performed by: PHYSICIAN ASSISTANT

## 2018-09-13 NOTE — PROGRESS NOTES
2018    Re:  Fabio Logan   :  1960      Dear Dr. Armenta,    I had the pleasure of seeing Fabio today in follow-up after his laparoscopic cholecystectomy on 2018 by Dr. Chaves. The patient tolerated the procedure well. The surgical pathology confirmed chronic cholecystitis, cholelithiasis and cholesterolosis and was benign. Fabio is happy to report that his preoperative symptoms have improved. He is now tolerating a regular diet and having normal bowel movements. He reports some mild RUQ soreness beginning the last two days, although currently has resolved.    On exam, the patient's incisions are healing well without signs of infection. A normal healing ridge is present at each incision site, as expected. His abdomen is soft and nontender.     Fabio is recovering well postoperatively. We will be happy to see him in the future as needed. He was encouraged to call us with any questions or concerns.      Sincerely,           Raven Kelly PA-C      Please route or send letter to:  Primary Care Provider (PCP)

## 2018-09-13 NOTE — MR AVS SNAPSHOT
"              After Visit Summary   9/13/2018    Fabio Logan    MRN: [id number]           Patient Information     Date Of Birth          1960        Visit Information        Provider Department      9/13/2018 4:00 PM Raven Kelly PA-C [facility] General Surgery      Today's Diagnoses     Surgical followup visit    -  1       Follow-ups after your visit        Who to contact     If you have questions or need follow up information about today's clinic visit or your schedule please contact [facility] directly at [phone].  Normal or non-critical lab and imaging results will be communicated to you by [employer], letter or phone within 4 business days after the clinic has received the results. If you do not hear from us within 7 days, please contact the clinic through [employer] or phone. If you have a critical or abnormal lab result, we will notify you by phone as soon as possible.  Submit refill requests through [employer] or call your pharmacy and they will forward the refill request to us. Please allow 3 business days for your refill to be completed.          Additional Information About Your Visit        MyChart Information     [employer] gives you secure access to your electronic health record. If you see a primary care provider, you can also send messages to your care team and make appointments. If you have questions, please call your primary care clinic.  If you do not have a primary care provider, please call [phone] and they will assist you.        Care EveryWhere ID     This is your Care EveryWhere ID. This could be used by other organizations to access your [facility] medical records  [id number]        Your Vitals Were     Pulse Respirations Height Pulse Oximetry BMI (Body Mass Index)       70 16 1.676 m (5' 6\") 96% 39.38 kg/m2        Blood Pressure from Last 3 Encounters:   09/13/18 110/74   09/06/18 130/84   09/02/18 " 147/81    Weight from Last 3 Encounters:   09/13/18 110.7 kg (244 lb)   09/06/18 110.8 kg (244 lb 3.2 oz)   08/20/18 110.7 kg (244 lb)              Today, you had the following     No orders found for display       Primary Care Provider Office Phone # Fax #    Manoj Armenta [phone] [address] [place] [address]        Equal Access to Services     [facility]: Hadii aad ku hadasho Soomaali, waaxda luqadaha, qaybta kaalmada adeegyada, waxay idiin hayaan adeeg kharash la'aan . So [facility] [phone].    ATENCIÓN: Si habla español, tiene a mae disposición servicios gratuitos de asistencia lingüística. [facility] [phone].    We comply with applicable federal civil rights laws and [place] laws. We do not discriminate on the basis of race, color, national origin, age, disability, sex, sexual orientation, or gender identity.            Thank you!     Thank you for choosing [facility]  for your care. Our goal is always to provide you with excellent care. Hearing back from our patients is one way we can continue to improve our services. Please take a few minutes to complete the written survey that you may receive in the mail after your visit with us. Thank you!             Your Updated Medication List - Protect others around you: Learn how to safely use, store and throw away your medicines at www.disposemymeds.org.          This list is accurate as of 9/13/18  4:24 PM.  Always use your most recent med list.                   Brand Name Dispense Instructions for use Diagnosis    albuterol 108 (90 Base) MCG/ACT inhaler    PROAIR HFA/PROVENTIL HFA/VENTOLIN HFA    1 Inhaler    Inhale 2 puffs into the lungs every 6 hours as needed for shortness of breath / dyspnea or wheezing    Acute bronchitis with coexisting condition requiring prophylactic treatment       atenolol 50 MG tablet    TENORMIN    90 tablet    TAKE ONE TABLET BY MOUTH ONE TIME DAILY    HTN, goal  below 140/90       losartan 25 MG tablet    COZAAR    90 tablet    Take 1 tablet (25 mg) by mouth daily    Essential hypertension with goal blood pressure less than 140/90, Encounter for routine adult health examination without abnormal findings       omeprazole 20 MG CR capsule    priLOSEC    90 capsule    Take 1 capsule (20 mg) by mouth daily as needed    Gastroesophageal reflux disease without esophagitis, Encounter for routine adult health examination without abnormal findings       senna 8.6 MG tablet    SENOKOT    30 tablet    Take 1-2 tablets by mouth daily Take while on pain medications    Symptomatic cholelithiasis       sertraline 100 MG tablet    ZOLOFT    90 tablet    Take 1 tablet (100 mg) by mouth daily    Major depressive disorder, recurrent episode, mild (H), Encounter for routine adult health examination without abnormal findings       sildenafil 100 MG tablet    VIAGRA    4 tablet    Take 0.5-1 tablets ( mg) by mouth daily as needed for erectile dysfunction Take 30 min to 4 hours before sexual activity.  Never use with nitroglycerin, terazosin or doxazosin.    Erectile dysfunction, unspecified erectile dysfunction type       SUMAtriptan 50 MG tablet    IMITREX    18 tablet    TAKE 1 TABLET (50 MG) BY MOUTH AT ONSET OF HEADACHE FOR MIGRAINE MAY REPEAT DOSE IN 2 HOURS.  DO NOT EXCEED 200 MG IN 24 HOURS    Other migraine without status migrainosus, not intractable       triamcinolone 0.1 % ointment    KENALOG     Apply topically 2 times daily as needed for irritation

## 2018-12-12 ENCOUNTER — ANCILLARY PROCEDURE (OUTPATIENT)
Dept: GENERAL RADIOLOGY | Facility: CLINIC | Age: 58
End: 2018-12-12
Attending: INTERNAL MEDICINE
Payer: COMMERCIAL

## 2018-12-12 ENCOUNTER — OFFICE VISIT (OUTPATIENT)
Dept: PEDIATRICS | Facility: CLINIC | Age: 58
End: 2018-12-12
Payer: COMMERCIAL

## 2018-12-12 VITALS
TEMPERATURE: 97.9 F | DIASTOLIC BLOOD PRESSURE: 86 MMHG | HEART RATE: 71 BPM | WEIGHT: 249 LBS | OXYGEN SATURATION: 96 % | SYSTOLIC BLOOD PRESSURE: 138 MMHG | BODY MASS INDEX: 40.19 KG/M2

## 2018-12-12 DIAGNOSIS — H53.9 VISION CHANGES: Primary | ICD-10-CM

## 2018-12-12 DIAGNOSIS — Z23 NEED FOR PROPHYLACTIC VACCINATION AND INOCULATION AGAINST INFLUENZA: ICD-10-CM

## 2018-12-12 DIAGNOSIS — R73.01 ELEVATED FASTING GLUCOSE: ICD-10-CM

## 2018-12-12 DIAGNOSIS — M54.6 ACUTE RIGHT-SIDED THORACIC BACK PAIN: ICD-10-CM

## 2018-12-12 DIAGNOSIS — S22.060A CLOSED WEDGE COMPRESSION FRACTURE OF EIGHTH THORACIC VERTEBRA, INITIAL ENCOUNTER: ICD-10-CM

## 2018-12-12 LAB
ALBUMIN UR-MCNC: NEGATIVE MG/DL
APPEARANCE UR: CLEAR
BILIRUB UR QL STRIP: NEGATIVE
COLOR UR AUTO: YELLOW
GLUCOSE UR STRIP-MCNC: NEGATIVE MG/DL
HBA1C MFR BLD: 5.6 % (ref 0–5.6)
HGB UR QL STRIP: ABNORMAL
KETONES UR STRIP-MCNC: NEGATIVE MG/DL
LEUKOCYTE ESTERASE UR QL STRIP: NEGATIVE
NITRATE UR QL: NEGATIVE
PH UR STRIP: 6 PH (ref 5–7)
RBC #/AREA URNS AUTO: NORMAL /HPF
SOURCE: ABNORMAL
SP GR UR STRIP: 1.02 (ref 1–1.03)
UROBILINOGEN UR STRIP-ACNC: 0.2 EU/DL (ref 0.2–1)
WBC #/AREA URNS AUTO: NORMAL /HPF

## 2018-12-12 PROCEDURE — 80053 COMPREHEN METABOLIC PANEL: CPT | Performed by: INTERNAL MEDICINE

## 2018-12-12 PROCEDURE — 72070 X-RAY EXAM THORAC SPINE 2VWS: CPT

## 2018-12-12 PROCEDURE — 83036 HEMOGLOBIN GLYCOSYLATED A1C: CPT | Performed by: INTERNAL MEDICINE

## 2018-12-12 PROCEDURE — 99214 OFFICE O/P EST MOD 30 MIN: CPT | Mod: 25 | Performed by: INTERNAL MEDICINE

## 2018-12-12 PROCEDURE — 36415 COLL VENOUS BLD VENIPUNCTURE: CPT | Performed by: INTERNAL MEDICINE

## 2018-12-12 PROCEDURE — 90682 RIV4 VACC RECOMBINANT DNA IM: CPT | Performed by: INTERNAL MEDICINE

## 2018-12-12 PROCEDURE — 81001 URINALYSIS AUTO W/SCOPE: CPT | Performed by: INTERNAL MEDICINE

## 2018-12-12 PROCEDURE — 90471 IMMUNIZATION ADMIN: CPT | Performed by: INTERNAL MEDICINE

## 2018-12-12 RX ORDER — CYCLOBENZAPRINE HCL 5 MG
5-10 TABLET ORAL
Qty: 30 TABLET | Refills: 0 | Status: SHIPPED | OUTPATIENT
Start: 2018-12-12 | End: 2019-04-18

## 2018-12-12 NOTE — PATIENT INSTRUCTIONS
1) We will do an xray of the spine today    2) Labs downstairs today- looking for signs of kidney stone or gallstones with this.    3) try flexeril at night for help with muscle spasms    4) We will do an MRI of the back if no other cause noted.     Eligio Bah MD

## 2018-12-12 NOTE — PROGRESS NOTES
SUBJECTIVE:   Fabio Logan is a 58 year old male who presents to clinic today for the following health issues:      Back Pain       Duration: has had back pain for a while but has worsened the last few weeks        Specific cause: degenerative  Description:   Location of pain: upper back right and right mid back- radiates to the front  Character of pain: gnawing, stabbing- constant  Pain radiates to the front  New numbness or weakness in legs, not attributed to pain:  no     Intensity: Currently 8/10 in a sitting position    History:   Pain interferes with job: YES  History of back problems: recurrent self limited episodes of low back pain in the past  Any previous MRI or X-rays: None  Sees a specialist for back pain:  No  Therapies tried without relief: chiropractor , ice and heat    Alleviating factors:   Improved by: chiropractor      Precipitating factors:  Worsened by: Standing, walking    Notes pain in the back. Notes that if up and moving better, gets spasms with decreased moving.       Accompanying Signs & Symptoms:  Risk of Fracture:  None  Risk of Cauda Equina:  None  Risk of Infection:  None  Risk of Cancer:  None  Risk of Ankylosing Spondylitis:  Onset at age <35, male, AND morning back stiffness. no     Has been noting some vision changes, due to see optometry, seems slightly blurry at times, no known injury, no discharge.       Pain will wrap around to the front of abdomen, no nausea, no dysuria or frequency. No fevers.     Did note some pain that seemed slightly worse with a pop after chiropractor adjustment.    Problem list and histories reviewed & adjusted, as indicated.  Additional history: as documented    Patient Active Problem List   Diagnosis     CARDIOVASCULAR SCREENING; LDL GOAL LESS THAN 160     Mild major depression (H)     Obese     HTN, goal below 140/90     H/O testicular cancer     Diverticulosis of large intestine     Esophageal reflux     Chest pain     Migraine     Skin cancer  screening     Skin eruption     Lentigines     Chronic dermatitis     ACP (advance care planning)     Mild intermittent asthma with (acute) exacerbation     Essential hypertension with goal blood pressure less than 140/90     Major depression in complete remission (H)     Impaired fasting glucose     Malignant neoplasm of testis, unspecified laterality, unspecified whether descended or undescended (H)     Morbid obesity (H)     Symptomatic cholelithiasis     Past Surgical History:   Procedure Laterality Date     COLONOSCOPY N/A 2014    Procedure: COLONOSCOPY;  Surgeon: Joe Garcia MD;  Location: [facility]     LAPAROSCOPIC CHOLECYSTECTOMY N/A 2018    Procedure: LAPAROSCOPIC CHOLECYSTECTOMY;  LAPAROSCOPIC CHOLECYSTECTOMY ;  Surgeon: Shannan Chaves MD;  Location: [facility] OR     ORCHIECTOMY NOS Right 1998     TONSILLECTOMY         Social History     Tobacco Use     Smoking status: Former Smoker     Last attempt to quit: 1997     Years since quittin.5     Smokeless tobacco: Former User   Substance Use Topics     Alcohol use: No     Family History   Problem Relation Age of Onset     Cerebrovascular Disease Mother      Hypertension Mother      Heart Disease Father         had 2 open heart surgery     Lipids Father      Hypertension Father      Cancer Maternal Grandfather         skin cancer     Skin Cancer Maternal Grandfather         skin cancer     Heart Disease Sister 45        heart attack     Cancer Sister 46        breast cancer     Skin Cancer Sister         BCC           Reviewed and updated as needed this visit by clinical staff       Reviewed and updated as needed this visit by Provider         ROS:  Constitutional, HEENT, cardiovascular, pulmonary, GI, , musculoskeletal, neuro, skin, endocrine and psych systems are negative, except as otherwise noted.    OBJECTIVE:     /86   Pulse 71   Temp 97.9  F (36.6  C) (Tympanic)   Wt 112.9 kg (249 lb)   SpO2 96%   BMI 40.19 kg/m    Body  mass index is 40.19 kg/m .  GENERAL: healthy, alert and no distress  EYES: Eyes grossly normal to inspection, PERRL and conjunctivae and sclerae normal  NECK: no adenopathy, no asymmetry, masses, or scars and thyroid normal to palpation  RESP: lungs clear to auscultation - no rales, rhonchi or wheezes  CV: regular rate and rhythm, normal S1 S2, no S3 or S4, no murmur, click or rub, no peripheral edema and peripheral pulses strong  ABDOMEN: soft, nontender, no hepatosplenomegaly, no masses and bowel sounds normal  MS: no gross musculoskeletal defects noted, no edema  MS: noted significant spasm along the right paraspinal muscles into the rhomboid areas, no central spine tenderness, negative straight leg raise bilaterally, normal great toe strength and lower extremity sensation.   SKIN: no suspicious lesions or rashes  NEURO: Normal strength and tone, mentation intact and speech normal  PSYCH: mentation appears normal, affect normal/bright    Diagnostic Test Results:  Results for orders placed or performed in visit on 12/12/18   Comprehensive metabolic panel   Result Value Ref Range    Sodium 139 133 - 144 mmol/L    Potassium 3.9 3.4 - 5.3 mmol/L    Chloride 105 94 - 109 mmol/L    Carbon Dioxide 24 20 - 32 mmol/L    Anion Gap 10 3 - 14 mmol/L    Glucose 104 (H) 70 - 99 mg/dL    Urea Nitrogen 22 7 - 30 mg/dL    Creatinine 0.93 0.66 - 1.25 mg/dL    GFR Estimate 84 >60 mL/min/1.7m2    GFR Estimate If Black >90 >60 mL/min/1.7m2    Calcium 9.7 8.5 - 10.1 mg/dL    Bilirubin Total 0.2 0.2 - 1.3 mg/dL    Albumin 3.9 3.4 - 5.0 g/dL    Protein Total 7.6 6.8 - 8.8 g/dL    Alkaline Phosphatase 95 40 - 150 U/L    ALT 32 0 - 70 U/L    AST 24 0 - 45 U/L   *UA reflex to Microscopic and Culture ([place] [facility] (except [place] and [place])   Result Value Ref Range    Color Urine Yellow     Appearance Urine Clear     Glucose Urine Negative NEG^Negative mg/dL    Bilirubin Urine Negative NEG^Negative    Ketones Urine  Negative NEG^Negative mg/dL    Specific Gravity Urine 1.020 1.003 - 1.035    Blood Urine Moderate (A) NEG^Negative    pH Urine 6.0 5.0 - 7.0 pH    Protein Albumin Urine Negative NEG^Negative mg/dL    Urobilinogen Urine 0.2 0.2 - 1.0 EU/dL    Nitrite Urine Negative NEG^Negative    Leukocyte Esterase Urine Negative NEG^Negative    Source Midstream Urine    Hemoglobin A1c   Result Value Ref Range    Hemoglobin A1C 5.6 0 - 5.6 %   Urine Microscopic   Result Value Ref Range    WBC Urine 0 - 5 OTO5^0 - 5 /HPF    RBC Urine O - 2 OTO2^O - 2 /HPF       ASSESSMENT/PLAN:       ICD-10-CM    1. Vision changes H53.9 OPHTHALMOLOGY ADULT REFERRAL     Urine Microscopic   2. Acute right-sided thoracic back pain M54.6 Comprehensive metabolic panel     *UA reflex to Microscopic and Culture ([place] and [place] [facility] (except [place] and [place])     cyclobenzaprine (FLEXERIL) 5 MG tablet     XR Thoracic Spine 2 Views     NEUROSURGERY REFERRAL   3. Elevated fasting glucose R73.01 Hemoglobin A1c   4. Need for prophylactic vaccination and inoculation against influenza Z23 FLU VACCINE, (RIV4) RECOMBINANT HA  , IM (FluBlok, egg free) [43923]- >18 YRS ([facility] recommended  50-64 YRS)     Vaccine Administration, Initial [46025]   5. Closed wedge compression fracture of eighth thoracic vertebra, initial encounter (H) S22.060A NEUROSURGERY REFERRAL     DX Hip/Pelvis/Spine     Radiology preliminary read demonstrating a T8 compression fraction fracture of unclear age.  Called reported findings the patient will have him establish with spine clinic given number and referral.  Discussed avoiding further chiropractic manipulation of the area until evaluated by spine clinic.  Labs above none demonstrating acute concerns will get bone density scan as well consider evaluation further processes based upon this.    Patient Instructions   1) We will do an xray of the spine today    2) Labs downstairs today- looking for signs of kidney stone or gallstones  with this.    3) try flexeril at night for help with muscle spasms    4) We will do an MRI of the back if no other cause noted.     MD Eligio Pike MD, MD  [facility]

## 2018-12-13 LAB
ALBUMIN SERPL-MCNC: 3.9 G/DL (ref 3.4–5)
ALP SERPL-CCNC: 95 U/L (ref 40–150)
ALT SERPL W P-5'-P-CCNC: 32 U/L (ref 0–70)
ANION GAP SERPL CALCULATED.3IONS-SCNC: 10 MMOL/L (ref 3–14)
AST SERPL W P-5'-P-CCNC: 24 U/L (ref 0–45)
BILIRUB SERPL-MCNC: 0.2 MG/DL (ref 0.2–1.3)
BUN SERPL-MCNC: 22 MG/DL (ref 7–30)
CALCIUM SERPL-MCNC: 9.7 MG/DL (ref 8.5–10.1)
CHLORIDE SERPL-SCNC: 105 MMOL/L (ref 94–109)
CO2 SERPL-SCNC: 24 MMOL/L (ref 20–32)
CREAT SERPL-MCNC: 0.93 MG/DL (ref 0.66–1.25)
GFR SERPL CREATININE-BSD FRML MDRD: 84 ML/MIN/1.7M2
GLUCOSE SERPL-MCNC: 104 MG/DL (ref 70–99)
POTASSIUM SERPL-SCNC: 3.9 MMOL/L (ref 3.4–5.3)
PROT SERPL-MCNC: 7.6 G/DL (ref 6.8–8.8)
SODIUM SERPL-SCNC: 139 MMOL/L (ref 133–144)

## 2018-12-14 ENCOUNTER — MYC MEDICAL ADVICE (OUTPATIENT)
Dept: PEDIATRICS | Facility: CLINIC | Age: 58
End: 2018-12-14

## 2018-12-14 DIAGNOSIS — S22.060A CLOSED WEDGE COMPRESSION FRACTURE OF EIGHTH THORACIC VERTEBRA, INITIAL ENCOUNTER: Primary | ICD-10-CM

## 2018-12-20 ENCOUNTER — OFFICE VISIT (OUTPATIENT)
Dept: NEUROSURGERY | Facility: CLINIC | Age: 58
End: 2018-12-20
Attending: INTERNAL MEDICINE
Payer: COMMERCIAL

## 2018-12-20 ENCOUNTER — HOSPITAL ENCOUNTER (OUTPATIENT)
Dept: MRI IMAGING | Facility: CLINIC | Age: 58
Discharge: HOME | End: 2018-12-20
Attending: NURSE PRACTITIONER | Admitting: NURSE PRACTITIONER
Payer: COMMERCIAL

## 2018-12-20 ENCOUNTER — TELEPHONE (OUTPATIENT)
Dept: NEUROSURGERY | Facility: CLINIC | Age: 58
End: 2018-12-20

## 2018-12-20 VITALS — WEIGHT: 249 LBS | HEIGHT: 66 IN | BODY MASS INDEX: 40.02 KG/M2

## 2018-12-20 DIAGNOSIS — M54.6 PAIN IN THORACIC SPINE: Primary | ICD-10-CM

## 2018-12-20 DIAGNOSIS — S22.058A OTHER CLOSED FRACTURE OF SIXTH THORACIC VERTEBRA, INITIAL ENCOUNTER (H): ICD-10-CM

## 2018-12-20 DIAGNOSIS — S22.058A OTHER CLOSED FRACTURE OF SIXTH THORACIC VERTEBRA, INITIAL ENCOUNTER (H): Primary | ICD-10-CM

## 2018-12-20 DIAGNOSIS — G58.0 NEUROPATHY, INTERCOSTAL NERVE: ICD-10-CM

## 2018-12-20 PROCEDURE — 99203 OFFICE O/P NEW LOW 30 MIN: CPT | Performed by: NURSE PRACTITIONER

## 2018-12-20 PROCEDURE — 72146 MRI CHEST SPINE W/O DYE: CPT

## 2018-12-20 PROCEDURE — G0463 HOSPITAL OUTPT CLINIC VISIT: HCPCS | Mod: 25

## 2018-12-20 ASSESSMENT — PAIN SCALES - GENERAL: PAINLEVEL: EXTREME PAIN (8)

## 2018-12-20 ASSESSMENT — MIFFLIN-ST. JEOR: SCORE: 1892.21

## 2018-12-20 NOTE — PROGRESS NOTES
Dr. Talha Rascon  [facility]  Neurosurgery Clinic Visit        CC: mid back pain    Primary care Provider: Manoj Armenta      Reason For Visit:   I was asked by Dr. Bah to consult on the patient for mid back pain.      HPI: Fabio [facility] is a 58 year old male with mid back pain. He notes that this started in September with radiation to his stomach. He then had his gallbladder removed.  He states that after that he felt pretty good.  He then noted that about 3-4 weeks ago he started to have mid back pain. He then went to have an adjustment.  He states that when he had his back adjusted he heard a crack and had pain. He went to see his PCP and on 12- he had a xray that showed a mild age indeterminate T8 compression fracture.  He denies any leg weakness.  He states that the back spasms are bad.  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     Pain at its worst 10  Pain right now:  8    Past Medical History:   Diagnosis Date     Anxiety      Depression      Hypertension      Testicular cancer - Right 1998       Past Medical History reviewed with patient during visit.    Past Surgical History:   Procedure Laterality Date     COLONOSCOPY N/A 8/22/2014    Procedure: COLONOSCOPY;  Surgeon: Joe Garcia MD;  Location: [facility]     LAPAROSCOPIC CHOLECYSTECTOMY N/A 9/2/2018    Procedure: LAPAROSCOPIC CHOLECYSTECTOMY;  LAPAROSCOPIC CHOLECYSTECTOMY ;  Surgeon: Shannan Chaves MD;  Location: [facility]     ORCHIECTOMY NOS Right 1998     TONSILLECTOMY       Past Surgical History reviewed with patient during visit.    Current Outpatient Medications   Medication     albuterol (PROAIR HFA/PROVENTIL HFA/VENTOLIN HFA) 108 (90 BASE) MCG/ACT Inhaler     atenolol (TENORMIN) 50 MG tablet     cyclobenzaprine (FLEXERIL) 5 MG tablet     losartan (COZAAR) 25 MG tablet     omeprazole (PRILOSEC) 20 MG CR capsule     senna (SENOKOT) 8.6 MG tablet     sertraline (ZOLOFT) 100 MG  tablet     sildenafil (VIAGRA) 100 MG tablet     SUMAtriptan (IMITREX) 50 MG tablet     triamcinolone (KENALOG) 0.1 % ointment     No current facility-administered medications for this visit.        Allergies   Allergen Reactions     Iodine-131 Shortness Of Breath     Internal iodine       Social History     Socioeconomic History     Marital status: Single     Spouse name: Not on file     Number of children: Not on file     Years of education: Not on file     Highest education level: Not on file   Social Needs     Financial resource strain: Not on file     Food insecurity - worry: Not on file     Food insecurity - inability: Not on file     Transportation needs - medical: Not on file     Transportation needs - non-medical: Not on file   Occupational History     Not on file   Tobacco Use     Smoking status: Former Smoker     Last attempt to quit: 1997     Years since quittin.5     Smokeless tobacco: Former User   Substance and Sexual Activity     Alcohol use: No     Drug use: No     Sexual activity: Yes     Partners: Female   Other Topics Concern     Parent/sibling w/ CABG, MI or angioplasty before 65F 55M? No   Social History Narrative     Not on file       Family History   Problem Relation Age of Onset     Cerebrovascular Disease Mother      Hypertension Mother      Heart Disease Father         had 2 open heart surgery     Lipids Father      Hypertension Father      Cancer Maternal Grandfather         skin cancer     Skin Cancer Maternal Grandfather         skin cancer     Heart Disease Sister 45        heart attack     Cancer Sister 46        breast cancer     Skin Cancer Sister         BCC         Review Of Systems  Skin: negative  Eyes: negative  Ears/Nose/Throat: negative  Respiratory: No shortness of breath, dyspnea on exertion, cough, or hemoptysis  Cardiovascular: HTN/HLD  Gastrointestinal: reflux  Genitourinary: history of testicular cancer  Musculoskeletal: thoracic pain   Neurologic:  "negative  Psychiatric: negative  Hematologic/Lymphatic/Immunologic: negative  Endocrine: negative     ROS: 10 point ROS neg other than the symptoms noted above in the HPI.      Vital Signs:     Ht 5' 6\" (1.676 m)   Wt 249 lb (112.9 kg)   BMI 40.19 kg/m         Examination:  Constitutional:  Alert, well nourished, NAD.  Memory: recent and remote memory intact   HEENT: Normocephalic, atraumatic.   Pulm:  Without shortness of breath   CV:  No pitting edema of BLE.    Neurological:  Awake  Alert  Oriented x 3  Speech clear  Cranial nerves II - XII intact  PERRL  EOMI  Face symmetric  Tongue midline  Motor exam   Shoulder Abduction:  Right:  5/5   Left:  5/5  Biceps:                      Right:  5/5   Left:  5/5  Triceps:                     Right:  5/5   Left:  5/5  Wrist Extensors:       Right:  5/5   Left:  5/5  Wrist Flexors:           Right:  5/5   Left:  5/5  Intrinsics:                   Right:  5/5   Left:  5/5   Hip Flexor:                Right: 5/5  Left:  5/5  Hip Adductor:             Right:  5/5  Left:  5/5  Hip Abductor:             Right:  5/5  Left:  5/5  Gastroc Soleus:        Right:  5/5  Left:  5/5  Tib/Ant:                      Right:  5/5  Left:  5/5  EHL:                          Right:  5/5  Left:  5/5   Sensation normal to bilateral upper and lower extremities  Muscle tone to bilateral upper and lower extremities normal   Gait: Able to stand from a seated position. Normal non-antalgic, non-myelopathic gait.      Lumbar examination reveals no tenderness of the spine or paraspinous muscles.        Thoracic exam shows pain to the right paraspinous muscles and rib cage.  Pain with palpation as well as AP/ lateral compression.         Imaging:     FINDINGS: There is mild compression deformity of the T8 vertebral body  which is age-indeterminate. No other acute fracture or malalignment.  The T1 and T2 vertebral bodies are not seen on the lateral view. There  is degenerative endplate osteophyte " formation throughout the thoracic  spine. Bibasilar lung infiltrates.                                                                      IMPRESSION: Mild age-indeterminate T8 compression fracture.    Assessment/Plan:     Fabio Logan is a 58 year old male with mid back pain. He notes that this started in September with radiation to his stomach. He then had his gallbladder removed.  He states that after that he felt pretty good.  He then noted that about 3-4 weeks ago he started to have mid back pain. He then went to have an adjustment.  He states that when he had his back adjusted he heard a crack and had pain. He went to see his PCP and on 12- he had a xray that showed a mild age indeterminate T8 compression fracture.  He denies any leg weakness.  He states that the back spasms are bad.  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 The pt was educated that I am not thinking that his pain is coming from his mild T8 compression fracture. It is recommended that instead of having his CT today as scheduled I would recommend a Thoracic MRI to rule out herniation. We also discussed the possibility of intercostal nerve pain.  We will have him obtain the MRI first.           Patient Instructions   1. Please call [facility] to have thoracic MRI completed. Call [phone] to schedule your scan.  I will contact you with results.         Shannan Floers [facility]  [facility]  [address] [address]    Tel [phone]  Pager [phone]

## 2018-12-20 NOTE — TELEPHONE ENCOUNTER
Pt contacted with MRI results.  No herniations, stenosis or cord impingement.  Noted again mild T8 wedging.        Recc. Intercostal nerve block and physical therapy.

## 2018-12-20 NOTE — LETTER
12/20/2018         RE: Fabio Logan  [address] [facility] [address]        Dear Colleague,    Thank you for referring your patient, Fabio Logan, to the [facility]. Please see a copy of my visit note below.    Dr. Talha Rascon  [facility]  Neurosurgery Clinic Visit        CC: mid back pain    Primary care Provider: Manoj Armenta      Reason For Visit:   I was asked by Dr. Bah to consult on the patient for mid back pain.      HPI: Fabio Logan is a 58 year old male with mid back pain. He notes that this started in September with radiation to his stomach. He then had his gallbladder removed.  He states that after that he felt pretty good.  He then noted that about 3-4 weeks ago he started to have mid back pain. He then went to have an adjustment.  He states that when he had his back adjusted he heard a crack and had pain. He went to see his PCP and on 12- he had a xray that showed a mild age indeterminate T8 compression fracture.  He denies any leg weakness.  He states that the back spasms are bad.  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     Pain at its worst 10  Pain right now:  8    Past Medical History:   Diagnosis Date     Anxiety      Depression      Hypertension      Testicular cancer - Right 1998       Past Medical History reviewed with patient during visit.    Past Surgical History:   Procedure Laterality Date     COLONOSCOPY N/A 8/22/2014    Procedure: COLONOSCOPY;  Surgeon: Joe Garcia MD;  Location:  GI     LAPAROSCOPIC CHOLECYSTECTOMY N/A 9/2/2018    Procedure: LAPAROSCOPIC CHOLECYSTECTOMY;  LAPAROSCOPIC CHOLECYSTECTOMY ;  Surgeon: Shannan Chaves MD;  Location: [facility] OR     ORCHIECTOMY NOS Right 1998     TONSILLECTOMY       Past Surgical History reviewed with patient during visit.    Current Outpatient Medications   Medication     albuterol (PROAIR HFA/PROVENTIL HFA/VENTOLIN  HFA) 108 (90 BASE) MCG/ACT Inhaler     atenolol (TENORMIN) 50 MG tablet     cyclobenzaprine (FLEXERIL) 5 MG tablet     losartan (COZAAR) 25 MG tablet     omeprazole (PRILOSEC) 20 MG CR capsule     senna (SENOKOT) 8.6 MG tablet     sertraline (ZOLOFT) 100 MG tablet     sildenafil (VIAGRA) 100 MG tablet     SUMAtriptan (IMITREX) 50 MG tablet     triamcinolone (KENALOG) 0.1 % ointment     No current facility-administered medications for this visit.        Allergies   Allergen Reactions     Iodine-131 Shortness Of Breath     Internal iodine       Social History     Socioeconomic History     Marital status: Single     Spouse name: Not on file     Number of children: Not on file     Years of education: Not on file     Highest education level: Not on file   Social Needs     Financial resource strain: Not on file     Food insecurity - worry: Not on file     Food insecurity - inability: Not on file     Transportation needs - medical: Not on file     Transportation needs - non-medical: Not on file   Occupational History     Not on file   Tobacco Use     Smoking status: Former Smoker     Last attempt to quit: 1997     Years since quittin.5     Smokeless tobacco: Former User   Substance and Sexual Activity     Alcohol use: No     Drug use: No     Sexual activity: Yes     Partners: Female   Other Topics Concern     Parent/sibling w/ CABG, MI or angioplasty before 65F 55M? No   Social History Narrative     Not on file       Family History   Problem Relation Age of Onset     Cerebrovascular Disease Mother      Hypertension Mother      Heart Disease Father         had 2 open heart surgery     Lipids Father      Hypertension Father      Cancer Maternal Grandfather         skin cancer     Skin Cancer Maternal Grandfather         skin cancer     Heart Disease Sister 45        heart attack     Cancer Sister 46        breast cancer     Skin Cancer Sister         BCC         Review Of Systems  Skin: negative  Eyes:  "negative  Ears/Nose/Throat: negative  Respiratory: No shortness of breath, dyspnea on exertion, cough, or hemoptysis  Cardiovascular: HTN/HLD  Gastrointestinal: reflux  Genitourinary: history of testicular cancer  Musculoskeletal: thoracic pain   Neurologic: negative  Psychiatric: negative  Hematologic/Lymphatic/Immunologic: negative  Endocrine: negative     ROS: 10 point ROS neg other than the symptoms noted above in the HPI.      Vital Signs:     Ht 5' 6\" (1.676 m)   Wt 249 lb (112.9 kg)   BMI 40.19 kg/m          Examination:  Constitutional:  Alert, well nourished, NAD.  Memory: recent and remote memory intact   HEENT: Normocephalic, atraumatic.   Pulm:  Without shortness of breath   CV:  No pitting edema of BLE.    Neurological:  Awake  Alert  Oriented x 3  Speech clear  Cranial nerves II - XII intact  PERRL  EOMI  Face symmetric  Tongue midline  Motor exam   Shoulder Abduction:  Right:  5/5   Left:  5/5  Biceps:                      Right:  5/5   Left:  5/5  Triceps:                     Right:  5/5   Left:  5/5  Wrist Extensors:       Right:  5/5   Left:  5/5  Wrist Flexors:           Right:  5/5   Left:  5/5  Intrinsics:                   Right:  5/5   Left:  5/5   Hip Flexor:                Right: 5/5  Left:  5/5  Hip Adductor:             Right:  5/5  Left:  5/5  Hip Abductor:             Right:  5/5  Left:  5/5  Gastroc Soleus:        Right:  5/5  Left:  5/5  Tib/Ant:                      Right:  5/5  Left:  5/5  EHL:                          Right:  5/5  Left:  5/5   Sensation normal to bilateral upper and lower extremities  Muscle tone to bilateral upper and lower extremities normal   Gait: Able to stand from a seated position. Normal non-antalgic, non-myelopathic gait.      Lumbar examination reveals no tenderness of the spine or paraspinous muscles.        Thoracic exam shows pain to the right paraspinous muscles and rib cage.  Pain with palpation as well as AP/ lateral compression.         Imaging: "     FINDINGS: There is mild compression deformity of the T8 vertebral body  which is age-indeterminate. No other acute fracture or malalignment.  The T1 and T2 vertebral bodies are not seen on the lateral view. There  is degenerative endplate osteophyte formation throughout the thoracic  spine. Bibasilar lung infiltrates.                                                                      IMPRESSION: Mild age-indeterminate T8 compression fracture.    Assessment/Plan:     Fabio Logan is a 58 year old male with mid back pain. He notes that this started in September with radiation to his stomach. He then had his gallbladder removed.  He states that after that he felt pretty good.  He then noted that about 3-4 weeks ago he started to have mid back pain. He then went to have an adjustment.  He states that when he had his back adjusted he heard a crack and had pain. He went to see his PCP and on 12- he had a xray that showed a mild age indeterminate T8 compression fracture.  He denies any leg weakness.  He states that the back spasms are bad.  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 The pt was educated that I am not thinking that his pain is coming from his mild T8 compression fracture. It is recommended that instead of having his CT today as scheduled I would recommend a Thoracic MRI to rule out herniation. We also discussed the possibility of intercostal nerve pain.  We will have him obtain the MRI first.           Patient Instructions   1. Please call [facility] Rad[facility] to have thoracic MRI completed. Call [phone] to schedule your scan.  I will contact you with results.         Shannan Flores [facility]  [facility]  [address] [address]    Tel [phone]  Pager [phone]      Again, thank you for allowing me to participate in the care of your patient.        Sincerely,        Shannan Flores,  APRN CNP

## 2018-12-20 NOTE — PATIENT INSTRUCTIONS
1. Please call [facility] Radiology to have thoracic MRI completed. Call [phone] to schedule your scan.  I will contact you with results.

## 2018-12-21 ENCOUNTER — HOSPITAL ENCOUNTER (OUTPATIENT)
Dept: BONE DENSITY | Facility: CLINIC | Age: 58
Discharge: HOME | End: 2018-12-21
Attending: INTERNAL MEDICINE | Admitting: INTERNAL MEDICINE
Payer: COMMERCIAL

## 2018-12-21 ENCOUNTER — TELEPHONE (OUTPATIENT)
Dept: PALLIATIVE MEDICINE | Facility: CLINIC | Age: 58
End: 2018-12-21

## 2018-12-21 DIAGNOSIS — S22.060A CLOSED WEDGE COMPRESSION FRACTURE OF EIGHTH THORACIC VERTEBRA, INITIAL ENCOUNTER: ICD-10-CM

## 2018-12-21 PROCEDURE — 77080 DXA BONE DENSITY AXIAL: CPT

## 2018-12-21 NOTE — TELEPHONE ENCOUNTER
Writer attempted to call pt, No answer.  LVM for Pt to call writer back at [phone].    Richy Su, RN  Care Coordinator   [facility]

## 2018-12-21 NOTE — TELEPHONE ENCOUNTER
Called patient to schedule Intercostal Nerve Block. Patient has a lot of questions in regards to the procedure as he states he was given no information as to what it entails. Patient would like to discuss the procedure and ask questions prior to deciding if he'd like the procedure done. Please call. Phone #[phone]      Erma Villanueva    [facility]

## 2018-12-27 NOTE — TELEPHONE ENCOUNTER
Called pt.  He is going to hold off for now on the block.  Writer talked w/ him in detail about the procedure.    Vania Flores RN-BSN  [facility]

## 2019-01-22 ENCOUNTER — OFFICE VISIT (OUTPATIENT)
Dept: PEDIATRICS | Facility: CLINIC | Age: 59
End: 2019-01-22
Payer: COMMERCIAL

## 2019-01-22 VITALS
BODY MASS INDEX: 40.72 KG/M2 | SYSTOLIC BLOOD PRESSURE: 116 MMHG | OXYGEN SATURATION: 97 % | WEIGHT: 253.38 LBS | DIASTOLIC BLOOD PRESSURE: 72 MMHG | TEMPERATURE: 97.9 F | HEIGHT: 66 IN | HEART RATE: 67 BPM

## 2019-01-22 DIAGNOSIS — H26.9 CATARACT OF RIGHT EYE, UNSPECIFIED CATARACT TYPE: ICD-10-CM

## 2019-01-22 DIAGNOSIS — Z01.818 PREOP GENERAL PHYSICAL EXAM: Primary | ICD-10-CM

## 2019-01-22 PROCEDURE — 99214 OFFICE O/P EST MOD 30 MIN: CPT | Performed by: INTERNAL MEDICINE

## 2019-01-22 ASSESSMENT — MIFFLIN-ST. JEOR: SCORE: 1912.05

## 2019-01-22 NOTE — PROGRESS NOTES
"  SUBJECTIVE:   Fabio Logan is a 58 year old male who presents to clinic today for the following health issues:      {Superlists:359183}    {additional problems for provider to add:932573}    Problem list and histories reviewed & adjusted, as indicated.  Additional history: {NONE - AS DOCUMENTED:900050::\"as documented\"}    {HIST REVIEW/ LINKS 2:198001}    Reviewed and updated as needed this visit by clinical staff       Reviewed and updated as needed this visit by Provider         {PROVIDER CHARTING PREFERENCE:127429}  "

## 2019-01-22 NOTE — PROGRESS NOTES
[facility] [place]  [address]  [address]  [place] [address]  [phone]  Dept: [phone]    PRE-OP EVALUATION:  Today's date: 2019    Fabio Logan (: 1960) presents for pre-operative evaluation assessment as requested by Dr. Chavez.  He requires evaluation and anesthesia risk assessment prior to undergoing surgery/procedure for treatment of right eye .    Proposed Surgery/ Procedure: Cataracts   Date of Surgery/ Procedure: 19  Time of Surgery/ Procedure: [facility]   Hospital/Surgical Facility: [facility] Fax# [phone]  Primary Physician: Manoj Armenta  Type of Anesthesia Anticipated: to be determined    Patient has a Health Care Directive or Living Will:  NO    1. NO - Do you have a history of heart attack, stroke, stent, bypass or surgery on an artery in the head, neck, heart or legs?  2. No  - Do you ever have any pain or discomfort in your chest?   3. NO - Do you have a history of  Heart Failure?  4. NO - Are you troubled by shortness of breath when: walking on the level, up a slight hill or at night?  5. NO - Do you currently have a cold, bronchitis or other respiratory infection?  6. NO - Do you have a cough, shortness of breath or wheezing?  7. NO - Do you sometimes get pains in the calves of your legs when you walk?  8. NO - Do you or anyone in your family have previous history of blood clots?  9. NO - Do you or does anyone in your family have a serious bleeding problem such as prolonged bleeding following surgeries or cuts?  10. NO - Have you ever had problems with anemia or been told to take iron pills?  11. NO - Have you had any abnormal blood loss such as black, tarry or bloody stools, or abnormal vaginal bleeding?  12. NO - Have you ever had a blood transfusion?  13. NO - Have you or any of your relatives ever had problems with anesthesia?  14. NO - Do you have sleep apnea, excessive snoring or daytime drowsiness?  15. NO -  Do you have any prosthetic heart valves?  16. NO - Do you have prosthetic joints?  17. NO - Is there any chance that you may be pregnant?      HPI:     HPI related to upcoming procedure: Patient is a 58 yr old male with history of hypertension who presents for pre-operative clearance for cataracts.       HYPERTENSION - Patient has longstanding history of HTN , currently denies any symptoms referable to elevated blood pressure. Specifically denies chest pain, palpitations, dyspnea, orthopnea, PND or peripheral edema. Blood pressure readings have been in normal range. Current medication regimen is as listed below. Patient denies any side effects of medication.                                                                                                                                                                                          .    MEDICAL HISTORY:     Patient Active Problem List    Diagnosis Date Noted     Symptomatic cholelithiasis 09/02/2018     Priority: Medium     Morbid obesity (H) 08/19/2018     Priority: Medium     Impaired fasting glucose 05/18/2018     Priority: Medium     Malignant neoplasm of testis, unspecified laterality, unspecified whether descended or undescended (H) 05/18/2018     Priority: Medium     Major depression in complete remission (H) 01/17/2018     Priority: Medium     Essential hypertension with goal blood pressure less than 140/90 09/26/2016     Priority: Medium     Mild intermittent asthma with (acute) exacerbation 05/10/2016     Priority: Medium     ACP (advance care planning) 02/02/2016     Priority: Medium     Advance Care Planning 2/2/2016: ACP Review of Chart / Resources Provided:  Reviewed chart for advance care plan.  Fabio Logan has no plan and full code status on file. Discussed available resources and patient declined at this time.  Confirmed code status reflects current choices pending further ACP discussions.  Confirmed/documented legally designated  decision maker(s). Added by Eileen Gupta             Chronic dermatitis 11/16/2015     Priority: Medium     Skin cancer screening 10/30/2015     Priority: Medium     Skin eruption 10/30/2015     Priority: Medium     Lentigines 10/30/2015     Priority: Medium     Chest pain 05/09/2015     Priority: Medium     Esophageal reflux 10/14/2014     Priority: Medium     H/O testicular cancer 10/09/2014     Priority: Medium     Diverticulosis of large intestine 10/09/2014     Priority: Medium     Problem list name updated by automated process. Provider to review       Migraine 07/15/2014     Priority: Medium     Problem list name updated by automated process. Provider to review       HTN, goal below 140/90 05/01/2014     Priority: Medium     Mild major depression (H) 07/09/2013     Priority: Medium     Obese 07/09/2013     Priority: Medium     CARDIOVASCULAR SCREENING; LDL GOAL LESS THAN 160 02/10/2010     Priority: Medium      Past Medical History:   Diagnosis Date     Anxiety      Depression      Hypertension      Testicular cancer - Right 1998     Past Surgical History:   Procedure Laterality Date     COLONOSCOPY N/A 8/22/2014    Procedure: COLONOSCOPY;  Surgeon: Joe Garcia MD;  Location: [facility] GI     LAPAROSCOPIC CHOLECYSTECTOMY N/A 9/2/2018    Procedure: LAPAROSCOPIC CHOLECYSTECTOMY;  LAPAROSCOPIC CHOLECYSTECTOMY ;  Surgeon: Shannan Chaves MD;  Location: [facility] OR     ORCHIECTOMY NOS Right 1998     TONSILLECTOMY       Current Outpatient Medications   Medication Sig Dispense Refill     albuterol (PROAIR HFA/PROVENTIL HFA/VENTOLIN HFA) 108 (90 BASE) MCG/ACT Inhaler Inhale 2 puffs into the lungs every 6 hours as needed for shortness of breath / dyspnea or wheezing 1 Inhaler 0     atenolol (TENORMIN) 50 MG tablet TAKE ONE TABLET BY MOUTH ONE TIME DAILY  90 tablet 2     losartan (COZAAR) 25 MG tablet Take 1 tablet (25 mg) by mouth daily 90 tablet 3     omeprazole (PRILOSEC) 20 MG CR capsule Take 1 capsule (20 mg) by  "mouth daily as needed 90 capsule 3     senna (SENOKOT) 8.6 MG tablet Take 1-2 tablets by mouth daily Take while on pain medications 30 tablet 0     sertraline (ZOLOFT) 100 MG tablet Take 1 tablet (100 mg) by mouth daily 90 tablet 3     sildenafil (VIAGRA) 100 MG tablet Take 0.5-1 tablets ( mg) by mouth daily as needed for erectile dysfunction Take 30 min to 4 hours before sexual activity.  Never use with nitroglycerin, terazosin or doxazosin. 4 tablet 11     SUMAtriptan (IMITREX) 50 MG tablet TAKE 1 TABLET (50 MG) BY MOUTH AT ONSET OF HEADACHE FOR MIGRAINE MAY REPEAT DOSE IN 2 HOURS.  DO NOT EXCEED 200 MG IN 24 HOURS 18 tablet 2     triamcinolone (KENALOG) 0.1 % ointment Apply topically 2 times daily as needed for irritation       OTC products: no recent use of OTC ASA, NSAIDS or Steroids    Allergies   Allergen Reactions     Iodine-131 Shortness Of Breath     Internal iodine      Latex Allergy: NO    Social History     Tobacco Use     Smoking status: Former Smoker     Last attempt to quit: 1997     Years since quittin.6     Smokeless tobacco: Former User   Substance Use Topics     Alcohol use: No     History   Drug Use No       REVIEW OF SYSTEMS:   Constitutional, neuro, ENT, endocrine, pulmonary, cardiac, gastrointestinal, genitourinary, musculoskeletal, integument and psychiatric systems are negative, except as otherwise noted.    EXAM:   /72 (BP Location: Right arm, Patient Position: Chair, Cuff Size: Adult Large)   Pulse 67   Temp 97.9  F (36.6  C) (Tympanic)   Ht 1.676 m (5' 6\")   Wt 114.9 kg (253 lb 6 oz)   SpO2 97%   BMI 40.90 kg/m      GENERAL APPEARANCE: healthy, alert and no distress     EYES: EOMI,  PERRL     HENT: right cataract noted, PEERL     NECK: no adenopathy, no asymmetry, masses, or scars and thyroid normal to palpation     RESP: lungs clear to auscultation - no rales, rhonchi or wheezes     CV: regular rates and rhythm, normal S1 S2, no S3 or S4 and no murmur, click " or rub     ABDOMEN:  soft, nontender, no HSM or masses and bowel sounds normal     MS: extremities normal- no gross deformities noted, no evidence of inflammation in joints, FROM in all extremities.     SKIN: no suspicious lesions or rashes     NEURO: Normal strength and tone, sensory exam grossly normal, mentation intact and speech normal     PSYCH: mentation appears normal. and affect normal/bright     LYMPHATICS: No cervical adenopathy    DIAGNOSTICS:   EKG not indicated    Recent Labs   Lab Test 12/12/18  1410 09/02/18  0321 05/11/18  0716  07/19/14  0924   HGB  --  13.7 13.9   < >  --    PLT  --  215 192   < >  --     139 139   < > 141   POTASSIUM 3.9 3.9 4.3   < > 4.0   CR 0.93 1.18 0.83   < > 0.82   A1C 5.6  --   --   --  5.4    < > = values in this interval not displayed.        IMPRESSION:   Reason for surgery/procedure: Right cataract  Diagnosis/reason for consult: pre-operative clearance    The proposed surgical procedure is considered LOW risk.    REVISED CARDIAC RISK INDEX  The patient has the following serious cardiovascular risks for perioperative complications such as (MI, PE, VFib and 3  AV Block):  No serious cardiac risks  INTERPRETATION: 0 risks: Class I (very low risk - 0.4% complication rate)    The patient has the following additional risks for perioperative complications:  No identified additional risks      ICD-10-CM    1. Preop general physical exam Z01.818    2. Cataract of right eye, unspecified cataract type H26.9        RECOMMENDATIONS:         --Patient is to take all scheduled medications on the day of surgery EXCEPT for modifications listed below.  - no NSAIDs or aspirin    APPROVAL GIVEN to proceed with proposed procedure, without further diagnostic evaluation       Signed Electronically by: Eligio Bah MD, MD    Copy of this evaluation report is provided to requesting physician.    [facility] Preop Guidelines    Revised Cardiac Risk Index

## 2019-01-22 NOTE — PATIENT INSTRUCTIONS
- No ibuprofen or aspirin before surgery    Ok to take your medication before surgery with small sip of water.     Eligio Bah MD    Before Your Surgery      Call your surgeon if there is any change in your health. This includes signs of a cold or flu (such as a sore throat, runny nose, cough, rash or fever).    Do not smoke, drink alcohol or take over the counter medicine (unless your surgeon or primary care doctor tells you to) for the 24 hours before and after surgery.    If you take prescribed drugs: Follow your doctor s orders about which medicines to take and which to stop until after surgery.    Eating and drinking prior to surgery: follow the instructions from your surgeon    Take a shower or bath the night before surgery. Use the soap your surgeon gave you to gently clean your skin. If you do not have soap from your surgeon, use your regular soap. Do not shave or scrub the surgery site.  Wear clean pajamas and have clean sheets on your bed.

## 2019-01-23 NOTE — PROGRESS NOTES
Pre op faxed to [facility] fax# [phone]  Rosette Roberson MA 1/22/2019 6:27 PM

## 2019-01-27 DIAGNOSIS — I10 HTN, GOAL BELOW 140/90: ICD-10-CM

## 2019-01-27 NOTE — TELEPHONE ENCOUNTER
"Requested Prescriptions   Pending Prescriptions Disp Refills     atenolol (TENORMIN) 50 MG tablet [Pharmacy Med Name: Atenolol Oral Tablet 50 MG]  Last Written Prescription Date:  4/30/18  Last Fill Quantity: 90 TABLET,  # refills: 2   Last office visit: 1/22/2019 with prescribing provider:  FRANCES   Future Office Visit:     90 tablet 1     Sig: TAKE ONE TABLET BY MOUTH ONE TIME DAILY    Beta-Blockers Protocol Passed - 1/27/2019  7:01 AM       Passed - Blood pressure under 140/90 in past 12 months    BP Readings from Last 3 Encounters:   01/22/19 116/72   12/12/18 138/86   09/13/18 110/74                Passed - Patient is age 6 or older       Passed - Recent (12 mo) or future (30 days) visit within the authorizing provider's specialty    Patient had office visit in the last 12 months or has a visit in the next 30 days with authorizing provider or within the authorizing provider's specialty.  See \"Patient Info\" tab in inbasket, or \"Choose Columns\" in Meds & Orders section of the refill encounter.             Passed - Medication is active on med list          "

## 2019-01-29 RX ORDER — ATENOLOL 50 MG/1
TABLET ORAL
Qty: 90 TABLET | Refills: 1 | Status: SHIPPED | OUTPATIENT
Start: 2019-01-29 | End: 2019-07-29

## 2019-01-29 NOTE — TELEPHONE ENCOUNTER
Prescription approved per [facility], [facility] or [facility] refill protocol.  Karishma UGARTE RN - Triage  [facility]

## 2019-03-14 ENCOUNTER — OFFICE VISIT (OUTPATIENT)
Dept: URGENT CARE | Facility: URGENT CARE | Age: 59
End: 2019-03-14
Payer: COMMERCIAL

## 2019-03-14 VITALS
SYSTOLIC BLOOD PRESSURE: 158 MMHG | TEMPERATURE: 98.2 F | DIASTOLIC BLOOD PRESSURE: 92 MMHG | OXYGEN SATURATION: 99 % | HEART RATE: 68 BPM | RESPIRATION RATE: 16 BRPM

## 2019-03-14 DIAGNOSIS — R07.9 CHEST PAIN, UNSPECIFIED TYPE: ICD-10-CM

## 2019-03-14 DIAGNOSIS — R42 DISEQUILIBRIUM: ICD-10-CM

## 2019-03-14 DIAGNOSIS — R55 PRE-SYNCOPE: ICD-10-CM

## 2019-03-14 DIAGNOSIS — R61 DIAPHORESIS: ICD-10-CM

## 2019-03-14 DIAGNOSIS — I10 ESSENTIAL HYPERTENSION WITH GOAL BLOOD PRESSURE LESS THAN 140/90: ICD-10-CM

## 2019-03-14 DIAGNOSIS — R42 DIZZINESS: Primary | ICD-10-CM

## 2019-03-14 PROCEDURE — 99215 OFFICE O/P EST HI 40 MIN: CPT | Performed by: PHYSICIAN ASSISTANT

## 2019-03-14 PROCEDURE — 93000 ELECTROCARDIOGRAM COMPLETE: CPT | Performed by: PHYSICIAN ASSISTANT

## 2019-03-14 NOTE — PROGRESS NOTES
"      SUBJECTIVE:      Fabio Logan is a 58 year old male, with a pmhx of HTN, obesity, impaired fasting glucose, testicular CA and anxiety,  who presents to  today for evaluation of lightheadedness, near fainting, balance disturbance and chest pain that began at approximately 5 am this morning. Patient drove himself to clinic. He states sxs worsened after checking in at .     HPI: Patient states he felt generally lightheaded and unwell when he woke this morning.  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 While at work, patient states he got more lightheaded throughout the day and noted some chest discomfort. He reports feeling close to fainting several times at work today. His employer reportedly advised he go to doctor so reports here. When MA called him to come to exam room, patient states, \"I got really dizzy and everything started moving.\" Patient tells me, \"I feel really disoriented.\"        Cardiac Risk Factors:  CAD:       No                                                    Hypertension:   Yes                             Hyperlipidemia:  No                    Diabetes:      No                                     Tobacco use:  No                             Gender:      Male                                       Age:      58                                                      Familial Hx of CAD: Mother Stroke in her 80's, Father MI in his 70's, Sister MI in her 40's        PE/DVT Risk Factors:   Hx of PE/DVT:      No                                        Hx of clotting disorder:  No                    Hx of cancer:     No                                                     Tobacco use:    No                                                      Hormone therapy: No                                                                                Prolonged immobilization: No                Recent surgery:     No                                      Recent " "travel:   No                                                 Familial Hx of PE/DVT:  No                      Review Of Systems  Skin: Denies any rash   Eyes: Denies any visual changes   Ears/Nose/Throat:  Denies any ST, nasal congestion or ear pain   Respiratory: Denies any hx of spontaneous pneumothorax No shortness of breath, dyspnea on exertion, cough, or hemoptysis    Cardiovascular: Positive for generalized \"tightness\" across entire chest since 5 am this morning (now nearly 12 hours ago) and left jaw and neck all day long. Positive presyncope. No actual syncope. Positive lightheadedness and felt more SOB at work than usual all day. Positive intermittent nausea and diaphoresis. Denies any abdominal pain, racing or irregular heartbeat.     Gastrointestinal: Denies any abdominal pain. Denies any personal or primary fmhx of known AA   Genitourinary: Denies any dysuria   Musculoskeletal:  Denies any red, warm or swollen joints.   Neurologic: Positive for lightheadedness all day and pre-syncope. Positive sense of feeling disoriented, positive loss of coordination and loss of balance.    Psychiatric: Hx of anxiety. Denies any hx of panic attacks.   Hematologic/Lymphatic/Immunologic: Denies any personal hx of DVT/PE or blood dyscrasia   Endocrine: Denies any DM         Past Medical History:   Diagnosis Date     Anxiety      Depression      Hypertension      Testicular cancer - Right 1998          Past Surgical History:   Procedure Laterality Date     COLONOSCOPY N/A 8/22/2014    Procedure: COLONOSCOPY;  Surgeon: Joe Garcia MD;  Location: [facility]     LAPAROSCOPIC CHOLECYSTECTOMY N/A 9/2/2018    Procedure: LAPAROSCOPIC CHOLECYSTECTOMY;  LAPAROSCOPIC CHOLECYSTECTOMY ;  Surgeon: Shannan Chaves MD;  Location: [facility]     ORCHIECTOMY NOS Right 1998     TONSILLECTOMY         Social History     Socioeconomic History     Marital status: Single     Spouse name: Not on file     Number of children: Not on file     Years of " education: Not on file     Highest education level: Not on file   Occupational History     Not on file   Social Needs     Financial resource strain: Not on file     Food insecurity:     Worry: Not on file     Inability: Not on file     Transportation needs:     Medical: Not on file     Non-medical: Not on file   Tobacco Use     Smoking status: Former Smoker     Last attempt to quit: 1997     Years since quittin.7     Smokeless tobacco: Former User   Substance and Sexual Activity     Alcohol use: No     Drug use: No     Sexual activity: Yes     Partners: Female   Lifestyle     Physical activity:     Days per week: Not on file     Minutes per session: Not on file     Stress: Not on file   Relationships     Social connections:     Talks on phone: Not on file     Gets together: Not on file     Attends Worship service: Not on file     Active member of club or organization: Not on file     Attends meetings of clubs or organizations: Not on file     Relationship status: Not on file     Intimate partner violence:     Fear of current or ex partner: Not on file     Emotionally abused: Not on file     Physically abused: Not on file     Forced sexual activity: Not on file   Other Topics Concern     Parent/sibling w/ CABG, MI or angioplasty before 65F 55M? No   Social History Narrative     Not on file       Family History   Problem Relation Age of Onset     Cerebrovascular Disease Mother      Hypertension Mother      Heart Disease Father         had 2 open heart surgery     Lipids Father      Hypertension Father      Cancer Maternal Grandfather         skin cancer     Skin Cancer Maternal Grandfather         skin cancer     Heart Disease Sister 45        heart attack     Cancer Sister 46        breast cancer     Skin Cancer Sister         BCC          Current Outpatient Medications   Medication     atenolol (TENORMIN) 50 MG tablet     losartan (COZAAR) 25 MG tablet     omeprazole (PRILOSEC) 20 MG CR capsule      sertraline (ZOLOFT) 100 MG tablet     sildenafil (VIAGRA) 100 MG tablet     SUMAtriptan (IMITREX) 50 MG tablet     albuterol (PROAIR HFA/PROVENTIL HFA/VENTOLIN HFA) 108 (90 BASE) MCG/ACT Inhaler     senna (SENOKOT) 8.6 MG tablet     triamcinolone (KENALOG) 0.1 % ointment     No current facility-administered medications for this visit.        Allergies   Allergen Reactions     Iodine-131 Shortness Of Breath     Internal iodine       OBJECTIVE:  /80 (Cuff Size: Adult Large)   Pulse 66   Temp 98  F (36.7  C) (Oral)   Resp 16   SpO2 99%     General appearance: alert, diaphoretic, laying supine   Skin: Positive facial flushing. Remainder of skin color is pink and without rash.  HEENT:   Conjunctiva not injected.  Sclera clear.  Left TM is normal: no effusions, no erythema, and normal landmarks.  Right TM is normal: no effusions, no erythema, and normal landmarks.  Nasal mucosa is normal.  Oropharyngeal exam is normal: no lesions, erythema, adenopathy or exudate.  Neck is supple, FROM with no adenopathy. No JVD  CARDIAC:NORMAL - regular rate and rhythm without murmur.  RESP: No reproducible pain on palpation of chest wall today. Normal - CTA without rales, rhonchi, or wheezing.Good breath sounds into all listening areas today   ABDOMEN: Abdomen soft, non-tender. BS normal. No masses, organomegaly  EXTREMITIES:  No asymmetry. Lower legs are equally symmetrical bilaterally. No redness, swelling, heat or pain of lower extremities.   NEURO: Alert and oriented x 3.  Normal speech and mentation.  PERRLA. No nystagmus. Moving all 4 limbs independently. UE and LE strength and sensation good and equal bilaterally. CN II/XII grossly intact.          Today's ECG: NSR at 63 bpm. No evidence of acute ischemic changes.  No ectopy.     MDM: 58 y.o. Man with pmhx of f HTN, obesity, impaired fasting glucose, testicular CA and anxiety and a strong fmhx of MI and CVA, presenting to  today for lightheadedness, pre-syncope,  balance disturbance, feeling disoriented and chest pain x 12 hours duration. Patient states lightheadedness has worsened throughout day. Near syncopal episode getting from our waiting area to exam room/needed MA assistance to walk. Differential diagnoses is very large at this time and includes, but is not limited to TIA, CVA and MI. Patient is unable to sit upright while here without sensation of increased dizziness/near syncope. EMS initiated. Patient stable during stay here today and was transferred to EMS in stable condition. Patient was undecided about which hospital when he left with EMS was still undecided.     (R42) Dizziness  (primary encounter diagnosis)  Plan: EKG 12-lead complete w/read - Clinics      (R07.9) Chest pain, unspecified type      (R55) Pre-syncope    (R42) Disequilibrium      (R61) Diaphoresis      (I10) Essential hypertension with goal blood pressure less than 140/90

## 2019-03-21 ENCOUNTER — OFFICE VISIT - HEALTHEAST (OUTPATIENT)
Dept: NEUROSURGERY | Facility: CLINIC | Age: 59
End: 2019-03-21

## 2019-03-21 DIAGNOSIS — R42 DIZZINESS: ICD-10-CM

## 2019-03-21 DIAGNOSIS — R06.02 SOB (SHORTNESS OF BREATH): ICD-10-CM

## 2019-03-21 DIAGNOSIS — H53.8 BLURRED VISION: ICD-10-CM

## 2019-03-21 DIAGNOSIS — R41.0 CONFUSION: ICD-10-CM

## 2019-03-21 DIAGNOSIS — M79.89 SWELLING OF LIMB: ICD-10-CM

## 2019-03-21 DIAGNOSIS — R19.7 DIARRHEA, UNSPECIFIED TYPE: ICD-10-CM

## 2019-03-21 DIAGNOSIS — R39.15 URGENCY OF URINATION: ICD-10-CM

## 2019-03-21 DIAGNOSIS — R20.0 NUMBNESS AND TINGLING: ICD-10-CM

## 2019-03-21 DIAGNOSIS — M62.81 GENERALIZED MUSCLE WEAKNESS: ICD-10-CM

## 2019-03-21 DIAGNOSIS — R42 LIGHTHEADEDNESS: ICD-10-CM

## 2019-03-21 DIAGNOSIS — R20.2 NUMBNESS AND TINGLING: ICD-10-CM

## 2019-03-21 DIAGNOSIS — J30.2 SEASONAL ALLERGIES: ICD-10-CM

## 2019-03-21 ASSESSMENT — MIFFLIN-ST. JEOR: SCORE: 1841.38

## 2019-03-27 ENCOUNTER — RECORDS - HEALTHEAST (OUTPATIENT)
Dept: ADMINISTRATIVE | Facility: OTHER | Age: 59
End: 2019-03-27

## 2019-03-28 ENCOUNTER — RECORDS - HEALTHEAST (OUTPATIENT)
Dept: ADMINISTRATIVE | Facility: OTHER | Age: 59
End: 2019-03-28

## 2019-03-29 ENCOUNTER — AMBULATORY - HEALTHEAST (OUTPATIENT)
Dept: NEUROSURGERY | Facility: CLINIC | Age: 59
End: 2019-03-29

## 2019-03-29 DIAGNOSIS — Z01.818 PRE-OP EXAM: ICD-10-CM

## 2019-04-01 ENCOUNTER — TELEPHONE (OUTPATIENT)
Dept: PEDIATRICS | Facility: CLINIC | Age: 59
End: 2019-04-01

## 2019-04-01 ENCOUNTER — AMBULATORY - HEALTHEAST (OUTPATIENT)
Dept: NEUROLOGY | Facility: CLINIC | Age: 59
End: 2019-04-01

## 2019-04-01 DIAGNOSIS — I67.1 NONRUPTURED CEREBRAL ANEURYSM: ICD-10-CM

## 2019-04-01 NOTE — TELEPHONE ENCOUNTER
Patient called back. Patient told that he needs to be seen closer to his surgery date (5/7). He still wants to be seen by Dr. Bah (preferred) or Dr. Armenta. Patient informed that the next available openings with these two providers aren't until the end of May. Patient requesting to be worked into schedule. Patient reminded that he would not hear back from us until we hear back from the providers.    Breanne ANAYA - TC/FD  4/1/2019 2:34 PM     I will SWITCH the dose or number of times a day I take the medications listed below when I get home from the hospital:  None

## 2019-04-01 NOTE — TELEPHONE ENCOUNTER
Patient sent a MyChart asking to be fit in this week for a preop with Dr. Bah. Patient was called on the phone and said he would wait to hear back from us as to whether he can be fit in. Message was sent to Dr. aBh. (Patient would rather see Dr. Bah than Dr. Armenta) Date of surgery is 5/7/19

## 2019-04-02 ENCOUNTER — COMMUNICATION - HEALTHEAST (OUTPATIENT)
Dept: NEUROSURGERY | Facility: CLINIC | Age: 59
End: 2019-04-02

## 2019-04-12 DIAGNOSIS — J30.89 SEASONAL ALLERGIC RHINITIS DUE TO OTHER ALLERGIC TRIGGER: ICD-10-CM

## 2019-04-12 NOTE — TELEPHONE ENCOUNTER
"Requested Prescriptions   Pending Prescriptions Disp Refills     fluticasone (FLONASE) 50 MCG/ACT nasal spray [Pharmacy Med Name:  Fluticasone Propionate Nasal Suspension 50 MCG/ACT]  DISCONTINUED  Last Written Prescription Date:  04/03/2018  Last Fill Quantity: 16 g,  # refills: 8    Last office visit: 1/22/2019 with prescribing provider:  Eligio Bah MD        Future Office Visit:   Next 5 appointments (look out 90 days)    Apr 18, 2019  8:10 AM CDT  Pre-Op physical with Eligio Bah MD  [facility] [address] [facility] [address] [phone]          16 g 7     Sig: SPRAY 1 TO 2 SPRAYS INTO BOTH NOSTRISL DAILY       Inhaled Steroids Protocol Failed - 4/12/2019  6:49 AM        Failed - Asthma control assessment score within normal limits in last 6 months     Please review ACT score.   ACT Total Scores 3/7/2017 1/17/2018 9/6/2018   ACT TOTAL SCORE (Goal Greater than or Equal to 20) 20 11 22   In the past 12 months, how many times did you visit the emergency room for your asthma without being admitted to the hospital? 0 0 0   In the past 12 months, how many times were you hospitalized overnight because of your asthma? 0 0 0             Failed - Medication is active on med list        Passed - Patient is age 12 or older        Passed - Recent (6 mo) or future (30 days) visit within the authorizing provider's specialty     Patient had office visit in the last 6 months or has a visit in the next 30 days with authorizing provider or within the authorizing provider's specialty.  See \"Patient Info\" tab in inbasket, or \"Choose Columns\" in Meds & Orders section of the refill encounter.              "

## 2019-04-16 NOTE — TELEPHONE ENCOUNTER
Contacted patient who reports that he has been currently as he uses it seasonally for allergies.      Routing refill request to provider for review/approval because:  Drug not active on patient's medication list: discontinued at Urgent care visit.      Karishma UGARTE RN - Triage  [facility]

## 2019-04-17 RX ORDER — FLUTICASONE PROPIONATE 50 MCG
SPRAY, SUSPENSION (ML) NASAL
Qty: 16 G | Refills: 7 | Status: SHIPPED | OUTPATIENT
Start: 2019-04-17 | End: 2023-03-01

## 2019-04-18 ENCOUNTER — OFFICE VISIT (OUTPATIENT)
Dept: FAMILY MEDICINE | Facility: CLINIC | Age: 59
End: 2019-04-18
Payer: COMMERCIAL

## 2019-04-18 VITALS
OXYGEN SATURATION: 98 % | HEIGHT: 66 IN | DIASTOLIC BLOOD PRESSURE: 76 MMHG | WEIGHT: 249.8 LBS | TEMPERATURE: 98.5 F | BODY MASS INDEX: 40.15 KG/M2 | SYSTOLIC BLOOD PRESSURE: 124 MMHG | HEART RATE: 65 BPM

## 2019-04-18 DIAGNOSIS — F41.9 ANXIETY: ICD-10-CM

## 2019-04-18 DIAGNOSIS — R73.01 IMPAIRED FASTING GLUCOSE: ICD-10-CM

## 2019-04-18 DIAGNOSIS — Z01.818 PREOP GENERAL PHYSICAL EXAM: Primary | ICD-10-CM

## 2019-04-18 DIAGNOSIS — I67.1 BRAIN ANEURYSM: ICD-10-CM

## 2019-04-18 DIAGNOSIS — J45.20 MILD INTERMITTENT ASTHMA WITHOUT COMPLICATION: ICD-10-CM

## 2019-04-18 DIAGNOSIS — I10 ESSENTIAL HYPERTENSION WITH GOAL BLOOD PRESSURE LESS THAN 140/90: ICD-10-CM

## 2019-04-18 PROCEDURE — 99214 OFFICE O/P EST MOD 30 MIN: CPT | Performed by: INTERNAL MEDICINE

## 2019-04-18 RX ORDER — HYDROXYZINE HYDROCHLORIDE 25 MG/1
25-50 TABLET, FILM COATED ORAL
Qty: 30 TABLET | Refills: 3 | Status: SHIPPED | OUTPATIENT
Start: 2019-04-18 | End: 2019-09-10

## 2019-04-18 ASSESSMENT — ANXIETY QUESTIONNAIRES
7. FEELING AFRAID AS IF SOMETHING AWFUL MIGHT HAPPEN: SEVERAL DAYS
5. BEING SO RESTLESS THAT IT IS HARD TO SIT STILL: NOT AT ALL
1. FEELING NERVOUS, ANXIOUS, OR ON EDGE: SEVERAL DAYS
2. NOT BEING ABLE TO STOP OR CONTROL WORRYING: SEVERAL DAYS
GAD7 TOTAL SCORE: 5
6. BECOMING EASILY ANNOYED OR IRRITABLE: NOT AT ALL
IF YOU CHECKED OFF ANY PROBLEMS ON THIS QUESTIONNAIRE, HOW DIFFICULT HAVE THESE PROBLEMS MADE IT FOR YOU TO DO YOUR WORK, TAKE CARE OF THINGS AT HOME, OR GET ALONG WITH OTHER PEOPLE: NOT DIFFICULT AT ALL
3. WORRYING TOO MUCH ABOUT DIFFERENT THINGS: SEVERAL DAYS

## 2019-04-18 ASSESSMENT — PATIENT HEALTH QUESTIONNAIRE - PHQ9
5. POOR APPETITE OR OVEREATING: SEVERAL DAYS
SUM OF ALL RESPONSES TO PHQ QUESTIONS 1-9: 6

## 2019-04-18 ASSESSMENT — MIFFLIN-ST. JEOR: SCORE: 1895.84

## 2019-04-18 NOTE — PROGRESS NOTES
[facility]  [address] [facility] [address] [phone]  Dept: [phone]    PRE-OP EVALUATION:  Today's date: 2019    Fabio Logan (: 1960) presents for pre-operative evaluation assessment as requested by Dr. Marin, Krzysztof, and Keturah.  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    Fax number for surgical facility: [phone]  Primary Physician: Eligio Bah  Type of Anesthesia Anticipated: General    Patient has a Health Care Directive or Living Will:  YES , will bring in     Preop Questions 2019   What are you having done? Brain Aneurysm  surgery   Date of Surgery/Procedure: May 7 2019   Facility or Hospital where procedure/surgery will be performed: [facility]   1.  Do you have a history of Heart attack, stroke, stent, coronary bypass surgery, or other heart surgery? YES  - father, sister had heart surgery    2.  Do you ever have any pain or discomfort in your chest? No    3.  Do you have a history of  Heart Failure? No   4.   Are you troubled by shortness of breath when:  walking on a level surface, or up a slight hill, or at night? No   5.  Do you currently have a cold, bronchitis or other respiratory infection? No   6.  Do you have a cough, shortness of breath, or wheezing? No   7.  Do you sometimes get pains in the calves of your legs when you walk? No   8. Do you or anyone in your family have previous history of blood clots? No   9.  Do you or does anyone in your family have a serious bleeding problem such as prolonged bleeding following surgeries or cuts? No   10. Have you ever had problems with anemia or been told to take iron pills? No   11. Have you had any abnormal blood loss such as black, tarry or bloody stools? No   12. Have you ever had a blood transfusion? No   13. Have you or any of your  relatives ever had problems with anesthesia? No   14. Do you have sleep apnea, excessive snoring or daytime drowsiness? No   15. Do you have any prosthetic heart valves? No   16. Do you have prosthetic joints? No         HPI:     HPI related to upcoming procedure: Mr. Logan is a 58 yr old male with history of hypertension, impaired fasting glucose, mild asthma who presents for evaluation for pre-op for brain aneurysm. He has been having episodic vertigo and was found to have an aneurysm as noted below.     MRI found an aneurysm  HEAD MRA:   1.  Moderate long segment narrowing of the proximal left middle cerebral artery. Distal branches of the left middle cerebral artery are patent.  2.  Otherwise no high-grade stenosis or occlusion of the rest of the major intracranial arteries.  3.  3.7 x 5 mm inferiorly directed aneurysm off the right middle cerebral artery bifurcation.    NECK MRA:  1.  Mild to moderate narrowing of the proximal right common carotid artery. No significant stenosis in the internal carotid arteries bilaterally based on NASCET criteria.  2.  The origin of both vertebral arteries are not well seen, although there is suggestion of at least mild narrowing bilaterally. Otherwise the vertebral arteries are patent throughout their course in the neck. No evidence for dissection or   pseudoaneurysm.    See problem list for active medical problems.  Problems all longstanding and stable, except as noted/documented.  See ROS for pertinent symptoms related to these conditions.                                                                                                                                                          .    MEDICAL HISTORY:     Patient Active Problem List    Diagnosis Date Noted     Symptomatic cholelithiasis 09/02/2018     Priority: Medium     Morbid obesity (H) 08/19/2018     Priority: Medium     Impaired fasting glucose 05/18/2018     Priority: Medium     Malignant neoplasm of  testis, unspecified laterality, unspecified whether descended or undescended (H) 05/18/2018     Priority: Medium     Major depression in complete remission (H) 01/17/2018     Priority: Medium     Essential hypertension with goal blood pressure less than 140/90 09/26/2016     Priority: Medium     Mild intermittent asthma with (acute) exacerbation 05/10/2016     Priority: Medium     ACP (advance care planning) 02/02/2016     Priority: Medium     Advance Care Planning 2/2/2016: ACP Review of Chart / Resources Provided:  Reviewed chart for advance care plan.  Fabio Logan has no plan and full code status on file. Discussed available resources and patient declined at this time.  Confirmed code status reflects current choices pending further ACP discussions.  Confirmed/documented legally designated decision maker(s). Added by Eileen Gupta             Chronic dermatitis 11/16/2015     Priority: Medium     Skin cancer screening 10/30/2015     Priority: Medium     Skin eruption 10/30/2015     Priority: Medium     Lentigines 10/30/2015     Priority: Medium     Chest pain 05/09/2015     Priority: Medium     Esophageal reflux 10/14/2014     Priority: Medium     H/O testicular cancer 10/09/2014     Priority: Medium     Diverticulosis of large intestine 10/09/2014     Priority: Medium     Problem list name updated by automated process. Provider to review       Migraine 07/15/2014     Priority: Medium     Problem list name updated by automated process. Provider to review       HTN, goal below 140/90 05/01/2014     Priority: Medium     Mild major depression (H) 07/09/2013     Priority: Medium     Obese 07/09/2013     Priority: Medium     CARDIOVASCULAR SCREENING; LDL GOAL LESS THAN 160 02/10/2010     Priority: Medium      Past Medical History:   Diagnosis Date     Anxiety      Depression      Hypertension      Testicular cancer - Right 1998     Past Surgical History:   Procedure Laterality Date     COLONOSCOPY N/A 8/22/2014     Procedure: COLONOSCOPY;  Surgeon: Joe Garcia MD;  Location: [facility]     LAPAROSCOPIC CHOLECYSTECTOMY N/A 9/2/2018    Procedure: LAPAROSCOPIC CHOLECYSTECTOMY;  LAPAROSCOPIC CHOLECYSTECTOMY ;  Surgeon: Shannan Chaves MD;  Location: [facility] OR     ORCHIECTOMY NOS Right 1998     TONSILLECTOMY       Current Outpatient Medications   Medication Sig Dispense Refill     albuterol (PROAIR HFA/PROVENTIL HFA/VENTOLIN HFA) 108 (90 BASE) MCG/ACT Inhaler Inhale 2 puffs into the lungs every 6 hours as needed for shortness of breath / dyspnea or wheezing (Patient not taking: Reported on 3/14/2019) 1 Inhaler 0     atenolol (TENORMIN) 50 MG tablet TAKE ONE TABLET BY MOUTH ONE TIME DAILY 90 tablet 1     fluticasone (FLONASE) 50 MCG/ACT nasal spray SPRAY 1 TO 2 SPRAYS INTO BOTH NOSTRISL DAILY 16 g 7     losartan (COZAAR) 25 MG tablet Take 1 tablet (25 mg) by mouth daily 90 tablet 3     omeprazole (PRILOSEC) 20 MG CR capsule Take 1 capsule (20 mg) by mouth daily as needed 90 capsule 3     senna (SENOKOT) 8.6 MG tablet Take 1-2 tablets by mouth daily Take while on pain medications (Patient not taking: Reported on 1/22/2019) 30 tablet 0     sertraline (ZOLOFT) 100 MG tablet Take 1 tablet (100 mg) by mouth daily 90 tablet 3     sildenafil (VIAGRA) 100 MG tablet Take 0.5-1 tablets ( mg) by mouth daily as needed for erectile dysfunction Take 30 min to 4 hours before sexual activity.  Never use with nitroglycerin, terazosin or doxazosin. 4 tablet 11     SUMAtriptan (IMITREX) 50 MG tablet TAKE 1 TABLET (50 MG) BY MOUTH AT ONSET OF HEADACHE FOR MIGRAINE MAY REPEAT DOSE IN 2 HOURS.  DO NOT EXCEED 200 MG IN 24 HOURS 18 tablet 2     triamcinolone (KENALOG) 0.1 % ointment Apply topically 2 times daily as needed for irritation       OTC products: no recent use of OTC ASA, NSAIDS or Steroids    Allergies   Allergen Reactions     Iodine-131 Shortness Of Breath     Internal iodine      Latex Allergy: NO    Social History     Tobacco Use  "    Smoking status: Former Smoker     Last attempt to quit: 1997     Years since quittin.8     Smokeless tobacco: Former User   Substance Use Topics     Alcohol use: No     History   Drug Use No       REVIEW OF SYSTEMS:   Constitutional, neuro, ENT, endocrine, pulmonary, cardiac, gastrointestinal, genitourinary, musculoskeletal, integument and psychiatric systems are negative, except as otherwise noted.    EXAM:   /76 (BP Location: Right arm, Patient Position: Sitting, Cuff Size: Adult Large)   Pulse 65   Temp 98.5  F (36.9  C) (Tympanic)   Ht 1.676 m (5' 6\")   Wt 113.3 kg (249 lb 12.8 oz)   SpO2 98%   BMI 40.32 kg/m      GENERAL APPEARANCE: healthy, alert and no distress     EYES: EOMI,  PERRL     HENT: ear canals and TM's normal and nose and mouth without ulcers or lesions     NECK: no adenopathy, no asymmetry, masses, or scars and thyroid normal to palpation     RESP: lungs clear to auscultation - no rales, rhonchi or wheezes     CV: regular rates and rhythm, normal S1 S2, no S3 or S4 and no murmur, click or rub     ABDOMEN:  soft, nontender, no HSM or masses and bowel sounds normal     MS: extremities normal- no gross deformities noted, no evidence of inflammation in joints, FROM in all extremities.     SKIN: no suspicious lesions or rashes     NEURO: Normal strength and tone, sensory exam grossly normal, mentation intact and speech normal     PSYCH: mentation appears normal. and affect normal/bright     LYMPHATICS: No cervical adenopathy    DIAGNOSTICS:   EKG: appears normal, NSR, normal axis, normal intervals, no acute ST/T changes c/w ischemia, no LVH by voltage criteria, unchanged from previous tracings    Recent Labs   Lab Test 18  1410 18  0321 18  0716  14  0924   HGB  --  13.7 13.9   < >  --    PLT  --  215 192   < >  --     139 139   < > 141   POTASSIUM 3.9 3.9 4.3   < > 4.0   CR 0.93 1.18 0.83   < > 0.82   A1C 5.6  --   --   --  5.4    < > = values in " this interval not displayed.    Basic Metabolic Panel   Result Value Ref Range   Sodium 139 136 - 145 mmol/L   Potassium 4.1 3.5 - 5.0 mmol/L   Chloride 106 98 - 107 mmol/L   CO2 25 22 - 31 mmol/L   Anion Gap, Calculation 8 5 - 18 mmol/L   Glucose 91 70 - 125 mg/dL   Calcium 9.5 8.5 - 10.5 mg/dL   BUN 17 8 - 22 mg/dL   Creatinine 0.86 0.70 - 1.30 mg/dL   GFR MDRD Af Amer >60 >60 mL/min/1.73m2   GFR MDRD Non Af Amer >60 >60 mL/min/1.73m2   Troponin I   Result Value Ref Range   Troponin I <0.01 0.00 - 0.29 ng/mL   Magnesium   Result Value Ref Range   Magnesium 2.4 1.8 - 2.6 mg/dL   HM1 (CBC with Diff)   Result Value Ref Range   WBC 5.9 4.0 - 11.0 thou/uL   RBC 4.46 4.40 - 6.20 mill/uL   Hemoglobin 13.4 (L) 14.0 - 18.0 g/dL   Hematocrit 40.6 40.0 - 54.0 %   MCV 91 80 - 100 fL   MCH 30.0 27.0 - 34.0 pg   MCHC 33.0 32.0 - 36.0 g/dL   RDW 12.8 11.0 - 14.5 %   Platelets 177 140 - 440 thou/uL   MPV 10.2 8.5 - 12.5 fL   Neutrophils % 49 (L) 50 - 70 %   Lymphocytes % 38 20 - 40 %   Monocytes % 12 (H) 2 - 10 %   Eosinophils % 1 0 - 6 %   Basophils % 1 0 - 2 %   Neutrophils Absolute 2.9 2.0 - 7.7 thou/uL   Lymphocytes Absolute 2.2 0.8 - 4.4 thou/uL   Monocytes Absolute 0.7 0.0 - 0.9 thou/uL   Eosinophils Absolute 0.1 0.0 - 0.4 thou/uL   Basophils Absolute 0.0 0.0 - 0.2 thou/uL   C-Reactive Protein   Result Value Ref Range   CRP 0.2 0.0 - 0.8 mg/dL   Will have PT/INR done before surgery    IMPRESSION:   Reason for surgery/procedure: Pre-operative clearance  Diagnosis/reason for consult: Brain aneurysm     The proposed surgical procedure is considered INTERMEDIATE risk.    REVISED CARDIAC RISK INDEX  The patient has the following serious cardiovascular risks for perioperative complications such as (MI, PE, VFib and 3  AV Block):  No serious cardiac risks  INTERPRETATION: 1 risks: Class II (low risk - 0.9% complication rate)    The patient has the following additional risks for perioperative complications:  No identified  additional risks  Morbid obesity      ICD-10-CM    1. Preop general physical exam Z01.818    2. Brain aneurysm I67.1    3. Essential hypertension with goal blood pressure less than 140/90 I10    4. Impaired fasting glucose R73.01    5. Mild intermittent asthma without complication J45.20        RECOMMENDATIONS:     --Consult hospital rounder / IM to assist post-op medical management    Anemia  Mild anemia noted on CBC in ER- Anemia and does not require treatment prior to surgery.  Monitor Hemoglobin postoperatively.      --Patient is to take all scheduled medications on the day of surgery EXCEPT for modifications listed below.    ACE Inhibitor or Angiotensin Receptor Blocker (ARB) Use  Ace inhibitor or Angiotensin Receptor Blocker (ARB) and should HOLD this medication for the 24 hours prior to surgery.      APPROVAL GIVEN to proceed with proposed procedure, without further diagnostic evaluation       Signed Electronically by: Eligio Bah MD    Copy of this evaluation report is provided to requesting physician.    [place] Preop Guidelines    Revised Cardiac Risk Index

## 2019-04-18 NOTE — PATIENT INSTRUCTIONS
1) No ibuprofen or aspirin as we discussed    2) Continue the atenolol before surgery and take the morning of surgery    3) Hold the losartan the morning of surgery    4) Ok to continue the zoloft until the day of surgery (hold that morning)    5) Try the hydroxyzine for help with sleep    Eligio Bah MD      Before Your Surgery      Call your surgeon if there is any change in your health. This includes signs of a cold or flu (such as a sore throat, runny nose, cough, rash or fever).    Do not smoke, drink alcohol or take over the counter medicine (unless your surgeon or primary care doctor tells you to) for the 24 hours before and after surgery.    If you take prescribed drugs: Follow your doctor s orders about which medicines to take and which to stop until after surgery.    Eating and drinking prior to surgery: follow the instructions from your surgeon    Take a shower or bath the night before surgery. Use the soap your surgeon gave you to gently clean your skin. If you do not have soap from your surgeon, use your regular soap. Do not shave or scrub the surgery site.  Wear clean pajamas and have clean sheets on your bed.

## 2019-04-18 NOTE — LETTER
My Asthma Action Plan  Name: Fabio Logan   YOB: 1960  Date: 4/18/2019   My doctor: Eligio Bah MD   My clinic: [facility]        My Control Medicine: None  My Rescue Medicine: Albuterol (Proair/Ventolin/Proventil) inhaler 2 puffs   My Asthma Severity: intermittent  Avoid your asthma triggers: upper respiratory infections               GREEN ZONE   Good Control    I feel good    No cough or wheeze    Can work, sleep and play without asthma symptoms       Take your asthma control medicine every day.     1. If exercise triggers your asthma, take your rescue medication    15 minutes before exercise or sports, and    During exercise if you have asthma symptoms  2. Spacer to use with inhaler: If you have a spacer, make sure to use it with your inhaler             YELLOW ZONE Getting Worse  I have ANY of these:    I do not feel good    Cough or wheeze    Chest feels tight    Wake up at night   1. Keep taking your Green Zone medications  2. Start taking your rescue medicine:    every 20 minutes for up to 1 hour. Then every 4 hours for 24-48 hours.  3. If you stay in the Yellow Zone for more than 12-24 hours, contact your doctor.  4. If you do not return to the Green Zone in 12-24 hours or you get worse, start taking your oral steroid medicine if prescribed by your provider.           RED ZONE Medical Alert - Get Help  I have ANY of these:    I feel awful    Medicine is not helping    Breathing getting harder    Trouble walking or talking    Nose opens wide to breathe       1. Take your rescue medicine NOW  2. If your provider has prescribed an oral steroid medicine, start taking it NOW  3. Call your doctor NOW  4. If you are still in the Red Zone after 20 minutes and you have not reached your doctor:    Take your rescue medicine again and    Call 911 or go to the emergency room right away    See your regular doctor within 2 weeks of an Emergency Room or Urgent Care visit for  follow-up treatment.          Annual Reminders:  Meet with Asthma Educator,  Flu Shot in the Fall, consider Pneumonia Vaccination for patients with asthma (aged 19 and older).    Pharmacy: [id number] [place], [place] - [address]                      Asthma Triggers  How To Control Things That Make Your Asthma Worse    Triggers are things that make your asthma worse.  Look at the list below to help you find your triggers and what you can do about them.  You can help prevent asthma flare-ups by staying away from your triggers.      Trigger                                                          What you can do   Cigarette Smoke  Tobacco smoke can make asthma worse. Do not allow smoking in your home, car or around you.  Be sure no one smokes at a child s day care or school.  If you smoke, ask your health care provider for ways to help you quit.  Ask family members to quit too.  Ask your health care provider for a referral to Quit Plan to help you quit smoking, or call 3-779-725-PLAN.     Colds, Flu, Bronchitis  These are common triggers of asthma. Wash your hands often.  Don t touch your eyes, nose or mouth.  Get a flu shot every year.     Dust Mites  These are tiny bugs that live in cloth or carpet. They are too small to see. Wash sheets and blankets in hot water every week.   Encase pillows and mattress in dust mite proof covers.  Avoid having carpet if you can. If you have carpet, vacuum weekly.   Use a dust mask and HEPA vacuum.   Pollen and Outdoor Mold  Some people are allergic to trees, grass, or weed pollen, or molds. Try to keep your windows closed.  Limit time out doors when pollen count is high.   Ask you health care provider about taking medicine during allergy season.     Animal Dander  Some people are allergic to skin flakes, urine or saliva from pets with fur or feathers. Keep pets with fur or feathers out of your home.    If you can t keep the pet outdoors, then keep the pet out of your  bedroom.  Keep the bedroom door closed.  Keep pets off cloth furniture and away from stuffed toys.     Mice, Rats, and Cockroaches  Some people are allergic to the waste from these pests.   Cover food and garbage.  Clean up spills and food crumbs.  Store grease in the refrigerator.   Keep food out of the bedroom.   Indoor Mold  This can be a trigger if your home has high moisture. Fix leaking faucets, pipes, or other sources of water.   Clean moldy surfaces.  Dehumidify basement if it is damp and smelly.   Smoke, Strong Odors, and Sprays  These can reduce air quality. Stay away from strong odors and sprays, such as perfume, powder, hair spray, paints, smoke incense, paint, cleaning products, candles and new carpet.   Exercise or Sports  Some people with asthma have this trigger. Be active!  Ask your doctor about taking medicine before sports or exercise to prevent symptoms.    Warm up for 5-10 minutes before and after sports or exercise.     Other Triggers of Asthma  Cold air:  Cover your nose and mouth with a scarf.  Sometimes laughing or crying can be a trigger.  Some medicines and food can trigger asthma.

## 2019-04-19 ASSESSMENT — ANXIETY QUESTIONNAIRES: GAD7 TOTAL SCORE: 5

## 2019-04-19 ASSESSMENT — ASTHMA QUESTIONNAIRES: ACT_TOTALSCORE: 22

## 2019-04-26 DIAGNOSIS — G43.809 OTHER MIGRAINE WITHOUT STATUS MIGRAINOSUS, NOT INTRACTABLE: ICD-10-CM

## 2019-04-26 NOTE — TELEPHONE ENCOUNTER
"Requested Prescriptions   Pending Prescriptions Disp Refills     SUMAtriptan (IMITREX) 50 MG tablet [Pharmacy Med Name: SUMAtriptan Succinate Oral Tablet 50 MG]  Last Written Prescription Date:  10/27/2017  Last Fill Quantity: 18 tablet,  # refills: 2    Last office visit: 04/18/2019 with prescribing provider:  Eligio Bah MD        Future Office Visit:     18 tablet 1     Sig: TAKE 1 TABLET (50 MG) BY MOUTH AT ONSET OF HEADACHE FOR MIGRAINE MAY REPEAT DOSE IN 2 HOURS.  DO NOT EXCEED 200 MG IN 24 HOURS       Serotonin Agonists Failed - 4/26/2019  6:26 AM        Failed - Serotonin Agonist request needs review.     Please review patient's record. If patient has had 8 or more treatments in the past month, please forward to provider.          Passed - Blood pressure under 140/90 in past 12 months     BP Readings from Last 3 Encounters:   04/18/19 124/76   03/14/19 (!) 158/92   01/22/19 116/72                 Passed - Recent (12 mo) or future (30 days) visit within the authorizing provider's specialty     Patient had office visit in the last 12 months or has a visit in the next 30 days with authorizing provider or within the authorizing provider's specialty.  See \"Patient Info\" tab in inbasket, or \"Choose Columns\" in Meds & Orders section of the refill encounter.              Passed - Medication is active on med list        Passed - Patient is age 18 or older          "

## 2019-04-30 RX ORDER — SUMATRIPTAN 50 MG/1
50 TABLET, FILM COATED ORAL
Qty: 18 TABLET | Refills: 1 | Status: SHIPPED | OUTPATIENT
Start: 2019-04-30 | End: 2021-09-17

## 2019-04-30 NOTE — TELEPHONE ENCOUNTER
Prescription approved per [facility] Refill Protocol.    Susie Osborne RN Flex

## 2019-05-06 ENCOUNTER — ANESTHESIA - HEALTHEAST (OUTPATIENT)
Dept: SURGERY | Facility: CLINIC | Age: 59
End: 2019-05-06

## 2019-05-06 ENCOUNTER — COMMUNICATION - HEALTHEAST (OUTPATIENT)
Dept: NEUROSURGERY | Facility: CLINIC | Age: 59
End: 2019-05-06

## 2019-05-06 ENCOUNTER — AMBULATORY - HEALTHEAST (OUTPATIENT)
Dept: LAB | Facility: CLINIC | Age: 59
End: 2019-05-06

## 2019-05-06 ENCOUNTER — AMBULATORY - HEALTHEAST (OUTPATIENT)
Dept: NEUROSURGERY | Facility: CLINIC | Age: 59
End: 2019-05-06

## 2019-05-06 DIAGNOSIS — Z01.818 PRE-OP EXAM: ICD-10-CM

## 2019-05-06 DIAGNOSIS — Z01.818 PRE-OP EVALUATION: ICD-10-CM

## 2019-05-06 LAB
ALBUMIN UR-MCNC: NEGATIVE MG/DL
ANION GAP SERPL CALCULATED.3IONS-SCNC: 10 MMOL/L (ref 5–18)
APPEARANCE UR: CLEAR
APTT PPP: 27 SECONDS (ref 24–37)
BACTERIA #/AREA URNS HPF: ABNORMAL HPF
BILIRUB UR QL STRIP: NEGATIVE
BUN SERPL-MCNC: 18 MG/DL (ref 8–22)
CALCIUM SERPL-MCNC: 9.9 MG/DL (ref 8.5–10.5)
CHLORIDE BLD-SCNC: 106 MMOL/L (ref 98–107)
CLOSURE TME COLL+EPINEP BLD: 90 SEC (ref 1–180)
CO2 SERPL-SCNC: 21 MMOL/L (ref 22–31)
COLOR UR AUTO: YELLOW
CREAT SERPL-MCNC: 0.77 MG/DL (ref 0.7–1.3)
ERYTHROCYTE [DISTWIDTH] IN BLOOD BY AUTOMATED COUNT: 12.7 % (ref 11–14.5)
GFR SERPL CREATININE-BSD FRML MDRD: >60 ML/MIN/1.73M2
GLUCOSE BLD-MCNC: 111 MG/DL (ref 70–125)
GLUCOSE UR STRIP-MCNC: NEGATIVE MG/DL
HCT VFR BLD AUTO: 41.9 % (ref 40–54)
HGB BLD-MCNC: 14 G/DL (ref 14–18)
HGB UR QL STRIP: ABNORMAL
INR PPP: 0.96 (ref 0.9–1.1)
KETONES UR STRIP-MCNC: NEGATIVE MG/DL
LEUKOCYTE ESTERASE UR QL STRIP: NEGATIVE
MCH RBC QN AUTO: 30.2 PG (ref 27–34)
MCHC RBC AUTO-ENTMCNC: 33.4 G/DL (ref 32–36)
MCV RBC AUTO: 90 FL (ref 80–100)
NITRATE UR QL: NEGATIVE
PH UR STRIP: 6 [PH] (ref 4.5–8)
PLATELET # BLD AUTO: 200 THOU/UL (ref 140–440)
PMV BLD AUTO: 10.2 FL (ref 8.5–12.5)
POTASSIUM BLD-SCNC: 4.4 MMOL/L (ref 3.5–5)
RBC # BLD AUTO: 4.64 MILL/UL (ref 4.4–6.2)
RBC #/AREA URNS AUTO: ABNORMAL HPF
SODIUM SERPL-SCNC: 137 MMOL/L (ref 136–145)
SP GR UR STRIP: 1.01 (ref 1–1.03)
SQUAMOUS #/AREA URNS AUTO: ABNORMAL LPF
UROBILINOGEN UR STRIP-ACNC: ABNORMAL
WBC #/AREA URNS AUTO: ABNORMAL HPF
WBC: 5.5 THOU/UL (ref 4–11)

## 2019-05-07 ENCOUNTER — HOSPITAL ENCOUNTER (OUTPATIENT)
Dept: INTERVENTIONAL RADIOLOGY/VASCULAR | Facility: CLINIC | Age: 59
Discharge: HOME | End: 2019-05-07
Attending: NEUROLOGICAL SURGERY

## 2019-05-07 ENCOUNTER — SURGERY - HEALTHEAST (OUTPATIENT)
Dept: SURGERY | Facility: CLINIC | Age: 59
End: 2019-05-07

## 2019-05-07 ENCOUNTER — TRANSFERRED RECORDS (OUTPATIENT)
Dept: HEALTH INFORMATION MANAGEMENT | Facility: CLINIC | Age: 59
End: 2019-05-07

## 2019-05-07 ENCOUNTER — RECORDS - HEALTHEAST (OUTPATIENT)
Dept: ADMINISTRATIVE | Facility: OTHER | Age: 59
End: 2019-05-07

## 2019-05-07 DIAGNOSIS — I67.1 NONRUPTURED CEREBRAL ANEURYSM: ICD-10-CM

## 2019-05-07 ASSESSMENT — MIFFLIN-ST. JEOR: SCORE: 1868.88

## 2019-05-10 ENCOUNTER — HOME CARE/HOSPICE - HEALTHEAST (OUTPATIENT)
Dept: HOME HEALTH SERVICES | Facility: HOME HEALTH | Age: 59
End: 2019-05-10

## 2019-05-10 ASSESSMENT — MIFFLIN-ST. JEOR
SCORE: 1873.13
SCORE: 1875.4

## 2019-05-11 ENCOUNTER — AMBULATORY - HEALTHEAST (OUTPATIENT)
Dept: NEUROSURGERY | Facility: CLINIC | Age: 59
End: 2019-05-11

## 2019-05-11 DIAGNOSIS — I67.1 CEREBRAL ANEURYSM: ICD-10-CM

## 2019-05-11 ASSESSMENT — MIFFLIN-ST. JEOR: SCORE: 1856.35

## 2019-05-13 ENCOUNTER — TELEPHONE (OUTPATIENT)
Dept: PEDIATRICS | Facility: CLINIC | Age: 59
End: 2019-05-13

## 2019-05-13 ENCOUNTER — COMMUNICATION - HEALTHEAST (OUTPATIENT)
Dept: NEUROSURGERY | Facility: CLINIC | Age: 59
End: 2019-05-13

## 2019-05-13 ENCOUNTER — PATIENT OUTREACH (OUTPATIENT)
Dept: CARE COORDINATION | Facility: CLINIC | Age: 59
End: 2019-05-13

## 2019-05-13 DIAGNOSIS — I72.9 ANEURYSM (H): Primary | ICD-10-CM

## 2019-05-13 DIAGNOSIS — I67.1 CEREBRAL ANEURYSM, NONRUPTURED: Primary | ICD-10-CM

## 2019-05-13 ASSESSMENT — ACTIVITIES OF DAILY LIVING (ADL): DEPENDENT_IADLS:: TRANSPORTATION;CLEANING

## 2019-05-13 NOTE — PROGRESS NOTES
"Clinic Care Coordination Contact    Clinic Care Coordination Contact  OUTREACH    Referral Information:  Referral Source: Health Plan  RN CC-primary care received and reviewed referral from patient product navigator:  \" Reason for Referral: Pt was recently discharged for cerebral aneurysm.  Please assess for needs/concerns and pcp follow up.     Provide additional details for Care Coordination to best meet the patient's current needs: none     Clinical Staff have discussed the Care Coordination Referral with the patient and/or caregiver: no\"    Primary Diagnosis: Neurological Disorders    Chief Complaint   Patient presents with     Clinic Care Coordination - Initial     post aneurysm and clipping        Universal Utilization: No Utilization Concerns   Clinic Utilization  Difficulty keeping appointments:: No  Compliance Concerns: No  No-Show Concerns: No  No PCP office visit in Past Year: No  Utilization    Last refreshed: 5/13/2019  1:38 PM:  Hospital Admissions 1           Last refreshed: 5/13/2019  1:38 PM:  ED Visits 0           Last refreshed: 5/13/2019  1:38 PM:  No Show Count (past year) 0              Current as of: 5/13/2019  1:38 PM          RN CC outreahced to patient, introduced self and role of Clinic Care Coordination; patient requested this writer speak to his sister, Zari, who was present with him    Clinical Concerns:  Current Medical Concerns:  Patient recently discharged from [facility] on 5/11/19 after a craniotomy and surgical clipping.  RN CC reviewed chart, noted Home Health Care was out for an initial start of care assessment and PCP has agreed with all order recommendations.   Patient's sister Zari indicated that the only acute concern is that approximately 1 hour before this writer's outreach call the patient began to complain of being chilled, Zari reports she measured his temperature using \"several thermometers\" and patient's temperature is 101 degrees F.   Zari also " "stated, \"I reviewed his neurosurgery discharge instructions and it says to call this number if a fever above 101. I did give him some Tylenol, so maybe I'll wait another 20 minutes or so\"; RN CC explained that the Tylenol may help with the fever, but the patient should be evaluated promptly for the potential source and post-operative fevers are something his discharging team should be alerted of. Zari verbalized understanding and stated \"ok, I will call this number written on the discharge instructions immediately after I get off of your call\"    Current Behavioral Concerns: Per Home Care note :\" Feels he is more emotional with recent illness, wants to cry, feels angry. Worried about his job and not being there, worried about money. Sleep is poor, educated on hydrozyzine. Will try at night as prescribed for anxiety. Memory appers intact per patient and family.\"    Education Provided to patient: Provided education about role of Clinic Care Coordination     Health Maintenance Reviewed: Up to date      Medication Management:  Set up by Home Health Care with prompts and schedule reminders by family     Functional Status:  Dependent ADLs:: Ambulation-walker  Dependent IADLs:: Transportation, Cleaning  Mobility Status: Independent w/Device    Living Situation:  Current living arrangement:: I live alone  Type of residence:: Private home - stairs    Diet/Exercise/Sleep:  Diet:: Regular  Inadequate nutrition (GOAL):: No  Food Insecurity: No  Tube Feeding: No  Exercise:: Yes  Days per week of moderate to strenuous exercise (like a brisk walk): 3(will be with home care PT)  On average, minutes per day of exercise at this level: 20  How intense was your typical exercise? : Light (like stretching or slow walking)  Exercise Minutes per Week: 60  Inadequate activity/exercise (GOAL):: No  Significant changes in sleep pattern (GOAL): No    Transportation:  Transportation concerns (GOAL):: No  Transportation means:: Family, Regular " car     Psychosocial:  Mental health DX:: No  Mental health management concern (GOAL):: No  Informal Support system:: Family       Resources and Interventions:  Current Resources:   List of home care services:: Skilled Nursing, Home Health Aid, Physicial Therapy, Occupational Therapy;   Community Resources: Home Care  Supplies used at home:: None  Equipment Currently Used at Home: danny puri    RN CC called Home Health Care RN Case Manager, Marielle Burkett to review initial assessment and note by Maria De Jesus Fallon; patient's sister had inquired if labs are drawn as part of Friday's home care visit will they report back by end of business day as she expressed concern in waiting over the weekend. Marielle indicated total and free phenytoin and keppra should be back by end of day Friday if drawn during skilled nurse visit.   RN CC also reported to Marielle the recent report of 101 degree fever and recommendations to call discharging surgical team for guidance on where/when to be evaluated.     1550: Called to reach patient's sister (Zari) via patient's phone number (non listed for Zari in contacts) to update her of home care's response to lab question, and get update on instructions from neurosurgical team after she called them to report patient's fever. Zari answered and was present in the car with patient-they were en route to the Emergency Room as directed by neurosurgery team.     RN CC huddled with Triage A staff; indicated Dr. Bah has responded agreeing to home care orders, including lab and no need for lab-only visit, the labs can be drawn as part of home care visit on Friday.    Advance Care Plan/Directive  Advanced Care Plans/Directives on file: No      Patient/Caregiver understanding: Patient verbalized understanding, engaged in AIDET communication behavior during encounter.    Outreach Frequency: monthly      Plan:   RN CC will continue to collaborate with Home Health care team and patient/family on  assuring outstanding and ongoing care coordination needs are met.     Will monitor in next 1-2 business days for follow up from today's Emergency Center visit.     Andreia Goodwin RN   Clinic Care Coordinator-[facility]  PH: [phone]

## 2019-05-13 NOTE — TELEPHONE ENCOUNTER
Reason for call:  Other   Patient called regarding (reason for call): labs per nuerology  Additional comments: Patient just had brain aneurysm surgery at [facility].  He was advised to scheduled labs at home clinic to keep medication levels checked.  No lab orders sent with patient. They were advised Dr. Bah would place orders.  Please contact neurosurgery team. Patient say Dr. Cameron at [facility].  Patient would like labs done on Friday.  Once information is cleared and lab orders or sent or placed please call sister Zari to schedule.     Verbal consent to speak with Zari from patient.     Zari's number [phone]        Best Time:  any    Can we leave a detailed message on this number?  YES

## 2019-05-13 NOTE — TELEPHONE ENCOUNTER
Pended both labs, please review & sign if appropriate. Once  sign the lab orders, need to help pt to schedule a lab-only appointment.     ED notes from ([facility]) on 5/11:  Impression: Partial seizures with secondary generalization. No recurrent seizures on phenytoin and Keppra. Get blood levels checked at the end of next week. Can be faxed to our office [phone]. I would like to see him back in the office in 4-6 weeks time.    Did not do well sleeping last night as he took melatonin he said it was worse for him. No seizures. Phenytoin level 10.8-free level pending. Discussed with patient and family getting lab studies done next Friday-total and free phenytoin and Keppra level.     Debbie, RN  Triage Nurse

## 2019-05-13 NOTE — TELEPHONE ENCOUNTER
Signed the pended orders because of  agreed with orders through the HC orders(see the other encounter from today).    Debbie, RN  Triage Nurse

## 2019-05-14 ENCOUNTER — PATIENT OUTREACH (OUTPATIENT)
Dept: CARE COORDINATION | Facility: CLINIC | Age: 59
End: 2019-05-14

## 2019-05-14 NOTE — PROGRESS NOTES
Clinic Care Coordination Contact  Care Team Conversations    5/14/19 @ 8261: RN CC received voicemail from patient's sister, Zari: she reported the patient was examined and discharged back to home last evening. She would like to discuss some questions the patient has regarding incontinence, he has noted this at night and occasionally with ambulation.     05/14/19 @ 7930: RN CC attempted to reach Zari/patient in follow up; left voicemail with request to call back.     PLAN:  Will await call back to assess further details regarding reported incontinence. RN CC will update PCP after speaking with patient/family.    Andreia Goodwin RN   Clinic Care Coordinator-[facility]  PH: [phone]

## 2019-05-15 ENCOUNTER — COMMUNICATION - HEALTHEAST (OUTPATIENT)
Dept: NEUROSURGERY | Facility: CLINIC | Age: 59
End: 2019-05-15

## 2019-05-15 ENCOUNTER — TELEPHONE (OUTPATIENT)
Dept: PEDIATRICS | Facility: CLINIC | Age: 59
End: 2019-05-15

## 2019-05-15 DIAGNOSIS — R56.9 SEIZURES (H): ICD-10-CM

## 2019-05-15 DIAGNOSIS — R50.9 FEVER: ICD-10-CM

## 2019-05-15 NOTE — TELEPHONE ENCOUNTER
Reason for call:  Other   Patient called regarding (reason for call): appointment  Additional comments: Patient needs 2 week post surgery (craniotomy, aneurysm surgery).  Patient has transportation on Friday the 24th and Tuesday the 28th.  Can he please be scheduled with Dr. Bah on one of those days.      Phone number to reach patient:  Home number on file [phone] (home) or sister at [phone]    Best Time:  any    Can we leave a detailed message on this number?  YES

## 2019-05-15 NOTE — TELEPHONE ENCOUNTER
Please advise when and where to overbook if possible. Thank you.   Zenaida Mcduffie on 5/15/2019 at 12:16 PM

## 2019-05-16 ENCOUNTER — PATIENT OUTREACH (OUTPATIENT)
Dept: CARE COORDINATION | Facility: CLINIC | Age: 59
End: 2019-05-16

## 2019-05-16 ENCOUNTER — AMBULATORY - HEALTHEAST (OUTPATIENT)
Dept: LAB | Facility: CLINIC | Age: 59
End: 2019-05-16

## 2019-05-16 DIAGNOSIS — R50.9 FEVER: ICD-10-CM

## 2019-05-16 DIAGNOSIS — R56.9 SEIZURES (H): ICD-10-CM

## 2019-05-16 LAB
BASOPHILS # BLD AUTO: 0 THOU/UL (ref 0–0.2)
BASOPHILS NFR BLD AUTO: 1 % (ref 0–2)
C REACTIVE PROTEIN LHE: 5.9 MG/DL (ref 0–0.8)
EOSINOPHIL # BLD AUTO: 0.3 THOU/UL (ref 0–0.4)
EOSINOPHIL NFR BLD AUTO: 5 % (ref 0–6)
ERYTHROCYTE [DISTWIDTH] IN BLOOD BY AUTOMATED COUNT: 12.9 % (ref 11–14.5)
ERYTHROCYTE [SEDIMENTATION RATE] IN BLOOD BY WESTERGREN METHOD: 44 MM/HR (ref 0–15)
HCT VFR BLD AUTO: 39.9 % (ref 40–54)
HGB BLD-MCNC: 12.9 G/DL (ref 14–18)
LEVETIRACETAM (KEPPRA): 21.5 UG/ML (ref 6–46)
LYMPHOCYTES # BLD AUTO: 1.4 THOU/UL (ref 0.8–4.4)
LYMPHOCYTES NFR BLD AUTO: 25 % (ref 20–40)
MCH RBC QN AUTO: 29.1 PG (ref 27–34)
MCHC RBC AUTO-ENTMCNC: 32.3 G/DL (ref 32–36)
MCV RBC AUTO: 90 FL (ref 80–100)
MONOCYTES # BLD AUTO: 0.5 THOU/UL (ref 0–0.9)
MONOCYTES NFR BLD AUTO: 9 % (ref 2–10)
NEUTROPHILS # BLD AUTO: 3.3 THOU/UL (ref 2–7.7)
NEUTROPHILS NFR BLD AUTO: 60 % (ref 50–70)
PHENYTOIN (DILATIN) FREE: 0.6 UG/ML (ref 1–2)
PHENYTOIN SERPL-MCNC: 6.8 UG/ML (ref 10–20)
PLATELET # BLD AUTO: 282 THOU/UL (ref 140–440)
PMV BLD AUTO: 9.6 FL (ref 8.5–12.5)
RBC # BLD AUTO: 4.43 MILL/UL (ref 4.4–6.2)
WBC: 5.7 THOU/UL (ref 4–11)

## 2019-05-16 NOTE — PROGRESS NOTES
"Clinic Care Coordination Contact    Clinic Care Coordination Contact  OUTREACH    Referral Information:  RN CC received call from patient's sister, Zari to provide update    Primary Diagnosis: Neurological Disorders    Chief Complaint   Patient presents with     Clinic Care Coordination - Follow-up     post ED/Urinary Incontinence Symptoms         Clinical Concerns:  Zari called to provide update regarding patient; he continued to have a \"low grade fever\" and \"some swelling\"; the family called neurosurgery, who has directed them to come in for lab draws; additionally, the anti-seizure levels will be drawn today also.   Zari also wanted to report that the patient has been experiencing some intermittent urinary incontinence; she states \"Nicolas (the patient) did say he had some urinary incontinence before the surgery, and had been prescribed a medication by Dr. Bah to treat it, but I guess he didn't think it helped much so he stopped taking it, but now he is stating what he's experiencing now is worse than before. He's wearing incontinence briefs for outings and has a urinal, but just want to make sure it gets addressed at his follow up appointment with Dr. Bah next week.\"      Patient/Caregiver understanding: Patient verbalized understanding, engaged in AIDET communication behavior during encounter.    Outreach Frequency: monthly      Plan:   Request for appointment with Dr. Bah has been forwarded to Nurse Station A for follow through  RN CC will route this to PCP as an FYI, re: urinary incontinence and post-op fever FYI.   Patient has been enrolled in Home Care; RN CC previously shared this writer's contact information with Home Care RN CM.   RN CC will outreach in 2 weeks in follow-up, encouraged patient/family to outreach sooner with new questions or needs.     Andreia Goodwin RN   Clinic Care Coordinator-[facility]  PH: [phone]    "

## 2019-05-17 ENCOUNTER — DOCUMENTATION ONLY (OUTPATIENT)
Dept: CARE COORDINATION | Facility: CLINIC | Age: 59
End: 2019-05-17

## 2019-05-17 DIAGNOSIS — I72.9 ANEURYSM (H): ICD-10-CM

## 2019-05-17 DIAGNOSIS — R35.0 URINARY FREQUENCY: Primary | ICD-10-CM

## 2019-05-17 DIAGNOSIS — R35.0 URINARY FREQUENCY: ICD-10-CM

## 2019-05-17 LAB
ALBUMIN UR-MCNC: NEGATIVE MG/DL
APPEARANCE UR: CLEAR
BACTERIA #/AREA URNS HPF: ABNORMAL /HPF
BILIRUB UR QL STRIP: NEGATIVE
COLOR UR AUTO: YELLOW
GLUCOSE UR STRIP-MCNC: NEGATIVE MG/DL
HGB UR QL STRIP: ABNORMAL
KETONES UR STRIP-MCNC: NEGATIVE MG/DL
LEUKOCYTE ESTERASE UR QL STRIP: ABNORMAL
NITRATE UR QL: NEGATIVE
NON-SQ EPI CELLS #/AREA URNS LPF: ABNORMAL /LPF
PH UR STRIP: 5.5 PH (ref 5–7)
PHENYTOIN SERPL-MCNC: 7.9 MG/L (ref 10–20)
RBC #/AREA URNS AUTO: ABNORMAL /HPF
SOURCE: ABNORMAL
SP GR UR STRIP: 1.02 (ref 1–1.03)
UROBILINOGEN UR STRIP-ACNC: 0.2 EU/DL (ref 0.2–1)
WBC #/AREA URNS AUTO: ABNORMAL /HPF

## 2019-05-17 PROCEDURE — 36415 COLL VENOUS BLD VENIPUNCTURE: CPT | Performed by: INTERNAL MEDICINE

## 2019-05-17 PROCEDURE — 80177 DRUG SCRN QUAN LEVETIRACETAM: CPT | Mod: 90 | Performed by: INTERNAL MEDICINE

## 2019-05-17 PROCEDURE — 99000 SPECIMEN HANDLING OFFICE-LAB: CPT | Performed by: INTERNAL MEDICINE

## 2019-05-17 PROCEDURE — 81001 URINALYSIS AUTO W/SCOPE: CPT | Performed by: INTERNAL MEDICINE

## 2019-05-17 PROCEDURE — 80185 ASSAY OF PHENYTOIN TOTAL: CPT | Performed by: INTERNAL MEDICINE

## 2019-05-17 NOTE — PROGRESS NOTES
Spoke with Andreia, the patient's sister and caregiver, and relayed PCP's recommendation for UA and urology referral.  States that he is currently resting, but she will plan to bring him in for the lab this afternoon. Provided phone number to call to set up appointment with urology.  No further questions or concerns; will f/u prn. Melva Watkins RN May 17, 2019 12:05 PM

## 2019-05-17 NOTE — PROGRESS NOTES
Met with pt this morning and he states that he has had increased urgency with urination which has been on going for quite sometime. Educated on s/sx of UTI however no concerns. SN educated on seeing urology as he states he is tired of having to use the bathroom so often. Pt would like to see a urologist however pt does not have one that he sees currently. If you see fit, would pt be able to have a referral for urology? Thanks.

## 2019-05-17 NOTE — PROGRESS NOTES
I would like to get a urine to rule out infection- ordered can come in for lab only if able to come. Ordered referral for urology.     [facility] - [place] [phone]   [email or web address]

## 2019-05-18 LAB — LEVETIRACETAM SERPL-MCNC: 17 UG/ML (ref 12–46)

## 2019-05-19 DIAGNOSIS — Z00.00 ENCOUNTER FOR ROUTINE ADULT HEALTH EXAMINATION WITHOUT ABNORMAL FINDINGS: ICD-10-CM

## 2019-05-19 DIAGNOSIS — I10 ESSENTIAL HYPERTENSION WITH GOAL BLOOD PRESSURE LESS THAN 140/90: ICD-10-CM

## 2019-05-19 NOTE — TELEPHONE ENCOUNTER
"Requested Prescriptions   Pending Prescriptions Disp Refills     losartan (COZAAR) 25 MG tablet [Pharmacy Med Name: Losartan Potassium Oral Tablet 25 MG]  Last Written Prescription Date:  05/18/2018  Last Fill Quantity: 90 tablet,  # refills: 3    Last Office Visit: 1/22/2019 Eligio Bah MD       Future Office Visit:      90 tablet 2     Sig: Take 1 tablet (25 mg) by mouth daily       Angiotensin-II Receptors Passed - 5/19/2019  7:01 AM        Passed - Blood pressure under 140/90 in past 12 months     BP Readings from Last 3 Encounters:   04/18/19 124/76   03/14/19 (!) 158/92   01/22/19 116/72                 Passed - Recent (12 mo) or future (30 days) visit within the authorizing provider's specialty     Patient had office visit in the last 12 months or has a visit in the next 30 days with authorizing provider or within the authorizing provider's specialty.  See \"Patient Info\" tab in inbasket, or \"Choose Columns\" in Meds & Orders section of the refill encounter.              Passed - Medication is active on med list        Passed - Patient is age 18 or older        Passed - Normal serum creatinine on file in past 12 months     Recent Labs   Lab Test 12/12/18  1410   CR 0.93             Passed - Normal serum potassium on file in past 12 months     Recent Labs   Lab Test 12/12/18  1410   POTASSIUM 3.9                      "

## 2019-05-20 ENCOUNTER — TELEPHONE (OUTPATIENT)
Dept: PEDIATRICS | Facility: CLINIC | Age: 59
End: 2019-05-20

## 2019-05-20 NOTE — TELEPHONE ENCOUNTER
Pt called requesting to schedule a post op appointment with either Dr. Bah or Dr. Armenta.  I was not able to schedule anything until late June.  Pt wasn't sure if he should wait that long and wanted to discuss how urgent he should be scheduling this.  He was hoping to discuss this with the team and look into scheduling this appointment as soon as necessary.  He has limited access to transportation and was hoping if he were able to be scheduled either 5/24 or 5/28 he would have a ride from his sister and would be available any time those two days.    Please call back to advise/schedule if possible    [phone]  Can leave a detailed message

## 2019-05-20 NOTE — TELEPHONE ENCOUNTER
Verbal approval given for Home Care orders requested.  Karishma UGARTE RN - Triage  [facility]

## 2019-05-20 NOTE — TELEPHONE ENCOUNTER
Reason for call:  Order   Order or referral being requested: Occupational therapy orders  Reason for request: Occup. Therapy orders  Date needed: as soon as possible  Has the patient been seen by the PCP for this problem? YES    Additional comments: Zoraida, from [facility], needs Occuptional therapy orders for cognitive assessment twice per week for 2 weeks.     Other phone number:  [phone]    Best Time:  ASAP    Can we leave a detailed message on this number?  YES

## 2019-05-21 ENCOUNTER — AMBULATORY - HEALTHEAST (OUTPATIENT)
Dept: NEUROSURGERY | Facility: CLINIC | Age: 59
End: 2019-05-21

## 2019-05-21 DIAGNOSIS — Z48.02 REMOVAL OF STAPLES: ICD-10-CM

## 2019-05-21 RX ORDER — LOSARTAN POTASSIUM 25 MG/1
25 TABLET ORAL DAILY
Qty: 90 TABLET | Refills: 2 | Status: SHIPPED | OUTPATIENT
Start: 2019-05-21 | End: 2020-02-06

## 2019-05-23 ENCOUNTER — DOCUMENTATION ONLY (OUTPATIENT)
Dept: PEDIATRICS | Facility: CLINIC | Age: 59
End: 2019-05-23

## 2019-05-23 NOTE — PROGRESS NOTES
[facility] now requests orders and shares plan of care/discharge summaries for some patients through [employer]  Please REPLY TO THIS MESSAGE OR ROUTE BACK TO THE AUTHOR in order to give authorization for orders when needed.  This is considered a verbal order, you will still receive a faxed copy of orders for signature.  Thank you for your assistance in improving collaboration for our patients.    ORDER    Speech Language Pathology for eval and treat for cognitive deficits following craniotomy.

## 2019-05-24 ENCOUNTER — OFFICE VISIT (OUTPATIENT)
Dept: PEDIATRICS | Facility: CLINIC | Age: 59
End: 2019-05-24
Payer: COMMERCIAL

## 2019-05-24 VITALS
OXYGEN SATURATION: 96 % | WEIGHT: 240.2 LBS | DIASTOLIC BLOOD PRESSURE: 78 MMHG | TEMPERATURE: 97.9 F | BODY MASS INDEX: 38.6 KG/M2 | HEART RATE: 73 BPM | HEIGHT: 66 IN | SYSTOLIC BLOOD PRESSURE: 120 MMHG

## 2019-05-24 DIAGNOSIS — I72.9 ANEURYSM (H): ICD-10-CM

## 2019-05-24 DIAGNOSIS — N39.43 POST-VOID DRIBBLING: Primary | ICD-10-CM

## 2019-05-24 DIAGNOSIS — R56.9 PARTIAL SEIZURE (H): ICD-10-CM

## 2019-05-24 DIAGNOSIS — R50.82 FEVER POSTOP: ICD-10-CM

## 2019-05-24 LAB
ALBUMIN UR-MCNC: NEGATIVE MG/DL
ANION GAP SERPL CALCULATED.3IONS-SCNC: 8 MMOL/L (ref 3–14)
APPEARANCE UR: CLEAR
BACTERIA #/AREA URNS HPF: ABNORMAL /HPF
BASOPHILS # BLD AUTO: 0.1 10E9/L (ref 0–0.2)
BASOPHILS NFR BLD AUTO: 1.7 %
BILIRUB UR QL STRIP: NEGATIVE
BUN SERPL-MCNC: 15 MG/DL (ref 7–30)
CALCIUM SERPL-MCNC: 10.1 MG/DL (ref 8.5–10.1)
CHLORIDE SERPL-SCNC: 104 MMOL/L (ref 94–109)
CO2 SERPL-SCNC: 24 MMOL/L (ref 20–32)
COLOR UR AUTO: YELLOW
CREAT SERPL-MCNC: 0.79 MG/DL (ref 0.66–1.25)
CRP SERPL-MCNC: 6.2 MG/L (ref 0–8)
DIFFERENTIAL METHOD BLD: ABNORMAL
EOSINOPHIL # BLD AUTO: 0.3 10E9/L (ref 0–0.7)
EOSINOPHIL NFR BLD AUTO: 4.7 %
ERYTHROCYTE [DISTWIDTH] IN BLOOD BY AUTOMATED COUNT: 12.9 % (ref 10–15)
ERYTHROCYTE [SEDIMENTATION RATE] IN BLOOD BY WESTERGREN METHOD: 17 MM/H (ref 0–20)
GFR SERPL CREATININE-BSD FRML MDRD: >90 ML/MIN/{1.73_M2}
GLUCOSE SERPL-MCNC: 103 MG/DL (ref 70–99)
GLUCOSE UR STRIP-MCNC: NEGATIVE MG/DL
HCT VFR BLD AUTO: 40.4 % (ref 40–53)
HGB BLD-MCNC: 13 G/DL (ref 13.3–17.7)
HGB UR QL STRIP: ABNORMAL
KETONES UR STRIP-MCNC: ABNORMAL MG/DL
LEUKOCYTE ESTERASE UR QL STRIP: NEGATIVE
LYMPHOCYTES # BLD AUTO: 1.6 10E9/L (ref 0.8–5.3)
LYMPHOCYTES NFR BLD AUTO: 27.6 %
MCH RBC QN AUTO: 29.4 PG (ref 26.5–33)
MCHC RBC AUTO-ENTMCNC: 32.2 G/DL (ref 31.5–36.5)
MCV RBC AUTO: 91 FL (ref 78–100)
MONOCYTES # BLD AUTO: 0.6 10E9/L (ref 0–1.3)
MONOCYTES NFR BLD AUTO: 10.8 %
MUCOUS THREADS #/AREA URNS LPF: PRESENT /LPF
NEUTROPHILS # BLD AUTO: 3.2 10E9/L (ref 1.6–8.3)
NEUTROPHILS NFR BLD AUTO: 55.2 %
NITRATE UR QL: NEGATIVE
NON-SQ EPI CELLS #/AREA URNS LPF: ABNORMAL /LPF
PH UR STRIP: 5.5 PH (ref 5–7)
PLATELET # BLD AUTO: 309 10E9/L (ref 150–450)
POTASSIUM SERPL-SCNC: 3.9 MMOL/L (ref 3.4–5.3)
PSA SERPL-ACNC: 1.07 UG/L (ref 0–4)
RBC # BLD AUTO: 4.42 10E12/L (ref 4.4–5.9)
RBC #/AREA URNS AUTO: ABNORMAL /HPF
SODIUM SERPL-SCNC: 136 MMOL/L (ref 133–144)
SOURCE: ABNORMAL
SP GR UR STRIP: 1.02 (ref 1–1.03)
UROBILINOGEN UR STRIP-ACNC: 0.2 EU/DL (ref 0.2–1)
WBC # BLD AUTO: 5.7 10E9/L (ref 4–11)
WBC #/AREA URNS AUTO: ABNORMAL /HPF

## 2019-05-24 PROCEDURE — G0103 PSA SCREENING: HCPCS | Performed by: INTERNAL MEDICINE

## 2019-05-24 PROCEDURE — 80048 BASIC METABOLIC PNL TOTAL CA: CPT | Performed by: INTERNAL MEDICINE

## 2019-05-24 PROCEDURE — 99495 TRANSJ CARE MGMT MOD F2F 14D: CPT | Performed by: INTERNAL MEDICINE

## 2019-05-24 PROCEDURE — 81001 URINALYSIS AUTO W/SCOPE: CPT | Performed by: INTERNAL MEDICINE

## 2019-05-24 PROCEDURE — 85025 COMPLETE CBC W/AUTO DIFF WBC: CPT | Performed by: INTERNAL MEDICINE

## 2019-05-24 PROCEDURE — 86140 C-REACTIVE PROTEIN: CPT | Performed by: INTERNAL MEDICINE

## 2019-05-24 PROCEDURE — 36415 COLL VENOUS BLD VENIPUNCTURE: CPT | Performed by: INTERNAL MEDICINE

## 2019-05-24 PROCEDURE — 85652 RBC SED RATE AUTOMATED: CPT | Performed by: INTERNAL MEDICINE

## 2019-05-24 RX ORDER — LEVETIRACETAM 1000 MG/1
1 TABLET ORAL 2 TIMES DAILY WITH MEALS
COMMUNITY
Start: 2019-05-11 | End: 2019-07-03

## 2019-05-24 RX ORDER — HYDROCODONE BITARTRATE AND ACETAMINOPHEN 5; 325 MG/1; MG/1
1-2 TABLET ORAL EVERY 6 HOURS PRN
COMMUNITY
Start: 2019-05-11 | End: 2019-06-11

## 2019-05-24 RX ORDER — PHENYTOIN SODIUM 300 MG/1
300 CAPSULE, EXTENDED RELEASE ORAL AT BEDTIME
COMMUNITY
Start: 2019-05-11 | End: 2019-09-10

## 2019-05-24 ASSESSMENT — MIFFLIN-ST. JEOR: SCORE: 1852.29

## 2019-05-24 NOTE — PATIENT INSTRUCTIONS
1) Keep stools soft with miralax if needed    2) Labs today including urine testing     3) Call to set up with urology as we discussed for the urine symptoms    4) Start following BLOOD PRESSURE at home if able and let me know if getting less than 100 systolic or above 140 (top numbers)    Let me know if you need anything in the coming months.     Eligio Bah MD

## 2019-05-24 NOTE — PROGRESS NOTES
Subjective     Fabio Logan is a 58 year old male who presents to clinic today for the following health issues:    History of Present Illness He consumes 2 sweetened beverage(s) daily.  He is taking medications regularly.    Pt is here for follow up on post surgery that was done on 05/07/2019 for right MCA aneurysm. Pt feeling fine, and better walking with cane than walker. Pt complains of feeling dizzy.     Has had some diarrhea, no constipation, more liquidly this AM.    Had fever post operative, doing better now. On seizure medications due to post operative seizure. Was hospitalized at [facility] from 5/7-5/10. Family helping him at home right now, feels getting back to new normal and would like to know when he can go and see the [place]. No further fevers or chills, no abdominal pain. Incision seems to be healing well without drainage. No further seizures noted.    Feels that urination is not yet normal. Having concerns about being able to completely empty bladder and would like to see urology.      Patient Active Problem List   Diagnosis     CARDIOVASCULAR SCREENING; LDL GOAL LESS THAN 160     Mild major depression (H)     Obese     HTN, goal below 140/90     H/O testicular cancer     Diverticulosis of large intestine     Esophageal reflux     Chest pain     Migraine     Skin cancer screening     Skin eruption     Lentigines     Chronic dermatitis     ACP (advance care planning)     Mild intermittent asthma without complication     Essential hypertension with goal blood pressure less than 140/90     Major depression in complete remission (H)     Impaired fasting glucose     Malignant neoplasm of testis, unspecified laterality, unspecified whether descended or undescended (H)     Morbid obesity (H)     Symptomatic cholelithiasis     Past Surgical History:   Procedure Laterality Date     COLONOSCOPY N/A 8/22/2014    Procedure: COLONOSCOPY;  Surgeon: Joe Garcia MD;  Location:  GI     LAPAROSCOPIC  "CHOLECYSTECTOMY N/A 2018    Procedure: LAPAROSCOPIC CHOLECYSTECTOMY;  LAPAROSCOPIC CHOLECYSTECTOMY ;  Surgeon: Shannan Chaves MD;  Location: [facility] OR     ORCHIECTOMY NOS Right 1998     TONSILLECTOMY         Social History     Tobacco Use     Smoking status: Former Smoker     Last attempt to quit: 1997     Years since quittin.0     Smokeless tobacco: Former User   Substance Use Topics     Alcohol use: No     Frequency: Never     Drinks per session: Patient refused     Binge frequency: Patient refused     Family History   Problem Relation Age of Onset     Cerebrovascular Disease Mother      Hypertension Mother      Heart Disease Father         had 2 open heart surgery     Lipids Father      Hypertension Father      Cancer Maternal Grandfather         skin cancer     Skin Cancer Maternal Grandfather         skin cancer     Heart Disease Sister 45        heart attack     Cancer Sister 46        breast cancer     Skin Cancer Sister         BCC           Reviewed and up c/d/i         Review of Systems   ROS COMP: Constitutional, HEENT, cardiovascular, pulmonary, GI, , musculoskeletal, neuro, skin, endocrine and psych systems are negative, except as otherwise noted.      Objective    /78   Pulse 73   Temp 97.9  F (36.6  C) (Oral)   Ht 1.676 m (5' 6\")   Wt 109 kg (240 lb 3.2 oz)   SpO2 96%   BMI 38.77 kg/m    Body mass index is 38.77 kg/m .  Physical Exam   GENERAL: healthy, alert and no distress  EYES: Eyes grossly normal to inspection, PERRL and conjunctivae and sclerae normal  HENT: noted healing surgical site over right frontal area c/d/i  NECK: no adenopathy, no asymmetry, masses, or scars and thyroid normal to palpation  RESP: lungs clear to auscultation - no rales, rhonchi or wheezes  CV: regular rate and rhythm, normal S1 S2, no S3 or S4, no murmur, click or rub, no peripheral edema and peripheral pulses strong  ABDOMEN: soft, nontender, no hepatosplenomegaly, no masses and bowel sounds " normal  MS: no gross musculoskeletal defects noted, no edema  SKIN: no suspicious lesions or rashes  NEURO: Normal strength and tone, mentation intact and speech normal  PSYCH: mentation appears normal, affect normal/bright    Diagnostic Test Results:  Labs reviewed in Epic  Results for orders placed or performed in visit on 05/24/19   *UA reflex to Microscopic and Culture ([place] and [facility] (except [place] and [place])   Result Value Ref Range    Color Urine Yellow     Appearance Urine Clear     Glucose Urine Negative NEG^Negative mg/dL    Bilirubin Urine Negative NEG^Negative    Ketones Urine Trace (A) NEG^Negative mg/dL    Specific Gravity Urine 1.025 1.003 - 1.035    Blood Urine Small (A) NEG^Negative    pH Urine 5.5 5.0 - 7.0 pH    Protein Albumin Urine Negative NEG^Negative mg/dL    Urobilinogen Urine 0.2 0.2 - 1.0 EU/dL    Nitrite Urine Negative NEG^Negative    Leukocyte Esterase Urine Negative NEG^Negative    Source Midstream Urine    CRP, inflammation   Result Value Ref Range    CRP Inflammation 6.2 0.0 - 8.0 mg/L   Erythrocyte sedimentation rate auto   Result Value Ref Range    Sed Rate 17 0 - 20 mm/h   PSA, screen   Result Value Ref Range    PSA 1.07 0 - 4 ug/L   Basic metabolic panel   Result Value Ref Range    Sodium 136 133 - 144 mmol/L    Potassium 3.9 3.4 - 5.3 mmol/L    Chloride 104 94 - 109 mmol/L    Carbon Dioxide 24 20 - 32 mmol/L    Anion Gap 8 3 - 14 mmol/L    Glucose 103 (H) 70 - 99 mg/dL    Urea Nitrogen 15 7 - 30 mg/dL    Creatinine 0.79 0.66 - 1.25 mg/dL    GFR Estimate >90 >60 mL/min/[1.73_m2]    GFR Estimate If Black >90 >60 mL/min/[1.73_m2]    Calcium 10.1 8.5 - 10.1 mg/dL   CBC with platelets differential   Result Value Ref Range    WBC 5.7 4.0 - 11.0 10e9/L    RBC Count 4.42 4.4 - 5.9 10e12/L    Hemoglobin 13.0 (L) 13.3 - 17.7 g/dL    Hematocrit 40.4 40.0 - 53.0 %    MCV 91 78 - 100 fl    MCH 29.4 26.5 - 33.0 pg    MCHC 32.2 31.5 - 36.5 g/dL    RDW 12.9 10.0 - 15.0 %     "Platelet Count 309 150 - 450 10e9/L    Diff Method Automated Method     % Neutrophils 55.2 %    % Lymphocytes 27.6 %    % Monocytes 10.8 %    % Eosinophils 4.7 %    % Basophils 1.7 %    Absolute Neutrophil 3.2 1.6 - 8.3 10e9/L    Absolute Lymphocytes 1.6 0.8 - 5.3 10e9/L    Absolute Monocytes 0.6 0.0 - 1.3 10e9/L    Absolute Eosinophils 0.3 0.0 - 0.7 10e9/L    Absolute Basophils 0.1 0.0 - 0.2 10e9/L   Urine Microscopic   Result Value Ref Range    WBC Urine 0 - 5 OTO5^0 - 5 /HPF    RBC Urine 5-10 (A) OTO2^O - 2 /HPF    Squamous Epithelial /LPF Urine Few FEW^Few /LPF    Bacteria Urine Few (A) NEG^Negative /HPF    Mucous Urine Present (A) NEG^Negative /LPF           Assessment & Plan       ICD-10-CM    1. Post-void dribbling N39.43 UROLOGY ADULT REFERRAL     PSA, screen     Basic metabolic panel     Urine Microscopic   2. Aneurysm (H) I72.9 *UA reflex to Microscopic and Culture ([place] and [facility] (except [place] and [place])     CRP, inflammation     Erythrocyte sedimentation rate auto   3. Fever postop R50.82 CBC with platelets differential   4. Partial seizure (H) R56.9      Patient Instructions   1) Keep stools soft with miralax if needed    2) Labs today including urine testing     3) Call to set up with urology as we discussed for the urine symptoms    4) Start following BLOOD PRESSURE at home if able and let me know if getting less than 100 systolic or above 140 (top numbers)    Let me know if you need anything in the coming months.     Eligio Bah MD         BMI:   Estimated body mass index is 38.77 kg/m  as calculated from the following:    Height as of this encounter: 1.676 m (5' 6\").    Weight as of this encounter: 109 kg (240 lb 3.2 oz).   Weight management plan: Discussed healthy diet and exercise guidelines    Return in about 6 weeks (around 7/5/2019).    Eligio Bah MD  [facility]    "

## 2019-06-02 DIAGNOSIS — Z00.00 ENCOUNTER FOR ROUTINE ADULT HEALTH EXAMINATION WITHOUT ABNORMAL FINDINGS: ICD-10-CM

## 2019-06-02 DIAGNOSIS — K21.9 GASTROESOPHAGEAL REFLUX DISEASE WITHOUT ESOPHAGITIS: ICD-10-CM

## 2019-06-02 NOTE — TELEPHONE ENCOUNTER
"Requested Prescriptions   Pending Prescriptions Disp Refills     omeprazole (PRILOSEC) 20 MG DR capsule [Pharmacy Med Name: Omeprazole Oral Capsule Delayed Release 20 MG]  Last Written Prescription Date:  05/18/2018  Last Fill Quantity: 90 capsule,  # refills: 3   Last Office Visit: 5/24/2019 Eligio Bah MD  Future Office Visit:      90 capsule 2     Sig: Take 1 capsule (20 mg) by mouth daily as needed       PPI Protocol Passed - 6/2/2019  7:01 AM        Passed - Not on Clopidogrel (unless Pantoprazole ordered)        Passed - No diagnosis of osteoporosis on record        Passed - Recent (12 mo) or future (30 days) visit within the authorizing provider's specialty     Patient had office visit in the last 12 months or has a visit in the next 30 days with authorizing provider or within the authorizing provider's specialty.  See \"Patient Info\" tab in inbasket, or \"Choose Columns\" in Meds & Orders section of the refill encounter.              Passed - Medication is active on med list        Passed - Patient is age 18 or older          "

## 2019-06-03 PROBLEM — R56.9 PARTIAL SEIZURE (H): Status: ACTIVE | Noted: 2019-06-03

## 2019-06-03 NOTE — TELEPHONE ENCOUNTER
Prescription approved per [facility] Refill Protocol.  Andreia Wilson RN

## 2019-06-04 ENCOUNTER — PATIENT OUTREACH (OUTPATIENT)
Dept: CARE COORDINATION | Facility: CLINIC | Age: 59
End: 2019-06-04

## 2019-06-04 ASSESSMENT — ACTIVITIES OF DAILY LIVING (ADL): DEPENDENT_IADLS:: TRANSPORTATION;CLEANING

## 2019-06-04 NOTE — LETTER
[facility]  Complex Care Plan  About Me:    Patient Name:  Fabio Logan    YOB: 1960  Age:         58 year old   [facility] MRN:    [id number] Telephone Information:  Home Phone [phone]   Mobile [phone]       Address:  [address] [place] [address] Email address:  [email or web address]      Emergency Contact(s)    Name Relationship Lgl Grd Work Phone Home Phone Mobile Phone   1. VANESSA CLARK Significant ot*   [phone]   2. SALLY PICKERING Sister   [phone]   3. KP OCONNELL Sister   [phone]           Primary language:  English     needed? No   [employer]:  [phone] op. 1  Other communication barriers: None  Preferred Method of Communication:  Mail  Current living arrangement: I live alone  Mobility Status/ Medical Equipment: Independent w/Device        My Access Plan  Medical Emergency 911   Primary Clinic Line [facility] - [phone]   24 Hour Appointment Line [phone] or  [phone]) (toll-free)   24 Hour Nurse Line [phone] (toll-free)   Preferred Urgent Care [facility] [phone]   [facility]  [phone]   Preferred Pharmacy [id number] - [place], [place] - [address]     Behavioral Health Crisis Line The National Suicide Prevention Lifeline at 1-219.426.3564 or 911             My Care Team Members  Patient Care Team       Relationship Specialty Notifications Start End    Eligio Bha MD PCP - General Internal Medicine  4/2/19     Phone: [phone] Fax: [phone]         [facility] [place] [address]    Kanika Davis PA-C Physician Assistant Physician Assistant  10/30/15     Phone: [phone] Fax: [phone]         [address] [address]    Eligio Bah MD Assigned PCP   4/26/19      Phone: [phone] Fax: [phone] [address] [place] [address]    Andreia Goodwin, RN Lead Care Coordinator Primary Care - CC  5/13/19             My Care Plans  Self Management and Treatment Plan  Goals and (Comments)  Goals        General    Functional (pt-stated)     Notes - Note created  6/5/2019  9:56 AM by Andreia Goodwin, RN    Goal Statement: I want fully recover from my recent surgery so that I may return to work and resume activities such as driving  Measure of Success: Evidenced by patient's completion of post-acute rehab therapy (Home Health Care) and recovery with ability to return to prior work  Supportive Steps to Achieve:   Patient will complete all recommended home care therapies and continue to implement home therapy regimens and/or outpatient therapy   Barriers: Recent brain surgery, currently on seizure medications and unable to drive/access to ongoing outpatient appointments a concern  Strengths: Very engaged and supportive family, patient is motivated to continue progress towards full recovery  Date to Achieve By: 4 months: October 5th, 2019                      My Medical and Care Information  Problem List   Patient Active Problem List   Diagnosis     CARDIOVASCULAR SCREENING; LDL GOAL LESS THAN 160     Mild major depression (H)     Obese     HTN, goal below 140/90     H/O testicular cancer     Diverticulosis of large intestine     Esophageal reflux     Chest pain     Migraine     Skin cancer screening     Skin eruption     Lentigines     Chronic dermatitis     ACP (advance care planning)     Mild intermittent asthma without complication     Essential hypertension with goal blood pressure less than 140/90     Major depression in complete remission (H)     Impaired fasting glucose     Malignant neoplasm of testis, unspecified laterality, unspecified whether descended or undescended (H)     Morbid obesity (H)     Symptomatic cholelithiasis     Partial seizure (H)           Care Coordination Start Date: 6/4/2019   Frequency of Care Coordination: monthly   Form Last Updated: 06/05/2019

## 2019-06-04 NOTE — LETTER
[facility]  [address] [place] [address]  June 5, 2019    Fabio Logan  [address]  [place] [address]      Dear Fabio,    I am a clinic care coordinator who works at [employer]. I wanted to thank you for spending the time to talk with me.  I wanted to introduce myself and provide you with my contact information so that you can call me with questions or concerns about your health care. Below is a description of clinic care coordination and how I can further assist you.     The clinic care coordinator is a registered nurse and/or  who understand the health care system. The goal of clinic care coordination is to help you manage your health and improve access to the [facility] system in the most efficient manner. The registered nurse can assist you in meeting your health care goals by providing education, coordinating services, and strengthening the communication among your providers. The  can assist you with financial, behavioral, psychosocial, chemical dependency, counseling, and/or psychiatric resources.    Please feel free to contact me at [phone], with any questions or concerns. We at [facility] are focused on providing you with the highest-quality healthcare experience possible and that all starts with you.     Sincerely,     Andreia Goodwin RN   Clinic Care Coordinator-[facility]  PH: [phone]    Enclosed: I have enclosed a copy of the Complex Care Plan. This has helpful information and goals that we have talked about. Please keep this in an easy to access place to use as needed.

## 2019-06-05 NOTE — PROGRESS NOTES
"Clinic Care Coordination Contact  Care Team Conversations    RN CC received voicemail from caller that identified herself as Brenda Powell, patient's sister and \"medical power of \" with request for call back.     Chart reviewed, no scanned consent to communicate with identified caller; RN CC contacted patient who confirmed this is his sister and he does wish for care team to collaborate with her regarding questions pertaining to his healthcare.     RN CC outreached to patient's sister, Brenda Powell; she reports \"this surgery has seemed to really have an impact on Nicolas.  He has a history of some pretty severe anxiety but this seems to have triggered it to be worse.  Night time is especially hard, he's reported he'll wake up with a racing heart and then pace around the house for a bit.  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     Has tried the Hydroxyzine (2 tabs the other night) but this only helped fall asleep: woke up with a racing heart, paced around before falling asleep in the recliner and eventually going back to bed.      Andreia Goodwin RN   Clinic Care Coordinator-[facility]  PH: [phone]    "

## 2019-06-05 NOTE — PROGRESS NOTES
"Clinic Care Coordination Contact    Clinic Care Coordination Contact  OUTREACH    Referral Information:  Referral Source: Health Plan    Primary Diagnosis: Neurological Disorders    Chief Complaint   Patient presents with     Clinic Care Coordination - Follow-up     post neuro surgery     RN CC follow up outreach    Clinical Concerns:  Current Medical Concerns:  Patient reports he is \"doing ok\"; he recently completed the last of his physical therapy in home sessions and continues to work on home PT exercise program; he continues to be followed and working with home care speech therapy and skilled nursing; no longer has home health aide as he is independent with most ADLs and IADLs at this time.  The patient's most prominent concern surrounded his anxiety; he indicates \"I know that with brain surgery they said there would be emotional ups and downs and I have a history of depression, I know that.  My family is all gone now and it's back to just me, except my girlfriend stopping by, and I guess for some reason some days are just kind of emotional and anxiety for me. I have Hydroxyzine to try 1-2 pills as needed and since my sleep has been a bit poor, I tried 2 the other night, feel asleep great but then woke up almost in a panic and then the next day was kind of groggy and sleepy, so I've been listening to my body and if it's tired I go to sleep\".     RN CC discussed the importance of good sleep patterns in relation to physical and emotional healing; encouraged patient to establish a sleep routine: pick a bedtime, determine small routine such as grooming/hygiene and maybe light reading, and then lights out consistently.  Encouraged deep breathing, and offereed referral to therapy for reported increase in anxiety, patient politely declined therapy but was enaged in discussion about sleep pattern, rest, and will monitor efficacy of hydroxyzine: if continued feelings of anxiety or wishes to trial something else, will " call to discuss with PCP.    Patient's speech was clear, affect bright and interactive; he denies persistent headache, reports he has been able to not use his walker and only minimally use his cane for ambulation; he's able to slowly space out tasks around the house: housekeeping, taking out the garbage, laundry, meals and takes breaks when he needs them; reports feeling well supported by family and his girlfriend. The other concern for the patient is transportation: due to recent surgery and being on anti-epileptics, he is presently not driving; he can rely on a neighbor or his girlfriend, but is hoping to also get qualified for [employer]-states he plans to work on this application with the home health care. RN CC offered that if, after 6/5/19 home care visit the application is not completed, RN CC could complete one for patient to review and mail in, explaining the typical timeline for review from [employer] is 20 days, so if patient is hopeful to use this in June of 2019 we should do it soon.      Health Maintenance Reviewed: Due/Overdue (asthma control test)    Functional Status:  Dependent ADLs:: Ambulation-walker, Ambulation-cane(mostly the cane, with stairs)  Dependent IADLs:: Transportation, Cleaning  Mobility Status: Independent w/Device  Fallen 2 or more times in the past year?: No  Any fall with injury in the past year?: No    Living Situation:  Current living arrangement:: I live alone  Type of residence:: Private home - stairs    Diet/Exercise/Sleep:  Diet:: Regular  Inadequate nutrition (GOAL):: No  Food Insecurity: No  Tube Feeding: No  Exercise:: Yes  On average, minutes per day of exercise at this level: 20  How intense was your typical exercise? : Light (like stretching or slow walking)  Inadequate activity/exercise (GOAL):: No  Significant changes in sleep pattern (GOAL): No    Transportation:  Transportation concerns (GOAL):: No  Transportation means:: Family, Regular car      Psychosocial:  Mental health DX:: No  Mental health management concern (GOAL):: No  Informal Support system:: Family     Financial/Insurance:   Financial/Insurance concerns (GOAL):: No  Patient has [employer] private pay through [employer]; his home is paid for and he has some cash reserve to help aide in finances during this time of recovery.      Resources and Interventions:  Current Resources:   List of home care services:: Skilled Nursing, Occupational Therapy, Speech Therapy;   Community Resources: Home Care  Supplies used at home:: None  Equipment Currently Used at Home: walker, rolling    Advance Care Plan/Directive  Advanced Care Plans/Directives on file:: No          Goals:   Goals        General    Functional (pt-stated)     Notes - Note created  6/5/2019  9:56 AM by Andreia Goodwin RN    Goal Statement: I want fully recover from my recent surgery so that I may return to work and resume activities such as driving  Measure of Success: Evidenced by patient's completion of post-acute rehab therapy (Home Health Care) and recovery with ability to return to prior work  Supportive Steps to Achieve:   Patient will complete all recommended home care therapies and continue to implement home therapy regimens and/or outpatient therapy   Barriers: Recent brain surgery, currently on seizure medications and unable to drive/access to ongoing outpatient appointments a concern  Strengths: Very engaged and supportive family, patient is motivated to continue progress towards full recovery  Date to Achieve By: 4 months: October 5th, 2019                  Patient/Caregiver understanding: Patient verbalized understanding, engaged in AIDET communication behavior during encounter.    Outreach Frequency: monthly  Future Appointments              In 2 weeks Ty Ivey MD [facility] [place] [place]          Plan:   RN CC will mail patient Introduction to Care Coordination letter and  will include Complex Care Plan  RN CC will monitor for patient's need of assistance in completion of [employer] application    Patient encouraged to call this writer with any outstanding questions or concerns; declined appointment with PCP, as states has several speciality visits scheduled in upcoming months.     Andreia Goodwin RN   Clinic Care Coordinator-[facility]  PH: [phone]

## 2019-06-10 ENCOUNTER — TELEPHONE (OUTPATIENT)
Dept: PEDIATRICS | Facility: CLINIC | Age: 59
End: 2019-06-10

## 2019-06-10 ENCOUNTER — PATIENT OUTREACH (OUTPATIENT)
Dept: CARE COORDINATION | Facility: CLINIC | Age: 59
End: 2019-06-10

## 2019-06-10 DIAGNOSIS — I72.9 ANEURYSM (H): Primary | ICD-10-CM

## 2019-06-10 NOTE — PROGRESS NOTES
"Clinic Care Coordination Contact  Care Team Conversations    RN CC received incoming call from patient: he indicated he had been continuing to experience symptoms of anxiety; stating specifically \"I wasn't able to sleep at night, I could fall asleep but then I'd wake up in these terrible panic attacks and so my sister advised me to take 1.5 tabs of hydroxyzine at night, which I did and that really helped me fall asleep and stay asleep, but I am struggling during the day. My anxiety just builds and my head feels swooshy (described further as constant thoughts circulating, difficulty focusing on tasks, such as organizing appointments and rides for appointments)\"; patient requesting appointment with PCP-next available is 4:20 PM on 6/27/19; patient requested phone visit on a sooner date.     RN CC also received call from patient's sister, Natalie; spoke with her who stated \"I think Nicolas (patient) is heading over the edge; Friday night he had a panic attack so bad he was up for hours; he is constantly crying, and complaining; what really is scaring us (family) is he's stated a few times in conversation recently 'I don't know if I can deal with this anymore. I can see why people end their lives'\"     Patient called his sister Natalie at 10:15 pm last evening again with anxiety attack and statement \"I may as well just end my life\"    Natalie is concerned the Keppra may be playing a role in the escalating anxiety; she states the patient has a follow-up neuro visit with Dr. Haddad of [facility] on June 28th; she wanted Dr. Bah to be aware of this.     RN CC has previously suggested therapy to patient; he declined, citing \"I tried that before and just felt like I was throwing money away\"; the patient did offer that the past 2 nights he has been able to more easily fall asleep and stay asleep with the 1.5 tablets of Trazodone. RN CC encouraged patient to continue to take the 1.5 tablets each evening. "     RN CC will route this encounter note to PCP as an FYI: Is there any sooner available phone visits to discuss increasing anxiety?     Andreia Goodwin RN   Clinic Care Coordinator[facility]  PH: [phone]

## 2019-06-10 NOTE — Clinical Note
See note/concenr about increasing anxiety; did not endorse any depression or SI to me, but per family he has been doing so to them. -Andreia

## 2019-06-10 NOTE — PROGRESS NOTES
"Clinic Care Coordination Contact  Care Team Conversations    6/10/19 @ 14:20- RN CC received call from patient's home care Speech Therapist; she indicates she is \"very worried about the patient's mental health\"; she reports she just completed a visit with him today, and reports the patient described to her that during periods of night time anxiety, he will wander up and down the street and talk to himself; she did ask him if he had a suicide plan, and he reportedly denied that.     The home care Speech therapist has requested a home care Mental Health Nurse, but the patient likely will not continue to receive services from home care as he becomes more independent and less home bound.     The patient reportedly expressed reluctance to the Speech Therapist regarding his willingness to start therapy for his escalating symptoms.     RN CC reviewed with Speech Therapy the consideration of the [facility]'s free LEAP Program (Life Enrichment Action Program), which is described as:   \"This program was researched and developed at the [facility] in [place]. The research indicates that increasing pleasurable activities may be as effective as cognitive behavioral therapy in treating depression. As people become depressed, they tend to engage in increasing avoidance and isolation, which maintains or worsens their symptoms. The goal of PEARLS treatment is to gradually decrease depressed individual s avoidance and isolation and increase their engagement in activities.    The skills and tools offered in the program help participants address life s problems and make ongoing plans to engage in a variety of pleasurable, meaningful activities.    PEARLS counselors meet with older adults in their homes to provide eight FREE one-on-one support sessions over the course of five months. After the sessions are complete, counselors offer monthly follow up phone call sessions for an additional four " "months.    The counselors maintain contact with the client s primary care doctor throughout the sessions to report on their client s progress and any further recommendations for care.    When needed, referrals are made for PEARLS clients to help them be successful in addressing their depression and reaching goals that support their well-being. For example, referrals are made for psychotherapists, psychiatrists, support groups, local [facility] centers, friendly visitor volunteers, etc\"    PLAN:  RN CC will huddle with PCP when he is back in the office on 6/11/19 to request urgent phone visit; RN CC will be available to patient/care team to help facilitate referrals to appropriate resources.     Andreia Goodwin RN   Clinic Care Coordinator-[facility]  PH: [phone]    "

## 2019-06-10 NOTE — TELEPHONE ENCOUNTER
Marielos from  [facility] called requesting orders from Dr. Bah. Orders would be for PT, OT, and speech therapy for the outpatient [facility] clinic. Diagnosis is craniotomy, dizziness, and cognitive difficulties. Please call [phone] with any questions.

## 2019-06-11 ENCOUNTER — VIRTUAL VISIT (OUTPATIENT)
Dept: PEDIATRICS | Facility: CLINIC | Age: 59
End: 2019-06-11
Payer: COMMERCIAL

## 2019-06-11 DIAGNOSIS — F33.0 MAJOR DEPRESSIVE DISORDER, RECURRENT EPISODE, MILD (H): ICD-10-CM

## 2019-06-11 DIAGNOSIS — F41.9 ANXIETY: Primary | ICD-10-CM

## 2019-06-11 PROCEDURE — 99442 ZZC PHYSICIAN TELEPHONE EVALUATION 11-20 MIN: CPT | Performed by: INTERNAL MEDICINE

## 2019-06-11 RX ORDER — SERTRALINE HYDROCHLORIDE 100 MG/1
150 TABLET, FILM COATED ORAL DAILY
Qty: 135 TABLET | Refills: 3 | Status: SHIPPED | OUTPATIENT
Start: 2019-06-11 | End: 2020-06-22

## 2019-06-11 RX ORDER — ALPRAZOLAM 0.5 MG
0.5 TABLET ORAL
Qty: 30 TABLET | Refills: 1 | Status: SHIPPED | OUTPATIENT
Start: 2019-06-11 | End: 2019-08-01

## 2019-06-11 NOTE — PROGRESS NOTES
"Clinic Care Coordination Contact  Care Team Conversations    RN CC received voicemail from patient; he indicated \"I still have not been contacted by anyone to see about a phone visit with Dr. Bah and I am wondering if I should mail in the [employer] application you sent, since I've already completed one with the speech therapist\"    RN CC called patient back; offered reassurance that the care team would be working to get him scheduled with Dr. Bah.     JUDITH LEES also introduced the LEAP (Life Enrichment Program for Adults) to the patient; explained that this service offers 8 free in home sessions to help with symptoms of depression, and the program can also offer referrals to outside resources: psychotherapists, support groups, psychiatrists.     Patient expressed interest in moving forward with referral to the LEAP program; JUDITH LEES will mail a Release of Information so that a referral may be initiated.     Andreia Goodwin RN   [facility]  PH: [phone]    "

## 2019-06-11 NOTE — PATIENT INSTRUCTIONS
1) Increase the zoloft to 150 mg per day (1.5 tablets)    2) Can use hydroxyzine during the day to help with anxiety    3) Use alprazolam  (xanax) at night to help with sleep  - will try trazodone if needed for next step    4) Try a probiotic to see if help with diarrhea    Eligio Bah MD

## 2019-06-11 NOTE — PROGRESS NOTES
"Fabio Logan is a 58 year old male who is being evaluated via a telephone visit.      The patient has been notified of following (by M.A) Mariely Beaulieu CMA      \"We have found that certain health care needs can be provided without the need for a physical exam.  This service lets us provide the care you need with a short phone conversation.  If a prescription is necessary we can send it directly to your pharmacy.  If lab work is needed we can place an order for that and you can then stop by our lab to have the test done at a later time.    Since this is like an office visit,  will bill your insurance company for this service.  Please check with your medical insurance if this type of telephone/virtual is covered . You may be responsible for the cost of this service if insurance coverage is denied.  The typical cost is $30 (10min), $59(11-20min) and $85 (21-30min)     If during the course of the call the physician/provider feels a telephone visit is not appropriate, you will not be charged for this service\"    Consent has been obtained for this service by care team member: yes.  See the scanned image in the medical record.    S:   Since seen in clinic has had increased anxiety. Has been having issues with moodiness, notes that not feeling as well, not sleeping at night. Does feel some shortness of breath, has tried albuterol- shortness of breath only with anxiety, no chest pain or shortness of breath with exercise- has been doing normal exercise.     Has been feeling emotional roller coaster, has been more frustrated with things.     Has tried hydroxyzine- taking at night, feeling some sleepiness on this- if takes 2 will cause hang over type symptoms.     Pertinent parts of the the patient's medical history reviewed and confirmed by the provider included : as noted    O: telephone visit    A/P  (F41.9) Anxiety  (primary encounter diagnosis)  Plan: ALPRAZolam (XANAX) 0.5 MG tablet        Discussed rare use of " xanax with anxiety, worked well in the past, will not use with pain medication     (F33.0) Major depressive disorder, recurrent episode, mild (H)  Comment:   Plan: sertraline (ZOLOFT) 100 MG tablet        Will increase to help with anxiety.         Total time of call between patient and provider was 11 minutes     Eligio Bah (MD signature)  ==================================================

## 2019-06-12 NOTE — PROGRESS NOTES
Clinic Care Coordination Contact  Care Team Conversations    RN CC received voicemail from [facility]; patient has been discharged from all home care services at this time, as he is no longer home bound. Mar[facility] worked with patient to complete [employer] application and assisted patient in navigating how to use it for rides, it has been activated and is good to go.     Andreia Goodwin RN   Clinic Care Coordinator-[facility]  PH: [phone]

## 2019-06-17 ENCOUNTER — HOSPITAL ENCOUNTER (OUTPATIENT)
Dept: SPEECH THERAPY | Facility: CLINIC | Age: 59
End: 2019-06-17
Attending: INTERNAL MEDICINE
Payer: COMMERCIAL

## 2019-06-17 DIAGNOSIS — I69.819 COGNITIVE DEFICIT AS LATE EFFECT OF CEREBRAL ANEURYSM: Primary | ICD-10-CM

## 2019-06-17 DIAGNOSIS — I72.9 ANEURYSM (H): ICD-10-CM

## 2019-06-17 PROCEDURE — 96125 COGNITIVE TEST BY HC PRO: CPT | Mod: GN | Performed by: SPEECH-LANGUAGE PATHOLOGIST

## 2019-06-17 NOTE — PROGRESS NOTES
Repeatable Battery for the Assessment of Neuropsychological Status Update   (RBANS  Update)  The Repeatable Battery for the Assessment of Neuropsychological Status Update (RBANS  Update) was administered to Fabio Logan on 6/17/2019.  The RBANS Update was originally developed with a primary focus on assessment of dementia, and measures cognitive decline or improvement across these domains: immediate memory, visuospatial/constructional; language; attention; and delayed memory.  However, special group studies are available for Alzheimer's Disease, Vascular Dementia, HIV Dementia, Heather's Disease, Parkinson's Disease, Depression, Schizophrenia, and Closed Head Injury.   The RBANS can be used by clinicians and neuropsychologists to help screen for deficits in acute-care settings; track recovery during rehabilitation; track progression of neurological disorders; and screen for neurocognitive status in adolescents.  This test is normed for ages 12-89.  The subtest normative data are presented in scaled score units that have a mean of 10 and a standard deviation of 3.          Total Score Scaled Score  Percentile Group       I.  Immediate memory    1.  List Learning   24  7   2.  Story Memory   16  9  Immediate Memory Index Score  = 87 (19%ile)    II.  Visuospatial/Constructional    3.  Figure copy   20  13  4.  Line Orientation   19     >75  Visuospatial/Constructional Index Score  = 121 (92%ile)    III.  Language     5.  Picture Naming   10     51-75  6.  Semantic Fluency   21  10  Language Index Score = 99 (47%ile)    IV.  Attention  7.  Digit Span     12  12  8.  Coding     32  5  Attention Index Score = 91 (27%ile)    V.  Delayed Memory  9.  List recall    4     17-25  10. List Recognition   19     26-50  11. Story Recall   8  8  12. Figure Recall   12  8  Delayed Memory Index Score = 94 (34%ile)  Sum of Total Scores for Subtest 9+11+12 24    Sum of Index Scores = 492  Total Scale Score = 97  (42%ile)      INTERPRETATION OF TEST RESULTS: Patient scored WNL in the areas of visualspatial construction, language, and low WNL for delayed memory. Patient had impairments in the areas of immediate memory and attention which are usually related to each other in terms of influencing cognitive abilities. Patient would benefit from skilled SLP in order to improve patients attention and memory for everyday living so that the patient is able to return to work and driving and be as independent as possible.         TOTAL TIME: 60    Repeatable Battery for the Assessment of Neuropsychological Status Update (RBANS Update). Copyright   2012 [facility] Adapted and reproduced with permission. All rights reserved.

## 2019-06-19 ENCOUNTER — OFFICE VISIT - HEALTHEAST (OUTPATIENT)
Dept: NEUROSURGERY | Facility: CLINIC | Age: 59
End: 2019-06-19

## 2019-06-19 DIAGNOSIS — I67.1 CEREBRAL ARTERIAL ANEURYSM: ICD-10-CM

## 2019-06-21 ENCOUNTER — HOSPITAL ENCOUNTER (OUTPATIENT)
Dept: PHYSICAL THERAPY | Facility: CLINIC | Age: 59
End: 2019-06-21
Attending: INTERNAL MEDICINE
Payer: COMMERCIAL

## 2019-06-21 ENCOUNTER — HOSPITAL ENCOUNTER (OUTPATIENT)
Dept: OCCUPATIONAL THERAPY | Facility: CLINIC | Age: 59
End: 2019-06-21
Attending: INTERNAL MEDICINE
Payer: COMMERCIAL

## 2019-06-21 DIAGNOSIS — R26.89 IMBALANCE: ICD-10-CM

## 2019-06-21 DIAGNOSIS — Z78.9 IMPAIRED INSTRUMENTAL ACTIVITIES OF DAILY LIVING (IADL): ICD-10-CM

## 2019-06-21 DIAGNOSIS — Z78.9 DECREASED ACTIVITIES OF DAILY LIVING (ADL): Primary | ICD-10-CM

## 2019-06-21 DIAGNOSIS — I72.9 ANEURYSM (H): ICD-10-CM

## 2019-06-21 DIAGNOSIS — Z74.09 IMPAIRED FUNCTIONAL MOBILITY AND ACTIVITY TOLERANCE: Primary | ICD-10-CM

## 2019-06-21 PROCEDURE — 97162 PT EVAL MOD COMPLEX 30 MIN: CPT | Mod: GP | Performed by: PHYSICAL THERAPIST

## 2019-06-21 PROCEDURE — 97535 SELF CARE MNGMENT TRAINING: CPT | Mod: GO | Performed by: OCCUPATIONAL THERAPIST

## 2019-06-21 PROCEDURE — 97166 OT EVAL MOD COMPLEX 45 MIN: CPT | Mod: GO | Performed by: OCCUPATIONAL THERAPIST

## 2019-06-21 PROCEDURE — 97110 THERAPEUTIC EXERCISES: CPT | Mod: GP | Performed by: PHYSICAL THERAPIST

## 2019-06-21 PROCEDURE — 97110 THERAPEUTIC EXERCISES: CPT | Mod: GO | Performed by: OCCUPATIONAL THERAPIST

## 2019-06-21 NOTE — PROGRESS NOTES
06/21/19 0800   Signing Clinician's Name / Credentials   Signing clinician's name / credentials Ira Simmons DPT   Functional Gait Assessment (TORITO Hernandez, NOE Farah, et al. (2004))   1. GAIT LEVEL SURFACE 1  (8 sec)   2. CHANGE IN GAIT SPEED 3   3. GAIT WITH HORIZONTAL HEAD TURNS 2   4. GAIT WITH VERTICAL HEAD TURNS 2   5. GAIT AND PIVOT TURN 3   6. STEP OVER OBSTACLE 3   7. GAIT WITH NARROW BASE OF SUPPORT 1   8. GAIT WITH EYES CLOSED 1  (Slow)   9. AMBULATING BACKWARDS 1   10. STEPS 2   Total Functional Gait Assessment Score   TOTAL SCORE: (MAXIMUM SCORE 30) 19     Functional Gait Assessment (FGA): The FGA assesses postural stability during various walking tasks.   Gait assistive device used: None     Patient Score: 19/30  Scores of <22/30 have been correlated with predicting falls in community-dwelling older adults according to Mary & Fredy 2010.   Scores of <18/30 have been correlated with increased risk for falls in patients with Parkinsons Disease according to Milton Horvath Zhou et al 2014.  Minimal Detectable Change for patients with acute/chronic stroke = 4.2 according to Nithin & Miguel Angelschel 2009  Minimal Detectable Change for patients with vestibular disorder = 8 according to Mary & Fredy 2010    Assessment (rationale for performing, application to patient s function & care plan): Assess risk for falls.  Minutes billed as physical performance test: 0

## 2019-06-24 ENCOUNTER — PATIENT OUTREACH (OUTPATIENT)
Dept: CARE COORDINATION | Facility: CLINIC | Age: 59
End: 2019-06-24

## 2019-06-24 ENCOUNTER — OFFICE VISIT (OUTPATIENT)
Dept: UROLOGY | Facility: CLINIC | Age: 59
End: 2019-06-24
Payer: COMMERCIAL

## 2019-06-24 VITALS — WEIGHT: 315 LBS | OXYGEN SATURATION: 98 % | BODY MASS INDEX: 50.62 KG/M2 | HEIGHT: 66 IN | HEART RATE: 72 BPM

## 2019-06-24 DIAGNOSIS — R31.29 MICROHEMATURIA: ICD-10-CM

## 2019-06-24 DIAGNOSIS — N39.41 URGENCY INCONTINENCE: Primary | ICD-10-CM

## 2019-06-24 LAB
ALBUMIN UR-MCNC: NEGATIVE MG/DL
APPEARANCE UR: CLEAR
BILIRUB UR QL STRIP: NEGATIVE
COLOR UR AUTO: YELLOW
GLUCOSE UR STRIP-MCNC: NEGATIVE MG/DL
HGB UR QL STRIP: ABNORMAL
KETONES UR STRIP-MCNC: NEGATIVE MG/DL
LEUKOCYTE ESTERASE UR QL STRIP: NEGATIVE
NITRATE UR QL: NEGATIVE
PH UR STRIP: 6 PH (ref 5–7)
RESIDUAL VOLUME (RV) (EXTERNAL): 57
SOURCE: ABNORMAL
SP GR UR STRIP: 1.02 (ref 1–1.03)
UROBILINOGEN UR STRIP-ACNC: 0.2 EU/DL (ref 0.2–1)

## 2019-06-24 PROCEDURE — 81003 URINALYSIS AUTO W/O SCOPE: CPT | Mod: QW | Performed by: UROLOGY

## 2019-06-24 PROCEDURE — 51798 US URINE CAPACITY MEASURE: CPT | Performed by: UROLOGY

## 2019-06-24 PROCEDURE — 99203 OFFICE O/P NEW LOW 30 MIN: CPT | Mod: 25 | Performed by: UROLOGY

## 2019-06-24 PROCEDURE — 88112 CYTOPATH CELL ENHANCE TECH: CPT | Performed by: UROLOGY

## 2019-06-24 RX ORDER — OXCARBAZEPINE 300 MG/1
450 TABLET, FILM COATED ORAL 2 TIMES DAILY
Refills: 1 | COMMUNITY
Start: 2019-06-13 | End: 2020-08-27

## 2019-06-24 ASSESSMENT — PATIENT HEALTH QUESTIONNAIRE - PHQ9: SUM OF ALL RESPONSES TO PHQ QUESTIONS 1-9: 23

## 2019-06-24 ASSESSMENT — MIFFLIN-ST. JEOR: SCORE: 2295.91

## 2019-06-24 ASSESSMENT — PAIN SCALES - GENERAL: PAINLEVEL: NO PAIN (1)

## 2019-06-24 NOTE — LETTER
6/24/2019       RE: Fabio Logan  [address]  [place] [address]     Dear Colleague,    Thank you for referring your patient, Fabio Logan, to the [facility] [place] [facility]. Please see a copy of my visit note below.    [facility]  Main Office: [address]  [address]  [place] [address]       CHIEF COMPLAINT:  Urinary leakage    HISTORY:   I was asked by Dr. Eligio Bah to see this 58 year old gentleman who complained of postvoid dribbling at a recent visit. Nicolas told me today that indeed he is having some postvoid dribbling and occasional urinary leakage but this is not bothering him at all today.He had surgery for a brain aneurysm 7 weeks ago and he is very preoccupied with his recovery from this surgery. He is having a difficult recovery and readjustment after his surgery. He tells me today that he really is not interested in pursuing any therapy for any sorts of urinary problems today.     I saw him in 2014 when he presented with microscopic hematuria.He had a negative hematuria workup at that time. The PSA was very low and the prostate was not enlarged.      PAST MEDICAL HISTORY:   Past Medical History:   Diagnosis Date     Anxiety      Depression      Hypertension      Testicular cancer - Right 1998       PAST SURGICAL HISTORY:   Past Surgical History:   Procedure Laterality Date     COLONOSCOPY N/A 8/22/2014    Procedure: COLONOSCOPY;  Surgeon: Joe Garcia MD;  Location: [facility]     LAPAROSCOPIC CHOLECYSTECTOMY N/A 9/2/2018    Procedure: LAPAROSCOPIC CHOLECYSTECTOMY;  LAPAROSCOPIC CHOLECYSTECTOMY ;  Surgeon: Shannan Chaves MD;  Location: [facility] OR     ORCHIECTOMY NOS Right 1998     TONSILLECTOMY         FAMILY HISTORY:   Family History   Problem Relation Age of Onset     Cerebrovascular Disease Mother      Hypertension Mother      Heart Disease Father         had 2 open heart surgery      Lipids Father      Hypertension Father      Cancer Maternal Grandfather         skin cancer     Skin Cancer Maternal Grandfather         skin cancer     Heart Disease Sister 45        heart attack     Cancer Sister 46        breast cancer     Skin Cancer Sister         BCC       SOCIAL HISTORY:   Social History     Tobacco Use     Smoking status: Former Smoker     Last attempt to quit: 1997     Years since quittin.0     Smokeless tobacco: Former User   Substance Use Topics     Alcohol use: No     Frequency: Never     Drinks per session: Patient refused     Binge frequency: Patient refused          Allergies   Allergen Reactions     Iodine-131 Shortness Of Breath     Internal iodine         Current Outpatient Medications:      albuterol (PROAIR HFA/PROVENTIL HFA/VENTOLIN HFA) 108 (90 BASE) MCG/ACT Inhaler, Inhale 2 puffs into the lungs every 6 hours as needed for shortness of breath / dyspnea or wheezing (Patient not taking: Reported on 3/14/2019), Disp: 1 Inhaler, Rfl: 0     ALPRAZolam (XANAX) 0.5 MG tablet, Take 1 tablet (0.5 mg) by mouth nightly as needed for anxiety, Disp: 30 tablet, Rfl: 1     atenolol (TENORMIN) 50 MG tablet, TAKE ONE TABLET BY MOUTH ONE TIME DAILY, Disp: 90 tablet, Rfl: 1     fluticasone (FLONASE) 50 MCG/ACT nasal spray, SPRAY 1 TO 2 SPRAYS INTO BOTH NOSTRISL DAILY, Disp: 16 g, Rfl: 7     hydrOXYzine (ATARAX) 25 MG tablet, Take 1-2 tablets (25-50 mg) by mouth nightly as needed for anxiety (sleep), Disp: 30 tablet, Rfl: 3     levETIRAcetam (KEPPRA) 1000 MG tablet, Take 1 tablet by mouth 2 times daily (with meals), Disp: , Rfl:      losartan (COZAAR) 25 MG tablet, Take 1 tablet (25 mg) by mouth daily, Disp: 90 tablet, Rfl: 2     omeprazole (PRILOSEC) 20 MG DR capsule, Take 1 capsule (20 mg) by mouth daily as needed, Disp: 90 capsule, Rfl: 0     phenytoin sodium extended (DILANTIN) 300 MG capsule, Take 300 mg by mouth 2 times daily, Disp: , Rfl:      senna (SENOKOT) 8.6 MG tablet, Take  1-2 tablets by mouth daily Take while on pain medications (Patient not taking: Reported on 1/22/2019), Disp: 30 tablet, Rfl: 0     sertraline (ZOLOFT) 100 MG tablet, Take 1.5 tablets (150 mg) by mouth daily, Disp: 135 tablet, Rfl: 3     sildenafil (VIAGRA) 100 MG tablet, Take 0.5-1 tablets ( mg) by mouth daily as needed for erectile dysfunction Take 30 min to 4 hours before sexual activity.  Never use with nitroglycerin, terazosin or doxazosin., Disp: 4 tablet, Rfl: 11     SUMAtriptan (IMITREX) 50 MG tablet, Take 1 tablet (50 mg) by mouth at onset of headache for migraine (may repeat dose in 2 hours. Do not exceed 200mg in 24 hours), Disp: 18 tablet, Rfl: 1     triamcinolone (KENALOG) 0.1 % ointment, Apply topically 2 times daily as needed for irritation, Disp: , Rfl:     Review Of Systems:  Skin: No rash, pruritis, or skin pigmentation  Eyes: No changes in vision  Ears/Nose/Throat: No changes in hearing, no nosebleeds  Respiratory: No shortness of breath, dyspnea on exertion, cough, or hemoptysis  Cardiovascular: No chest pain or palpitations  Gastrointestinal: No diarrhea or constipation. No abdominal pain. No hematochezia  Genitourinary: see HPI  Musculoskeletal: No pain or swelling of joints, normal range of motion  Neurologic: No weakness or tremors  Psychiatric: No recent changes in memory or mood  Hematologic/Lymphatic/Immunologic: No easy bruising or enlarged lymph nodes  Endocrine: No weight gain or loss      PHYSICAL EXAM:    There were no vitals taken for this visit.  General appearance: In NAD, conversant  HEENT: Normocephalic and atraumatic, anicteric sclera  Cardiovascular: Not examined  Respiratory: normal, non-labored breathing  Gastrointestinal: negative, Abdomen soft, non-tender, and non-distended.   Musculoskeletal: Not Examined  Peripheral Vascular/extremity: No peripheral edema  Skin: Normal temperature, turgor, and texture. No rash  Psychiatric: Appropriate affect, alert and oriented to  person, place, and time    Penis: Not Examined  Scrotal Skin: Not Examined   Testicles: Not Examined  Epididymis: Not Examined  Lymphatic: Not examined  Digital Rectal Exam: he declines examination today    Cystoscopy: Not done      PSA: 1.07    UA RESULTS:  Recent Labs   Lab Test 05/24/19  1111   COLOR Yellow   APPEARANCE Clear   URINEGLC Negative   URINEBILI Negative   URINEKETONE Trace*   SG 1.025   UBLD Small*   URINEPH 5.5   PROTEIN Negative   UROBILINOGEN 0.2   NITRITE Negative   LEUKEST Negative   RBCU 5-10*   WBCU 0 - 5       Bladder Scan: 57mL    Other Labs:      Imaging Studies: None      CLINICAL IMPRESSION:   Postvoid dribbling    PLAN:   He as postvoid dribbling that does not bother him at this time. He is more preoccupied with his other issues at this time and  Does not wish to seek any urologic evaluationHis PSA is normal,his bladder emptying is normal as well.We will send his urine sample for cytology since his endoscopic hematuria persists  But otherwise he can follow-up with me as needed in the future if urinary symptoms should start to bother him.      Ty Ivey MD

## 2019-06-24 NOTE — PROGRESS NOTES
"Clinic Care Coordination Contact  Care Team Conversations    RN CC received incoming call from patient's sister, Brenda Hernandez; she called to state the patient is \"about to have a nervous breakdown or something. All of us sisters have been talking to him and he's just depressed and out of sorts: he'll be crying and crying; I tried to encourage him to just relax, tried to distract him with conversation, told him to take one of his Lorazepam; he just answered to me 'maybe I should just take the whole bottle'\"    She goes on to state \"he called one of my sisters at midnight the other night and was crying to her to; we've offered for him to come back to [place] to visit and stay, and he just gets upset with that idea\"; \"his girlfriend called the other day and said that one of us (siblings) needed to call him because he was just so out of sorts\".     RN CC reviewed options and provided resources, including emergency resources:     [facility] Crisis Response Unit (24/7 Mobile Crisis Response Unit)  PH: [phone].    Behavioral Healthcare Providers: [phone]- a resources to offer DEC Assessment evaluation locations; access to psychiatry, psychotherapy; DBT therapy    [facility]: local ([place], [place]) mental health clinic.     Encouraged patient's family members to share these resources among them and first see if patient is agreeable to consider establishing care; if not, may need to utilize a crisis resource as outlined above.     PLAN:  Patient's family will discuss and disseminate information regarding mental health resources     RN CC will route this encounter to PCP as an FYI-     Andreia Goodwin RN   Clinic Care Coordinator-[facility]  PH: [phone]  "

## 2019-06-24 NOTE — PROGRESS NOTES
[facility]  Main Office: [address]  [address]  [place] [address]       CHIEF COMPLAINT:  Urinary leakage    HISTORY:   I was asked by Dr. Eligio Bah to see this 58 year old gentleman who complained of postvoid dribbling at a recent visit. Nicolas told me today that indeed he is having some postvoid dribbling and occasional urinary leakage but this is not bothering him at all today.He had surgery for a brain aneurysm 7 weeks ago and he is very preoccupied with his recovery from this surgery. He is having a difficult recovery and readjustment after his surgery. He tells me today that he really is not interested in pursuing any therapy for any sorts of urinary problems today.     I saw him in 2014 when he presented with microscopic hematuria.He had a negative hematuria workup at that time. The PSA was very low and the prostate was not enlarged.      PAST MEDICAL HISTORY:   Past Medical History:   Diagnosis Date     Anxiety      Depression      Hypertension      Testicular cancer - Right 1998       PAST SURGICAL HISTORY:   Past Surgical History:   Procedure Laterality Date     COLONOSCOPY N/A 8/22/2014    Procedure: COLONOSCOPY;  Surgeon: Joe Garcia MD;  Location: [facility]     LAPAROSCOPIC CHOLECYSTECTOMY N/A 9/2/2018    Procedure: LAPAROSCOPIC CHOLECYSTECTOMY;  LAPAROSCOPIC CHOLECYSTECTOMY ;  Surgeon: Shannan Chaves MD;  Location: [facility] OR     ORCHIECTOMY NOS Right 1998     TONSILLECTOMY         FAMILY HISTORY:   Family History   Problem Relation Age of Onset     Cerebrovascular Disease Mother      Hypertension Mother      Heart Disease Father         had 2 open heart surgery     Lipids Father      Hypertension Father      Cancer Maternal Grandfather         skin cancer     Skin Cancer Maternal Grandfather         skin cancer     Heart Disease Sister 45        heart attack     Cancer Sister 46        breast cancer     Skin Cancer Sister         BCC       SOCIAL HISTORY:   Social History     Tobacco  Use     Smoking status: Former Smoker     Last attempt to quit: 1997     Years since quittin.0     Smokeless tobacco: Former User   Substance Use Topics     Alcohol use: No     Frequency: Never     Drinks per session: Patient refused     Binge frequency: Patient refused          Allergies   Allergen Reactions     Iodine-131 Shortness Of Breath     Internal iodine         Current Outpatient Medications:      albuterol (PROAIR HFA/PROVENTIL HFA/VENTOLIN HFA) 108 (90 BASE) MCG/ACT Inhaler, Inhale 2 puffs into the lungs every 6 hours as needed for shortness of breath / dyspnea or wheezing (Patient not taking: Reported on 3/14/2019), Disp: 1 Inhaler, Rfl: 0     ALPRAZolam (XANAX) 0.5 MG tablet, Take 1 tablet (0.5 mg) by mouth nightly as needed for anxiety, Disp: 30 tablet, Rfl: 1     atenolol (TENORMIN) 50 MG tablet, TAKE ONE TABLET BY MOUTH ONE TIME DAILY, Disp: 90 tablet, Rfl: 1     fluticasone (FLONASE) 50 MCG/ACT nasal spray, SPRAY 1 TO 2 SPRAYS INTO BOTH NOSTRISL DAILY, Disp: 16 g, Rfl: 7     hydrOXYzine (ATARAX) 25 MG tablet, Take 1-2 tablets (25-50 mg) by mouth nightly as needed for anxiety (sleep), Disp: 30 tablet, Rfl: 3     levETIRAcetam (KEPPRA) 1000 MG tablet, Take 1 tablet by mouth 2 times daily (with meals), Disp: , Rfl:      losartan (COZAAR) 25 MG tablet, Take 1 tablet (25 mg) by mouth daily, Disp: 90 tablet, Rfl: 2     omeprazole (PRILOSEC) 20 MG DR capsule, Take 1 capsule (20 mg) by mouth daily as needed, Disp: 90 capsule, Rfl: 0     phenytoin sodium extended (DILANTIN) 300 MG capsule, Take 300 mg by mouth 2 times daily, Disp: , Rfl:      senna (SENOKOT) 8.6 MG tablet, Take 1-2 tablets by mouth daily Take while on pain medications (Patient not taking: Reported on 2019), Disp: 30 tablet, Rfl: 0     sertraline (ZOLOFT) 100 MG tablet, Take 1.5 tablets (150 mg) by mouth daily, Disp: 135 tablet, Rfl: 3     sildenafil (VIAGRA) 100 MG tablet, Take 0.5-1 tablets ( mg) by mouth daily as  needed for erectile dysfunction Take 30 min to 4 hours before sexual activity.  Never use with nitroglycerin, terazosin or doxazosin., Disp: 4 tablet, Rfl: 11     SUMAtriptan (IMITREX) 50 MG tablet, Take 1 tablet (50 mg) by mouth at onset of headache for migraine (may repeat dose in 2 hours. Do not exceed 200mg in 24 hours), Disp: 18 tablet, Rfl: 1     triamcinolone (KENALOG) 0.1 % ointment, Apply topically 2 times daily as needed for irritation, Disp: , Rfl:     Review Of Systems:  Skin: No rash, pruritis, or skin pigmentation  Eyes: No changes in vision  Ears/Nose/Throat: No changes in hearing, no nosebleeds  Respiratory: No shortness of breath, dyspnea on exertion, cough, or hemoptysis  Cardiovascular: No chest pain or palpitations  Gastrointestinal: No diarrhea or constipation. No abdominal pain. No hematochezia  Genitourinary: see HPI  Musculoskeletal: No pain or swelling of joints, normal range of motion  Neurologic: No weakness or tremors  Psychiatric: No recent changes in memory or mood  Hematologic/Lymphatic/Immunologic: No easy bruising or enlarged lymph nodes  Endocrine: No weight gain or loss      PHYSICAL EXAM:    There were no vitals taken for this visit.  General appearance: In NAD, conversant  HEENT: Normocephalic and atraumatic, anicteric sclera  Cardiovascular: Not examined  Respiratory: normal, non-labored breathing  Gastrointestinal: negative, Abdomen soft, non-tender, and non-distended.   Musculoskeletal: Not Examined  Peripheral Vascular/extremity: No peripheral edema  Skin: Normal temperature, turgor, and texture. No rash  Psychiatric: Appropriate affect, alert and oriented to person, place, and time    Penis: Not Examined  Scrotal Skin: Not Examined   Testicles: Not Examined  Epididymis: Not Examined  Lymphatic: Not examined  Digital Rectal Exam: he declines examination today    Cystoscopy: Not done      PSA: 1.07    UA RESULTS:  Recent Labs   Lab Test 05/24/19  1111   COLOR Yellow    APPEARANCE Clear   URINEGLC Negative   URINEBILI Negative   URINEKETONE Trace*   SG 1.025   UBLD Small*   URINEPH 5.5   PROTEIN Negative   UROBILINOGEN 0.2   NITRITE Negative   LEUKEST Negative   RBCU 5-10*   WBCU 0 - 5       Bladder Scan: 57mL    Other Labs:      Imaging Studies: None      CLINICAL IMPRESSION:   Postvoid dribbling    PLAN:   He as postvoid dribbling that does not bother him at this time. He is more preoccupied with his other issues at this time and  Does not wish to seek any urologic evaluationHis PSA is normal,his bladder emptying is normal as well.We will send his urine sample for cytology since his endoscopic hematuria persists  But otherwise he can follow-up with me as needed in the future if urinary symptoms should start to bother him.      Ty Ivey MD

## 2019-06-24 NOTE — PROGRESS NOTES
06/21/19 0900   Quick Adds   Type of Visit Initial Outpatient Occupational Therapy Evaluation   General Information   Start Of Care Date 06/21/19   Referring Physician Eligio Bah MD   Orders Evaluate and treat as indicated   Orders Date 06/11/19   Medical Diagnosis aneurism   Onset of Illness/Injury or Date of Surgery 05/07/19   Surgical/Medical History Reviewed Yes   Additional Occupational Profile Info/Pertinent History of Current Problem Patient is 59 yo male who has a PMH ofAnxiety, Asthma, Cerebr-al aneurysm, Chest pain,Chronic dermatitis,Diverticulosis of large intestine, Esophagealreflux, Hypertension,impaired fasting glucos-e, Lentigines, Majordepression in complete remission (H), Malignant neoplasm of testis (H),Migraine, obesity. On3/14/19 patient went tot ED revealed a middle cerebral aneurysm and sixth nerve palsy, given meclizine and zofran and told to follow up with neurologist and neurosurgeon. 5/7/19 underwent craniotomy and clipping of a complex 3 x 5 right MCA mm aneurysm - 2 seizures follow-ing surgery, discharged from the hospital to home on 5/10/19.  Patient returned to the ED on5/13 with fever - negat-lita work up for infection. Patient reports since then, he has been home and trying to maintain his functional mobility, now starting therapies. Patient is following up again with his surgeon next week,also has a urology appt on Monday for chronic incontinence. Pt is very fatigued by the end of the day. Patient is working on activity tolerance through ADLs.    Comments/Observations Patient works as metal fabricater   Role/Living Environment   Current Community Support Family/friend caregiver  (Pt has a girlfriend in the nearby vicinity)   Patient role/Employment history Employed   Current Living Environment [facility]   Prior Level - ADLS Independent   Prior Responsibilities - IADL Meal Preparation;Housekeeping;Laundry;Shopping;Medication management;Finances;Driving;Work   Prior Level Comments  Pt was indpendent with all ADLs/IADLs prior to surgery.   Role/Living Environment Comments Pt lives alone, but two of his sisters came from out of state to care for him after his surgery.  Pt feeling overwhelmed by his impending medical debt. He reports decrease in memory and lack of focus.   Patient/family Goals Statement Pt would like to drive again and get back to his previous routine of going to work as a .    Pain   Patient currently in pain Yes   Pain rating 5   Pain comments Pt feels soreness at the top of his head.   Fall Risk Screen   Fall screen completed by PT   Fall screen comments See PT evaluation of this same dated   Abuse Screen (yes response referral indicated)   Feels Unsafe at Home or Work/School no   Feels Threatened by Someone no   Does Anyone Try to Keep You From Having Contact with Others or Doing Things Outside Your Home? no   Physical Signs of Abuse Present no   Cognitive Status Examination   Orientation Orientation to person, place and time   Level of Consciousness Alert   Follows Commands and Answers Questions 100% of the time   Memory Impaired   Memory Comments Pt recalled 0/5 novel words after a five minute delay.    Cognitive Comment Pt completed MoCA cognitive screen with BNL score of 20/30. Pt will benefit from further cognitive assessment   Visual Perception   Visual Perception Wears glasses   Range of Motion (ROM)   ROM Quick Adds No deficits identified   Strength   Strength Comments  is below average for patient age and gender.  MMT at sh flex.ext elbow flex/ext WFL   Hand Strength   Left Hand  (pounds) 53 pounds   Right Hand  (pounds) 45 pounds   Left Lateral Pinch (pounds) 17 pounds   Right Lateral Pinch (pounds) 18 pounds   Left Three Point Pinch (pounds) 16 pounds   Right Three Point Pinch (pounds) 16 pounds   Hand Strength Comments  strength is BNL on R and WNL on L but well below average compared to norms for age and gender   Coordination    Left Hand, Nine Hole Peg Test (seconds) 22   Right Hand, Nine Hole Peg Test (seconds) 23   Left Hand, Box and Blocks Test (cubes transferred in 1 minute) 52   Right Hand, Box and Blocks Test (cubes transferred in 1 minute) 54   Coordination Comments GMC BNL and FMC WNL but below average compared to norms for age and gender.    Bathing   Level of [place] - Bathing independent   Upper Body Dressing   Level of [place]: Dress Upper Body independent   Upper Body Dressing Comments Pt denies ADLs taking increased time, however based on reaction time and processing speeds at evaluation, pt may lack insight into his speed of performance.    Lower Body Dressing   Level of [place]: Dress Lower Body independent   Toileting   Level of [place]: Toilet independent   Grooming   Level of [place]: Grooming independent   Eating/Self-Feeding   Level of [place]: Eating independent   Planned Therapy Interventions   Planned Therapy Interventions ADL training;IADL training;Coordination training;Self care/Home management;Strengthening    OT Goal 1   Goal Identifier HEP   Goal Description Pt will demonstrate good follow through at home of a B UE HEP for strength  and  coordination with SBA and min cues to increase functional strength and coordination while maximizing  independence and performance of ADL/IADLs.   Target Date 08/30/19    OT Goal 2   Goal Identifier Safety with community mobility   Goal Description Patient will demonstrate WFLs visual scanning (organized scanning pattern) and visual reaction time skills using compensatory strategies prn, for safe independent community mobility and increased ability tolerate work tasks by scoring WNLs on all modes of the [employer]ison and pen/paper scanning tasks.   Target Date 08/30/19    OT Goal 3   Goal Identifier AE for increased ease of handwriting and other ADLs/IADLs   Goal Description For improvement and increased ease with handwriting and other fmc tasks,  patient will demonstrate use of adaptive equipment and or adapted techniques for  everyday skills.    Target Date 08/30/19   OT Goal 4   Goal Identifier Numerical reasoing/problem solving   Goal Description Patient will demonstrate the ability to complete moderately complex tasks requiring numerical reasoning, problem solving and new learning skills with 90% accuracy to complete financial, home and community tasks accurately, for maximum independence to function at or near baseline at home, at    Target Date 08/30/19   Clinical Impression   Criteria for Skilled Therapeutic Interventions Met Yes, treatment indicated   OT Diagnosis decreased ADLs/IADLs   Influenced by the following impairments decreased  strength,  decreased GMC,  decreased fmc,  forgetfulness, decreased concentration, fatigue, decreased reaction time   Assessment of Occupational Performance 3-5 Performance Deficits   Identified Performance Deficits decreased handwriitng performance, decreased memory and focus, decreased skills for driving, decreased actiity tolerance,    Clinical Decision Making (Complexity) Moderate complexity   Therapy Frequency 2x per week    Predicted Duration of Therapy Intervention (days/wks) 10 weeks   Risks and Benefits of Treatment have been explained. Yes   Patient, Family & other staff in agreement with plan of care Yes   Clinical Impression Comments Pt will benefit from OT services to address the above goals.    Education Assessment   Barriers To Learning No Barriers   Preferred Learning Style Listening;Reading;Demonstration;Pictures/video   Total Evaluation Time   OT Eval, Moderate Complexity Minutes (88303) 40

## 2019-06-24 NOTE — NURSING NOTE
Pt had aneurism surg 7 weeks ago.  Pt leaks mostly at nt.  He wakes up wet.  Pt doesn't notice leaking during the day.  Pt had testicualr cancer when he was 29yo.  pvr by scanner=57ml  Pt states he is on an emotional rollercoaster.  Pt thinks he saw SDB many years ago.  Pt scored high on his PHQ2.  Pt has a lot going on.  Pt just had surg and not felling himself.  Pt drinks water during the day.  Pt is shaky now and was not before.  Pt sees his neurologist friday of this week.      TORITO Garnica CMA

## 2019-06-25 ENCOUNTER — AMBULATORY - HEALTHEAST (OUTPATIENT)
Dept: NEUROSURGERY | Facility: CLINIC | Age: 59
End: 2019-06-25

## 2019-06-25 DIAGNOSIS — Z51.89 VISIT FOR WOUND CHECK: ICD-10-CM

## 2019-06-25 LAB — COPATH REPORT: NORMAL

## 2019-06-26 ENCOUNTER — PATIENT OUTREACH (OUTPATIENT)
Dept: CARE COORDINATION | Facility: CLINIC | Age: 59
End: 2019-06-26

## 2019-06-26 NOTE — PROGRESS NOTES
Clinic Care Coordination Contact  Care Team Conversations    RN CC received voicemail from patient's sister, Natalie Powell, she indicated the patient has been admitted to [facility] for mental health concerns and she would like to notify PCP office of this, as well as request assistance with cancelling patient's scheduled therapy visits (OT, PT, Speech).     RN CC went to rehab desk and cancelled outpatient therapy visits as requested.     PLAN:  RN CC will route this note to PCP as an FYI; RN CC will monitor for patient's discharge from inpatient mental health and outreach in follow up upon discharge.     Andreia Goodwin RN   Clinic Care Coordinator-[facility]  PH: [phone]

## 2019-07-01 ENCOUNTER — HOSPITAL ENCOUNTER (OUTPATIENT)
Dept: PHYSICAL THERAPY | Facility: CLINIC | Age: 59
End: 2019-07-01
Payer: COMMERCIAL

## 2019-07-01 ENCOUNTER — HOSPITAL ENCOUNTER (OUTPATIENT)
Dept: OCCUPATIONAL THERAPY | Facility: CLINIC | Age: 59
End: 2019-07-01
Payer: COMMERCIAL

## 2019-07-01 DIAGNOSIS — Z78.9 DECREASED ACTIVITIES OF DAILY LIVING (ADL): ICD-10-CM

## 2019-07-01 DIAGNOSIS — I72.9 ANEURYSM (H): ICD-10-CM

## 2019-07-01 DIAGNOSIS — R26.89 IMBALANCE: ICD-10-CM

## 2019-07-01 DIAGNOSIS — I72.9 ANEURYSM (H): Primary | ICD-10-CM

## 2019-07-01 DIAGNOSIS — Z78.9 IMPAIRED INSTRUMENTAL ACTIVITIES OF DAILY LIVING (IADL): ICD-10-CM

## 2019-07-01 DIAGNOSIS — Z74.09 IMPAIRED FUNCTIONAL MOBILITY AND ACTIVITY TOLERANCE: Primary | ICD-10-CM

## 2019-07-01 PROCEDURE — 97535 SELF CARE MNGMENT TRAINING: CPT | Mod: GO | Performed by: OCCUPATIONAL THERAPIST

## 2019-07-01 PROCEDURE — 97110 THERAPEUTIC EXERCISES: CPT | Mod: GP | Performed by: PHYSICAL THERAPIST

## 2019-07-01 PROCEDURE — 97112 NEUROMUSCULAR REEDUCATION: CPT | Mod: GP | Performed by: PHYSICAL THERAPIST

## 2019-07-03 ENCOUNTER — HOSPITAL ENCOUNTER (OUTPATIENT)
Dept: PHYSICAL THERAPY | Facility: CLINIC | Age: 59
End: 2019-07-03
Payer: COMMERCIAL

## 2019-07-03 ENCOUNTER — HOSPITAL ENCOUNTER (OUTPATIENT)
Dept: OCCUPATIONAL THERAPY | Facility: CLINIC | Age: 59
End: 2019-07-03
Payer: COMMERCIAL

## 2019-07-03 ENCOUNTER — OFFICE VISIT (OUTPATIENT)
Dept: PEDIATRICS | Facility: CLINIC | Age: 59
End: 2019-07-03
Payer: COMMERCIAL

## 2019-07-03 VITALS
BODY MASS INDEX: 38.33 KG/M2 | TEMPERATURE: 98.6 F | OXYGEN SATURATION: 97 % | HEIGHT: 66 IN | DIASTOLIC BLOOD PRESSURE: 76 MMHG | WEIGHT: 238.5 LBS | HEART RATE: 77 BPM | SYSTOLIC BLOOD PRESSURE: 120 MMHG

## 2019-07-03 DIAGNOSIS — Z78.9 IMPAIRED INSTRUMENTAL ACTIVITIES OF DAILY LIVING (IADL): ICD-10-CM

## 2019-07-03 DIAGNOSIS — Z78.9 DECREASED ACTIVITIES OF DAILY LIVING (ADL): ICD-10-CM

## 2019-07-03 DIAGNOSIS — R26.89 IMBALANCE: ICD-10-CM

## 2019-07-03 DIAGNOSIS — Z74.09 IMPAIRED FUNCTIONAL MOBILITY AND ACTIVITY TOLERANCE: Primary | ICD-10-CM

## 2019-07-03 DIAGNOSIS — R60.9 EDEMA, PITTING: ICD-10-CM

## 2019-07-03 DIAGNOSIS — F32.A DEPRESSION, UNSPECIFIED DEPRESSION TYPE: Primary | ICD-10-CM

## 2019-07-03 DIAGNOSIS — R56.9 SEIZURES (H): ICD-10-CM

## 2019-07-03 DIAGNOSIS — I72.9 ANEURYSM (H): Primary | ICD-10-CM

## 2019-07-03 DIAGNOSIS — I72.9 ANEURYSM (H): ICD-10-CM

## 2019-07-03 PROCEDURE — 99214 OFFICE O/P EST MOD 30 MIN: CPT | Mod: GC | Performed by: STUDENT IN AN ORGANIZED HEALTH CARE EDUCATION/TRAINING PROGRAM

## 2019-07-03 PROCEDURE — 97112 NEUROMUSCULAR REEDUCATION: CPT | Mod: GP | Performed by: PHYSICAL THERAPIST

## 2019-07-03 PROCEDURE — 97535 SELF CARE MNGMENT TRAINING: CPT | Mod: GO | Performed by: OCCUPATIONAL THERAPIST

## 2019-07-03 PROCEDURE — 97110 THERAPEUTIC EXERCISES: CPT | Mod: GP | Performed by: PHYSICAL THERAPIST

## 2019-07-03 PROCEDURE — 97110 THERAPEUTIC EXERCISES: CPT | Mod: GO | Performed by: OCCUPATIONAL THERAPIST

## 2019-07-03 RX ORDER — QUETIAPINE FUMARATE 50 MG/1
50 TABLET, FILM COATED ORAL
COMMUNITY
Start: 2019-06-28 | End: 2019-07-03

## 2019-07-03 RX ORDER — QUETIAPINE FUMARATE 50 MG/1
50 TABLET, FILM COATED ORAL AT BEDTIME
Qty: 90 TABLET | Refills: 3 | Status: SHIPPED | OUTPATIENT
Start: 2019-07-03 | End: 2020-02-06

## 2019-07-03 RX ORDER — CEPHALEXIN 500 MG/1
500 CAPSULE ORAL
COMMUNITY
Start: 2019-06-28 | End: 2019-09-10

## 2019-07-03 RX ORDER — TRAZODONE HYDROCHLORIDE 50 MG/1
50 TABLET, FILM COATED ORAL
COMMUNITY
Start: 2019-06-28 | End: 2020-01-11

## 2019-07-03 ASSESSMENT — MIFFLIN-ST. JEOR: SCORE: 1844.58

## 2019-07-03 ASSESSMENT — PATIENT HEALTH QUESTIONNAIRE - PHQ9: SUM OF ALL RESPONSES TO PHQ QUESTIONS 1-9: 2

## 2019-07-03 NOTE — PROGRESS NOTES
Subjective     Fabio Logan is a 58 year old male who presents to clinic today for the following health issues:    HPI     Hospital Follow-up Visit:    Hospital/Nursing Home/IP Rehab Facility: [facility]   Date of Admission: 6/25/19  Date of Discharge: 6/28/19  Reason(s) for Admission: Major Depressive Disorder-Recurrent episode            Problems taking medications regularly:  None       Medication changes since discharge: see medication list        Problems adhering to non-medication therapy:  None    Summary of hospitalization:  CareEverywhere information obtained and reviewed  Diagnostic Tests/Treatments reviewed.  Follow up needed: Neurology and Psychiatry  Other Healthcare Providers Involved in Patient s Care:         None  Update since discharge: improved.     Post Discharge Medication Reconciliation: discharge medications reconciled, continue medications without change.  Plan of care communicated with patient     Coding guidelines for this visit:  Type of Medical   Decision Making Face-to-Face Visit       within 7 Days of discharge Face-to-Face Visit        within 14 days of discharge   Moderate Complexity [id number]   High Complexity 27007 [id number]          Inpatient psych 6/25/2019 - 6/28/2019 for suicidal ideation   Craniotomy 5/7/2019 f/up with neurology 2/2 seizure   10 day course started on 6/25 (cephalexin) for craniotomy     Currently on Trileptal and dilantin (Switched keppra to trileptal)  EEG in 6/27/2019 read: abnormal EEG due to paroxysmal right hemispheric slowing in theta range that suggests nonspecific dysfunction in that area. This is likely is due to patient's known surgery and subdural fluid collection.     Pt states he is feeling better than when he was discharged. Feeling more tired than before because he was watching TV more than usual.  Was previously run down with anxiety and couldn't sleep, but he has been able to sleep better. He has not had any seizures since day of  the surgery (2019). Otherwise mood is good, he denies any thoughts of SI/HI. No firearms at home. Has not been driving because of seizure history and currently using metro mobility which is a stressor.     He would like a referral to follow up with Neurology at a different location and he is also interested in psychiatry follow up to help manage his mood. His goal is to go back to work, in 2019.     Had some diarrhea, will do yogurt and lactobacillus; states he does not need a prescription for this.     Patient Active Problem List   Diagnosis     CARDIOVASCULAR SCREENING; LDL GOAL LESS THAN 160     Mild major depression (H)     Obese     HTN, goal below 140/90     H/O testicular cancer     Diverticulosis of large intestine     Esophageal reflux     Chest pain     Migraine     Skin cancer screening     Skin eruption     Lentigines     Chronic dermatitis     ACP (advance care planning)     Mild intermittent asthma without complication     Essential hypertension with goal blood pressure less than 140/90     Major depression in complete remission (H)     Impaired fasting glucose     Malignant neoplasm of testis, unspecified laterality, unspecified whether descended or undescended (H)     Morbid obesity (H)     Symptomatic cholelithiasis     Partial seizure (H)     Past Surgical History:   Procedure Laterality Date     COLONOSCOPY N/A 2014    Procedure: COLONOSCOPY;  Surgeon: Joe Garcia MD;  Location:  GI     CYSTOSCOPY       LAPAROSCOPIC CHOLECYSTECTOMY N/A 2018    Procedure: LAPAROSCOPIC CHOLECYSTECTOMY;  LAPAROSCOPIC CHOLECYSTECTOMY ;  Surgeon: Shannan Chaves MD;  Location:  OR     ORCHIECTOMY NOS Right 1998     TESTICLE SURGERY       TONSILLECTOMY         Social History     Tobacco Use     Smoking status: Former Smoker     Last attempt to quit: 1997     Years since quittin.0     Smokeless tobacco: Former User   Substance Use Topics     Alcohol use: No     Frequency: Never      Drinks per session: Patient refused     Binge frequency: Patient refused    Lives alone at home, girlfriend is around.  Family History   Problem Relation Age of Onset     Cerebrovascular Disease Mother      Hypertension Mother      Heart Disease Father         had 2 open heart surgery     Lipids Father      Hypertension Father      Cancer Maternal Grandfather         skin cancer     Skin Cancer Maternal Grandfather         skin cancer     Heart Disease Sister 45        heart attack     Cancer Sister 46        breast cancer     Skin Cancer Sister         BCC         Current Outpatient Medications   Medication Sig Dispense Refill     cephALEXin (KEFLEX) 500 MG capsule Take 500 mg by mouth       QUEtiapine (SEROQUEL) 50 MG tablet Take 50 mg by mouth       traZODone (DESYREL) 50 MG tablet Take 50 mg by mouth       albuterol (PROAIR HFA/PROVENTIL HFA/VENTOLIN HFA) 108 (90 BASE) MCG/ACT Inhaler Inhale 2 puffs into the lungs every 6 hours as needed for shortness of breath / dyspnea or wheezing (Patient not taking: Reported on 3/14/2019) 1 Inhaler 0     ALPRAZolam (XANAX) 0.5 MG tablet Take 1 tablet (0.5 mg) by mouth nightly as needed for anxiety 30 tablet 1     atenolol (TENORMIN) 50 MG tablet TAKE ONE TABLET BY MOUTH ONE TIME DAILY 90 tablet 1     fluticasone (FLONASE) 50 MCG/ACT nasal spray SPRAY 1 TO 2 SPRAYS INTO BOTH NOSTRISL DAILY 16 g 7     hydrOXYzine (ATARAX) 25 MG tablet Take 1-2 tablets (25-50 mg) by mouth nightly as needed for anxiety (sleep) 30 tablet 3     losartan (COZAAR) 25 MG tablet Take 1 tablet (25 mg) by mouth daily 90 tablet 2     omeprazole (PRILOSEC) 20 MG DR capsule Take 1 capsule (20 mg) by mouth daily as needed 90 capsule 0     OXcarbazepine (TRILEPTAL) 300 MG tablet Take 300 mg by mouth 2 times daily  1     phenytoin sodium extended (DILANTIN) 300 MG capsule Take 300 mg by mouth At Bedtime       sertraline (ZOLOFT) 100 MG tablet Take 1.5 tablets (150 mg) by mouth daily 135 tablet 3      "SUMAtriptan (IMITREX) 50 MG tablet Take 1 tablet (50 mg) by mouth at onset of headache for migraine (may repeat dose in 2 hours. Do not exceed 200mg in 24 hours) 18 tablet 1     triamcinolone (KENALOG) 0.1 % ointment Apply topically 2 times daily as needed for irritation       Allergies   Allergen Reactions     Iodine-131 Shortness Of Breath     Internal iodine     Reviewed and updated as needed this visit by Provider         Review of Systems   ROS COMP: Constitutional, HEENT, cardiovascular, pulmonary, gi and gu systems are negative, except as otherwise noted.      Objective    /76 (BP Location: Right arm, Patient Position: Chair, Cuff Size: Adult Regular)   Pulse 77   Temp 98.6  F (37  C) (Oral)   Ht 1.676 m (5' 6\")   Wt 108.2 kg (238 lb 8 oz)   SpO2 97%   BMI 38.49 kg/m    Body mass index is 38.49 kg/m .  Physical Exam   GENERAL: healthy, alert and no distress, sutures of craniotomy in place   EYES: Eyes grossly normal to inspection, PERRL and conjunctivae and sclerae normal  HENT: nose and mouth without ulcers or lesions  NECK: no adenopathy, no asymmetry, masses, or scars and thyroid normal to palpation  RESP: lungs clear to auscultation - no rales, rhonchi or wheezes  CV: regular rate and rhythm, normal S1 S2, no S3 or S4, no murmur, click or rub, no peripheral edema and peripheral pulses strong  ABDOMEN: soft, nontender, no hepatosplenomegaly, no masses and bowel sounds normal  MS: no gross musculoskeletal defects noted, pitting edema 2+ on RLE and 1+ on LLE; pt states it is chronic  SKIN: no suspicious lesions or rashes  NEURO: Normal strength and tone, mentation intact and speech normal. CN 2-12 grossly intact.   PSYCH: mentation appears normal, affect normal/bright    Diagnostic Test Results:  Labs reviewed in Epic        Assessment & Plan   Pt is a 59 yo who is s/p craniotomy (5/7/2019) for MCA aneursym complicated w/seizures and recent voluntary inpatient psych admission 6/25/2019 - " "6/28/2019 for suicidal ideation, coming in for post-hospitalization follow up.     1. Depression, unspecified depression type  Currently denying any SI/HI. PHQ 9  = 2. No firearms at home. States his mood has been okay. Currently will continue medications that pt was discharged on as he is tolerating them well. He is interested in following up with mental health provider outpatient so will provide referral. Last Trileptal levels checked at OSH wnl in 6/26/2019.   - QUEtiapine (SEROQUEL) 50 MG tablet; Take 1 tablet (50 mg) by mouth At Bedtime  Dispense: 90 tablet; Refill: 3  - MENTAL HEALTH REFERRAL  - Adult; Outpatient Treatment, Psychiatry and Medication Management; Individual/Couples/Family/Group Therapy/Health Psychology[place] [facility] [place] [facility] [phone]; We will contact you to schedule the appointmen...    2. Seizures (H)  Pt w/ seizures 2/2 craniotomy and MCA aneursym. He would prefer a new referral to follow up regarding his seizure medications that fits his insurance. Recent levels of medications wnl.   -Continue trileptal and Dilantin  - NEUROLOGY ADULT REFERRAL    3. Pitting edema (bl)  Worse on RLE compared to LLE. Pt states this is somewhat chronic. Instructed patient to let us know if one side gets worse or if any skin changes occur (currently no signs of venous stasis or any skin breakdown). Pt denies any chest pain or SOB during this visit.      BMI:   Estimated body mass index is 38.49 kg/m  as calculated from the following:    Height as of this encounter: 1.676 m (5' 6\").    Weight as of this encounter: 108.2 kg (238 lb 8 oz).     Pt seen and discussed with Dr. Marin.     Blanca Alex MD  [facility] CANDI    I have seen the patient, discussed with the resident and agree with the history, physical and plan as documented above.    Kia Marin MD  Internal Medicine - Pediatrics      "

## 2019-07-03 NOTE — PATIENT INSTRUCTIONS
1) Referral for Neurology and Mental Health placed  2) Refill for seroquel given  3) Follow up as needed for next visit

## 2019-07-05 ENCOUNTER — AMBULATORY - HEALTHEAST (OUTPATIENT)
Dept: NEUROSURGERY | Facility: CLINIC | Age: 59
End: 2019-07-05

## 2019-07-08 ENCOUNTER — COMMUNICATION - HEALTHEAST (OUTPATIENT)
Dept: NEUROSURGERY | Facility: CLINIC | Age: 59
End: 2019-07-08

## 2019-07-08 DIAGNOSIS — I67.1 NONRUPTURED CEREBRAL ANEURYSM: ICD-10-CM

## 2019-07-08 DIAGNOSIS — S06.5XAA SDH (SUBDURAL HEMATOMA) (H): ICD-10-CM

## 2019-07-08 DIAGNOSIS — Z98.890 S/P CRANIOTOMY: ICD-10-CM

## 2019-07-09 ENCOUNTER — HOSPITAL ENCOUNTER (OUTPATIENT)
Dept: CT IMAGING | Facility: CLINIC | Age: 59
Discharge: HOME | End: 2019-07-09
Attending: NEUROLOGICAL SURGERY

## 2019-07-09 ENCOUNTER — OFFICE VISIT - HEALTHEAST (OUTPATIENT)
Dept: NEUROSURGERY | Facility: CLINIC | Age: 59
End: 2019-07-09

## 2019-07-09 DIAGNOSIS — S06.5XAA SDH (SUBDURAL HEMATOMA) (H): ICD-10-CM

## 2019-07-09 DIAGNOSIS — I67.1 NONRUPTURED CEREBRAL ANEURYSM: ICD-10-CM

## 2019-07-09 DIAGNOSIS — Z98.890 S/P CRANIOTOMY: ICD-10-CM

## 2019-07-09 DIAGNOSIS — S06.5XAA SUBDURAL HEMATOMA (H): ICD-10-CM

## 2019-07-09 ASSESSMENT — MIFFLIN-ST. JEOR: SCORE: 1827.77

## 2019-07-10 ENCOUNTER — HOSPITAL ENCOUNTER (OUTPATIENT)
Dept: PHYSICAL THERAPY | Facility: CLINIC | Age: 59
End: 2019-07-10
Payer: COMMERCIAL

## 2019-07-10 ENCOUNTER — HOSPITAL ENCOUNTER (OUTPATIENT)
Dept: OCCUPATIONAL THERAPY | Facility: CLINIC | Age: 59
End: 2019-07-10
Payer: COMMERCIAL

## 2019-07-10 DIAGNOSIS — Z78.9 DECREASED ACTIVITIES OF DAILY LIVING (ADL): ICD-10-CM

## 2019-07-10 DIAGNOSIS — I72.9 ANEURYSM (H): ICD-10-CM

## 2019-07-10 DIAGNOSIS — Z74.09 IMPAIRED FUNCTIONAL MOBILITY AND ACTIVITY TOLERANCE: Primary | ICD-10-CM

## 2019-07-10 DIAGNOSIS — R26.89 IMBALANCE: ICD-10-CM

## 2019-07-10 DIAGNOSIS — I72.9 ANEURYSM (H): Primary | ICD-10-CM

## 2019-07-10 DIAGNOSIS — Z78.9 IMPAIRED INSTRUMENTAL ACTIVITIES OF DAILY LIVING (IADL): ICD-10-CM

## 2019-07-10 PROCEDURE — 97112 NEUROMUSCULAR REEDUCATION: CPT | Mod: GP | Performed by: PHYSICAL THERAPIST

## 2019-07-10 PROCEDURE — 97110 THERAPEUTIC EXERCISES: CPT | Mod: GP | Performed by: PHYSICAL THERAPIST

## 2019-07-10 PROCEDURE — 97110 THERAPEUTIC EXERCISES: CPT | Mod: GO | Performed by: OCCUPATIONAL THERAPIST

## 2019-07-10 PROCEDURE — 97535 SELF CARE MNGMENT TRAINING: CPT | Mod: GO | Performed by: OCCUPATIONAL THERAPIST

## 2019-07-11 ENCOUNTER — PATIENT OUTREACH (OUTPATIENT)
Dept: CARE COORDINATION | Facility: CLINIC | Age: 59
End: 2019-07-11

## 2019-07-11 NOTE — PROGRESS NOTES
"Clinic Care Coordination Contact    Clinic Care Coordination Contact  OUTREACH      Primary Diagnosis: Neurological Disorders    Chief Complaint   Patient presents with     Clinic Care Coordination - Follow-up     depression follow up          Clinical Concerns:  RN NABEEL follow up outreach; patient was admitted to the inpatient behavioral healthcare unit at the end of June. Followed up with PCP office in early July; recommended (referrals placed ) to start mental health therapy.     Today, patient much more engaged and talkative; positive outlook, he indicated he was out and about himself (took the bus) and visited his employer, which he says felt really good. Patient also was able to articulate in great detail his future plans for recovery, noting he has follow up with neurosurgery on 7/23/19 and hopes to get clearance to drive and return to work in August. In August, he intends to return to work on a part-time basis and gradually ease back into full time scheduling.   The patient does not have his \"clipboard of schedule\" with him, but he is agreeable to initiate therapy and believes he may have set up an appointment to do so; if not, he reports he believes he has the number to call   RN CC reinforced the importance and value in continuing outpatient therapy following an inpatient Behavioral Health admission.             Goals:   Goals        General    Functional (pt-stated)     Notes - Note created  6/5/2019  9:56 AM by Andreia Goodwin RN    Goal Statement: I want fully recover from my recent surgery so that I may return to work and resume activities such as driving  Measure of Success: Evidenced by patient's completion of post-acute rehab therapy (Home Health Care) and recovery with ability to return to prior work  Supportive Steps to Achieve:   Patient will complete all recommended home care therapies and continue to implement home therapy regimens and/or outpatient therapy   Barriers: Recent brain surgery, " currently on seizure medications and unable to drive/access to ongoing outpatient appointments a concern  Strengths: Very engaged and supportive family, patient is motivated to continue progress towards full recovery  Date to Achieve By: 4 months: October 5th, 2019              As of today's date 7/11/2019 goal is met at 26 - 50%.   Goal Status:  Showing progress    Patient/Caregiver understanding: Patient verbalized understanding, engaged in AIDET communication behavior during encounter.    Outreach Frequency: monthly  Future Appointments              Tomorrow AdeleRuddy boylee, OT [facility] [place], [place]    Tomorrow Ángel Silva, SLP [facility] [place], [place]    In 4 days Adele, Sis, OT [facility] [place], [place]    In 4 days Ángel Silva, SLP [facility] [place], [place]    In 4 days Ihbe-Eberts, Rocio, PT [facility] [place], [place]    In 1 week Ángel Silva, SLP [facility] [place], [place]    In 1 week [place], Sis, OT [facility] [place], [place]    In 1 week Ihbe-Eberts, Rocio, PT [facility] [place], [place]    In 2 weeks Ángel Silva, SLP [facility] [place], [place]    In 2 weeks Adele, Sis, OT [facility] [place], [place]    In 2 weeks Ihbe-Eberts, Rocio, PT [facility] [place], [place]    In 3 weeks 3a, Ea Rn Pal; Eligio Bah MD; EA EXAM ROOM 36 [facility] [place], [place]    In 3 weeks Ángel Silva, SLP [facility] [place], [place]    In 3 weeks Adele, Sis, OT [facility] [place], [place]    In 3 weeks Ihbe-Eberts, Rocio, PT [facility] [place], [place]    In 1 month Ángel Silva, SLP [facility] [place], [place]    In 1 month RofferÁngel greene, SLP [facility] [place], [place]    In 1 month [place], Sis, OT [facility] [place] [place]    In 1 month Ángel Silva, SLP [facility] [place] [place]    In 2 months Goyo Varghese, CNP [facility]          Plan:   Patient feels he is almost completed with his needed outpatient therapy, reports his mood & sleep are improved.   RN CC will continue to outreach 1-2 more as patient makes transition back into driving and working to assure well equipped to cope with transition  Next RN CC outreach in 2-3 weeks; patient encouraged to call sooner with questions or concerns.     Andreia Goodwin, RN   Clinic Care Coordinator-[facility]  PH: [phone]

## 2019-07-12 ENCOUNTER — HOSPITAL ENCOUNTER (OUTPATIENT)
Dept: SPEECH THERAPY | Facility: CLINIC | Age: 59
End: 2019-07-12
Payer: COMMERCIAL

## 2019-07-12 ENCOUNTER — HOSPITAL ENCOUNTER (OUTPATIENT)
Dept: OCCUPATIONAL THERAPY | Facility: CLINIC | Age: 59
End: 2019-07-12
Payer: COMMERCIAL

## 2019-07-12 DIAGNOSIS — I69.819 COGNITIVE DEFICIT AS LATE EFFECT OF CEREBRAL ANEURYSM: ICD-10-CM

## 2019-07-12 DIAGNOSIS — I72.9 ANEURYSM (H): Primary | ICD-10-CM

## 2019-07-12 DIAGNOSIS — Z78.9 IMPAIRED INSTRUMENTAL ACTIVITIES OF DAILY LIVING (IADL): ICD-10-CM

## 2019-07-12 DIAGNOSIS — Z78.9 DECREASED ACTIVITIES OF DAILY LIVING (ADL): ICD-10-CM

## 2019-07-12 PROCEDURE — 97535 SELF CARE MNGMENT TRAINING: CPT | Mod: GO | Performed by: OCCUPATIONAL THERAPIST

## 2019-07-12 PROCEDURE — 97127 ZZC THERAPEUTIC IVNTJ W/FOCUS ON COGNITIVE FUNCTION, PER 15 MINUTES: CPT | Mod: GN | Performed by: SPEECH-LANGUAGE PATHOLOGIST

## 2019-07-15 ENCOUNTER — HOSPITAL ENCOUNTER (OUTPATIENT)
Dept: SPEECH THERAPY | Facility: CLINIC | Age: 59
End: 2019-07-15
Payer: COMMERCIAL

## 2019-07-15 ENCOUNTER — HOSPITAL ENCOUNTER (OUTPATIENT)
Dept: PHYSICAL THERAPY | Facility: CLINIC | Age: 59
End: 2019-07-15
Payer: COMMERCIAL

## 2019-07-15 ENCOUNTER — HOSPITAL ENCOUNTER (OUTPATIENT)
Dept: OCCUPATIONAL THERAPY | Facility: CLINIC | Age: 59
End: 2019-07-15
Payer: COMMERCIAL

## 2019-07-15 DIAGNOSIS — I72.9 ANEURYSM (H): ICD-10-CM

## 2019-07-15 DIAGNOSIS — Z78.9 IMPAIRED INSTRUMENTAL ACTIVITIES OF DAILY LIVING (IADL): ICD-10-CM

## 2019-07-15 DIAGNOSIS — R42 DIZZINESS: Primary | ICD-10-CM

## 2019-07-15 DIAGNOSIS — I72.9 ANEURYSM (H): Primary | ICD-10-CM

## 2019-07-15 DIAGNOSIS — Z78.9 DECREASED ACTIVITIES OF DAILY LIVING (ADL): ICD-10-CM

## 2019-07-15 DIAGNOSIS — R26.89 IMBALANCE: ICD-10-CM

## 2019-07-15 DIAGNOSIS — I69.819 COGNITIVE DEFICIT AS LATE EFFECT OF CEREBRAL ANEURYSM: ICD-10-CM

## 2019-07-15 DIAGNOSIS — Z74.09 IMPAIRED FUNCTIONAL MOBILITY AND ACTIVITY TOLERANCE: ICD-10-CM

## 2019-07-15 PROCEDURE — 97112 NEUROMUSCULAR REEDUCATION: CPT | Mod: GP | Performed by: PHYSICAL THERAPIST

## 2019-07-15 PROCEDURE — 97535 SELF CARE MNGMENT TRAINING: CPT | Mod: GO | Performed by: OCCUPATIONAL THERAPIST

## 2019-07-15 PROCEDURE — 97127 ZZC THERAPEUTIC IVNTJ W/FOCUS ON COGNITIVE FUNCTION, PER 15 MINUTES: CPT | Mod: GN | Performed by: SPEECH-LANGUAGE PATHOLOGIST

## 2019-07-15 PROCEDURE — 97110 THERAPEUTIC EXERCISES: CPT | Mod: GO | Performed by: OCCUPATIONAL THERAPIST

## 2019-07-15 NOTE — PROGRESS NOTES
Outpatient Speech Language Pathology Discharge Note     Patient: Fabio Logan  : 1960    Beginning/End Dates of Reporting Period:   2019 to 7/15/2019    Referring Provider: Eligio Bah Diagnosis: Cognitive Deficits    Client Self Report: Patient with no new reports to his health. Patient appears hapy.         Goals:  Goal Identifier Memory   Goal Description Patient will utilize 2-3 memory strategies to complete memory activities with 80% or higher accuracy.   Target Date 19   Date Met   7/15/2019   Progress:Patient improved him scores in the area of immediate memory and delayed memory. Patient initially scored in the 19%ile for immediate memory and by end of treatment patient scored in the 58%ile. Patient also increased his delayed memory score from 34%ile to the 84%ile. Patient is utilizing self talk and writing information on a calendar for strategies to aid his memory.      Goal Identifier Attention   Goal Description Patient will complete attention activities (divided, sustained, and alternating attention) with 80% or higher accuracy in order to improve his attention for everyday living.    Target Date 19   Date Met   7/15/2019   Progress:Patient initially scored in the in the 27%ile for attention and at end of treatment scored in the 42%ile. Patient completing all attention based tasks with 80% or higher accuracy. GOAL MET     Goal Identifier Problem Solving   Goal Description Patient will complete moderate to complex level problem solving tasks with 80% accuracy in order to improve his cognition for everyday living.    Target Date 19   Date Met   7/15/2019   Progress:Patient able to solve complex puzzles and deductive reasoning tasks with 90% accuracy and very minimal SLP cueing.        Progress Toward Goals:    Progress this reporting period: Patient has met all goals set by SLP and appears to be functioning near baseline. SLP is comfortable with patients  ability to live independently and is appearing to function near his baseline.     Plan:  Discharge from therapy.    Discharge:    Reason for Discharge: Patient has met all goals.      Discharge Plan: Patient to continue home program.

## 2019-07-15 NOTE — ADDENDUM NOTE
Encounter addended by: Ángel Silva, SLP on: 7/15/2019 3:27 PM   Actions taken: Episode resolved, Sign clinical note

## 2019-07-18 ENCOUNTER — MYC MEDICAL ADVICE (OUTPATIENT)
Dept: PEDIATRICS | Facility: CLINIC | Age: 59
End: 2019-07-18

## 2019-07-23 ENCOUNTER — OFFICE VISIT - HEALTHEAST (OUTPATIENT)
Dept: NEUROSURGERY | Facility: CLINIC | Age: 59
End: 2019-07-23

## 2019-07-23 ENCOUNTER — HOSPITAL ENCOUNTER (OUTPATIENT)
Dept: CT IMAGING | Facility: CLINIC | Age: 59
Discharge: HOME | End: 2019-07-23

## 2019-07-23 ENCOUNTER — TRANSFERRED RECORDS (OUTPATIENT)
Dept: HEALTH INFORMATION MANAGEMENT | Facility: CLINIC | Age: 59
End: 2019-07-23

## 2019-07-23 DIAGNOSIS — S06.5XAA SUBDURAL HEMATOMA (H): ICD-10-CM

## 2019-07-23 DIAGNOSIS — Z09 FOLLOW-UP EXAMINATION FOLLOWING SURGERY: ICD-10-CM

## 2019-07-23 ASSESSMENT — MIFFLIN-ST. JEOR: SCORE: 1843.65

## 2019-07-24 ENCOUNTER — HOSPITAL ENCOUNTER (OUTPATIENT)
Dept: OCCUPATIONAL THERAPY | Facility: CLINIC | Age: 59
End: 2019-07-24
Payer: COMMERCIAL

## 2019-07-24 ENCOUNTER — HOSPITAL ENCOUNTER (OUTPATIENT)
Dept: PHYSICAL THERAPY | Facility: CLINIC | Age: 59
End: 2019-07-24
Payer: COMMERCIAL

## 2019-07-24 DIAGNOSIS — Z74.09 IMPAIRED FUNCTIONAL MOBILITY AND ACTIVITY TOLERANCE: ICD-10-CM

## 2019-07-24 DIAGNOSIS — R26.89 IMBALANCE: Primary | ICD-10-CM

## 2019-07-24 DIAGNOSIS — R42 DIZZINESS: ICD-10-CM

## 2019-07-24 DIAGNOSIS — Z78.9 DECREASED ACTIVITIES OF DAILY LIVING (ADL): ICD-10-CM

## 2019-07-24 DIAGNOSIS — I72.9 ANEURYSM (H): Primary | ICD-10-CM

## 2019-07-24 DIAGNOSIS — I72.9 ANEURYSM (H): ICD-10-CM

## 2019-07-24 DIAGNOSIS — Z78.9 IMPAIRED INSTRUMENTAL ACTIVITIES OF DAILY LIVING (IADL): ICD-10-CM

## 2019-07-24 PROCEDURE — 97535 SELF CARE MNGMENT TRAINING: CPT | Mod: GO | Performed by: OCCUPATIONAL THERAPIST

## 2019-07-24 PROCEDURE — 97110 THERAPEUTIC EXERCISES: CPT | Mod: GO | Performed by: OCCUPATIONAL THERAPIST

## 2019-07-24 PROCEDURE — 97750 PHYSICAL PERFORMANCE TEST: CPT | Mod: GP | Performed by: PHYSICAL THERAPIST

## 2019-07-24 PROCEDURE — 97110 THERAPEUTIC EXERCISES: CPT | Mod: GP | Performed by: PHYSICAL THERAPIST

## 2019-07-29 DIAGNOSIS — I10 HTN, GOAL BELOW 140/90: ICD-10-CM

## 2019-07-29 RX ORDER — ATENOLOL 50 MG/1
TABLET ORAL
Qty: 90 TABLET | Refills: 0 | Status: SHIPPED | OUTPATIENT
Start: 2019-07-29 | End: 2019-11-09

## 2019-07-29 NOTE — TELEPHONE ENCOUNTER
Prescription approved per [facility] Refill Protocol.    Adrienne Huddleston RN  7/29/2019   4:08 PM

## 2019-07-29 NOTE — TELEPHONE ENCOUNTER
"Requested Prescriptions   Pending Prescriptions Disp Refills     atenolol (TENORMIN) 50 MG tablet [Pharmacy Med Name: Atenolol Oral Tablet 50 MG]    Last Written Prescription Date:  1/29/2019  Last Fill Quantity: 90,  # refills: 1   Last office visit: 7/3/2019 with prescribing provider:  Eligio Bah Office Visit:   Next 5 appointments (look out 90 days)    Aug 01, 2019  9:00 AM CDT  Office Visit with Eligio Bah MD, Ea Rn Pal 3a, EA EXAM ROOM 36  [facility] [address] [place] [address]  [address]  [facility] [address]  [phone]   Sep 05, 2019  8:00 AM CDT  Return Visit with Yolette Jimenez [facility] ([facility]) [address] [place] [address]  [phone]          90 tablet 0     Sig: TAKE ONE TABLET BY MOUTH ONE TIME DAILY       Beta-Blockers Protocol Passed - 7/29/2019  7:01 AM        Passed - Blood pressure under 140/90 in past 12 months     BP Readings from Last 3 Encounters:   07/03/19 120/76   05/24/19 120/78   04/18/19 124/76                 Passed - Patient is age 6 or older        Passed - Recent (12 mo) or future (30 days) visit within the authorizing provider's specialty     Patient had office visit in the last 12 months or has a visit in the next 30 days with authorizing provider or within the authorizing provider's specialty.  See \"Patient Info\" tab in inbasket, or \"Choose Columns\" in Meds & Orders section of the refill encounter.              Passed - Medication is active on med list          "

## 2019-07-31 ENCOUNTER — HOSPITAL ENCOUNTER (OUTPATIENT)
Dept: PHYSICAL THERAPY | Facility: CLINIC | Age: 59
End: 2019-07-31
Payer: COMMERCIAL

## 2019-07-31 ENCOUNTER — HOSPITAL ENCOUNTER (OUTPATIENT)
Dept: OCCUPATIONAL THERAPY | Facility: CLINIC | Age: 59
End: 2019-07-31
Payer: COMMERCIAL

## 2019-07-31 DIAGNOSIS — R26.89 IMBALANCE: ICD-10-CM

## 2019-07-31 DIAGNOSIS — Z78.9 IMPAIRED INSTRUMENTAL ACTIVITIES OF DAILY LIVING (IADL): ICD-10-CM

## 2019-07-31 DIAGNOSIS — R42 DIZZINESS: ICD-10-CM

## 2019-07-31 DIAGNOSIS — I72.9 ANEURYSM (H): Primary | ICD-10-CM

## 2019-07-31 DIAGNOSIS — I72.9 ANEURYSM (H): ICD-10-CM

## 2019-07-31 DIAGNOSIS — Z74.09 IMPAIRED FUNCTIONAL MOBILITY AND ACTIVITY TOLERANCE: Primary | ICD-10-CM

## 2019-07-31 DIAGNOSIS — Z78.9 DECREASED ACTIVITIES OF DAILY LIVING (ADL): ICD-10-CM

## 2019-07-31 PROCEDURE — 97535 SELF CARE MNGMENT TRAINING: CPT | Mod: GO | Performed by: OCCUPATIONAL THERAPIST

## 2019-07-31 PROCEDURE — 97110 THERAPEUTIC EXERCISES: CPT | Mod: GP | Performed by: PHYSICAL THERAPIST

## 2019-07-31 NOTE — PROGRESS NOTES
"Outpatient Physical Therapy Discharge Note     Patient: Fabio Logan  : 1960    Beginning/End Dates of Reporting Period:  19 to 2019    Referring Provider: Eligio Bah MD    Therapy Diagnosis: Impaired functional mobility and activity tolerance secondary to craniotomy     Client Self Report: Patient reproting that he is doing well. Feels that he is ready to discharge- knows that he needs to start doing more on his own such as returning to activity at the gym. Is feeling that he can return to work. Notes that he's at a point that he won't know how he does until he's back in that environment.     Objective Measurements:  Objective Measure: 30s STS  Details: 12    Objective Measure: 6MWT  Details: 1450    Objective Measure: FGA  Details:     Objective Measure: 25' walk  Details: 6.53 sec    Objective Measure: Rhomberg   Details: 30 sec + EC on foam    Objective Measure: NuStep  Details: Seat: 18, Arms: 5, RL: 1 to 3, Total time (including warm-up and cool down: 11 minutes, Average steps/minute: ~90      Outcome Measures (most recent score):  See goal comments    Goals:  Goal Identifier MET: HEP   Goal Description Patient will demonstrate understanding and compliance to his HEP for continued wellbeing upon discharge from skilled physical therapy.   Target Date 19   Date Met  19   Progress: Patient has demonstrated good understanding for programming and additional exercises that can be performed in gym setting and/or with equipment available at home. Has been intermittently compliant with programming over course of therapy but patient acknowledges his \"complacency\" and is motivated to return to activities at his gym.      Goal Identifier MET: Transfers   Goal Description Patient will complete 9 STS transfers with no UE assist from an 18 inch surface in 30 seconds to demonstrate improved LE strength for independent transfers from low surfaces.   Target Date 19   Date " Met  07/24/19   Progress: Goal MET. Has demonstrated a significant improvement in functional LE strength.     Goal Identifier Fall risk   Goal Description Patient will complete the FGA with a score of 26/30 to demonstrate improved balance and decreased risk for falls.   Target Date 08/16/19   Date Met      Progress: Patient has demonstrated significant progress towards goal- scoring 24/30. Goal has been nearly met and scoring on this, and other balance assessments, demonstrate that the patient is at a minimal risk of falling.      Goal Identifier MET: Activity tolerance   Goal Description Patient will ambulate 250 feet more than baseline during the 6 MWT with no AD to demonstrate improved activity tolerance for independent and safe community ambulation.   Target Date 08/16/19   Date Met  07/24/19   Progress: Goal Met- demonstrating significant improvement in functional endurance and has been consistent with programming.      Goal Identifier MET: Balance   Goal Description Patient will maintain his balance with feet together and eyes closed  on foam for 30 seconds to demonstrate improved balance with low vision for safety with going to the bathroom at night.   Target Date 08/16/19   Date Met  07/24/19   Progress: Goal Met     Goal Identifier MET: Gait speed   Goal Description Patient will ambulate 25 feet in <8 seconds with no AD to demonstrate improved step length and gait speed for increased safety and independence with crossing the street.   Target Date 08/16/19   Date Met  07/24/19   Progress: Goal Met     Goal Identifier     Goal Description     Target Date     Date Met      Progress:     Goal Identifier     Goal Description     Target Date     Date Met      Progress:     Progress Toward Goals:   Progress this reporting period: See comments above. Overall, patient has demonstrated significant improvement in strength, endurance and balance. Has met or nearly net all goals and standardized testing indicates that  he is at a minimal risk of falling.     Plan:  Discharge from therapy.    Discharge:  Yes, recommending continued participation in programming and gradual return to normal activity as tolerated.

## 2019-08-01 ENCOUNTER — OFFICE VISIT (OUTPATIENT)
Dept: PEDIATRICS | Facility: CLINIC | Age: 59
End: 2019-08-01
Payer: COMMERCIAL

## 2019-08-01 ENCOUNTER — AMBULATORY - HEALTHEAST (OUTPATIENT)
Dept: NEUROSURGERY | Facility: CLINIC | Age: 59
End: 2019-08-01

## 2019-08-01 ENCOUNTER — COMMUNICATION - HEALTHEAST (OUTPATIENT)
Dept: NEUROSURGERY | Facility: CLINIC | Age: 59
End: 2019-08-01

## 2019-08-01 VITALS
BODY MASS INDEX: 39.13 KG/M2 | OXYGEN SATURATION: 97 % | WEIGHT: 243.5 LBS | HEIGHT: 66 IN | HEART RATE: 70 BPM | DIASTOLIC BLOOD PRESSURE: 76 MMHG | TEMPERATURE: 98 F | SYSTOLIC BLOOD PRESSURE: 118 MMHG

## 2019-08-01 DIAGNOSIS — F41.9 ANXIETY: ICD-10-CM

## 2019-08-01 DIAGNOSIS — S06.5XAA SDH (SUBDURAL HEMATOMA) (H): ICD-10-CM

## 2019-08-01 DIAGNOSIS — G40.109 LOCALIZATION-RELATED FOCAL EPILEPSY WITH SIMPLE PARTIAL SEIZURES (H): Primary | ICD-10-CM

## 2019-08-01 DIAGNOSIS — R19.7 DIARRHEA, UNSPECIFIED TYPE: ICD-10-CM

## 2019-08-01 DIAGNOSIS — R20.2 PARESTHESIAS: ICD-10-CM

## 2019-08-01 LAB
DEPRECATED CALCIDIOL+CALCIFEROL SERPL-MC: 21 UG/L (ref 20–75)
PHENYTOIN SERPL-MCNC: 6.2 MG/L (ref 10–20)
TSH SERPL DL<=0.005 MIU/L-ACNC: 1.46 MU/L (ref 0.4–4)
VIT B12 SERPL-MCNC: 281 PG/ML (ref 193–986)

## 2019-08-01 PROCEDURE — 36415 COLL VENOUS BLD VENIPUNCTURE: CPT | Performed by: PSYCHIATRY & NEUROLOGY

## 2019-08-01 PROCEDURE — 80186 ASSAY OF PHENYTOIN FREE: CPT | Mod: 90 | Performed by: PSYCHIATRY & NEUROLOGY

## 2019-08-01 PROCEDURE — 80183 DRUG SCRN QUANT OXCARBAZEPIN: CPT | Mod: 90 | Performed by: PSYCHIATRY & NEUROLOGY

## 2019-08-01 PROCEDURE — 80185 ASSAY OF PHENYTOIN TOTAL: CPT | Performed by: PSYCHIATRY & NEUROLOGY

## 2019-08-01 PROCEDURE — 82306 VITAMIN D 25 HYDROXY: CPT | Performed by: INTERNAL MEDICINE

## 2019-08-01 PROCEDURE — 82607 VITAMIN B-12: CPT | Performed by: INTERNAL MEDICINE

## 2019-08-01 PROCEDURE — 99000 SPECIMEN HANDLING OFFICE-LAB: CPT | Performed by: PSYCHIATRY & NEUROLOGY

## 2019-08-01 PROCEDURE — 80053 COMPREHEN METABOLIC PANEL: CPT | Performed by: PSYCHIATRY & NEUROLOGY

## 2019-08-01 PROCEDURE — 99214 OFFICE O/P EST MOD 30 MIN: CPT | Performed by: INTERNAL MEDICINE

## 2019-08-01 PROCEDURE — 84443 ASSAY THYROID STIM HORMONE: CPT | Performed by: INTERNAL MEDICINE

## 2019-08-01 RX ORDER — ALPRAZOLAM 0.5 MG
.5-1 TABLET ORAL DAILY PRN
Qty: 60 TABLET | Refills: 1 | Status: SHIPPED | OUTPATIENT
Start: 2019-08-01 | End: 2023-03-01 | Stop reason: SINTOL

## 2019-08-01 ASSESSMENT — ANXIETY QUESTIONNAIRES
7. FEELING AFRAID AS IF SOMETHING AWFUL MIGHT HAPPEN: NOT AT ALL
2. NOT BEING ABLE TO STOP OR CONTROL WORRYING: NOT AT ALL
GAD7 TOTAL SCORE: 2
IF YOU CHECKED OFF ANY PROBLEMS ON THIS QUESTIONNAIRE, HOW DIFFICULT HAVE THESE PROBLEMS MADE IT FOR YOU TO DO YOUR WORK, TAKE CARE OF THINGS AT HOME, OR GET ALONG WITH OTHER PEOPLE: NOT DIFFICULT AT ALL
1. FEELING NERVOUS, ANXIOUS, OR ON EDGE: SEVERAL DAYS
6. BECOMING EASILY ANNOYED OR IRRITABLE: SEVERAL DAYS
5. BEING SO RESTLESS THAT IT IS HARD TO SIT STILL: NOT AT ALL
3. WORRYING TOO MUCH ABOUT DIFFERENT THINGS: NOT AT ALL

## 2019-08-01 ASSESSMENT — PATIENT HEALTH QUESTIONNAIRE - PHQ9
SUM OF ALL RESPONSES TO PHQ QUESTIONS 1-9: 6
5. POOR APPETITE OR OVEREATING: NOT AT ALL

## 2019-08-01 ASSESSMENT — MIFFLIN-ST. JEOR: SCORE: 1867.26

## 2019-08-01 NOTE — LETTER
August 1, 2019      Fabio Logan  [address] [facility] [address]        To Whom It May Concern,        Mr. Logan may return to work starting 8/5. He should start with 4-5 hour days for the next two weeks. He may be able to return to 8 hour days after this 2 week trial after consultation with my office.          Sincerely,        Eligio Bah MD

## 2019-08-01 NOTE — PROGRESS NOTES
Subjective     Fabio Logan is a 58 year old male who presents to clinic today for the following health issues:    HPI     Patient here to discuss receiving a note to go back to work, drive, etc.    Having improvement in strength and feeling that he can drive.    Wondering about going back to be able to drive. Last seizures were only after surgery. Doing well- no focal weakness. Some occasional tingling around lips.     Pain has been going well.    ANxiety feels going well.       PHQ-9 SCORE 6/24/2019 7/3/2019 8/1/2019   PHQ-9 Total Score - - -   PHQ-9 Total Score 23 - 6   PHQ-A Total Score - 2 -     OSMAN-7 SCORE 1/17/2018 4/18/2019 8/1/2019   Total Score 1 5 2     Has appt set up with psychologist and psychiatrist, feels that anxiety is getting better.       Patient Active Problem List   Diagnosis     CARDIOVASCULAR SCREENING; LDL GOAL LESS THAN 160     Mild major depression (H)     Obese     HTN, goal below 140/90     H/O testicular cancer     Diverticulosis of large intestine     Esophageal reflux     Chest pain     Migraine     Skin cancer screening     Skin eruption     Lentigines     Chronic dermatitis     ACP (advance care planning)     Mild intermittent asthma without complication     Essential hypertension with goal blood pressure less than 140/90     Major depression in complete remission (H)     Impaired fasting glucose     Malignant neoplasm of testis, unspecified laterality, unspecified whether descended or undescended (H)     Morbid obesity (H)     Symptomatic cholelithiasis     Partial seizure (H)     Past Surgical History:   Procedure Laterality Date     COLONOSCOPY N/A 8/22/2014    Procedure: COLONOSCOPY;  Surgeon: Joe Garcia MD;  Location:  GI     CYSTOSCOPY       LAPAROSCOPIC CHOLECYSTECTOMY N/A 9/2/2018    Procedure: LAPAROSCOPIC CHOLECYSTECTOMY;  LAPAROSCOPIC CHOLECYSTECTOMY ;  Surgeon: Shannan Chaves MD;  Location: [facility] OR     ORCHIECTOMY NOS Right 1998     TESTICLE SURGERY    "    TONSILLECTOMY         Social History     Tobacco Use     Smoking status: Former Smoker     Last attempt to quit: 1997     Years since quittin.1     Smokeless tobacco: Former User   Substance Use Topics     Alcohol use: No     Frequency: Never     Drinks per session: Patient refused     Binge frequency: Patient refused     Family History   Problem Relation Age of Onset     Cerebrovascular Disease Mother      Hypertension Mother      Heart Disease Father         had 2 open heart surgery     Lipids Father      Hypertension Father      Cancer Maternal Grandfather         skin cancer     Skin Cancer Maternal Grandfather         skin cancer     Heart Disease Sister 45        heart attack     Cancer Sister 46        breast cancer     Skin Cancer Sister         BCC           Reviewed and updated as needed this visit by Provider         Review of Systems   ROS COMP: Constitutional, HEENT, cardiovascular, pulmonary, GI, , musculoskeletal, neuro, skin, endocrine and psych systems are negative, except as otherwise noted.      Objective    /76 (BP Location: Right arm, Patient Position: Sitting, Cuff Size: Adult Large)   Pulse 70   Temp 98  F (36.7  C) (Oral)   Ht 1.676 m (5' 6\")   Wt 110.5 kg (243 lb 8 oz)   SpO2 97%   BMI 39.30 kg/m    Body mass index is 39.3 kg/m .  Physical Exam   GENERAL: healthy, alert and no distress  EYES: Eyes grossly normal to inspection, PERRL and conjunctivae and sclerae normal  NECK: no adenopathy, no asymmetry, masses, or scars and thyroid normal to palpation  RESP: lungs clear to auscultation - no rales, rhonchi or wheezes  CV: regular rate and rhythm, normal S1 S2, no S3 or S4, no murmur, click or rub, no peripheral edema and peripheral pulses strong  ABDOMEN: soft, nontender, no hepatosplenomegaly, no masses and bowel sounds normal  MS: no gross musculoskeletal defects noted, no edema  SKIN: no suspicious lesions or rashes  NEURO: Normal strength and tone, mentation " "intact and speech normal  PSYCH: mentation appears normal, affect normal/bright    Diagnostic Test Results:  Labs reviewed in Epic        Assessment & Plan       ICD-10-CM    1. Localization-related focal epilepsy with simple partial seizures (H) G40.109 Oxcarbazepine level     **Comprehensive metabolic panel FUTURE anytime     Phenytoin level     Phenytoin free   2. Diarrhea, unspecified type R19.7 Enteric Bacteria and Virus Panel by [facility] Stool     Clostridium difficile Toxin B PCR     TSH with free T4 reflex   3. Paresthesias R20.2 Vitamin D Deficiency     Vitamin B12   4. Anxiety F41.9 ALPRAZolam (XANAX) 0.5 MG tablet        BMI:   Estimated body mass index is 39.3 kg/m  as calculated from the following:    Height as of this encounter: 1.676 m (5' 6\").    Weight as of this encounter: 110.5 kg (243 lb 8 oz).   Weight management plan: Discussed healthy diet and exercise guidelines        See Patient Instructions    A TOTAL OF 25 MINUTES was spent evaluating patient with greater than 50% of time in direct patient contact and counseling of patient of above problems.      Return in about 3 months (around 11/1/2019).    Eligio Bah MD  [facility]      "

## 2019-08-01 NOTE — PATIENT INSTRUCTIONS
1) Hold the fiber supplement    2) Stool testing downstairs    3) Ordered some labs to check on causes of tingling in the lips    4) Let's try return to work at 1/2 days for at least a week, then we can consider full days if working well.    Eligio Bah MD

## 2019-08-02 ENCOUNTER — HOSPITAL ENCOUNTER (OUTPATIENT)
Dept: OCCUPATIONAL THERAPY | Facility: CLINIC | Age: 59
End: 2019-08-02
Payer: COMMERCIAL

## 2019-08-02 DIAGNOSIS — R19.7 DIARRHEA, UNSPECIFIED TYPE: ICD-10-CM

## 2019-08-02 LAB
10OH-CARBAZEPINE SERPL-MCNC: 18.3 UG/ML (ref 10–35)
ALBUMIN SERPL-MCNC: 3.7 G/DL (ref 3.4–5)
ALP SERPL-CCNC: 157 U/L (ref 40–150)
ALT SERPL W P-5'-P-CCNC: 34 U/L (ref 0–70)
ANION GAP SERPL CALCULATED.3IONS-SCNC: 7 MMOL/L (ref 3–14)
AST SERPL W P-5'-P-CCNC: 21 U/L (ref 0–45)
BILIRUB SERPL-MCNC: 0.2 MG/DL (ref 0.2–1.3)
BUN SERPL-MCNC: 18 MG/DL (ref 7–30)
C DIFF TOX B STL QL: NEGATIVE
CALCIUM SERPL-MCNC: 9.2 MG/DL (ref 8.5–10.1)
CHLORIDE SERPL-SCNC: 107 MMOL/L (ref 94–109)
CO2 SERPL-SCNC: 26 MMOL/L (ref 20–32)
CREAT SERPL-MCNC: 0.73 MG/DL (ref 0.66–1.25)
GFR SERPL CREATININE-BSD FRML MDRD: >90 ML/MIN/{1.73_M2}
GLUCOSE SERPL-MCNC: 113 MG/DL (ref 70–99)
PHENYTOIN FREE SERPL-MCNC: <0.5 UG/ML (ref 1–2)
POTASSIUM SERPL-SCNC: 3.9 MMOL/L (ref 3.4–5.3)
PROT SERPL-MCNC: 7.3 G/DL (ref 6.8–8.8)
SODIUM SERPL-SCNC: 140 MMOL/L (ref 133–144)
SPECIMEN SOURCE: NORMAL

## 2019-08-02 PROCEDURE — 87493 C DIFF AMPLIFIED PROBE: CPT | Mod: 59 | Performed by: INTERNAL MEDICINE

## 2019-08-02 PROCEDURE — 97535 SELF CARE MNGMENT TRAINING: CPT | Mod: GO | Performed by: OCCUPATIONAL THERAPIST

## 2019-08-02 PROCEDURE — 87506 IADNA-DNA/RNA PROBE TQ 6-11: CPT | Performed by: INTERNAL MEDICINE

## 2019-08-02 ASSESSMENT — ANXIETY QUESTIONNAIRES: GAD7 TOTAL SCORE: 2

## 2019-08-02 NOTE — PROGRESS NOTES
"Outpatient Occupational Therapy Discharge Note     Patient: Fabio Logan  : 1960    Beginning/End Dates of Reporting Period:  2019 to 2019    Referring Provider: Eligio Bah MD    Therapy Diagnosis: Decreased adls/Iadls    Client Self Report: I saw Dr. Bah yesterday and he wants a different neurologist to approve me for driving.  I have left messages with all of my doctors and I am waiting to hear back.  I would like to get back to work.  I am going to try to work half days for two weeks to ease back into my schedule.\"     Objective Measurements:             Objective Measure:    Details: from 7/15 R 66# L 62 #  an incresae of 21 #s on R and 9 #s of  strength on Left.  WNL   Objective Measure: 9HPT   Details: from 7/15 R 21 sec L 19 sec An increase of 2 seconds on R and 3 seconds on L  WNL   Objective Measure: Box and Block   Details:  from  R 69  L 64  WNL   increase of 15 blocks on R and increase of 12 blocks on L   Objective Measure: Dynavision   Details: from  Mode B divided attention:  43 10/10  WNL where WNL is 35 and 9-10/10 or greater.   Objective Measure: Symbol Digit Modalities Test   Details: from  44 WNL compared to age and education level        Goals:     Goal Identifier HEP   Goal Description Pt will demonstrate good follow through at home of a B UE HEP for strength  and  coordination with SBA and min cues to increase functional strength and coordination while maximizing  independence and performance of ADL/IADLs.   Target Date 19   Date Met  19   Progress: Pt completed HEP regularly with progress noted in objective measures above.      Goal Identifier Safety with community mobility   Goal Description Patient will demonstrate WFLs visual scanning (organized scanning pattern) and visual reaction time skills using compensatory strategies prn, for safe independent community mobility and increased ability tolerate work tasks by scoring WNLs on all modes " of the Dynavison and pen/paper scanning tasks.   Target Date 08/30/19   Date Met  08/02/19   Progress: Pt made gains with reaction time and visual scanning skills needed for safe driving/community mobilty, scoring WNL on all IADL box assessments.     Goal Identifier AE for increased ease of handwriting and other ADLs/IADLs   Goal Description For improvement and increased ease with handwriting and other fmc tasks, patient will demonstrate use of adaptive equipment and or adapted techniques for  everyday skills.    Target Date 08/30/19   Date Met  08/02/19   Progress:Goal Met.  Pt instructed in handwriting techniques/ and given adaptive  for pen/pencil for increased ease with writing     Goal Identifier Numerical reasoing/problem solving   Goal Description Patient will demonstrate the ability to complete moderately complex tasks requiring numerical reasoning, problem solving and new learning skills with 90% accuracy to complete financial, home and community tasks accurately, for maximum independence to function at or near baseline at home, at    Target Date 08/30/19   Date Met  08/02/19   Progress:Pt oompleted 7 fxl math pages that became increasingly more challenging with various levels of accuracy.  Pt completed multi task/memory with 100% accuracy,  And demonstrated none to mild deficits in concrete-complex problem solving on modified CAM.  Pt has moderate deficits with visual memory.        Progress Toward Goals:   Progress this reporting period: Pt seen for 9 OT visits and completed 4/4 OT goals.  See progress above in goal and objective measures sections. Pts physicians sent inbasket messages updating pts progress with safety for community mobility    Plan:  Discharge from therapy.    Discharge:    Reason for Discharge: Patient has met all goals.    Equipment Issued: putty    Discharge Plan: Patient to continue home program as needed to maintain strength and coordination

## 2019-08-06 ENCOUNTER — TELEPHONE (OUTPATIENT)
Dept: PEDIATRICS | Facility: CLINIC | Age: 59
End: 2019-08-06

## 2019-08-13 ENCOUNTER — AMBULATORY - HEALTHEAST (OUTPATIENT)
Dept: NEUROSURGERY | Facility: CLINIC | Age: 59
End: 2019-08-13

## 2019-08-15 ENCOUNTER — PATIENT OUTREACH (OUTPATIENT)
Dept: CARE COORDINATION | Facility: CLINIC | Age: 59
End: 2019-08-15

## 2019-08-15 NOTE — PROGRESS NOTES
Clinic Care Coordination Contact    Follow Up Progress Note      Assessment: RN CC outreached to patient to assess his ongoing post neurosurgical rehab and goal of returning to work. The patient reports he returned to work 1/2 days on August 1st and has since increased this to 6 hour days and will anticipate before the end of the month going to full days.   He denies headache pain and has a follow up CT Scan scheduled for 9/3/19 to monitor his hematoma.  He is beginning to wean phenytoin under the direction of his neuro team and feels his emotions and mood are much more stable.     Goals addressed this encounter:   Goals Addressed                 This Visit's Progress       Patient Stated      Functional (pt-stated)   On track     Goal Statement: I want fully recover from my recent surgery so that I may return to work and resume activities such as driving  Measure of Success: Evidenced by patient's completion of post-acute rehab therapy (Home Health Care) and recovery with ability to return to prior work  Supportive Steps to Achieve:   Patient will complete all recommended home care therapies and continue to implement home therapy regimens and/or outpatient therapy   Barriers: Recent brain surgery, currently on seizure medications and unable to drive/access to ongoing outpatient appointments a concern  Strengths: Very engaged and supportive family, patient is motivated to continue progress towards full recovery  Date to Achieve By: 4 months: October 5th, 2019                 Intervention/Education provided during outreach: Reinforced patient's slow progression to return to work and other activities rather than a quick escalation.      Outreach Frequency: monthly    Plan:   RN CC will place patient in Maintenance Care Coordination status and outreach in follow up once more in 2 months before graduation.       Care Coordinator will follow up in 2 months.     Andreia Goodwin RN   Clinic Care Coordinator-[facility]  PH: [phone]

## 2019-08-29 ENCOUNTER — OFFICE VISIT (OUTPATIENT)
Dept: PSYCHOLOGY | Facility: CLINIC | Age: 59
End: 2019-08-29
Payer: COMMERCIAL

## 2019-08-29 DIAGNOSIS — F43.23 ADJUSTMENT DISORDER WITH MIXED ANXIETY AND DEPRESSED MOOD: Primary | ICD-10-CM

## 2019-08-29 PROCEDURE — 90791 PSYCH DIAGNOSTIC EVALUATION: CPT | Performed by: SOCIAL WORKER

## 2019-08-29 ASSESSMENT — COLUMBIA-SUICIDE SEVERITY RATING SCALE - C-SSRS
TOTAL  NUMBER OF INTERRUPTED ATTEMPTS LIFETIME: NO
6. HAVE YOU EVER DONE ANYTHING, STARTED TO DO ANYTHING, OR PREPARED TO DO ANYTHING TO END YOUR LIFE?: NO
TOTAL  NUMBER OF INTERRUPTED ATTEMPTS PAST 3 MONTHS: NO
2. HAVE YOU ACTUALLY HAD ANY THOUGHTS OF KILLING YOURSELF LIFETIME?: YES
ATTEMPT PAST THREE MONTHS: NO
6. HAVE YOU EVER DONE ANYTHING, STARTED TO DO ANYTHING, OR PREPARED TO DO ANYTHING TO END YOUR LIFE?: NO
5. HAVE YOU STARTED TO WORK OUT OR WORKED OUT THE DETAILS OF HOW TO KILL YOURSELF? DO YOU INTEND TO CARRY OUT THIS PLAN?: NO
5. HAVE YOU STARTED TO WORK OUT OR WORKED OUT THE DETAILS OF HOW TO KILL YOURSELF? DO YOU INTEND TO CARRY OUT THIS PLAN?: NO
4. HAVE YOU HAD THESE THOUGHTS AND HAD SOME INTENTION OF ACTING ON THEM?: NO
4. HAVE YOU HAD THESE THOUGHTS AND HAD SOME INTENTION OF ACTING ON THEM?: NO
ATTEMPT LIFETIME: NO
TOTAL  NUMBER OF ABORTED OR SELF INTERRUPTED ATTEMPTS PAST 3 MONTHS: NO
2. HAVE YOU ACTUALLY HAD ANY THOUGHTS OF KILLING YOURSELF?: NO
1. IN THE PAST MONTH, HAVE YOU WISHED YOU WERE DEAD OR WISHED YOU COULD GO TO SLEEP AND NOT WAKE UP?: NO
REASONS FOR IDEATION LIFETIME: MOSTLY TO END OR STOP THE PAIN (YOU COULDN'T GO ON LIVING WITH THE PAIN OR HOW YOU WERE FEELING)
3. HAVE YOU BEEN THINKING ABOUT HOW YOU MIGHT KILL YOURSELF?: YES
1. IN THE PAST MONTH, HAVE YOU WISHED YOU WERE DEAD OR WISHED YOU COULD GO TO SLEEP AND NOT WAKE UP?: YES
TOTAL  NUMBER OF ABORTED OR SELF INTERRUPTED ATTEMPTS PAST LIFETIME: NO

## 2019-08-29 ASSESSMENT — ANXIETY QUESTIONNAIRES
3. WORRYING TOO MUCH ABOUT DIFFERENT THINGS: SEVERAL DAYS
5. BEING SO RESTLESS THAT IT IS HARD TO SIT STILL: SEVERAL DAYS
7. FEELING AFRAID AS IF SOMETHING AWFUL MIGHT HAPPEN: NOT AT ALL
GAD7 TOTAL SCORE: 6
6. BECOMING EASILY ANNOYED OR IRRITABLE: MORE THAN HALF THE DAYS
2. NOT BEING ABLE TO STOP OR CONTROL WORRYING: SEVERAL DAYS
1. FEELING NERVOUS, ANXIOUS, OR ON EDGE: SEVERAL DAYS
IF YOU CHECKED OFF ANY PROBLEMS ON THIS QUESTIONNAIRE, HOW DIFFICULT HAVE THESE PROBLEMS MADE IT FOR YOU TO DO YOUR WORK, TAKE CARE OF THINGS AT HOME, OR GET ALONG WITH OTHER PEOPLE: SOMEWHAT DIFFICULT

## 2019-08-29 ASSESSMENT — PATIENT HEALTH QUESTIONNAIRE - PHQ9
SUM OF ALL RESPONSES TO PHQ QUESTIONS 1-9: 10
5. POOR APPETITE OR OVEREATING: NOT AT ALL
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
## 2019-08-29 NOTE — Clinical Note
Ar Bah, Eligio Caban,Your patient presented to [facility] for a diagnostic evaluation today.  They will be beginning individual therapy with me.  Please do not hesitate to contact me if you have any questions in regards to Fabio Logan's care.    Yolette Jimenez, [facility]

## 2019-08-30 ASSESSMENT — ANXIETY QUESTIONNAIRES: GAD7 TOTAL SCORE: 6

## 2019-09-03 ENCOUNTER — HOSPITAL ENCOUNTER (OUTPATIENT)
Dept: CT IMAGING | Facility: CLINIC | Age: 59
Discharge: HOME | End: 2019-09-03
Attending: NEUROLOGICAL SURGERY

## 2019-09-03 ENCOUNTER — OFFICE VISIT - HEALTHEAST (OUTPATIENT)
Dept: NEUROSURGERY | Facility: CLINIC | Age: 59
End: 2019-09-03

## 2019-09-03 DIAGNOSIS — S06.5XAA SDH (SUBDURAL HEMATOMA) (H): ICD-10-CM

## 2019-09-03 ASSESSMENT — MIFFLIN-ST. JEOR: SCORE: 1841.38

## 2019-09-10 ENCOUNTER — OFFICE VISIT (OUTPATIENT)
Dept: PSYCHIATRY | Facility: CLINIC | Age: 59
End: 2019-09-10
Attending: PEDIATRICS
Payer: COMMERCIAL

## 2019-09-10 VITALS
DIASTOLIC BLOOD PRESSURE: 80 MMHG | BODY MASS INDEX: 39.22 KG/M2 | SYSTOLIC BLOOD PRESSURE: 128 MMHG | HEART RATE: 66 BPM | WEIGHT: 243 LBS | OXYGEN SATURATION: 96 % | RESPIRATION RATE: 12 BRPM

## 2019-09-10 DIAGNOSIS — F32.0 MILD MAJOR DEPRESSION (H): Primary | ICD-10-CM

## 2019-09-10 PROCEDURE — 90792 PSYCH DIAG EVAL W/MED SRVCS: CPT | Performed by: NURSE PRACTITIONER

## 2019-09-10 RX ORDER — PHENYTOIN SODIUM 100 MG/1
CAPSULE, EXTENDED RELEASE ORAL
COMMUNITY
Start: 2019-08-17 | End: 2020-02-06

## 2019-09-10 NOTE — PATIENT INSTRUCTIONS
-- Continue Zoloft at 150 mg daily    -- Continue Seroquel 25 mg to 50 mg (half to full tab) nightly for sleep    -- Hold off on using Xanax for sleep. Only use for high anxiety episodes.    -- Continue therapy
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
## 2019-10-08 ENCOUNTER — TELEPHONE (OUTPATIENT)
Dept: PSYCHOLOGY | Facility: CLINIC | Age: 59
End: 2019-10-08

## 2019-10-08 NOTE — TELEPHONE ENCOUNTER
"Therapist LVM for patient to follow up on MyChart note which client indicated, \"Thanks for I have chosen a different path for this recovery.\" Therapist informed client he is welcome to schedule with this therapist or another therapist with [facility]. Therapist informed patient, if the office has not heard from patient by end of day 10.9.19, therapist will close his chart though he is welcome to return in the future.  "

## 2019-10-10 ENCOUNTER — FCC EXTENDED DOCUMENTATION (OUTPATIENT)
Dept: PSYCHOLOGY | Facility: CLINIC | Age: 59
End: 2019-10-10

## 2019-10-10 NOTE — PROGRESS NOTES
"                      Discharge Summary  Single Session    Client Name: Fabio Logan MRN#: [id number] YOB: 1960      Intake / Discharge Date: Intake: 19 Discharge: 10/10/19      DSM5 Diagnoses: (Sustained by DSM5 Criteria Listed Above)  Diagnoses:  Adjustment Disorders  309.28 (F43.23) With mixed anxiety and depressed mood   Therapist will continue to assess for mood disorders  Psychosocial & Contextual Factors: Recovering from brain surgery  WHODAS 2.0 (12 item) Score: 22          Presenting Concern:  Client reports the reason for seeking therapy at this time as support in dealing with emotions and anxiety after significant life events this year. Client reports he had brain surgery for an aneurysm in May 2019. Client reported he has experienced stress in his healing process, that he has been required to attend many appointments including speech, OT, and PT as part of his recovery. Client reported earlier this summer he had an \"emotional breakdown\" for which he was hospitalized inpatient. Client reports his medications at the time as part of his recovery from surgery had adverse side effects which contribute to his level of anxiety and overwhelm.     Client reports since the surgery, he needs more time to slow down and process his thoughts. Client reports there is increased worry regarding fiances and medical bills. Client reports ongoing stress in dealing with the fact that he could have , had the surgery gone poorly or had the aneurysm not been detected. Client reports a close friend is hospitalized with a brain hemorage and client reports thoughts of \"that could have been me.\"     Client also reports stress as he navigates his relationship of 4 years, noting that it takes patience to navigate their stage in life together and his partner's care taking roles of her family members.     Client reports stress in returning to work.       Client stated that his symptoms have resulted in the " "following functional impairments: health maintenance, operation of a motor vehicle, relationship(s), self-care and work / vocational responsibilities       Reason for Discharge:  Client did not return      Disposition at Time of Last Encounter:   Comments:   Therapist received message via [employer] when patient cancelled his appointment that was scheduled for 10/9/19 which stated, \"Thanks for I have chosen a different path for this recovery.\" Therapist followed up with a voicemail to clarify patient's interest in services. Therapist requested a call back before end of day 10/9/19 if wishing to schedule with this therapist or another therapist. No response was received, thus client is being discharged.     Risk Management per assessment 8/29/19:   Client has had a history of suicidal ideation: Client reports a history of suicidal ideation. Per client's chart he was admitted to inpatient hospitalization 6/29/19 when he was overwhelmed with the number of tasks and responsibilities following his surgery. Per chart, client reported thoughts of overdosing on medications and did not feel safe being at his home. Client reported the incident was about not feeling like he could handle or wanted to deal with the stress he was facing.     Today client reports, \"It scares me that people think about that\" referring to suicide and \"There is so much to live for.\" Client reported he did not think he could have actually harmed himself due to his nishant, though reported understanding that he needed help to deal with feeling overwhelmed.    Patient denies a history of suicide attempts, self-injurious behavior, homicidal ideation, homicidal behavior and and other safety concerns  Recommended that patient call 911 or go to the local ED should there be a change in any of these risk factors.      Referred To:  No referrals made at this time. Patient is welcome to return to [facility] in the future.        Yolette Jimenez, " LICSW   10/10/2019

## 2019-10-22 ENCOUNTER — PATIENT OUTREACH (OUTPATIENT)
Dept: CARE COORDINATION | Facility: CLINIC | Age: 59
End: 2019-10-22

## 2019-10-22 NOTE — PROGRESS NOTES
Clinic Care Coordination Contact    Assessment: Care Coordinator reviewed chart and patient plan of care for 2 month follow up.  Patient has continued to follow the plan of care and assessment is negative for any new needs or concerns.    Enrollment status: Graduated.      Plan: No further outreaches at this time.  Patient will continue to follow the plan of care.  If new needs arise a new Care Coordination referral may be placed.      Andreia Goodwin RN   Clinic Care Coordinator-[facility]  PH: [phone]

## 2019-11-04 ENCOUNTER — OFFICE VISIT (OUTPATIENT)
Dept: URGENT CARE | Facility: URGENT CARE | Age: 59
End: 2019-11-04
Payer: COMMERCIAL

## 2019-11-04 VITALS
HEIGHT: 66 IN | BODY MASS INDEX: 38.89 KG/M2 | TEMPERATURE: 97.4 F | OXYGEN SATURATION: 98 % | HEART RATE: 69 BPM | DIASTOLIC BLOOD PRESSURE: 88 MMHG | WEIGHT: 242 LBS | SYSTOLIC BLOOD PRESSURE: 147 MMHG

## 2019-11-04 DIAGNOSIS — R30.0 DYSURIA: Primary | ICD-10-CM

## 2019-11-04 DIAGNOSIS — Z11.3 SCREEN FOR STD (SEXUALLY TRANSMITTED DISEASE): ICD-10-CM

## 2019-11-04 DIAGNOSIS — N34.2 URETHRITIS: ICD-10-CM

## 2019-11-04 LAB
ALBUMIN UR-MCNC: NEGATIVE MG/DL
APPEARANCE UR: CLEAR
BACTERIA #/AREA URNS HPF: ABNORMAL /HPF
BILIRUB UR QL STRIP: NEGATIVE
COLOR UR AUTO: YELLOW
GLUCOSE UR STRIP-MCNC: NEGATIVE MG/DL
HGB UR QL STRIP: ABNORMAL
KETONES UR STRIP-MCNC: NEGATIVE MG/DL
LEUKOCYTE ESTERASE UR QL STRIP: NEGATIVE
NITRATE UR QL: NEGATIVE
PH UR STRIP: 5.5 PH (ref 5–7)
RBC #/AREA URNS AUTO: ABNORMAL /HPF
SOURCE: ABNORMAL
SP GR UR STRIP: 1.02 (ref 1–1.03)
UROBILINOGEN UR STRIP-ACNC: 0.2 EU/DL (ref 0.2–1)
WBC #/AREA URNS AUTO: ABNORMAL /HPF

## 2019-11-04 PROCEDURE — 99214 OFFICE O/P EST MOD 30 MIN: CPT | Performed by: FAMILY MEDICINE

## 2019-11-04 PROCEDURE — 87591 N.GONORRHOEAE DNA AMP PROB: CPT | Performed by: FAMILY MEDICINE

## 2019-11-04 PROCEDURE — 87491 CHLMYD TRACH DNA AMP PROBE: CPT | Performed by: FAMILY MEDICINE

## 2019-11-04 PROCEDURE — 81001 URINALYSIS AUTO W/SCOPE: CPT | Performed by: FAMILY MEDICINE

## 2019-11-04 PROCEDURE — 87086 URINE CULTURE/COLONY COUNT: CPT | Performed by: FAMILY MEDICINE

## 2019-11-04 RX ORDER — DOXYCYCLINE 100 MG/1
100 CAPSULE ORAL 2 TIMES DAILY
Qty: 20 CAPSULE | Refills: 0 | Status: SHIPPED | OUTPATIENT
Start: 2019-11-04 | End: 2020-05-10

## 2019-11-04 ASSESSMENT — MIFFLIN-ST. JEOR: SCORE: 1855.45

## 2019-11-04 NOTE — PATIENT INSTRUCTIONS
Patient Education     Urethritis in Men     An inflamed urethra can cause pain during urination.   The urethra is the tube that runs from the bladder through the penis. When the urethra is inflamed, it is called urethritis. The urethra becomes swollen and causes burning pain when you urinate. Other symptoms of urethritis may include itching or tingling of the penis or pus discharge from the penis. You may also have pain with sex and masturbation. Some men may not have symptoms.  What causes urethritis?  Urethritis can be caused by a bacterial or viral infection. Such an infection can lead to conditions such as a urinary tract infection (UTI) or sexually transmitted diseases (STD). Urethritis can also be caused by injury or sensitivity or allergy to chemicals in lotions and other products.  How is urethritis diagnosed?  Your healthcare provider will examine you and ask about your symptoms and health history. You may also have one or more of the following tests:    Urine test to take samples of urine and have them checked for problems.    Blood test to take a sample of blood and have it checked for problems.    Urethral discharge to take a sample of fluid from inside the urethra. A cotton swab is inserted into the opening of the penis and into the urethra.    Cystoscopy to allow the healthcare provider to look for problems in the urinary tract. The test uses a thin, flexible telescope called a cystoscope with a light and camera attached. The scope is inserted into the urethra.  How is urethritis treated?  Treatment depends on the cause of urethritis. If it s due to a bacterial infection, antibiotics (medicines that fight infection) will be given. Your healthcare provider can tell you more about your treatment options. In the meantime, your symptoms can be treated. To relieve pain and swelling, anti-inflammatory medications, such as ibuprofen, may be given. Untreated, symptoms may get worse. Also, scar tissue can  form in the urethra, causing it to narrow.  When to call your healthcare provider   Call your healthcare provider right away if you have any of the following:    Fever of 100.4 F (38.0 C) or higher     Blood in the urine or semen    Burning pain with urination    Increased urge to urinate    Discharge from the penis    Itching, tenderness, or swelling in the penis or groin    Pain during sex or masturbation   Preventing STDs  When it comes to sex, it s important to take care and be safe. Any sexual contact with the penis, vagina, anus, or mouth can spread an STD. The only sure way to prevent STDs is not having sex. But there are ways to make sex safer. Use a latex condom each time you have sex. And talk to your partner about STDs before you have sex.  Date Last Reviewed: 1/1/2017 2000-2018 [employer] [address] [place] [address]. All rights reserved. This information is not intended as a substitute for professional medical care. Always follow your healthcare professional's instructions.

## 2019-11-05 LAB
BACTERIA SPEC CULT: NORMAL
BACTERIA SPEC CULT: NORMAL
SPECIMEN SOURCE: NORMAL

## 2019-11-05 NOTE — PROGRESS NOTES
SUBJECTIVE:  Chief Complaint   Patient presents with     Urgent Care     Pt in clinic to have eval for dysuria     Dysuria        Fabio Logan is a 59 year old male who  presents today for a possible UTI. Symptoms of dysuria, urgency, frequency and burning have been going on for 1day(s).  Hematuria no.  gradual onset, still present and worsening and moderate.  There is no history of fever, chills, nausea or vomiting.  No history of  penile discharge. This patient does not have a history of urinary tract infections. Patient denies long duration, rigors, flank pain, temperature > 101 degrees F. and Vomiting, significant nausea or diarrhea      Has had negative exam in the past with urology for microscopic hematuria    Would like screen for GC/ chlamydia    Past Medical History:   Diagnosis Date     Anxiety      Depression      Hypertension      Mumps      Testicular cancer - Right 1998     Patient Active Problem List   Diagnosis     CARDIOVASCULAR SCREENING; LDL GOAL LESS THAN 160     Mild major depression (H)     Obese     HTN, goal below 140/90     H/O testicular cancer     Diverticulosis of large intestine     Esophageal reflux     Chest pain     Migraine     Skin cancer screening     Skin eruption     Lentigines     Chronic dermatitis     ACP (advance care planning)     Mild intermittent asthma without complication     Essential hypertension with goal blood pressure less than 140/90     Major depression in complete remission (H)     Impaired fasting glucose     Malignant neoplasm of testis, unspecified laterality, unspecified whether descended or undescended (H)     Morbid obesity (H)     Symptomatic cholelithiasis     Partial seizure (H)       ALLERGIES:  Iodine-131    MEDs  albuterol (PROAIR HFA/PROVENTIL HFA/VENTOLIN HFA) 108 (90 BASE) MCG/ACT Inhaler, Inhale 2 puffs into the lungs every 6 hours as needed for shortness of breath / dyspnea or wheezing  ALPRAZolam (XANAX) 0.5 MG tablet, Take 1-2 tablets  (0.5-1 mg) by mouth daily as needed for anxiety  atenolol (TENORMIN) 50 MG tablet, TAKE ONE TABLET BY MOUTH ONE TIME DAILY   fluticasone (FLONASE) 50 MCG/ACT nasal spray, SPRAY 1 TO 2 SPRAYS INTO BOTH NOSTRISL DAILY  losartan (COZAAR) 25 MG tablet, Take 1 tablet (25 mg) by mouth daily  omeprazole (PRILOSEC) 20 MG DR capsule, Take 1 capsule (20 mg) by mouth daily as needed  OXcarbazepine (TRILEPTAL) 300 MG tablet, Take 450 mg by mouth 2 times daily  phenytoin (DILANTIN) 100 MG capsule, TAKE 2 CAPSULES BY MOUTH DAILY FOR 3 WEEKS THEN DECREASE TO 1 CAPSULE DAILY FOR 3 WEEKS. THEN DISCONTINUE  QUEtiapine (SEROQUEL) 50 MG tablet, Take 1 tablet (50 mg) by mouth At Bedtime  sertraline (ZOLOFT) 100 MG tablet, Take 1.5 tablets (150 mg) by mouth daily  SUMAtriptan (IMITREX) 50 MG tablet, Take 1 tablet (50 mg) by mouth at onset of headache for migraine (may repeat dose in 2 hours. Do not exceed 200mg in 24 hours)  triamcinolone (KENALOG) 0.1 % ointment, Apply topically 2 times daily as needed for irritation  [] traZODone (DESYREL) 50 MG tablet, Take 50 mg by mouth    No current facility-administered medications on file prior to visit.       Social History     Tobacco Use     Smoking status: Former Smoker     Last attempt to quit: 1997     Years since quittin.4     Smokeless tobacco: Former User   Substance Use Topics     Alcohol use: No     Frequency: Never     Drinks per session: Patient refused     Binge frequency: Patient refused       Family History   Problem Relation Age of Onset     Cerebrovascular Disease Mother      Hypertension Mother      Heart Disease Father         had 2 open heart surgery     Lipids Father      Hypertension Father      Cancer Maternal Grandfather         skin cancer     Skin Cancer Maternal Grandfather         skin cancer     Heart Disease Sister 45        heart attack     Cancer Sister 46        breast cancer     Skin Cancer Sister         BCC       ROS:   CONSTITUTIONAL:NEGATIVE  "for fever, chills,   INTEGUMENTARY/SKIN: NEGATIVE for worrisome rashes,   or lesions  EYES: NEGATIVE for vision changes or irritation  ENT/MOUTH: NEGATIVE for ear, mouth and throat problems  RESP:NEGATIVE for significant cough or SOB    OBJECTIVE:  BP (!) 147/88   Pulse 69   Temp 97.4  F (36.3  C) (Oral)   Ht 1.676 m (5' 6\")   Wt 109.8 kg (242 lb)   SpO2 98%   BMI 39.06 kg/m    GENERAL APPEARANCE: healthy, alert and no distress  RESP: lungs clear to auscultation - no rales, rhonchi or wheezes  CV: regular rates and rhythm, normal S1 S2, no murmur noted  ABDOMEN:  soft, nontender, no HSM or masses and bowel sounds normal  BACK: No CVA tenderness  SKIN: no suspicious lesions or rashes    Results for orders placed or performed in visit on 11/04/19   *UA reflex to Microscopic and Culture ([facility] (except [place] and [place])     Status: Abnormal   Result Value Ref Range    Color Urine Yellow     Appearance Urine Clear     Glucose Urine Negative NEG^Negative mg/dL    Bilirubin Urine Negative NEG^Negative    Ketones Urine Negative NEG^Negative mg/dL    Specific Gravity Urine 1.025 1.003 - 1.035    Blood Urine Moderate (A) NEG^Negative    pH Urine 5.5 5.0 - 7.0 pH    Protein Albumin Urine Negative NEG^Negative mg/dL    Urobilinogen Urine 0.2 0.2 - 1.0 EU/dL    Nitrite Urine Negative NEG^Negative    Leukocyte Esterase Urine Negative NEG^Negative    Source Midstream Urine    Urine Microscopic     Status: Abnormal   Result Value Ref Range    WBC Urine 0 - 5 OTO5^0 - 5 /HPF    RBC Urine 2-5 (A) OTO2^O - 2 /HPF    Bacteria Urine Few (A) NEG^Negative /HPF       ASSESSMENT:   Dysuria     - *UA reflex to Microscopic and Culture ([facility] (except [place] and [place])  - Urine Microscopic  - Urine Culture Aerobic Bacterial    Urethritis     - doxycycline hyclate (VIBRAMYCIN) 100 MG capsule; Take 1 capsule (100 mg) by mouth 2 times daily for 10 days  - NEISSERIA GONORRHOEA PCR  - " CHLAMYDIA TRACHOMATIS PCR     We discussed that the urine sample had no/  Minimal signs of pus, blood , bacteria that are usually found in a urinary tract infection.   Other possible causes of dysuria were discussed including irritation from concentrated urine or crystals in the urine,   STD's or urethritis infection       We discussed that a urine culture is being performed and that if a bacteria is detected in the urine he/she will be called to start an antibiotic to treat the infection.    We discussed that dysuria with a negative UA could be present in early UTI or could be consistent with a urethritis caused by mycoplasma, ureaplasma or chlamydia. he was advised that there is no test to detect mycoplasma or ureaplasma, so empiric therapy with doxycyline is a method of diagnosis and treatment.        Patient will do testing for GC/ chlamydia today     he expressed desire to treat for a urethritis and was given doxycycline RX       Screen for STD (sexually transmitted disease)     - NEISSERIA GONORRHOEA PCR  - CHLAMYDIA TRACHOMATIS PCR    Will be called if test positive with treatment

## 2019-11-06 LAB
C TRACH DNA SPEC QL NAA+PROBE: NEGATIVE
N GONORRHOEA DNA SPEC QL NAA+PROBE: NEGATIVE
SPECIMEN SOURCE: NORMAL
SPECIMEN SOURCE: NORMAL

## 2019-11-18 DIAGNOSIS — N39.41 URGENCY INCONTINENCE: Primary | ICD-10-CM

## 2019-11-20 ENCOUNTER — DOCUMENTATION ONLY (OUTPATIENT)
Dept: OTHER | Facility: CLINIC | Age: 59
End: 2019-11-20

## 2019-11-20 ENCOUNTER — AMBULATORY - HEALTHEAST (OUTPATIENT)
Dept: OTHER | Facility: CLINIC | Age: 59
End: 2019-11-20

## 2019-12-15 ENCOUNTER — HEALTH MAINTENANCE LETTER (OUTPATIENT)
Age: 59
End: 2019-12-15

## 2020-01-11 DIAGNOSIS — F32.A DEPRESSION, UNSPECIFIED DEPRESSION TYPE: Primary | ICD-10-CM

## 2020-01-13 RX ORDER — TRAZODONE HYDROCHLORIDE 50 MG/1
50 TABLET, FILM COATED ORAL AT BEDTIME
Qty: 30 TABLET | Refills: 0 | Status: SHIPPED | OUTPATIENT
Start: 2020-01-13 | End: 2020-02-06

## 2020-01-13 NOTE — TELEPHONE ENCOUNTER
"Routing refill request to provider for review/approval because:  Drug not active on patient's medication list      Requested Prescriptions   Pending Prescriptions Disp Refills     traZODone (DESYREL) 50 MG tablet       Sig: Take 1 tablet (50 mg) by mouth   Last Written Prescription Date:  6/28/2019  Last Fill Quantity: NA,  # refills: NA   Last office visit: 8/1/2019 with prescribing provider:     Future Office Visit:   Next 5 appointments (look out 90 days)    Feb 06, 2020  3:40 PM CST  (Arrive by 3:20 PM)  Acute Visit with Manoj Armenta MD  [facility] [address] [place] [address]  [address]  [facility] [address]  [phone]             Serotonin Modulators Failed - 1/11/2020  6:51 PM        Failed - Medication is active on med list        Passed - Recent (12 mo) or future (30 days) visit within the authorizing provider's specialty     Patient has had an office visit with the authorizing provider or a provider within the authorizing providers department within the previous 12 mos or has a future within next 30 days. See \"Patient Info\" tab in inbasket, or \"Choose Columns\" in Meds & Orders section of the refill encounter.              Passed - Patient is age 18 or older      Michelle Alexander RN      "

## 2020-01-13 NOTE — TELEPHONE ENCOUNTER
Refill x 1 month until he see's Dr. Armenta.    (previous Dr. Bah patient).    Nicky Vieira MD  Internal Medicine/Pediatrics  [facility]

## 2020-02-06 ENCOUNTER — OFFICE VISIT (OUTPATIENT)
Dept: PEDIATRICS | Facility: CLINIC | Age: 60
End: 2020-02-06
Payer: COMMERCIAL

## 2020-02-06 VITALS
BODY MASS INDEX: 40.5 KG/M2 | TEMPERATURE: 97.8 F | RESPIRATION RATE: 20 BRPM | SYSTOLIC BLOOD PRESSURE: 134 MMHG | HEIGHT: 66 IN | DIASTOLIC BLOOD PRESSURE: 82 MMHG | WEIGHT: 252 LBS | HEART RATE: 72 BPM

## 2020-02-06 DIAGNOSIS — Z23 NEEDS FLU SHOT: ICD-10-CM

## 2020-02-06 DIAGNOSIS — Z00.00 ENCOUNTER FOR ROUTINE ADULT HEALTH EXAMINATION WITHOUT ABNORMAL FINDINGS: ICD-10-CM

## 2020-02-06 DIAGNOSIS — F33.0 MAJOR DEPRESSIVE DISORDER, RECURRENT EPISODE, MILD (H): ICD-10-CM

## 2020-02-06 DIAGNOSIS — I72.9 ANEURYSM (H): ICD-10-CM

## 2020-02-06 DIAGNOSIS — K21.9 GASTROESOPHAGEAL REFLUX DISEASE WITHOUT ESOPHAGITIS: ICD-10-CM

## 2020-02-06 DIAGNOSIS — I10 HTN, GOAL BELOW 140/90: Primary | ICD-10-CM

## 2020-02-06 DIAGNOSIS — F32.A DEPRESSION, UNSPECIFIED DEPRESSION TYPE: ICD-10-CM

## 2020-02-06 DIAGNOSIS — I10 ESSENTIAL HYPERTENSION WITH GOAL BLOOD PRESSURE LESS THAN 140/90: ICD-10-CM

## 2020-02-06 DIAGNOSIS — R56.9 SEIZURES (H): ICD-10-CM

## 2020-02-06 PROCEDURE — 90471 IMMUNIZATION ADMIN: CPT | Performed by: INTERNAL MEDICINE

## 2020-02-06 PROCEDURE — 99214 OFFICE O/P EST MOD 30 MIN: CPT | Mod: 25 | Performed by: INTERNAL MEDICINE

## 2020-02-06 PROCEDURE — 90682 RIV4 VACC RECOMBINANT DNA IM: CPT | Performed by: INTERNAL MEDICINE

## 2020-02-06 RX ORDER — TRAZODONE HYDROCHLORIDE 50 MG/1
50-100 TABLET, FILM COATED ORAL AT BEDTIME
Qty: 180 TABLET | Refills: 3 | Status: SHIPPED | OUTPATIENT
Start: 2020-02-06 | End: 2021-03-16

## 2020-02-06 RX ORDER — LOSARTAN POTASSIUM 25 MG/1
25 TABLET ORAL DAILY
Qty: 90 TABLET | Refills: 3 | Status: SHIPPED | OUTPATIENT
Start: 2020-02-06 | End: 2021-02-02

## 2020-02-06 RX ORDER — ATENOLOL 50 MG/1
50 TABLET ORAL DAILY
Qty: 90 TABLET | Refills: 3 | Status: SHIPPED | OUTPATIENT
Start: 2020-02-06 | End: 2021-02-02

## 2020-02-06 ASSESSMENT — ANXIETY QUESTIONNAIRES
1. FEELING NERVOUS, ANXIOUS, OR ON EDGE: NOT AT ALL
3. WORRYING TOO MUCH ABOUT DIFFERENT THINGS: NOT AT ALL
IF YOU CHECKED OFF ANY PROBLEMS ON THIS QUESTIONNAIRE, HOW DIFFICULT HAVE THESE PROBLEMS MADE IT FOR YOU TO DO YOUR WORK, TAKE CARE OF THINGS AT HOME, OR GET ALONG WITH OTHER PEOPLE: NOT DIFFICULT AT ALL
GAD7 TOTAL SCORE: 0
5. BEING SO RESTLESS THAT IT IS HARD TO SIT STILL: NOT AT ALL
6. BECOMING EASILY ANNOYED OR IRRITABLE: NOT AT ALL
7. FEELING AFRAID AS IF SOMETHING AWFUL MIGHT HAPPEN: NOT AT ALL
2. NOT BEING ABLE TO STOP OR CONTROL WORRYING: NOT AT ALL

## 2020-02-06 ASSESSMENT — PATIENT HEALTH QUESTIONNAIRE - PHQ9
SUM OF ALL RESPONSES TO PHQ QUESTIONS 1-9: 3
5. POOR APPETITE OR OVEREATING: NOT AT ALL

## 2020-02-06 ASSESSMENT — MIFFLIN-ST. JEOR: SCORE: 1900.81

## 2020-02-06 NOTE — PROGRESS NOTES
SUBJECTIVE:                                                    Fabio Logan is a 59 year old male who presents to clinic today for the following health issues:    Medication Followup of Atenolol    Taking Medication as prescribed: yes    Side Effects:  None    Medication Helping Symptoms:  yes     patient here for above and to reestablish care with me, some back issue sbut seeing chiropractor and this has been helping. was referred to physical therapy but hasn't gone yet, patient was told to see a neurologist after discharge but chose not to, doesn't think it would be helpful and they charge so much, but dos agree that we need help with management and plan for anticonvulsant medications. He got an interest free loan from [facility] for home improvements na has bene working with his neighbor on home projects, he enjoys this very much. taking all medications. does need some refills today and is willing to get his flu vaccine. No other concerns or complaints today.       Problem list and histories reviewed & adjusted, as indicated.  Additional history: as documented    Patient Active Problem List    Diagnosis Date Noted     Partial seizure (H) 06/03/2019     Priority: Medium     Following surgery for Right MCA aneurysm with hospitalization at [facility]       Symptomatic cholelithiasis 09/02/2018     Priority: Medium     Morbid obesity (H) 08/19/2018     Priority: Medium     Impaired fasting glucose 05/18/2018     Priority: Medium     Malignant neoplasm of testis, unspecified laterality, unspecified whether descended or undescended (H) 05/18/2018     Priority: Medium     Major depression in complete remission (H) 01/17/2018     Priority: Medium     Essential hypertension with goal blood pressure less than 140/90 09/26/2016     Priority: Medium     Mild intermittent asthma without complication 05/10/2016     Priority: Medium     Chronic dermatitis 11/16/2015     Priority: Medium     Skin cancer screening  10/30/2015     Priority: Medium     Skin eruption 10/30/2015     Priority: Medium     Lentigines 10/30/2015     Priority: Medium     Chest pain 2015     Priority: Medium     Esophageal reflux 10/14/2014     Priority: Medium     H/O testicular cancer 10/09/2014     Priority: Medium     Diverticulosis of large intestine 10/09/2014     Priority: Medium     Problem list name updated by automated process. Provider to review       Migraine 07/15/2014     Priority: Medium     Problem list name updated by automated process. Provider to review       HTN, goal below 140/90 2014     Priority: Medium     Mild major depression (H) 2013     Priority: Medium     Obese 2013     Priority: Medium     CARDIOVASCULAR SCREENING; LDL GOAL LESS THAN 160 02/10/2010     Priority: Medium     Social History     Tobacco Use     Smoking status: Former Smoker     Last attempt to quit: 1997     Years since quittin.6     Smokeless tobacco: Former User   Substance Use Topics     Alcohol use: No     Frequency: Never     Drinks per session: Patient refused     Binge frequency: Patient refused     Drug use: No     Family History   Problem Relation Age of Onset     Cerebrovascular Disease Mother      Hypertension Mother      Heart Disease Father         had 2 open heart surgery     Lipids Father      Hypertension Father      Cancer Maternal Grandfather         skin cancer     Skin Cancer Maternal Grandfather         skin cancer     Heart Disease Sister 45        heart attack     Cancer Sister 46        breast cancer     Skin Cancer Sister         BCC       ROS:  A complete 10 point review of systems was taken and negative except for those noted in the subjective/HPI section(s) above     BP Readings from Last 3 Encounters:   20 134/82   19 (!) 147/88   09/10/19 128/80    Wt Readings from Last 3 Encounters:   20 114.3 kg (252 lb)   19 109.8 kg (242 lb)   09/10/19 110.2 kg (243 lb)                 "    OBJECTIVE:                                                    /82   Pulse 72   Temp 97.8  F (36.6  C) (Oral)   Resp 20   Ht 1.676 m (5' 6\")   Wt 114.3 kg (252 lb)   BMI 40.67 kg/m     Wt Readings from Last 4 Encounters:   02/06/20 114.3 kg (252 lb)   11/04/19 109.8 kg (242 lb)   09/10/19 110.2 kg (243 lb)   08/01/19 110.5 kg (243 lb 8 oz)        Constitutional: healthy, alert and no distress  Psychiatric: mentation appears normal and affect normal/bright  Presentation- normal:Abstract reasoning  Attention and concentration  Coherency and relevance of thought  Dress, grooming, personal hygiene  Facial expression  Information  Judgment  Memory  Mood  Orientation  Perceptions  Posture and motor behavoir  Speech  Thought content  Vocabulary,abnormal:none    No results found for any visits on 02/06/20.         ASSESSMENT/PLAN:                                                        ICD-10-CM    1. HTN, goal below 140/90 I10 atenolol (TENORMIN) 50 MG tablet   2. Major depressive disorder, recurrent episode, mild (H) F33.0    3. Seizures (H) R56.9 NEUROLOGY ADULT REFERRAL   4. Aneurysm (H) I72.9 NEUROLOGY ADULT REFERRAL   5. Essential hypertension with goal blood pressure less than 140/90 I10 losartan (COZAAR) 25 MG tablet   6. Encounter for routine adult health examination without abnormal findings Z00.00 losartan (COZAAR) 25 MG tablet     omeprazole (PRILOSEC) 20 MG DR capsule   7. Gastroesophageal reflux disease without esophagitis K21.9 omeprazole (PRILOSEC) 20 MG DR capsule   8. Depression, unspecified depression type F32.9 traZODone (DESYREL) 50 MG tablet   9. Needs flu shot Z23 FLU VAC, QUADRIVALENT (RIV4) RECOMBINANT DNA, IM   discussed with patient (or patient's parents/caregiver) pathophysiology of condition and treatment options.  reviwed history in detail with patient as this is the first vist we have had together since his aneurism repair. reviwed neurosurgery's reccs as well as goals of care. " "[facility] and help us with management and plan for oxcarbazepine, etc. refilled other medications today, patient will cehck BPs at home and reviwed goal ranges with him today, plan to follow-up again in 3 months (appointment made). Patient verbalized understanding and is agreeable to this plan.       Estimated body mass index is 39.06 kg/m  as calculated from the following:    Height as of 11/4/19: 1.676 m (5' 6\").    Weight as of 11/4/19: 109.8 kg (242 lb).  Weight management plan: Discussed healthy diet and exercise guidelines    Return to clinic as needed or if symptoms persist, change, worsen or if any new symptoms develop.    Manoj Amrenta M.D.  Internal Medicine-Pediatrics              "

## 2020-02-07 ASSESSMENT — ANXIETY QUESTIONNAIRES: GAD7 TOTAL SCORE: 0

## 2020-02-27 NOTE — PATIENT INSTRUCTIONS
AFTER VISIT SUMMARY (AVS):    At today's visit we thoroughly discussed current symptoms, diagnosis, available treatment options, necessary evaluation, and the plan, which includes:  Orders Placed This Encounter   Procedures     CBC with platelets and differential     Comprehensive metabolic panel     Oxcarbazepine level     No medication changes.    [place] driving laws regarding spells of loss of consciousness or postural control were discussed.  No driving for 90 days after the last event.  Every episode should be reported to DMV by the  within 30 days.  Other safety precautions were discussed.    Next follow-up appointment is in the next 6 months or earlier if needed.    Please do not hesitate to call me with any questions or concerns.    Thanks.

## 2020-02-27 NOTE — PROGRESS NOTES
INITIAL NEUROLOGY CONSULTATION    DATE OF VISIT: 2/28/2020  CLINIC LOCATION: [facility]  MRN: [id number]  PATIENT NAME: Fabio Logan  YOB: 1960    PRIMARY CARE PROVIDER: Manoj Armenta MD     REASON FOR VISIT:   Chief Complaint   Patient presents with     Seizures     Has not had any and would like to lower med dose      HISTORY OF PRESENT ILLNESS:                                                    Mr. Fabio Logan is 59 year old right handed male patient with past medical history of depression, hypertension, testicular cancer, migraine, alcohol use disorder, right MCA aneurysm, status post repair (May 2019) complicated by postoperative seizures, who was seen in consultation today requested by Manoj Armenta MD, for seizure disorder.    Per patient's report and review of past medical records (Care Everywhere), he had craniotomy on 5/7/2019 with clipping of right 3 x 5 mm MCA aneurysm.  In postoperative period he had 2 seizures.  One started with the twitching of the left side of his face with secondary generalization.  The second one started with left eye deviation followed by a generalized seizure lasting for 2 minutes.  Loaded with fosphenytoin, and also received Keppra prophylactically before surgery.  Was seen by Dr. Cameron at [facility].  Noted to have no prior history of seizures.  Initial EEG demonstrated background slowing without evidence of interictal epileptiform activity.  Discharged on oral Keppra and phenytoin.    Later developed worsening of anxiety and depression with suicidal ideations and was admitted to psychiatry unit at [facility] on 6/25/2019.  Head CT demonstrated a small right-sided subdural hematoma with other expected postoperative changes.  Was seen by Dr. Perry, in-patient neurologist, who agreed that his mental health worsening could be due to Keppra and concurred with switching it to Trileptal.  His EEG from  6/27/2019 was abnormal due to paroxysmal right hemispheric slowing in theta range that suggested non-specific dysfunction in that area, felt to be related to prior surgery and subdural fluid collection.  Eventually his phenytoin was stopped by Dr. Cameron in outpatient setting.    Most recent head CT from 9/3/2019 demonstrated resolution of previously seen residual faint subdural fluid collection over the right cerebral hemisphere.  Stable postoperative changes with aneurysm clip in the right MCA territory was noted.  No new or acute intracranial pathology was seen.    At the present time, the patient reports no additional seizures.  Has some mild intermittent dizziness, especially in the morning that could get worse while he is turning in bed, getting up too fast, or bending over to the side while working.  It is significantly improved compared to before aneurysm clipping.  He denies any additional focal neurological symptoms.    He is on Trileptal 450 mg twice daily monotherapy.  His latest 10 hydroxy metabolite level was 18.3 in August 2019.  His CBC was unremarkable in March 2019.  CMP from August 2019 was unremarkable, except slightly elevated alkaline phosphatase.  TSH was normal at that time (1.46).  Vitamin B 12 was low normal (281).    Review of Systems - the patient endorses weight gain, asthma, vision changes, arthritis, muscle tenderness, feet edema, excessive hunger/thirst, loss of libido, anxiety, restlessness, limb paresthesia, sinus problems, irritable bowel syndrome,and urinary urgency.  All of them have been previously discussed with other medical providers. Otherwise, he denies any other complaints on 14-point comprehensive review of systems.  PAST MEDICAL/SURGICAL HISTORY:                                                    I personally reviewed patient's past medical and surgical history with the patient at today's visit.  Patient Active Problem List   Diagnosis     CARDIOVASCULAR SCREENING; LDL GOAL  LESS THAN 160     Mild major depression (H)     Obese     HTN, goal below 140/90     H/O testicular cancer     Diverticulosis of large intestine     Esophageal reflux     Chest pain     Migraine     Skin cancer screening     Skin eruption     Lentigines     Chronic dermatitis     Mild intermittent asthma without complication     Essential hypertension with goal blood pressure less than 140/90     Major depression in complete remission (H)     Impaired fasting glucose     Malignant neoplasm of testis, unspecified laterality, unspecified whether descended or undescended (H)     Morbid obesity (H)     Symptomatic cholelithiasis     Partial seizure (H)     Past Medical History:   Diagnosis Date     Anxiety      Depression      Hypertension      Mumps      Testicular cancer - Right 1998     Past Surgical History:   Procedure Laterality Date     COLONOSCOPY N/A 8/22/2014    Procedure: COLONOSCOPY;  Surgeon: Joe Garcia MD;  Location:  GI     CYSTOSCOPY       LAPAROSCOPIC CHOLECYSTECTOMY N/A 9/2/2018    Procedure: LAPAROSCOPIC CHOLECYSTECTOMY;  LAPAROSCOPIC CHOLECYSTECTOMY ;  Surgeon: Shannan Chaves MD;  Location: [facility] OR     ORCHIECTOMY NOS Right 1998     TESTICLE SURGERY       TONSILLECTOMY       MEDICATIONS:                                                    I personally reviewed patient's medications and allergies with the patient at today's visit:  albuterol (PROAIR HFA/PROVENTIL HFA/VENTOLIN HFA) 108 (90 BASE) MCG/ACT Inhaler, Inhale 2 puffs into the lungs every 6 hours as needed for shortness of breath / dyspnea or wheezing  ALPRAZolam (XANAX) 0.5 MG tablet, Take 1-2 tablets (0.5-1 mg) by mouth daily as needed for anxiety  atenolol (TENORMIN) 50 MG tablet, Take 1 tablet (50 mg) by mouth daily  fluticasone (FLONASE) 50 MCG/ACT nasal spray, SPRAY 1 TO 2 SPRAYS INTO BOTH NOSTRISL DAILY  losartan (COZAAR) 25 MG tablet, Take 1 tablet (25 mg) by mouth daily  omeprazole (PRILOSEC) 20 MG DR capsule, Take 1  capsule (20 mg) by mouth daily  OXcarbazepine (TRILEPTAL) 300 MG tablet, Take 450 mg by mouth 2 times daily  sertraline (ZOLOFT) 100 MG tablet, Take 1.5 tablets (150 mg) by mouth daily  SUMAtriptan (IMITREX) 50 MG tablet, Take 1 tablet (50 mg) by mouth at onset of headache for migraine (may repeat dose in 2 hours. Do not exceed 200mg in 24 hours)  traZODone (DESYREL) 50 MG tablet, Take 1-2 tablets ( mg) by mouth At Bedtime  triamcinolone (KENALOG) 0.1 % ointment, Apply topically 2 times daily as needed for irritation  [] doxycycline hyclate (VIBRAMYCIN) 100 MG capsule, Take 1 capsule (100 mg) by mouth 2 times daily for 10 days.  ALLERGIES:                                                      Allergies   Allergen Reactions     Iodine-131 Shortness Of Breath     Internal iodine     FAMILY/SOCIAL HISTORY:                                                    Family and social history was reviewed with the patient at today's visit.  Family history is positive for brain hemorrhage, stroke, migraine, headache, and dementia.  Former smoker, quit in .  Denies current alcohol and recreational drug use.  Single, lives alone.  Works full-time at [employer].  REVIEW OF SYSTEMS:                                                    Patient has completed a [facility] Patient Health History, including a 14-system review, which was personally reviewed, and pertinent positives are listed in HPI. He denies any additional problems on the further questioning.  EXAM:                                                    VITAL SIGNS:   /74   Pulse 67   Temp 98.6  F (37  C) (Oral)   SpO2 95%   Mini-Cog Assessment:  Mini Cog Assessment  Clock Draw Score: 2 Normal  3 Item Recall: 3 objects recalled  Mini Cog Total Score: 5    General: pt is in NAD, cooperative.  Skin: normal turgor, moist mucous membranes, no lesions/rashes noticed.  HEENT: ATNC, EOMI, PERRL, white sclera, normal conjunctiva, no  nystagmus or ptosis. No carotid bruits bilaterally.  Respiratory: lung sounds clear to auscultation bilaterally, no crackles, wheezes, rhonchi. Symmetric lung excursion, no accessory respiratory muscle use.  Cardiovascular: normal S1/S2, no murmurs/rubs/gallops.   Abdomen: Not distended.  : deferred.    Neurological:  Mental: alert, follows commands, Mini Cog Total Score: 5/5 with 3/3 on memory recall, no aphasia or dysarthria. Fund of knowledge is appropriate for age.  Cranial Nerves:  CN II: visual acuity - able to accurately count fingers with each eye. Visual fields intact, fundi: discs sharp, no papilledema and normal vessels bilaterally.  CN III, IV, VI: EOM intact, pupils equal and reactive.  No skew deviation.  No spontaneous nystagmus bilaterally.  CN V: facial sensation nl  CN VII: face symmetric, no facial droop  CN VIII: hearing normal.  Head impulse test is negative bilaterally.  Yocasta-Hallpike maneuver produce mild rotatory sustained nystagmus bilaterally.  CN IX: palate elevation symmetric, uvula at midline  CN XI SCM normal, shoulder shrug nl  CN XII: tongue midline  Motor: Strength: 5/5 in all major groups of all extremities. Normal tone. No abnormal movements. No pronator drift b/l.  Reflexes: Triceps, biceps, brachioradialis, patellar, and achilles reflexes normal and symmetric. No clonus noted. Toes are down-going b/l.   Sensory: temperature, light touch, pinprick, and vibration intact. Romberg: negative.  Coordination: FNF and heel-shin tests intact b/l.   Gait:  Normal, except mild difficulty with tandem walk.  DATA:   LABS/EEG/IMAGING/OTHER STUDIES: I reviewed pertinent medical records, including extensive hospital records, neurology notes, EEG report, imaging reports, and other pertinent information in Care Everywhere, as detailed in the history of present illness.  Record review took approximately 45 minutes of extra time.  ASSESSMENT AND PLAN:      ASSESSMENT: Fabio GODINEZ Desmond is a 59 year  old male patient with past medical history of depression, hypertension, testicular cancer, migraine, alcohol use disorder, right MCA aneurysm, status post repair (May 2019) complicated by postoperative seizures, who presents to establish care for his seizure disorder.    We had a prolonged discussion with the patient regarding his current complaints, diagnosis, available treatment options, and the plan.  I would like to repeat his Trileptal level along with screening labs (CBC and CMP) for monitoring purposes.  Medication dose might be adjusted based on results.  We also discussed the long-term prognosis.  At this time I am not quite sure if he would require lifelong treatment with AED's.  We discussed that after he is on the medication for 1 or 2 years, the attempt to wean him off Trileptal could be made after he obtains baseline EEG.  We also discussed that it will increase the risk of seizure, but if he remains seizure-free for 2 years, the risk will be slightly lower.  We will discuss it again at the next follow-up visit.    We also briefly discussed his persistent mild dizziness.  He has mildly positive Yocasta-Hallpike maneuver which suggest peripheral vestibular dysfunction.  At the present time the patient does not want to pursue any treatment because symptoms are mild, but we discussed that in the future we might consider vestibular physical therapy.    DIAGNOSES:    ICD-10-CM    1. Seizure disorder (H) G40.909    2. History of cerebral aneurysm repair Z98.890     Z86.79      PLAN: At today's visit we thoroughly discussed current symptoms, diagnosis, available treatment options, necessary evaluation, and the plan, which includes:  Orders Placed This Encounter   Procedures     CBC with platelets and differential     Comprehensive metabolic panel     Oxcarbazepine level     No medication changes.    [place] driving laws regarding spells of loss of consciousness or postural control were discussed with the  patient.  No driving for 90 days after the last event.  Every episode should be reported to DMV by the  within 30 days.  Other safety precautions were discussed.    Next follow-up appointment is in the next 6 months or earlier if needed.    Total Time:  81 minutes with > 50% spent counseling the patient on stated above assessment and recommendations, including nature of the diagnosis, needed w/u, available treatment options, and proposed plan of treatment.  Extra time was used to answer questions regarding patient's symptoms, my recommendations, and the plan.  Record review took additional 45 minutes of extra time in preparation to this visit.    Humberto Clark MD  / Neurology  [facility]  (Chart documentation was completed in part with Dragon voice-recognition software. Even though reviewed, some grammatical, spelling, and word errors may remain.)

## 2020-02-28 ENCOUNTER — OFFICE VISIT (OUTPATIENT)
Dept: NEUROLOGY | Facility: CLINIC | Age: 60
End: 2020-02-28
Payer: COMMERCIAL

## 2020-02-28 VITALS
SYSTOLIC BLOOD PRESSURE: 122 MMHG | TEMPERATURE: 98.6 F | DIASTOLIC BLOOD PRESSURE: 74 MMHG | OXYGEN SATURATION: 95 % | HEART RATE: 67 BPM

## 2020-02-28 DIAGNOSIS — Z98.890 HISTORY OF CEREBRAL ANEURYSM REPAIR: ICD-10-CM

## 2020-02-28 DIAGNOSIS — Z86.79 HISTORY OF CEREBRAL ANEURYSM REPAIR: ICD-10-CM

## 2020-02-28 DIAGNOSIS — Z51.81 ENCOUNTER FOR THERAPEUTIC DRUG MONITORING: ICD-10-CM

## 2020-02-28 DIAGNOSIS — G40.909 SEIZURE DISORDER (H): Primary | ICD-10-CM

## 2020-02-28 LAB
ALBUMIN SERPL-MCNC: 3.8 G/DL (ref 3.4–5)
ALP SERPL-CCNC: 107 U/L (ref 40–150)
ALT SERPL W P-5'-P-CCNC: 35 U/L (ref 0–70)
ANION GAP SERPL CALCULATED.3IONS-SCNC: 5 MMOL/L (ref 3–14)
AST SERPL W P-5'-P-CCNC: 20 U/L (ref 0–45)
BASOPHILS # BLD AUTO: 0 10E9/L (ref 0–0.2)
BASOPHILS NFR BLD AUTO: 0.4 %
BILIRUB SERPL-MCNC: 0.2 MG/DL (ref 0.2–1.3)
BUN SERPL-MCNC: 19 MG/DL (ref 7–30)
CALCIUM SERPL-MCNC: 9.6 MG/DL (ref 8.5–10.1)
CHLORIDE SERPL-SCNC: 106 MMOL/L (ref 94–109)
CO2 SERPL-SCNC: 26 MMOL/L (ref 20–32)
CREAT SERPL-MCNC: 0.68 MG/DL (ref 0.66–1.25)
DIFFERENTIAL METHOD BLD: NORMAL
EOSINOPHIL # BLD AUTO: 0.1 10E9/L (ref 0–0.7)
EOSINOPHIL NFR BLD AUTO: 1.2 %
ERYTHROCYTE [DISTWIDTH] IN BLOOD BY AUTOMATED COUNT: 13.2 % (ref 10–15)
GFR SERPL CREATININE-BSD FRML MDRD: >90 ML/MIN/{1.73_M2}
GLUCOSE SERPL-MCNC: 103 MG/DL (ref 70–99)
HCT VFR BLD AUTO: 43.6 % (ref 40–53)
HGB BLD-MCNC: 14 G/DL (ref 13.3–17.7)
LYMPHOCYTES # BLD AUTO: 1.6 10E9/L (ref 0.8–5.3)
LYMPHOCYTES NFR BLD AUTO: 28 %
MCH RBC QN AUTO: 29.4 PG (ref 26.5–33)
MCHC RBC AUTO-ENTMCNC: 32.1 G/DL (ref 31.5–36.5)
MCV RBC AUTO: 92 FL (ref 78–100)
MONOCYTES # BLD AUTO: 0.7 10E9/L (ref 0–1.3)
MONOCYTES NFR BLD AUTO: 12.5 %
NEUTROPHILS # BLD AUTO: 3.3 10E9/L (ref 1.6–8.3)
NEUTROPHILS NFR BLD AUTO: 57.9 %
PLATELET # BLD AUTO: 199 10E9/L (ref 150–450)
POTASSIUM SERPL-SCNC: 4.3 MMOL/L (ref 3.4–5.3)
PROT SERPL-MCNC: 7.8 G/DL (ref 6.8–8.8)
RBC # BLD AUTO: 4.76 10E12/L (ref 4.4–5.9)
SODIUM SERPL-SCNC: 137 MMOL/L (ref 133–144)
WBC # BLD AUTO: 5.6 10E9/L (ref 4–11)

## 2020-02-28 PROCEDURE — 80053 COMPREHEN METABOLIC PANEL: CPT | Performed by: PSYCHIATRY & NEUROLOGY

## 2020-02-28 PROCEDURE — 99245 OFF/OP CONSLTJ NEW/EST HI 55: CPT | Performed by: PSYCHIATRY & NEUROLOGY

## 2020-02-28 PROCEDURE — 99359 PROLONG SERV W/O CONTACT ADD: CPT | Performed by: PSYCHIATRY & NEUROLOGY

## 2020-02-28 PROCEDURE — 36415 COLL VENOUS BLD VENIPUNCTURE: CPT | Performed by: PSYCHIATRY & NEUROLOGY

## 2020-02-28 PROCEDURE — 99000 SPECIMEN HANDLING OFFICE-LAB: CPT | Performed by: PSYCHIATRY & NEUROLOGY

## 2020-02-28 PROCEDURE — 80183 DRUG SCRN QUANT OXCARBAZEPIN: CPT | Mod: 90 | Performed by: PSYCHIATRY & NEUROLOGY

## 2020-02-28 PROCEDURE — 85025 COMPLETE CBC W/AUTO DIFF WBC: CPT | Performed by: PSYCHIATRY & NEUROLOGY

## 2020-02-28 NOTE — LETTER
2/28/2020         RE: Fabio Logan  [address]  [place] [address]        Dear Colleague,    Thank you for referring your patient, Fabio Logan, to the [facility]. Please see a copy of my visit note below.    INITIAL NEUROLOGY CONSULTATION    DATE OF VISIT: 2/28/2020  CLINIC LOCATION: [facility]  MRN: [id number]  PATIENT NAME: Fabio Logan  YOB: 1960    PRIMARY CARE PROVIDER: Manoj Armenta MD     REASON FOR VISIT:   Chief Complaint   Patient presents with     Seizures     Has not had any and would like to lower med dose      HISTORY OF PRESENT ILLNESS:                                                    Mr. Fabio Logan is 59 year old right handed male patient with past medical history of depression, hypertension, testicular cancer, migraine, alcohol use disorder, right MCA aneurysm, status post repair (May 2019) complicated by postoperative seizures, who was seen in consultation today requested by Manoj Armenta MD, for seizure disorder.    Per patient's report and review of past medical records (Care Everywhere), he had craniotomy on 5/7/2019 with clipping of right 3 x 5 mm MCA aneurysm.  In postoperative period he had 2 seizures.  One started with the twitching of the left side of his face with secondary generalization.  The second one started with left eye deviation followed by a generalized seizure lasting for 2 minutes.  Loaded with fosphenytoin, and also received Keppra prophylactically before surgery.  Was seen by Dr. Cameron at [facility].  Noted to have no prior history of seizures.  Initial EEG demonstrated background slowing without evidence of interictal epileptiform activity.  Discharged on oral Keppra and phenytoin.    Later developed worsening of anxiety and depression with suicidal ideations and was admitted to psychiatry unit at [facility] on 6/25/2019.  Head CT demonstrated a small  right-sided subdural hematoma with other expected postoperative changes.  Was seen by Dr. Perry, in-patient neurologist, who agreed that his mental health worsening could be due to Keppra and concurred with switching it to Trileptal.  His EEG from 6/27/2019 was abnormal due to paroxysmal right hemispheric slowing in theta range that suggested non-specific dysfunction in that area, felt to be related to prior surgery and subdural fluid collection.  Eventually his phenytoin was stopped by Dr. Cameron in outpatient setting.    Most recent head CT from 9/3/2019 demonstrated resolution of previously seen residual faint subdural fluid collection over the right cerebral hemisphere.  Stable postoperative changes with aneurysm clip in the right MCA territory was noted.  No new or acute intracranial pathology was seen.    At the present time, the patient reports no additional seizures.  Has some mild intermittent dizziness, especially in the morning that could get worse while he is turning in bed, getting up too fast, or bending over to the side while working.  It is significantly improved compared to before aneurysm clipping.  He denies any additional focal neurological symptoms.    He is on Trileptal 450 mg twice daily monotherapy.  His latest 10 hydroxy metabolite level was 18.3 in August 2019.  His CBC was unremarkable in March 2019.  CMP from August 2019 was unremarkable, except slightly elevated alkaline phosphatase.  TSH was normal at that time (1.46).  Vitamin B 12 was low normal (281).    Review of Systems - the patient endorses weight gain, asthma, vision changes, arthritis, muscle tenderness, feet edema, excessive hunger/thirst, loss of libido, anxiety, restlessness, limb paresthesia, sinus problems, irritable bowel syndrome,and urinary urgency.  All of them have been previously discussed with other medical providers. Otherwise, he denies any other complaints on 14-point comprehensive review of systems.  PAST  MEDICAL/SURGICAL HISTORY:                                                    I personally reviewed patient's past medical and surgical history with the patient at today's visit.  Patient Active Problem List   Diagnosis     CARDIOVASCULAR SCREENING; LDL GOAL LESS THAN 160     Mild major depression (H)     Obese     HTN, goal below 140/90     H/O testicular cancer     Diverticulosis of large intestine     Esophageal reflux     Chest pain     Migraine     Skin cancer screening     Skin eruption     Lentigines     Chronic dermatitis     Mild intermittent asthma without complication     Essential hypertension with goal blood pressure less than 140/90     Major depression in complete remission (H)     Impaired fasting glucose     Malignant neoplasm of testis, unspecified laterality, unspecified whether descended or undescended (H)     Morbid obesity (H)     Symptomatic cholelithiasis     Partial seizure (H)     Past Medical History:   Diagnosis Date     Anxiety      Depression      Hypertension      Mumps      Testicular cancer - Right 1998     Past Surgical History:   Procedure Laterality Date     COLONOSCOPY N/A 8/22/2014    Procedure: COLONOSCOPY;  Surgeon: Joe Garcia MD;  Location:  GI     CYSTOSCOPY       LAPAROSCOPIC CHOLECYSTECTOMY N/A 9/2/2018    Procedure: LAPAROSCOPIC CHOLECYSTECTOMY;  LAPAROSCOPIC CHOLECYSTECTOMY ;  Surgeon: Shannan Chaves MD;  Location: [facility] OR     ORCHIECTOMY NOS Right 1998     TESTICLE SURGERY       TONSILLECTOMY       MEDICATIONS:                                                    I personally reviewed patient's medications and allergies with the patient at today's visit:  albuterol (PROAIR HFA/PROVENTIL HFA/VENTOLIN HFA) 108 (90 BASE) MCG/ACT Inhaler, Inhale 2 puffs into the lungs every 6 hours as needed for shortness of breath / dyspnea or wheezing  ALPRAZolam (XANAX) 0.5 MG tablet, Take 1-2 tablets (0.5-1 mg) by mouth daily as needed for anxiety  atenolol (TENORMIN) 50 MG  tablet, Take 1 tablet (50 mg) by mouth daily  fluticasone (FLONASE) 50 MCG/ACT nasal spray, SPRAY 1 TO 2 SPRAYS INTO BOTH NOSTRISL DAILY  losartan (COZAAR) 25 MG tablet, Take 1 tablet (25 mg) by mouth daily  omeprazole (PRILOSEC) 20 MG DR capsule, Take 1 capsule (20 mg) by mouth daily  OXcarbazepine (TRILEPTAL) 300 MG tablet, Take 450 mg by mouth 2 times daily  sertraline (ZOLOFT) 100 MG tablet, Take 1.5 tablets (150 mg) by mouth daily  SUMAtriptan (IMITREX) 50 MG tablet, Take 1 tablet (50 mg) by mouth at onset of headache for migraine (may repeat dose in 2 hours. Do not exceed 200mg in 24 hours)  traZODone (DESYREL) 50 MG tablet, Take 1-2 tablets ( mg) by mouth At Bedtime  triamcinolone (KENALOG) 0.1 % ointment, Apply topically 2 times daily as needed for irritation  [] doxycycline hyclate (VIBRAMYCIN) 100 MG capsule, Take 1 capsule (100 mg) by mouth 2 times daily for 10 days.  ALLERGIES:                                                      Allergies   Allergen Reactions     Iodine-131 Shortness Of Breath     Internal iodine     FAMILY/SOCIAL HISTORY:                                                    Family and social history was reviewed with the patient at today's visit.  Family history is positive for brain hemorrhage, stroke, migraine, headache, and dementia.  Former smoker, quit in .  Denies current alcohol and recreational drug use.  Single, lives alone.  Works full-time at [employer].  REVIEW OF SYSTEMS:                                                    Patient has completed a [facility] Patient Health History, including a 14-system review, which was personally reviewed, and pertinent positives are listed in HPI. He denies any additional problems on the further questioning.  EXAM:                                                    VITAL SIGNS:   /74   Pulse 67   Temp 98.6  F (37  C) (Oral)   SpO2 95%   Mini-Cog Assessment:  Mini Cog Assessment  Clock  Draw Score: 2 Normal  3 Item Recall: 3 objects recalled  Mini Cog Total Score: 5    General: pt is in NAD, cooperative.  Skin: normal turgor, moist mucous membranes, no lesions/rashes noticed.  HEENT: ATNC, EOMI, PERRL, white sclera, normal conjunctiva, no nystagmus or ptosis. No carotid bruits bilaterally.  Respiratory: lung sounds clear to auscultation bilaterally, no crackles, wheezes, rhonchi. Symmetric lung excursion, no accessory respiratory muscle use.  Cardiovascular: normal S1/S2, no murmurs/rubs/gallops.   Abdomen: Not distended.  : deferred.    Neurological:  Mental: alert, follows commands, Mini Cog Total Score: 5/5 with 3/3 on memory recall, no aphasia or dysarthria. Fund of knowledge is appropriate for age.  Cranial Nerves:  CN II: visual acuity - able to accurately count fingers with each eye. Visual fields intact, fundi: discs sharp, no papilledema and normal vessels bilaterally.  CN III, IV, VI: EOM intact, pupils equal and reactive.  No skew deviation.  No spontaneous nystagmus bilaterally.  CN V: facial sensation nl  CN VII: face symmetric, no facial droop  CN VIII: hearing normal.  Head impulse test is negative bilaterally.  Yocasta-Hallpike maneuver produce mild rotatory sustained nystagmus bilaterally.  CN IX: palate elevation symmetric, uvula at midline  CN XI SCM normal, shoulder shrug nl  CN XII: tongue midline  Motor: Strength: 5/5 in all major groups of all extremities. Normal tone. No abnormal movements. No pronator drift b/l.  Reflexes: Triceps, biceps, brachioradialis, patellar, and achilles reflexes normal and symmetric. No clonus noted. Toes are down-going b/l.   Sensory: temperature, light touch, pinprick, and vibration intact. Romberg: negative.  Coordination: FNF and heel-shin tests intact b/l.   Gait:  Normal, except mild difficulty with tandem walk.  DATA:   LABS/EEG/IMAGING/OTHER STUDIES: I reviewed pertinent medical records, including extensive hospital records, neurology notes,  EEG report, imaging reports, and other pertinent information in Care Everywhere, as detailed in the history of present illness.  Record review took approximately 45 minutes of extra time.  ASSESSMENT AND PLAN:      ASSESSMENT: Fabio Logan is a 59 year old male patient with past medical history of depression, hypertension, testicular cancer, migraine, alcohol use disorder, right MCA aneurysm, status post repair (May 2019) complicated by postoperative seizures, who presents to establish care for his seizure disorder.    We had a prolonged discussion with the patient regarding his current complaints, diagnosis, available treatment options, and the plan.  I would like to repeat his Trileptal level along with screening labs (CBC and CMP) for monitoring purposes.  Medication dose might be adjusted based on results.  We also discussed the long-term prognosis.  At this time I am not quite sure if he would require lifelong treatment with AED's.  We discussed that after he is on the medication for 1 or 2 years, the attempt to wean him off Trileptal could be made after he obtains baseline EEG.  We also discussed that it will increase the risk of seizure, but if he remains seizure-free for 2 years, the risk will be slightly lower.  We will discuss it again at the next follow-up visit.    We also briefly discussed his persistent mild dizziness.  He has mildly positive Jackson-Hallpike maneuver which suggest peripheral vestibular dysfunction.  At the present time the patient does not want to pursue any treatment because symptoms are mild, but we discussed that in the future we might consider vestibular physical therapy.    DIAGNOSES:    ICD-10-CM    1. Seizure disorder (H) G40.909    2. History of cerebral aneurysm repair Z98.890     Z86.79      PLAN: At today's visit we thoroughly discussed current symptoms, diagnosis, available treatment options, necessary evaluation, and the plan, which includes:  Orders Placed This  Encounter   Procedures     CBC with platelets and differential     Comprehensive metabolic panel     Oxcarbazepine level     No medication changes.    [place] driving laws regarding spells of loss of consciousness or postural control were discussed with the patient.  No driving for 90 days after the last event.  Every episode should be reported to DMV by the  within 30 days.  Other safety precautions were discussed.    Next follow-up appointment is in the next 6 months or earlier if needed.    Total Time:  81 minutes with > 50% spent counseling the patient on stated above assessment and recommendations, including nature of the diagnosis, needed w/u, available treatment options, and proposed plan of treatment.  Extra time was used to answer questions regarding patient's symptoms, my recommendations, and the plan.  Record review took additional 45 minutes of extra time in preparation to this visit.    Humberto Clark MD  / Neurology  [place]  (Chart documentation was completed in part with Dragon voice-recognition software. Even though reviewed, some grammatical, spelling, and word errors may remain.)            Again, thank you for allowing me to participate in the care of your patient.        Sincerely,        Humberto Clark MD

## 2020-02-29 LAB — 10OH-CARBAZEPINE SERPL-MCNC: 17.9 UG/ML (ref 10–35)

## 2020-05-05 ENCOUNTER — MYC MEDICAL ADVICE (OUTPATIENT)
Dept: PEDIATRICS | Facility: CLINIC | Age: 60
End: 2020-05-05

## 2020-05-05 NOTE — TELEPHONE ENCOUNTER
"Sent [employer] message.    Awaiting response.      - Osvaldo \"Garry\" JUDITH Rangel - Patient Advocate Liason (PAL)  [facility]    "

## 2020-05-08 ENCOUNTER — HOSPITAL ENCOUNTER (OUTPATIENT)
Dept: CT IMAGING | Facility: CLINIC | Age: 60
Discharge: HOME | End: 2020-05-08
Attending: PHYSICIAN ASSISTANT | Admitting: PHYSICIAN ASSISTANT
Payer: COMMERCIAL

## 2020-05-08 ENCOUNTER — OFFICE VISIT (OUTPATIENT)
Dept: PEDIATRICS | Facility: CLINIC | Age: 60
End: 2020-05-08
Attending: NURSE PRACTITIONER
Payer: COMMERCIAL

## 2020-05-08 ENCOUNTER — TELEPHONE (OUTPATIENT)
Dept: FAMILY MEDICINE | Facility: CLINIC | Age: 60
End: 2020-05-08

## 2020-05-08 ENCOUNTER — E-VISIT (OUTPATIENT)
Dept: FAMILY MEDICINE | Facility: CLINIC | Age: 60
End: 2020-05-08
Payer: COMMERCIAL

## 2020-05-08 ENCOUNTER — OFFICE VISIT (OUTPATIENT)
Dept: FAMILY MEDICINE | Facility: CLINIC | Age: 60
End: 2020-05-08
Payer: COMMERCIAL

## 2020-05-08 VITALS
TEMPERATURE: 98.2 F | OXYGEN SATURATION: 98 % | BODY MASS INDEX: 40.36 KG/M2 | DIASTOLIC BLOOD PRESSURE: 79 MMHG | RESPIRATION RATE: 16 BRPM | SYSTOLIC BLOOD PRESSURE: 122 MMHG | HEART RATE: 70 BPM | WEIGHT: 244 LBS

## 2020-05-08 VITALS
OXYGEN SATURATION: 98 % | DIASTOLIC BLOOD PRESSURE: 80 MMHG | BODY MASS INDEX: 40.15 KG/M2 | SYSTOLIC BLOOD PRESSURE: 132 MMHG | RESPIRATION RATE: 16 BRPM | HEART RATE: 66 BPM | TEMPERATURE: 97.5 F | HEIGHT: 65 IN | WEIGHT: 241 LBS

## 2020-05-08 DIAGNOSIS — R19.7 DIARRHEA, UNSPECIFIED TYPE: Primary | ICD-10-CM

## 2020-05-08 DIAGNOSIS — R19.5 LOOSE STOOLS: ICD-10-CM

## 2020-05-08 DIAGNOSIS — R10.84 ABDOMINAL PAIN, GENERALIZED: Primary | ICD-10-CM

## 2020-05-08 DIAGNOSIS — R10.84 ABDOMINAL PAIN, GENERALIZED: ICD-10-CM

## 2020-05-08 DIAGNOSIS — R19.7 DIARRHEA, UNSPECIFIED TYPE: ICD-10-CM

## 2020-05-08 DIAGNOSIS — K29.00 ACUTE GASTRITIS WITHOUT HEMORRHAGE, UNSPECIFIED GASTRITIS TYPE: ICD-10-CM

## 2020-05-08 DIAGNOSIS — R11.0 NAUSEA: ICD-10-CM

## 2020-05-08 DIAGNOSIS — E66.01 MORBID OBESITY (H): ICD-10-CM

## 2020-05-08 DIAGNOSIS — R19.7 DIARRHEA: ICD-10-CM

## 2020-05-08 LAB
ALBUMIN SERPL-MCNC: 3.9 G/DL (ref 3.4–5)
ALBUMIN UR-MCNC: NEGATIVE MG/DL
ALP SERPL-CCNC: 124 U/L (ref 40–150)
ALT SERPL W P-5'-P-CCNC: 39 U/L (ref 0–70)
AMYLASE SERPL-CCNC: 24 U/L (ref 30–110)
ANION GAP SERPL CALCULATED.3IONS-SCNC: 3 MMOL/L (ref 3–14)
APPEARANCE UR: CLEAR
AST SERPL W P-5'-P-CCNC: 27 U/L (ref 0–45)
BASOPHILS # BLD AUTO: 0 10E9/L (ref 0–0.2)
BASOPHILS NFR BLD AUTO: 0.3 %
BILIRUB SERPL-MCNC: 0.4 MG/DL (ref 0.2–1.3)
BILIRUB UR QL STRIP: NEGATIVE
BUN SERPL-MCNC: 20 MG/DL (ref 7–30)
CALCIUM SERPL-MCNC: 9.1 MG/DL (ref 8.5–10.1)
CHLORIDE SERPL-SCNC: 106 MMOL/L (ref 94–109)
CO2 SERPL-SCNC: 28 MMOL/L (ref 20–32)
COLOR UR AUTO: YELLOW
CREAT SERPL-MCNC: 0.74 MG/DL (ref 0.66–1.25)
DIFFERENTIAL METHOD BLD: ABNORMAL
EOSINOPHIL # BLD AUTO: 0.1 10E9/L (ref 0–0.7)
EOSINOPHIL NFR BLD AUTO: 1.8 %
ERYTHROCYTE [DISTWIDTH] IN BLOOD BY AUTOMATED COUNT: 13.3 % (ref 10–15)
GFR SERPL CREATININE-BSD FRML MDRD: >90 ML/MIN/{1.73_M2}
GLUCOSE SERPL-MCNC: 93 MG/DL (ref 70–99)
GLUCOSE UR STRIP-MCNC: NEGATIVE MG/DL
HCT VFR BLD AUTO: 43.6 % (ref 40–53)
HGB BLD-MCNC: 14.2 G/DL (ref 13.3–17.7)
HGB UR QL STRIP: ABNORMAL
KETONES UR STRIP-MCNC: NEGATIVE MG/DL
LEUKOCYTE ESTERASE UR QL STRIP: NEGATIVE
LIPASE SERPL-CCNC: 70 U/L (ref 73–393)
LYMPHOCYTES # BLD AUTO: 1.3 10E9/L (ref 0.8–5.3)
LYMPHOCYTES NFR BLD AUTO: 33.2 %
MCH RBC QN AUTO: 29.5 PG (ref 26.5–33)
MCHC RBC AUTO-ENTMCNC: 32.6 G/DL (ref 31.5–36.5)
MCV RBC AUTO: 91 FL (ref 78–100)
MONOCYTES # BLD AUTO: 0.6 10E9/L (ref 0–1.3)
MONOCYTES NFR BLD AUTO: 16.2 %
MUCOUS THREADS #/AREA URNS LPF: PRESENT /LPF
NEUTROPHILS # BLD AUTO: 1.9 10E9/L (ref 1.6–8.3)
NEUTROPHILS NFR BLD AUTO: 48.5 %
NITRATE UR QL: NEGATIVE
PH UR STRIP: 5.5 PH (ref 5–7)
PLATELET # BLD AUTO: 176 10E9/L (ref 150–450)
POTASSIUM SERPL-SCNC: 3.8 MMOL/L (ref 3.4–5.3)
PROT SERPL-MCNC: 8 G/DL (ref 6.8–8.8)
RBC # BLD AUTO: 4.81 10E12/L (ref 4.4–5.9)
RBC #/AREA URNS AUTO: 1 /HPF (ref 0–2)
SODIUM SERPL-SCNC: 137 MMOL/L (ref 133–144)
SOURCE: ABNORMAL
SP GR UR STRIP: 1.03 (ref 1–1.03)
SQUAMOUS #/AREA URNS AUTO: <1 /HPF (ref 0–1)
UROBILINOGEN UR STRIP-MCNC: NORMAL MG/DL (ref 0–2)
WBC # BLD AUTO: 3.9 10E9/L (ref 4–11)
WBC #/AREA URNS AUTO: <1 /HPF (ref 0–5)

## 2020-05-08 PROCEDURE — 99214 OFFICE O/P EST MOD 30 MIN: CPT | Performed by: NURSE PRACTITIONER

## 2020-05-08 PROCEDURE — 87177 OVA AND PARASITES SMEARS: CPT | Performed by: NURSE PRACTITIONER

## 2020-05-08 PROCEDURE — 36415 COLL VENOUS BLD VENIPUNCTURE: CPT | Performed by: NURSE PRACTITIONER

## 2020-05-08 PROCEDURE — 80053 COMPREHEN METABOLIC PANEL: CPT | Performed by: PHYSICIAN ASSISTANT

## 2020-05-08 PROCEDURE — 81001 URINALYSIS AUTO W/SCOPE: CPT | Performed by: PHYSICIAN ASSISTANT

## 2020-05-08 PROCEDURE — 74177 CT ABD & PELVIS W/CONTRAST: CPT

## 2020-05-08 PROCEDURE — 25000125 ZZHC RX 250: Performed by: PHYSICIAN ASSISTANT

## 2020-05-08 PROCEDURE — 87493 C DIFF AMPLIFIED PROBE: CPT | Mod: 59 | Performed by: NURSE PRACTITIONER

## 2020-05-08 PROCEDURE — 99207 ZZC NON-BILLABLE SERV PER CHARTING: CPT | Performed by: FAMILY MEDICINE

## 2020-05-08 PROCEDURE — 25000128 H RX IP 250 OP 636: Performed by: PHYSICIAN ASSISTANT

## 2020-05-08 PROCEDURE — 99215 OFFICE O/P EST HI 40 MIN: CPT | Mod: 25 | Performed by: PHYSICIAN ASSISTANT

## 2020-05-08 PROCEDURE — 96360 HYDRATION IV INFUSION INIT: CPT | Performed by: PHYSICIAN ASSISTANT

## 2020-05-08 PROCEDURE — 85025 COMPLETE CBC W/AUTO DIFF WBC: CPT | Performed by: NURSE PRACTITIONER

## 2020-05-08 PROCEDURE — 83690 ASSAY OF LIPASE: CPT | Performed by: PHYSICIAN ASSISTANT

## 2020-05-08 PROCEDURE — 87209 SMEAR COMPLEX STAIN: CPT | Performed by: NURSE PRACTITIONER

## 2020-05-08 PROCEDURE — 82150 ASSAY OF AMYLASE: CPT | Performed by: PHYSICIAN ASSISTANT

## 2020-05-08 PROCEDURE — 87506 IADNA-DNA/RNA PROBE TQ 6-11: CPT | Performed by: NURSE PRACTITIONER

## 2020-05-08 RX ORDER — OMEPRAZOLE 40 MG/1
40 CAPSULE, DELAYED RELEASE ORAL DAILY
Qty: 20 CAPSULE | Refills: 0 | Status: SHIPPED | OUTPATIENT
Start: 2020-05-08 | End: 2021-07-13

## 2020-05-08 RX ORDER — IOPAMIDOL 755 MG/ML
500 INJECTION, SOLUTION INTRAVASCULAR ONCE
Status: COMPLETED | OUTPATIENT
Start: 2020-05-08 | End: 2020-05-08

## 2020-05-08 RX ORDER — DIPHENOXYLATE HCL/ATROPINE 2.5-.025MG
1 TABLET ORAL 3 TIMES DAILY PRN
Qty: 21 TABLET | Refills: 0 | Status: SHIPPED | OUTPATIENT
Start: 2020-05-08 | End: 2023-01-18

## 2020-05-08 RX ADMIN — Medication 1000 ML: at 14:26

## 2020-05-08 RX ADMIN — IOPAMIDOL 100 ML: 755 INJECTION, SOLUTION INTRAVENOUS at 14:09

## 2020-05-08 RX ADMIN — SODIUM CHLORIDE 65 ML: 9 INJECTION, SOLUTION INTRAVENOUS at 14:09

## 2020-05-08 ASSESSMENT — MIFFLIN-ST. JEOR: SCORE: 1838.22

## 2020-05-08 NOTE — PROGRESS NOTES
SUBJECTIVE  HPI:  Fabio Logan is a 59 year old male who presents with the CC of abdominal/pelvic pain.    Pain is located in the generalized area, with radiation to None.  The pain is characterized as pressure, and at worst is a level 4 on a scale of 1-10.  Pain has been present for 6 day(s) and is stable.  EXACERBATING FACTORS: eating and drinking  RELIEVING FACTORS: none taken.  ASSOCIATED SX: diarrhea, abdominal cramps, nausea and achiness.    Past Medical History:   Diagnosis Date     Anxiety      Depression      Hypertension      Mumps      Testicular cancer - Right      Allergies   Allergen Reactions     Iodine-131 Shortness Of Breath     Internal iodine;    Pt had Isovue-370 contrast dye on 2020 w/ no adverse reactions.     Social History     Tobacco Use     Smoking status: Former Smoker     Last attempt to quit: 1997     Years since quittin.9     Smokeless tobacco: Former User   Substance Use Topics     Alcohol use: No     Frequency: Never     Drinks per session: Patient refused     Binge frequency: Patient refused     Family History   Problem Relation Age of Onset     Cerebrovascular Disease Mother      Hypertension Mother      Heart Disease Father         had 2 open heart surgery     Lipids Father      Hypertension Father      Cancer Maternal Grandfather         skin cancer     Skin Cancer Maternal Grandfather         skin cancer     Heart Disease Sister 45        heart attack     Cancer Sister 46        breast cancer     Skin Cancer Sister         BCC       ROS:  CONSTITUTIONAL:POSITIVE  for chills  INTEGUMENTARY/SKIN: NEGATIVE for worrisome rashes, moles or lesions  EYES: NEGATIVE for vision changes or irritation  ENT/MOUTH: NEGATIVE for ear, mouth and throat problems  RESP:NEGATIVE for significant cough or SOB  CV: NEGATIVE for chest pain, palpitations or peripheral edema  GI: POSITIVE for nausea, upset stomach, diarrhea and abdominal cramping  : negative for and  dysuria  MUSCULOSKELETAL: NEGATIVE for significant arthralgias or myalgia  NEURO: NEGATIVE for weakness, dizziness or paresthesias    OBJECTIVE:  /79 (BP Location: Right arm, Patient Position: Chair, Cuff Size: Adult Large)   Pulse 70   Temp 98.2  F (36.8  C) (Oral)   Resp 16   Wt 110.7 kg (244 lb)   SpO2 98%   BMI 40.36 kg/m    GENERAL APPEARANCE: healthy, alert and no distress  EYES: EOMI,  PERRL, conjunctiva clear  HENT: ear canals and TM's normal.  Nose and mouth without ulcers, erythema or lesions  NECK: supple, nontender, no lymphadenopathy  RESP: lungs clear to auscultation - no rales, rhonchi or wheezes  CV: regular rates and rhythm, normal S1 S2, no murmur noted  ABDOMEN: soft, normal bowel sounds, tenderness mild generalized  NEURO: Normal strength and tone, sensory exam grossly normal,  normal speech and mentation  SKIN: no suspicious lesions or rashes    Results for orders placed or performed during the hospital encounter of 05/08/20   CT Abdomen Pelvis w Contrast     Status: None    Narrative    CT ABDOMEN PELVIS W CONTRAST 5/8/2020 2:17 PM    CLINICAL HISTORY: generalized abdominal pain, diarrhea; Abdominal  pain, generalized; Diarrhea    TECHNIQUE: CT scan of the abdomen and pelvis was performed following  injection of IV contrast. Multiplanar reformats were obtained. Dose  reduction techniques were used.  CONTRAST: 100mL Isovue-370    COMPARISON: Ultrasound dated 9/2/2018    FINDINGS:   LOWER CHEST: Normal.    HEPATOBILIARY: Fatty change of the liver. Cholecystectomy.    PANCREAS: Normal.    SPLEEN: Normal.    ADRENAL GLANDS: Normal.    KIDNEYS/BLADDER: Nonobstructing 3 mm calculus at the upper pole of the  left kidney. No hydronephrosis or hydroureter. No ureteral calculus.  Normal urinary bladder.    BOWEL: Diverticulosis in the colon. No acute inflammatory change. No  obstruction.     PELVIC ORGANS: Normal.    ADDITIONAL FINDINGS: Atherosclerotic disease.    MUSCULOSKELETAL: Normal.       Impression    IMPRESSION:   1.  Fatty change of the liver.  2.  Colonic diverticulosis without diverticulitis.  3.  Nonobstructing 3 mm calculi in the left kidney.    LESTER J FAHRNER, MD   Results for orders placed or performed in visit on 05/08/20   CBC with platelets and differential     Status: Abnormal   Result Value Ref Range    WBC 3.9 (L) 4.0 - 11.0 10e9/L    RBC Count 4.81 4.4 - 5.9 10e12/L    Hemoglobin 14.2 13.3 - 17.7 g/dL    Hematocrit 43.6 40.0 - 53.0 %    MCV 91 78 - 100 fl    MCH 29.5 26.5 - 33.0 pg    MCHC 32.6 31.5 - 36.5 g/dL    RDW 13.3 10.0 - 15.0 %    Platelet Count 176 150 - 450 10e9/L    Diff Method Automated Method     % Neutrophils 48.5 %    % Lymphocytes 33.2 %    % Monocytes 16.2 %    % Eosinophils 1.8 %    % Basophils 0.3 %    Absolute Neutrophil 1.9 1.6 - 8.3 10e9/L    Absolute Lymphocytes 1.3 0.8 - 5.3 10e9/L    Absolute Monocytes 0.6 0.0 - 1.3 10e9/L    Absolute Eosinophils 0.1 0.0 - 0.7 10e9/L    Absolute Basophils 0.0 0.0 - 0.2 10e9/L   Results for orders placed or performed in visit on 05/08/20   Amylase     Status: Abnormal   Result Value Ref Range    Amylase 24 (L) 30 - 110 U/L   Comprehensive metabolic panel     Status: None   Result Value Ref Range    Sodium 137 133 - 144 mmol/L    Potassium 3.8 3.4 - 5.3 mmol/L    Chloride 106 94 - 109 mmol/L    Carbon Dioxide 28 20 - 32 mmol/L    Anion Gap 3 3 - 14 mmol/L    Glucose 93 70 - 99 mg/dL    Urea Nitrogen 20 7 - 30 mg/dL    Creatinine 0.74 0.66 - 1.25 mg/dL    GFR Estimate >90 >60 mL/min/[1.73_m2]    GFR Estimate If Black >90 >60 mL/min/[1.73_m2]    Calcium 9.1 8.5 - 10.1 mg/dL    Bilirubin Total 0.4 0.2 - 1.3 mg/dL    Albumin 3.9 3.4 - 5.0 g/dL    Protein Total 8.0 6.8 - 8.8 g/dL    Alkaline Phosphatase 124 40 - 150 U/L    ALT 39 0 - 70 U/L    AST 27 0 - 45 U/L   Lipase     Status: Abnormal   Result Value Ref Range    Lipase 70 (L) 73 - 393 U/L   UA with Microscopic reflex to Culture     Status: Abnormal    Specimen:  Unspecified Urine   Result Value Ref Range    Color Urine Yellow     Appearance Urine Clear     Glucose Urine Negative NEG^Negative mg/dL    Bilirubin Urine Negative NEG^Negative    Ketones Urine Negative NEG^Negative mg/dL    Specific Gravity Urine 1.026 1.003 - 1.035    Blood Urine Trace (A) NEG^Negative    pH Urine 5.5 5.0 - 7.0 pH    Protein Albumin Urine Negative NEG^Negative mg/dL    Urobilinogen mg/dL Normal 0.0 - 2.0 mg/dL    Nitrite Urine Negative NEG^Negative    Leukocyte Esterase Urine Negative NEG^Negative    Source Unspecified Urine     WBC Urine <1 0 - 5 /HPF    RBC Urine 1 0 - 2 /HPF    Squamous Epithelial /HPF Urine <1 0 - 1 /HPF    Mucous Urine Present (A) NEG^Negative /LPF       ASSESSMENT/PLAN      ICD-10-CM    1. Abdominal pain, generalized  R10.84 0.9% sodium chloride BOLUS     sodium chloride (PF) 0.9% PF flush 3 mL     Amylase     Comprehensive metabolic panel     Lipase     UA with Microscopic reflex to Culture     CT Abdomen Pelvis w Contrast     omeprazole (PRILOSEC) 40 MG DR capsule   2. Diarrhea  R19.7 0.9% sodium chloride BOLUS     sodium chloride (PF) 0.9% PF flush 3 mL     Amylase     Comprehensive metabolic panel     Lipase     UA with Microscopic reflex to Culture     CT Abdomen Pelvis w Contrast   3. Acute gastritis without hemorrhage, unspecified gastritis type  K29.00 omeprazole (PRILOSEC) 40 MG DR capsule   4. Nausea  R11.0    5. Loose stools  R19.5 diphenoxylate-atropine (LOMOTIL) 2.5-0.025 MG tablet         PLAN:  Orders Placed This Encounter     CT Abdomen Pelvis w Contrast     Amylase     Comprehensive metabolic panel     Lipase     UA with Microscopic reflex to Culture     0.9% sodium chloride BOLUS     sodium chloride (PF) 0.9% PF flush 3 mL     omeprazole (PRILOSEC) 40 MG DR capsule     diphenoxylate-atropine (LOMOTIL) 2.5-0.025 MG tablet       CT was normal without infection  CBC and CMP normal without infection  UA normal without infection  Will increase omeprazole to 40  mg daily  Given lomotil for diarrhea  The 'BRAT' diet is suggested, then progress to diet as tolerated as symptoms kiera. Call if bloody stools, persistent diarrhea, vomiting, fever or abdominal pain.  Stool culture pending, to check for infection.  Follow up with PCP as needed    See Orders in Epic

## 2020-05-08 NOTE — TELEPHONE ENCOUNTER
Reason for call:  Patient reporting a symptom    Symptom or request: Abdominal pain, diarrhea    Duration (how long have symptoms been present): 1.5 weeks    Have you been treated for this before? No    Additional comments: patient holding, Red Flag    Phone Number patient can be reached at:  Cell number on file:    Telephone Information:   Mobile [phone]       Best Time:  asap    Can we leave a detailed message on this number:  YES    Call taken on 5/8/2020 at 9:46 AM by Nel George

## 2020-05-08 NOTE — TELEPHONE ENCOUNTER
"S-(situation): Initiated an evisit with Dr Briggs but it didn't go through. Has had yellow diarrhea, 4-5 times a day or more. Some solid stool this morning but then turned into diarrhea. Pain in the lower abdomen, started with throbbing into testicle over the weekend. No throbbing now into the testicles. Pain in the testicle is not as severe but he still feels it a little when he walks, feels it on the left side. When lifting something yesterday at work it bother him   Pain is in lower abd on both sides equally but more on the left side. . Diarrhea started about 5 days ago. No fever. Temp 97. No blood in stool. Rates pain on 1-10 scale as 6-7. Pain is constant.  Pain hasn't worsened in the last 5 days. He is still up and doing his work. \"Lots of bellowing and lots of gurlging in my stomach, lots of gas\"  No vomiting, some nausea    B-(background): hx of testicle cancer in his 30s and has only one testicle now. Still has his appendix  Hx of irritable colon so often has diarrhea and loose stools  His flynn in shop sick with diarrhea too  No exposure to water of which he has any concerns.   No hx of hernia  Was told he had hernia as child when he tried to lift a concrete block - not now    Will review with provider -he was given in person clinic visit today at 1:20 with Bridgette Fleming CNP    Or should he go to ED?    Adriana Lynch, RN, BSN       "

## 2020-05-08 NOTE — Clinical Note
Thank you for your referral to the [facility]  Appears to present as gastroenteritis  CT was normal without infection  CBC and CMP normal without infection  UA normal without infection  Will increase omeprazole to 40 mg daily  Given lomotil for diarrhea  The 'BRAT' diet is suggested, then progress to diet as tolerated as symptoms kiera. Call if bloody stools, persistent diarrhea, vomiting, fever or abdominal pain.  Stool culture pending, to check for infection.  Follow up with PCP as needed

## 2020-05-08 NOTE — PROGRESS NOTES
"Subjective     Fabio Logan is a 59 year old male who presents to clinic today for the following health issues:    HPI   Chief Complaint   Patient presents with     Abdominal Pain     x 1 week     Diarrhea      x 1 week       Nicolas is in clinic today with c/o abdominal cramping and explosive diarrhea for a week.  Abd pain.  \"I'm not feeling up to par.\"  Diarrhea stools 4-5 times a day, yellowish. No blood or mucous.  Colonoscopy was normaly, 2014.  Stool testing done 11/2019, negative for bacteria and c diff.  He has not been on antibiotics recently.  No fever.  Drinking fluids/urinating okay.  Some urinary leakage/incontinence which is chronic for him.    This morning he did not drink coffee (usually 4 cups) and that has seemed to help today with his abd pain and diarrhea.  Diet lately: \"I'm not eating much. I had toast today.\"    Pain is in his generalized abdomen and then is concentrated in his left lower abdomen radiating to left groin.   No blood in his stools.  No fever, chills.    Left testicular pain now, mild.  Worse when he walks.  No testicular redness, swelling.  Urinating normally.  History of right testicle removed for cancer.    \"Tinny/odd taste in my mouth.\"    History of depression.  Feeling \"moodier lately\" due to coronavirus pandemic.    Cerebral aneurysm:  Repaired one year ago.  Recovery has gone well.    From triage note/telephone call this morning:  S-(situation): Initiated an evisit with Dr Briggs but it didn't go through. Has had yellow diarrhea, 4-5 times a day or more. Some solid stool this morning but then turned into diarrhea. Pain in the lower abdomen, started with throbbing into testicle over the weekend. No throbbing now into the testicles. Pain in the testicle is not as severe but he still feels it a little when he walks, feels it on the left side. When lifting something yesterday at work it bother him   Pain is in lower abd on both sides equally but more on the left side. . " "Diarrhea started about 5 days ago. No fever. Temp 97. No blood in stool. Rates pain on 1-10 scale as 6-7. Pain is constant.  Pain hasn't worsened in the last 5 days. He is still up and doing his work. \"Lots of bellowing and lots of gurlging in my stomach, lots of gas\"  No vomiting, some nausea     B-(background): hx of testicle cancer in his 30s and has only one testicle now. Still has his appendix  Hx of irritable colon so often has diarrhea and loose stools  His flynn in shop sick with diarrhea too  No exposure to water of which he has any concerns.   No hx of hernia  Was told he had hernia as child when he tried to lift a concrete block - not now     Will review with provider -he was given in person clinic visit today at 1:20 with Bridgette Fleming CNP     Or should he go to ED?     Adriana Lynch, RN, BSN      Patient Active Problem List   Diagnosis     CARDIOVASCULAR SCREENING; LDL GOAL LESS THAN 160     Mild major depression (H)     Obese     HTN, goal below 140/90     H/O testicular cancer     Diverticulosis of large intestine     Esophageal reflux     Chest pain     Migraine     Skin cancer screening     Skin eruption     Lentigines     Chronic dermatitis     Mild intermittent asthma without complication     Essential hypertension with goal blood pressure less than 140/90     Major depression in complete remission (H)     Impaired fasting glucose     Malignant neoplasm of testis, unspecified laterality, unspecified whether descended or undescended (H)     Morbid obesity (H)     Symptomatic cholelithiasis     Partial seizure (H)     Past Surgical History:   Procedure Laterality Date     COLONOSCOPY N/A 8/22/2014    Procedure: COLONOSCOPY;  Surgeon: Joe Garcia MD;  Location:  GI     CYSTOSCOPY       LAPAROSCOPIC CHOLECYSTECTOMY N/A 9/2/2018    Procedure: LAPAROSCOPIC CHOLECYSTECTOMY;  LAPAROSCOPIC CHOLECYSTECTOMY ;  Surgeon: Shannan Chaves MD;  Location: [facility] OR     ORCHIECTOMY NOS Right "      TESTICLE SURGERY       TONSILLECTOMY         Social History     Tobacco Use     Smoking status: Former Smoker     Last attempt to quit: 1997     Years since quittin.9     Smokeless tobacco: Former User   Substance Use Topics     Alcohol use: No     Frequency: Never     Drinks per session: Patient refused     Binge frequency: Patient refused     Family History   Problem Relation Age of Onset     Cerebrovascular Disease Mother      Hypertension Mother      Heart Disease Father         had 2 open heart surgery     Lipids Father      Hypertension Father      Cancer Maternal Grandfather         skin cancer     Skin Cancer Maternal Grandfather         skin cancer     Heart Disease Sister 45        heart attack     Cancer Sister 46        breast cancer     Skin Cancer Sister         BCC         Current Outpatient Medications   Medication Sig Dispense Refill     albuterol (PROAIR HFA/PROVENTIL HFA/VENTOLIN HFA) 108 (90 BASE) MCG/ACT Inhaler Inhale 2 puffs into the lungs every 6 hours as needed for shortness of breath / dyspnea or wheezing 1 Inhaler 0     ALPRAZolam (XANAX) 0.5 MG tablet Take 1-2 tablets (0.5-1 mg) by mouth daily as needed for anxiety 60 tablet 1     atenolol (TENORMIN) 50 MG tablet Take 1 tablet (50 mg) by mouth daily 90 tablet 3     fluticasone (FLONASE) 50 MCG/ACT nasal spray SPRAY 1 TO 2 SPRAYS INTO BOTH NOSTRISL DAILY 16 g 7     losartan (COZAAR) 25 MG tablet Take 1 tablet (25 mg) by mouth daily 90 tablet 3     omeprazole (PRILOSEC) 20 MG DR capsule Take 1 capsule (20 mg) by mouth daily 90 capsule 3     OXcarbazepine (TRILEPTAL) 300 MG tablet Take 450 mg by mouth 2 times daily  1     sertraline (ZOLOFT) 100 MG tablet Take 1.5 tablets (150 mg) by mouth daily 135 tablet 3     SUMAtriptan (IMITREX) 50 MG tablet Take 1 tablet (50 mg) by mouth at onset of headache for migraine (may repeat dose in 2 hours. Do not exceed 200mg in 24 hours) 18 tablet 1     traZODone (DESYREL) 50 MG tablet Take  "1-2 tablets ( mg) by mouth At Bedtime 180 tablet 3     triamcinolone (KENALOG) 0.1 % ointment Apply topically 2 times daily as needed for irritation       Allergies   Allergen Reactions     Iodine-131 Shortness Of Breath     Internal iodine     Recent Labs   Lab Test 02/28/20  0849 08/01/19  0927 08/01/19  0919  12/12/18  1410  05/11/18  0716 03/25/17  0921  05/16/15  0957 05/08/15  2105 07/19/14  0924   A1C  --   --   --   --  5.6  --   --   --   --   --   --  5.4   LDL  --   --   --   --   --   --  88 92  --  103  --  133*   HDL  --   --   --   --   --   --  35* 34*  --  40*  --  31*   TRIG  --   --   --   --   --   --  270* 228*  --  256*  --  215*   ALT 35  --  34  --  32   < > 34 39  --   --  40 35   CR 0.68  --  0.73   < > 0.93   < > 0.83 0.81   < >  --  0.92 0.82   GFRESTIMATED >90  --  >90   < > 84   < > >90 >90  Non African American GFR Calc     < >  --  86 >90   GFRESTBLACK >90  --  >90   < > >90   < > >90 >90  African American GFR Calc     < >  --  >90   GFR Calc   >90   POTASSIUM 4.3  --  3.9   < > 3.9   < > 4.3 4.2   < >  --  3.4 4.0   TSH  --  1.46  --   --   --   --   --   --   --   --  1.63  --     < > = values in this interval not displayed.      BP Readings from Last 3 Encounters:   05/08/20 132/80   02/28/20 122/74   02/06/20 134/82    Wt Readings from Last 3 Encounters:   05/08/20 109.3 kg (241 lb)   02/06/20 114.3 kg (252 lb)   11/04/19 109.8 kg (242 lb)              Reviewed and updated as needed this visit by Provider         Review of Systems   ROS COMP: Constitutional, HEENT, cardiovascular, pulmonary, GI, , musculoskeletal, neuro, skin, endocrine and psych systems are negative, except as otherwise noted.      Objective    /80 (BP Location: Right arm, Patient Position: Sitting, Cuff Size: Adult Large)   Pulse 66   Temp 97.5  F (36.4  C) (Tympanic)   Resp 16   Ht 1.656 m (5' 5.2\")   Wt 109.3 kg (241 lb)   SpO2 98%   BMI 39.86 kg/m    Body mass index is " 39.86 kg/m .  Physical Exam   GENERAL: healthy, alert and no distress  EYES: Eyes grossly normal to inspection, PERRL and conjunctivae and sclerae normal  NECK: no adenopathy, no asymmetry, masses, or scars and thyroid normal to palpation  RESP: lungs clear to auscultation - no rales, rhonchi or wheezes  CV: regular rate and rhythm, normal S1 S2, no S3 or S4, no murmur, click or rub, no peripheral edema and peripheral pulses strong  ABDOMEN: abd round, bowel sounds active.  He c/o pain with palpation across upper and left abd regions.  No rebound or guarding.    : right testes surgically absent.  Right testicle without swelling, no localized redness, no pain with palpation.     MS: ambulatory with a steady gait.   SKIN: warm and dry.   NEURO: Normal strength and tone, mentation intact and speech normal  PSYCH: mentation appears normal, affect normal/bright    Diagnostic Test Results:  Labs reviewed in Epic  Results for orders placed or performed in visit on 05/08/20   CBC with platelets and differential     Status: Abnormal   Result Value Ref Range    WBC 3.9 (L) 4.0 - 11.0 10e9/L    RBC Count 4.81 4.4 - 5.9 10e12/L    Hemoglobin 14.2 13.3 - 17.7 g/dL    Hematocrit 43.6 40.0 - 53.0 %    MCV 91 78 - 100 fl    MCH 29.5 26.5 - 33.0 pg    MCHC 32.6 31.5 - 36.5 g/dL    RDW 13.3 10.0 - 15.0 %    Platelet Count 176 150 - 450 10e9/L    Diff Method Automated Method     % Neutrophils 48.5 %    % Lymphocytes 33.2 %    % Monocytes 16.2 %    % Eosinophils 1.8 %    % Basophils 0.3 %    Absolute Neutrophil 1.9 1.6 - 8.3 10e9/L    Absolute Lymphocytes 1.3 0.8 - 5.3 10e9/L    Absolute Monocytes 0.6 0.0 - 1.3 10e9/L    Absolute Eosinophils 0.1 0.0 - 0.7 10e9/L    Absolute Basophils 0.0 0.0 - 0.2 10e9/L             Assessment & Plan     (R19.7) Diarrhea, unspecified type  (primary encounter diagnosis)  Comment: uncertain  Plan: CBC with platelets and differential, Enteric         Bacteria and Virus Panel by [facility] Stool, Ova and         " Parasite Exam Routine, Clostridium difficile         Toxin B PCR        I reassured Nicolas today that his CBC does not show a bacterial infection.   He has the take home stool kit.  I discussed a bland diet, push clear fluids, cut back on the [employer] food (from his girlfriend).  Nicolas was not comfortable with a \"wait and see\" treatment today.  I did make arrangements for him to go to the [facility] today for additional consultation/treatment/testing which was approved by Jaron Mayberry.  The patient was very appreciative and he is driving himself to the [facility] now.  He was instructed no food or fluids on the way.  He verbalized understanding.     (R10.84) Abdominal pain, generalized  Comment:   Plan: as abovde    (E66.01) Morbid obesity (H)  Comment:   Plan:        To [facility] now.    Return in about 1 week (around 5/15/2020), or as scheduled with Dr. Armenta.    HERMILA [facility]        "

## 2020-05-08 NOTE — PATIENT INSTRUCTIONS
Patient Education     Diarrhea with Uncertain Cause (Adult)    Diarrhea is when stools are loose and watery. This can be caused by:    Viral infections    Bacterial infections    Food poisoning    Parasites    Irritable bowel syndrome (IBS)    Inflammatory bowel diseases such as ulcerative colitis, Crohn's disease, and celiac disease    Food intolerance, such as to lactose, the sugar found in milk and milk products    Reaction to medicines like antibiotics, laxatives, cancer drugs, and antacids  Along with diarrhea, you may also have:    Abdominal pain and cramping    Nausea and vomiting    Loss of bowel control    Fever and chills    Bloody stools  In some cases, antibiotics may help to treat diarrhea. You may have a stool sample test. This is done to see what is causing your diarrhea, and if antibiotics will help treat it. The results of a stool sample test may take up to 2 days. The healthcare provider may not give you antibiotics until he or she has the stool test results.  Diarrhea can cause dehydration. This is the loss of too much water and other fluids from the body. When this occurs, body fluid must be replaced. This can be done with oral rehydration solutions. Oral rehydration solutions are available at drugstores and grocery stores without a prescription. Sports drinks are not the best choice if you are very dehydrated. They have too much sugar and not enough electrolytes.  Home care  Follow all instructions given by your healthcare provider. Rest at home for the next 24 hours, or until you feel better. Avoid caffeine, tobacco, and alcohol. These can make diarrhea, cramping, and pain worse.  If taking medicines:    Over-the-counter nausea and diarrhea medicines are generally OK unless you experience fever or blood stool. Check with your doctor first in those circumstances.    You may use acetaminophen or NSAID medicines like ibuprofen or naproxen to reduce pain and fever. Don t use these if you  have chronic liver or kidney disease, or ever had a stomach ulcer or gastrointestinal bleeding. Don't use NSAID medicines if you are already taking one for another condition (like arthritis) or are on daily aspirin therapy (such as for heart disease or after a stroke). Talk with your healthcare provider first.    If antibiotics were prescribed, be sure you take them until they are finished. Don t stop taking them even when you feel better. Antibiotics must be taken as a full course.  To prevent the spread of illness:    Remember that washing with soap and water and using alcohol-based  is the best way to prevent the spread of infection. Dry your hands with a single use towel (like a paper towel).    Clean the toilet after each use.    Wash your hands before eating.    Wash your hands before and after preparing food. Keep in mind that people with diarrhea or vomiting should not prepare food for others.    Wash your hands after using cutting boards, countertops, and knives that have been in contact with raw foods.    Wash and then peel fruits and vegetables.    Keep uncooked meats away from cooked and ready-to-eat foods.    Use a food thermometer when cooking. Cook poultry to at least 165 F (74 C). Cook ground meat (beef, veal, pork, lamb) to at least 160 F (71 C). Cook fresh beef, veal, lamb, and pork to at least 145 F (63 C).    Don t eat raw or undercooked eggs (poached or janette side up), poultry, meat, or unpasteurized milk and juices.  Food and drinks  The main goal while treating vomiting or diarrhea is to prevent dehydration. This is done by taking small amounts of liquids often.    Keep in mind that liquids are more important than food right now.    Drink only small amounts of liquids at a time.    Don t force yourself to eat, especially if you are having cramping, vomiting, or diarrhea. Don t eat large amounts at a time, even if you are hungry.    If you eat, avoid fatty, greasy, spicy, or fried  foods.    Don t eat dairy foods or drink milk if you have diarrhea. These can make diarrhea worse.  During the first 24 hours you can try:    Oral rehydration solutions.  Sports drinks may be used if you are not too dehydrated and are otherwise healthy.    Soft drinks without caffeine    Ginger ale    Water (plain or flavored)    Decaf tea or coffee    Clear broth, consommé, or bouillon    Gelatin, popsicles, or frozen fruit juice bars  The second 24 hours, if you are feeling better, you can add:    Hot cereal, plain toast, bread, rolls, or crackers    Plain noodles, rice, mashed potatoes, chicken noodle soup, or rice soup    Unsweetened canned fruit (no pineapple)    Bananas  As you recover:    Limit fat intake to less than 15 grams per day. Don t eat margarine, butter, oils, mayonnaise, sauces, gravies, fried foods, peanut butter, meat, poultry, or fish.    Limit fiber. Don t eat raw or cooked vegetables, fresh fruits except bananas, or bran cereals.    Limit caffeine and chocolate.    Limit dairy.    Don t use spices or seasonings except salt.    Go back to your normal diet over time, as you feel better and your symptoms improve.    If the symptoms come back, go back to a simple diet or clear liquids.  Follow-up care  Follow up with your healthcare provider, or as advised. If a stool sample was taken or cultures were done, call the healthcare provider for the results as instructed.  Call 911  Call 911 if you have any of these symptoms:    Trouble breathing    Confusion    Extreme drowsiness or trouble walking    Loss of consciousness    Rapid heart rate    Chest pain    Stiff neck    Seizure  When to seek medical advice  Call your healthcare provider right away if any of these occur:    Abdominal pain that gets worse    Constant lower right abdominal pain    Continued vomiting and inability to keep liquids down    Diarrhea more than 5 times a day    Blood in vomit or stool    Dark urine or no urine for 8 hours,  dry mouth and tongue, tiredness, weakness, or dizziness    Drowsiness    New rash    You don t get better in 2 to 3 days    Fever of 100.4 F (38 C) or higher, or as directed by your healthcare provider  Date Last Reviewed: 6/1/2018 2000-2019 [employer]. [address] [place] [id number]. All rights reserved. This information is not intended as a substitute for professional medical care. Always follow your healthcare professional's instructions.         Patient Education     Treating Diarrhea    Diarrhea happens when you have loose, watery, or frequent bowel movements. It is a common problem with many causes. Most cases of diarrhea clear up on their own. But certain cases may need treatment. Be sure to see your healthcare provider if your symptoms do not improve within a few days.  Getting relief  Treatment of diarrhea depends on its cause. Diarrhea caused by bacterial or parasite infection is often treated with antibiotics. Diarrhea caused by other factors, such as a stomach virus, often improves with simple home treatment. The tips below may also help relieve your symptoms.    Drink plenty of fluids. This helps prevent too much fluid loss (dehydration). Water, clear soups, and electrolyte solutions are good choices. Avoid alcohol, coffee, tea, and milk. These can irritate your intestines and make symptoms worse.    Suck on ice chips if drinking makes you queasy.    Return to your normal diet slowly. You may want to eat bland foods at first, such as rice and toast. Also, you may need to avoid certain foods for a while, such as dairy products. These can make symptoms worse. Ask your healthcare provider if there are any other foods you should avoid.    If you were prescribed antibiotics, take them as directed.    Do not take anti-diarrhea medicines without asking your healthcare provider first.  Call your healthcare provider   Call your healthcare provider if you have any of the  following:     A fever of 100.4 F (38.0 C) or higher, or as directed by your healthcare provider    Severe pain    Worsening diarrhea or diarrhea for more than 2 days    Bloody vomit or stool    Signs of dehydration (dizziness, dry mouth and tongue, rapid pulse, dark urine)  Date Last Reviewed: 7/1/2016 2000-2019 [employer]. [address] [place] [address]. All rights reserved. This information is not intended as a substitute for professional medical care. Always follow your healthcare professional's instructions.

## 2020-05-09 LAB
C COLI+JEJUNI+LARI FUSA STL QL NAA+PROBE: NOT DETECTED
C DIFF TOX B STL QL: NEGATIVE
EC STX1 GENE STL QL NAA+PROBE: NOT DETECTED
EC STX2 GENE STL QL NAA+PROBE: NOT DETECTED
ENTERIC PATHOGEN COMMENT: ABNORMAL
NOROV GI+II ORF1-ORF2 JNC STL QL NAA+PR: NOT DETECTED
RVA NSP5 STL QL NAA+PROBE: ABNORMAL
SALMONELLA SP RPOD STL QL NAA+PROBE: NOT DETECTED
SHIGELLA SP+EIEC IPAH STL QL NAA+PROBE: NOT DETECTED
SPECIMEN SOURCE: NORMAL
V CHOL+PARA RFBL+TRKH+TNAA STL QL NAA+PR: NOT DETECTED
Y ENTERO RECN STL QL NAA+PROBE: NOT DETECTED

## 2020-05-09 ASSESSMENT — ASTHMA QUESTIONNAIRES: ACT_TOTALSCORE: 21

## 2020-05-10 ENCOUNTER — HOSPITAL ENCOUNTER (EMERGENCY)
Facility: CLINIC | Age: 60
Discharge: HOME | End: 2020-05-10
Attending: EMERGENCY MEDICINE | Admitting: EMERGENCY MEDICINE
Payer: COMMERCIAL

## 2020-05-10 VITALS
SYSTOLIC BLOOD PRESSURE: 145 MMHG | RESPIRATION RATE: 18 BRPM | OXYGEN SATURATION: 99 % | TEMPERATURE: 98.4 F | HEART RATE: 93 BPM | DIASTOLIC BLOOD PRESSURE: 95 MMHG

## 2020-05-10 DIAGNOSIS — E86.0 DEHYDRATION: ICD-10-CM

## 2020-05-10 DIAGNOSIS — A08.0 ROTAVIRAL GASTROENTERITIS: ICD-10-CM

## 2020-05-10 DIAGNOSIS — R19.7 VOMITING AND DIARRHEA: ICD-10-CM

## 2020-05-10 DIAGNOSIS — R11.10 VOMITING AND DIARRHEA: ICD-10-CM

## 2020-05-10 LAB
ANION GAP SERPL CALCULATED.3IONS-SCNC: 5 MMOL/L (ref 3–14)
BUN SERPL-MCNC: 22 MG/DL (ref 7–30)
CALCIUM SERPL-MCNC: 9.3 MG/DL (ref 8.5–10.1)
CHLORIDE SERPL-SCNC: 107 MMOL/L (ref 94–109)
CO2 SERPL-SCNC: 26 MMOL/L (ref 20–32)
CREAT SERPL-MCNC: 0.9 MG/DL (ref 0.66–1.25)
GFR SERPL CREATININE-BSD FRML MDRD: >90 ML/MIN/{1.73_M2}
GLUCOSE SERPL-MCNC: 116 MG/DL (ref 70–99)
POTASSIUM SERPL-SCNC: 3.8 MMOL/L (ref 3.4–5.3)
SODIUM SERPL-SCNC: 138 MMOL/L (ref 133–144)

## 2020-05-10 PROCEDURE — 25000128 H RX IP 250 OP 636: Performed by: EMERGENCY MEDICINE

## 2020-05-10 PROCEDURE — 93005 ELECTROCARDIOGRAM TRACING: CPT

## 2020-05-10 PROCEDURE — 96361 HYDRATE IV INFUSION ADD-ON: CPT

## 2020-05-10 PROCEDURE — 99284 EMERGENCY DEPT VISIT MOD MDM: CPT | Mod: 25

## 2020-05-10 PROCEDURE — 25800030 ZZH RX IP 258 OP 636: Performed by: EMERGENCY MEDICINE

## 2020-05-10 PROCEDURE — 80048 BASIC METABOLIC PNL TOTAL CA: CPT | Performed by: EMERGENCY MEDICINE

## 2020-05-10 PROCEDURE — 96374 THER/PROPH/DIAG INJ IV PUSH: CPT

## 2020-05-10 PROCEDURE — 25000132 ZZH RX MED GY IP 250 OP 250 PS 637: Performed by: EMERGENCY MEDICINE

## 2020-05-10 RX ORDER — ONDANSETRON 4 MG/1
4 TABLET, ORALLY DISINTEGRATING ORAL EVERY 6 HOURS PRN
Qty: 12 TABLET | Refills: 0 | Status: SHIPPED | OUTPATIENT
Start: 2020-05-10 | End: 2020-05-13

## 2020-05-10 RX ORDER — ONDANSETRON 2 MG/ML
4 INJECTION INTRAMUSCULAR; INTRAVENOUS ONCE
Status: COMPLETED | OUTPATIENT
Start: 2020-05-10 | End: 2020-05-10

## 2020-05-10 RX ORDER — DIPHENOXYLATE HCL/ATROPINE 2.5-.025MG
2 TABLET ORAL ONCE
Status: COMPLETED | OUTPATIENT
Start: 2020-05-10 | End: 2020-05-10

## 2020-05-10 RX ADMIN — SODIUM CHLORIDE 1000 ML: 9 INJECTION, SOLUTION INTRAVENOUS at 00:25

## 2020-05-10 RX ADMIN — DIPHENOXYLATE HYDROCHLORIDE AND ATROPINE SULFATE 2 TABLET: 2.5; .025 TABLET ORAL at 00:29

## 2020-05-10 RX ADMIN — ONDANSETRON 4 MG: 2 INJECTION INTRAMUSCULAR; INTRAVENOUS at 00:30

## 2020-05-10 ASSESSMENT — ENCOUNTER SYMPTOMS
VOMITING: 1
NAUSEA: 1
FEVER: 0
DIARRHEA: 1

## 2020-05-10 NOTE — ED PROVIDER NOTES
History     Chief Complaint:  Nausea, Vomiting, & Diarrhea      The history is provided by the patient.      Fabio Logan is a 59 year old male with a history of testicular cancer, hypertension, hyperlipidemia, cerebral aneurysm, who presents to the emergency department for evaluation of nausea, vomiting and diarrhea. The patient reports that for the last week he has been experiencing diarrhea and intermittent nausea and vomiting. He was worked up at Urgent Care on 5/8/20 with the below results but today he went to a birthday party and on arriving home again started vomiting and having episodes of diarrhea.  Denies fever.    CT Abdomen Pelvis w Contrast 5/8/20  Impression  1.  Fatty change of the liver.  2.  Colonic diverticulosis without diverticulitis.  3.  Nonobstructing 3 mm calculi in the left kidney.    Enteric Bacteria and Virus Panel by AUSTEN Stool 5/8/20  Rotavirus A by AUSTEN: Detected    Lab Results from 5/8/20  CBC with platelets and differential     Status: Abnormal   Result Value Ref Range     WBC 3.9 (L) 4.0 - 11.0 10e9/L     RBC Count 4.81 4.4 - 5.9 10e12/L     Hemoglobin 14.2 13.3 - 17.7 g/dL     Hematocrit 43.6 40.0 - 53.0 %     MCV 91 78 - 100 fl     MCH 29.5 26.5 - 33.0 pg     MCHC 32.6 31.5 - 36.5 g/dL     RDW 13.3 10.0 - 15.0 %     Platelet Count 176 150 - 450 10e9/L     Diff Method Automated Method       % Neutrophils 48.5 %     % Lymphocytes 33.2 %     % Monocytes 16.2 %     % Eosinophils 1.8 %     % Basophils 0.3 %     Absolute Neutrophil 1.9 1.6 - 8.3 10e9/L     Absolute Lymphocytes 1.3 0.8 - 5.3 10e9/L     Absolute Monocytes 0.6 0.0 - 1.3 10e9/L     Absolute Eosinophils 0.1 0.0 - 0.7 10e9/L     Absolute Basophils 0.0 0.0 - 0.2 10e9/L   Results for orders placed or performed in visit on 05/08/20   Amylase     Status: Abnormal   Result Value Ref Range     Amylase 24 (L) 30 - 110 U/L   Comprehensive metabolic panel     Status: None   Result Value Ref Range     Sodium 137 133 - 144 mmol/L      Potassium 3.8 3.4 - 5.3 mmol/L     Chloride 106 94 - 109 mmol/L     Carbon Dioxide 28 20 - 32 mmol/L     Anion Gap 3 3 - 14 mmol/L     Glucose 93 70 - 99 mg/dL     Urea Nitrogen 20 7 - 30 mg/dL     Creatinine 0.74 0.66 - 1.25 mg/dL     GFR Estimate >90 >60 mL/min/[1.73_m2]     GFR Estimate If Black >90 >60 mL/min/[1.73_m2]     Calcium 9.1 8.5 - 10.1 mg/dL     Bilirubin Total 0.4 0.2 - 1.3 mg/dL     Albumin 3.9 3.4 - 5.0 g/dL     Protein Total 8.0 6.8 - 8.8 g/dL     Alkaline Phosphatase 124 40 - 150 U/L     ALT 39 0 - 70 U/L     AST 27 0 - 45 U/L   Lipase     Status: Abnormal   Result Value Ref Range     Lipase 70 (L) 73 - 393 U/L   UA with Microscopic reflex to Culture     Status: Abnormal     Specimen: Unspecified Urine   Result Value Ref Range     Color Urine Yellow       Appearance Urine Clear       Glucose Urine Negative NEG^Negative mg/dL     Bilirubin Urine Negative NEG^Negative     Ketones Urine Negative NEG^Negative mg/dL     Specific Gravity Urine 1.026 1.003 - 1.035     Blood Urine Trace (A) NEG^Negative     pH Urine 5.5 5.0 - 7.0 pH     Protein Albumin Urine Negative NEG^Negative mg/dL     Urobilinogen mg/dL Normal 0.0 - 2.0 mg/dL     Nitrite Urine Negative NEG^Negative     Leukocyte Esterase Urine Negative NEG^Negative     Source Unspecified Urine       WBC Urine <1 0 - 5 /HPF     RBC Urine 1 0 - 2 /HPF     Squamous Epithelial /HPF Urine <1 0 - 1 /HPF     Mucous Urine Present (A) NEG^Negative /LPF       Allergies:  Iodine-131    Medications:    Albuterol  Alprazolam  Atenolol  Hydroxyzine   Fluticasone  Losartan  Omeprazole  Oxcarbazepine  Phenytoin  Quetiapine  Sertraline  Sumatriptan  Trazodone     Past Medical History:    Anxiety  Depression  HTN  HLD  Mumps  Testicular cancer  Partial seizure   Cholelithiasis   Morbid obesity  Asthma  Chronic dermatitis   Esophageal reflux   Diverticulosis of large intestine   Migraine  Cerebral aneurysm  Metabolic encephalopathy    Past Surgical History:     Colonoscopy  Cystoscopy  Cholecystectomy  Orchiectomy  Testicle surgery  Tonsillectomy  Eye surgery  Craniotomy for aneurysm     Family History:    Mother - Cerebrovascular Disease, HTN, Varicose Veins  Father - Heart Disease, HLD, HTN, MI  Sister(s) - MI, Breast Cancer, Skin Cancer    Social History:  Former smoker.  Negative for alcohol use.  Negative for drug use.  PCP: Manoj Armenta  Presents alone today.    Review of Systems   Constitutional: Negative for fever.   Gastrointestinal: Positive for diarrhea, nausea and vomiting.   All other systems reviewed and are negative.      Physical Exam     Patient Vitals for the past 24 hrs:   BP Temp Temp src Pulse Resp SpO2   05/10/20 0005 159/98 98.2  F (36.8  C) Oral 96 18 97 %       Physical Exam  Nursing note and vitals reviewed.  Constitutional: Cooperative.   HENT:   Mouth/Throat: Mucous membranes are dry.  Cardiovascular: Normal rate, regular rhythm and normal heart sounds.  No murmur.  Pulmonary/Chest: Effort normal and breath sounds normal. No respiratory distress. No wheezes. No rales.   Abdominal: Soft. Normal appearance and bowel sounds are normal. No distension. There is mild diffuse tenderness. There is no rigidity and no guarding.   Neurological: Alert. Oriented x4.   Skin: Skin is warm and dry.   Psychiatric: Normal mood and affect.     Emergency Department Course     ECG:  Time: 0010  Vent. Rate 98 bpm. CO interval 152. QRS duration 100. QT/QTc 356/454. P-R-T axis 45 -43 37.  Sinus rhythm.  Left axis deviation.   Abnormal ECG.  No significant change compared to EKG dated 9/2/18.    Laboratory:  Laboratory findings were communicated with the patient who voiced understanding of the findings.    BMP: Glucose 116 (H), o/w WNL (Creatinine: 0.90)     Interventions:  0025 NS Bolus 1,000 mLs IV   NS Bolus 1,000 mLs IV  0029 Lomotil 2 tablets PO  0030 Zofran 4 mg IV    Emergency Department Course:  Past medical records, nursing notes, and vitals  reviewed.    0005 I performed an exam of the patient as documented above.     EKG obtained in the ED, see results above.   IV was inserted and blood was drawn for laboratory testing, results above.    0145 I rechecked the patient and discussed the results of his workup thus far.     Findings and plan explained to the Patient. Patient discharged home with instructions regarding supportive care, medications, and reasons to return. The importance of close follow-up was reviewed. The patient was prescribed Zofran.    I personally reviewed the laboratory results with the Patient and answered all related questions prior to discharge.     Impression & Plan     Medical Decision Makin yo M with continued diarrhea and vomiting.  His stool culture from yesterday was + for rotavirus.  He was made aware of this.  Non-surgical abdomen on exam.  Labs and imaging from yesterday reviewed.  BMP normal here.  2 liters IVF provided as well as Zofran and Lomotil.  He was advised to let people at the party know of his diagnosis as it is very contagious.  Oral hydration strategies reviewed.  OK for discharge home.      Diagnosis:    ICD-10-CM   1. Rotaviral gastroenteritis  A08.0   2. Dehydration  E86.0   3. Vomiting and diarrhea  R11.10    R19.7       Disposition:  Discharged to home.    Discharge Medications:  New Prescriptions    ONDANSETRON (ZOFRAN ODT) 4 MG ODT TAB    Take 1 tablet (4 mg) by mouth every 6 hours as needed for nausea       Scribe Disclosure:  Moody BLACK, am serving as a scribe at 12:03 AM on 5/10/2020 to document services personally performed by Daniel Andrade MD based on my observations and the provider's statements to me.          Daniel Andrade MD  05/10/20 0152

## 2020-05-10 NOTE — ED AVS SNAPSHOT
[facility] Emergency Department  [address]  [facility] [address]  Phone:  [phone]  Fax:  [phone]                                    Fabio Logan   MRN: [id number]    Department:  [facility] Emergency Department   Date of Visit:  5/10/2020           After Visit Summary Signature Page    I have received my discharge instructions, and my questions have been answered. I have discussed any challenges I see with this plan with the nurse or doctor.    ..........................................................................................................................................  Patient/Patient Representative Signature      ..........................................................................................................................................  Patient Representative Print Name and Relationship to Patient    ..................................................               ................................................  Date                                   Time    ..........................................................................................................................................  Reviewed by Signature/Title    ...................................................              ..............................................  Date                                               Time          22EPIC Rev 08/18

## 2020-05-11 LAB
INTERPRETATION ECG - MUSE: NORMAL
O+P STL MICRO: NORMAL
O+P STL MICRO: NORMAL
SPECIMEN SOURCE: NORMAL

## 2020-05-20 ENCOUNTER — OFFICE VISIT (OUTPATIENT)
Dept: PEDIATRICS | Facility: CLINIC | Age: 60
End: 2020-05-20
Payer: COMMERCIAL

## 2020-05-20 VITALS
HEART RATE: 72 BPM | RESPIRATION RATE: 20 BRPM | HEIGHT: 66 IN | SYSTOLIC BLOOD PRESSURE: 112 MMHG | WEIGHT: 231.3 LBS | BODY MASS INDEX: 37.17 KG/M2 | DIASTOLIC BLOOD PRESSURE: 70 MMHG | TEMPERATURE: 97.9 F

## 2020-05-20 DIAGNOSIS — R19.5 LOOSE STOOLS: ICD-10-CM

## 2020-05-20 DIAGNOSIS — A08.0 ROTAVIRUS ENTERITIS: Primary | ICD-10-CM

## 2020-05-20 PROCEDURE — 99213 OFFICE O/P EST LOW 20 MIN: CPT | Performed by: INTERNAL MEDICINE

## 2020-05-20 ASSESSMENT — ANXIETY QUESTIONNAIRES
2. NOT BEING ABLE TO STOP OR CONTROL WORRYING: NOT AT ALL
1. FEELING NERVOUS, ANXIOUS, OR ON EDGE: MORE THAN HALF THE DAYS
GAD7 TOTAL SCORE: 4
7. FEELING AFRAID AS IF SOMETHING AWFUL MIGHT HAPPEN: NOT AT ALL
3. WORRYING TOO MUCH ABOUT DIFFERENT THINGS: MORE THAN HALF THE DAYS
IF YOU CHECKED OFF ANY PROBLEMS ON THIS QUESTIONNAIRE, HOW DIFFICULT HAVE THESE PROBLEMS MADE IT FOR YOU TO DO YOUR WORK, TAKE CARE OF THINGS AT HOME, OR GET ALONG WITH OTHER PEOPLE: NOT DIFFICULT AT ALL
6. BECOMING EASILY ANNOYED OR IRRITABLE: NOT AT ALL
5. BEING SO RESTLESS THAT IT IS HARD TO SIT STILL: NOT AT ALL

## 2020-05-20 ASSESSMENT — PATIENT HEALTH QUESTIONNAIRE - PHQ9
SUM OF ALL RESPONSES TO PHQ QUESTIONS 1-9: 0
5. POOR APPETITE OR OVEREATING: NOT AT ALL

## 2020-05-20 ASSESSMENT — MIFFLIN-ST. JEOR: SCORE: 1806.92

## 2020-05-20 NOTE — PROGRESS NOTES
SUBJECTIVE:                                                    Fabio Logan is a 59 year old male who presents to clinic today for the following health issues:    ED/UC Followup:    Facility:  [facility]  Date of visit: 05/10/2020  Reason for visit: Gastroenteritis  Current Status: Pt stated that since ED visit, he have had Sx, but has changed diet since and feeling ok day/better     patient here for ER fu, has been doingbetter, has been watching his deit, the lomotil really helped, no fevers, no abdominal pain, was having up to 5-6 runny watery stools a day efuche ruddin gto the emergency room, was having cramps as well, no blood. some LUQ pain and LLQ pain, this has resolved, has clost a couple of pounds with this illness. no fevers, mood is good. no sever abdominal pain, no dark or bloody stools.   was planning ot travel [place] in [place], but wants to know if this is a good idea.   No other concerns or complaints today.       Problem list and histories reviewed & adjusted, as indicated.  Additional history: as documented    Patient Active Problem List    Diagnosis Date Noted     Partial seizure (H) 06/03/2019     Priority: Medium     Following surgery for Right MCA aneurysm with hospitalization at [facility]       Symptomatic cholelithiasis 09/02/2018     Priority: Medium     Morbid obesity (H) 08/19/2018     Priority: Medium     Impaired fasting glucose 05/18/2018     Priority: Medium     Malignant neoplasm of testis, unspecified laterality, unspecified whether descended or undescended (H) 05/18/2018     Priority: Medium     Major depression in complete remission (H) 01/17/2018     Priority: Medium     Essential hypertension with goal blood pressure less than 140/90 09/26/2016     Priority: Medium     Mild intermittent asthma without complication 05/10/2016     Priority: Medium     Chronic dermatitis 11/16/2015     Priority: Medium     Skin cancer screening 10/30/2015     Priority: Medium      Skin eruption 10/30/2015     Priority: Medium     Lentigines 10/30/2015     Priority: Medium     Chest pain 2015     Priority: Medium     Esophageal reflux 10/14/2014     Priority: Medium     H/O testicular cancer 10/09/2014     Priority: Medium     Diverticulosis of large intestine 10/09/2014     Priority: Medium     Problem list name updated by automated process. Provider to review       Migraine 07/15/2014     Priority: Medium     Problem list name updated by automated process. Provider to review       HTN, goal below 140/90 2014     Priority: Medium     Mild major depression (H) 2013     Priority: Medium     Obese 2013     Priority: Medium     CARDIOVASCULAR SCREENING; LDL GOAL LESS THAN 160 02/10/2010     Priority: Medium     Social History     Tobacco Use     Smoking status: Former Smoker     Last attempt to quit: 1997     Years since quittin.9     Smokeless tobacco: Former User   Substance Use Topics     Alcohol use: No     Frequency: Never     Drinks per session: Patient refused     Binge frequency: Patient refused     Drug use: No     Family History   Problem Relation Age of Onset     Cerebrovascular Disease Mother      Hypertension Mother      Heart Disease Father         had 2 open heart surgery     Lipids Father      Hypertension Father      Cancer Maternal Grandfather         skin cancer     Skin Cancer Maternal Grandfather         skin cancer     Heart Disease Sister 45        heart attack     Cancer Sister 46        breast cancer     Skin Cancer Sister         BCC       ROS:  A complete 10 point review of systems was taken and negative except for those noted in the subjective/HPI section(s) above     BP Readings from Last 3 Encounters:   20 112/70   05/10/20 (!) 145/95   20 132/80    Wt Readings from Last 3 Encounters:   20 104.9 kg (231 lb 4.8 oz)   20 109.3 kg (241 lb)   20 110.7 kg (244 lb)                    OBJECTIVE:                   "                                  /70   Pulse 72   Temp 97.9  F (36.6  C) (Oral)   Resp 20   Ht 1.676 m (5' 6\")   Wt 104.9 kg (231 lb 4.8 oz)   BMI 37.33 kg/m     Constitutional: healthy, alert and no distress  HEENT: normocephalic and atrumatic, mucous membranes moist, op clear, mucous membranes moist, neck supple   Cardiovascular: regular rate and rhythm no rubs, gallops or murmurs normal S1/S2; no S3 or S4  Respiratory: clear to auscultation bilaterally in all lung fields, normal insp/exp effort. Good air movement  Gastrointestinal: Abdomen obese,  soft, non-tender. BS normal. No masses, organomegaly; no rebound or guarding.   Musculoskeletal: extremities normal- no gross deformities noted, gait normal and normal muscle tone  Skin: no suspicious lesions or rashes  Psychiatric: mentation appears normal and affect normal/bright        Contains abnormal data  Enteric Bacteria and Virus Panel by AUSTEN Stool   Order: [id number]   Status:  Final result   Visible to patient:  Yes (MyChart) Dx:  Diarrhea, unspecified type   Specimen Information:  Feces            Ref Range & Units  2wk ago 9mo ago     Campylobacter group by AUSTEN  NDET^Not Detected  Not Detected   Not Detected       Salmonella species by AUSTEN  NDET^Not Detected  Not Detected   Not Detected       Shigella species by AUSTEN  NDET^Not Detected  Not Detected   Not Detected       Vibrio group by AUSTEN  NDET^Not Detected  Not Detected   Not Detected       Rotavirus A by AUSTEN  NDET^Not Detected  Detected, Abnormal ResultAbnormal     Not Detected       Shiga toxin 1 gene by AUSTEN  NDET^Not Detected  Not Detected   Not Detected       Shiga toxin 2 gene by AUSTEN  NDET^Not Detected  Not Detected   Not Detected       Norovirus I and II by AUSTEN  NDET^Not Detected  Not Detected   Not Detected       Yersinia enterocolitica by AUSTEN  NDET^Not Detected  Not Detected   Not Detected       Enteric pathogen comment   Testing performed by multiplexed, qualitative PCR using the " [employer] Verigene Enteric   Pathogens Nucleic Acid Test. Results should not be used as the sole basis for diagnosis,   treatment, or other patient management decisions.  Testing performed by multiplexed, qualitative PCR using the [employer] Verigene Enteric   Pathogens Nucleic Acid Test. Results should not be used as the sole basis for diagnosis,   treatment, or other patient management decisions.  CM       Comment: Positive results do not rule out co-infection with other organisms that are   not detected by this test, and may not be the sole or definitive cause of   patient illness.   Negative results in the setting of clinical illness compatible with   gastroenteritis may be due to infection by pathogens that are not detected by   this test or non-infectious causes such as ulcerative colitis, irritable bowel    syndrome, or Crohn's disease.   Note: Shiga toxin producing E. coli (STEC) typically harbor one or both genes   that encode for Shiga toxins 1 and 2.    [facility]   INFECTIOUS DISEASES DIAGNOSTIC LABORATORY  INFECTIOUS DISEASES DIAGNOSTIC LABORATORY          Specimen Collected: 05/08/20  5:23 PM  Last Resulted: 05/09/20 11:26 PM                    ASSESSMENT/PLAN:                                                      .    ICD-10-CM    1. Rotavirus enteritis  A08.0    2. Loose stools  R19.5    discussed with patient (or patient's parents/caregiver) pathophysiology of condition and treatment options.  reviwed continued supportive cares, no red flags and patient improving, continue cares and plan, recommend he NOT travel this coming weekend as he is just starting to feel better and more recuperation and avoidance of virus shedding to others (cristofer young children, immunocompromised, etc) is important. patient agrees with plan, continue to ADAT, okay to start a probiotic. discussed with patient (or patient's parents/caregiver) pathophysiology of condition and treatment options.  reviewed labs, medications,  "diet ,etc.  Patient verbalized understanding and is agreeable to this plan.       Estimated body mass index is 40.36 kg/m  as calculated from the following:    Height as of 5/8/20: 1.656 m (5' 5.2\").    Weight as of 5/8/20: 110.7 kg (244 lb).  Weight management plan: Discussed healthy diet and exercise guidelines    Return to clinic as needed or if symptoms persist, change, worsen or if any new symptoms develop.      Manoj Armenta M.D.  Internal Medicine-Pediatrics              "

## 2020-05-20 NOTE — PATIENT INSTRUCTIONS
"  Patient Education     Viral Diarrhea (Adult)    Diarrhea caused by a virus is often called viral gastroenteritis. Many people call it the \"stomach flu,\" but it has nothing to do with influenza. The virus that causes diarrhea affects the stomach and intestinal tract and usually lasts from 2 to 7 days. Diarrhea is the passing of loose, watery stools 3 or more times a day.  Symptoms  Along with diarrhea, you may have these symptoms:    Abdominal pain and cramping    Nausea and vomiting    Loss of bowel control    Fever and chills    Bloody stools  The danger from repeated diarrhea is dehydration. Dehydration is the loss of too much water and other fluids from the body without taking in enough to replace what is lost.  Antibiotics are not effective in this illness, but there are a number of things you can do at home that will help.  Home care  Follow these home care measures:    If symptoms are severe, rest at home for the next 24 hours or until you are feeling better.    Wash your hands with soap and water or alcohol-based  to prevent the spread of infection. Wash your hands after touching anyone who is sick.    Wash your hands after using the toilet and before meals. Clean the toilet after each use.  Food preparation:    People with diarrhea should not prepare food for others. When preparing foods, wash your hands after touching anyone who is sick.    Wash your hands after using cutting boards, countertops, and knives that have been in contact with raw food.    Keep uncooked meats away from cooked and ready-to-eat foods.  Medicines:    You may use acetaminophen or NSAIDS such as ibuprofen or naproxen to control fever unless another medicine was prescribed.  If you have chronic liver or kidney disease or ever had a stomach ulcer or gastrointestinal bleeding, talk with your healthcare provider before using these medicines. Aspirin should never be used in anyone under 18 years of age who is ill with a fever. " It may cause severe liver damage. Don't use NSAID medicines if you are already taking one for another condition (like arthritis) or are on aspirin (such as for heart disease or after a stroke).    Anti-diarrhea medicine should be taken for this condition only if advised by your healthcare provider. Sometimes anti-diarrhea medicine can make your condition worse. If you have bloody diarrhea or fever, check with your healthcare provider before taking antidiarrheals.  Diet:    Water and clear liquids are important so you don't get dehydrated. Drink small amounts at a time, don't guzzle it down. If you are very dehydrated, sports drinks aren't a good choice. They have too much sugar and not enough electrolytes. In this case, commercially available products called oral rehydration solutions are best.    Caffeine, tobacco, and alcohol can make the diarrhea, cramping, and pain worse.    Don't force yourself to eat, especially if you have cramping, vomiting, or diarrhea. Don't eat large amounts at a time, even if you are hungry. It may make you feel worse.    If you eat, avoid fatty, greasy, spicy, or fried foods.    No dairy products, as they can make diarrhea worse.  During the first 24 Hours (the first full day) follow the diet below:    Beverages: Water, clear liquids, soft drinks without caffeine; ginger ale, mineral water (plain or flavored), decaffeinated tea and coffee.    Soups: Clear broth, consommé and bouillon    Desserts: Plain gelatin, popsicles and fruit juice bars  During the next 24 hours (the second day) you may add the following to the above if you have improved:    Hot cereal, plain toast, bread, rolls, crackers    Plain noodles, rice, mashed potatoes, chicken noodle or rice soup    Unsweetened canned fruit like applesauce and bananas (avoid pineapple and citrus)    Limit fat intake to less than 15 grams per day by avoiding margarine, butter, oils, mayonnaise, sauces, gravies, fried foods, peanut butter,  meat, poultry and fish.    Limit fiber; avoid raw or cooked vegetables, fresh fruits (except bananas) and bran cereals.    Limit caffeine and chocolate. No spices or seasonings except salt.  During the next 24 hours    Gradually resume a normal diet, as you feel better and your symptoms improve.    If at any time the diarrhea or cramping gets worse, go back to the simpler diet (above) or to clear liquids.  Follow-up care  Follow up with your healthcare provider, or as advised. Call if you are not improving within 24 hours or if the diarrhea lasts more than one week. This is especially true if you are in a high-risk group. For example, if you are very elderly, have a weak immune system (from cancer treatment for example), or you have inflammatory bowel disease (Crohn's or colitis).  If a stool (diarrhea) sample was taken, you may call in 2 days (or as directed) for the results.  When to seek medical advice  Call your healthcare provider right away if any of the following occur:    Increasing abdominal pain or constant lower right abdominal pain    Continued vomiting (unable to keep liquids down)    Frequent diarrhea (more than 5 times a day)    Blood in vomit or stool (black or red color)    Reduced oral intake    Dark urine, reduced urine output    Weakness, dizziness    Drowsiness    Fever of 100.4 F (38 C) oral or higher, or as directed by your healthcare provider    New rash  Call 911  Call 911 if any of the following occur:    Trouble breathing    Confused    Severe drowsiness or trouble awakening    Fainting or loss of consciousness    Rapid heart rate    Seizure    Stiff neck  Date Last Reviewed: 3/1/2018    0282-6489 [employer]. [address] [place] [address], [place] [address]. All rights reserved. This information is not intended as a substitute for professional medical care. Always follow your healthcare professional's instructions.             Get a probiotic over-the-counter: Try to get more than  10-30 billion CFUs (may need to go to [employer] or whole foods) with multiple strains.

## 2020-05-21 ASSESSMENT — ANXIETY QUESTIONNAIRES: GAD7 TOTAL SCORE: 4

## 2020-05-21 ASSESSMENT — ASTHMA QUESTIONNAIRES: ACT_TOTALSCORE: 25

## 2020-06-19 DIAGNOSIS — F33.0 MAJOR DEPRESSIVE DISORDER, RECURRENT EPISODE, MILD (H): ICD-10-CM

## 2020-06-22 RX ORDER — SERTRALINE HYDROCHLORIDE 100 MG/1
150 TABLET, FILM COATED ORAL DAILY
Qty: 135 TABLET | Refills: 1 | Status: SHIPPED | OUTPATIENT
Start: 2020-06-22 | End: 2021-01-11

## 2020-06-22 NOTE — TELEPHONE ENCOUNTER
Routing refill request to provider for review/approval because:  Drug interaction warning    Sophia Acosta RN on 6/22/2020 at 9:50 AM

## 2020-07-14 ENCOUNTER — TELEPHONE (OUTPATIENT)
Dept: PEDIATRICS | Facility: CLINIC | Age: 60
End: 2020-07-14

## 2020-07-14 DIAGNOSIS — Z00.00 ENCOUNTER FOR ROUTINE ADULT HEALTH EXAMINATION WITHOUT ABNORMAL FINDINGS: ICD-10-CM

## 2020-07-14 DIAGNOSIS — B37.2 CANDIDAL INTERTRIGO: ICD-10-CM

## 2020-07-14 NOTE — TELEPHONE ENCOUNTER
Fax request:    nystatin (MYCOSTATIN) [id number] UNIT/GM POWD (Discontinued)     Prescribed during UC visit.

## 2020-07-15 RX ORDER — NYSTATIN 100000 [USP'U]/G
POWDER TOPICAL 3 TIMES DAILY PRN
Qty: 60 G | Refills: 2 | Status: SHIPPED | OUTPATIENT
Start: 2020-07-15 | End: 2021-08-03

## 2020-07-15 NOTE — TELEPHONE ENCOUNTER
PCP:    Medication last prescribed in 2018. Is not active on med list. Please advise.     Dee Dee Redmond, JUDITH   [facility] -- Triage Nurse

## 2020-08-27 ENCOUNTER — OFFICE VISIT (OUTPATIENT)
Dept: NEUROLOGY | Facility: CLINIC | Age: 60
End: 2020-08-27
Payer: COMMERCIAL

## 2020-08-27 VITALS — BODY MASS INDEX: 36.96 KG/M2 | HEIGHT: 66 IN | WEIGHT: 230 LBS

## 2020-08-27 DIAGNOSIS — R56.9 PARTIAL SEIZURE (H): Primary | ICD-10-CM

## 2020-08-27 DIAGNOSIS — G40.909 SEIZURE DISORDER (H): ICD-10-CM

## 2020-08-27 PROCEDURE — 99214 OFFICE O/P EST MOD 30 MIN: CPT | Mod: 95 | Performed by: PSYCHIATRY & NEUROLOGY

## 2020-08-27 RX ORDER — OXCARBAZEPINE 300 MG/1
450 TABLET, FILM COATED ORAL 2 TIMES DAILY
Qty: 270 TABLET | Refills: 3 | Status: SHIPPED | OUTPATIENT
Start: 2020-08-27 | End: 2021-07-13

## 2020-08-27 ASSESSMENT — MIFFLIN-ST. JEOR: SCORE: 1801.02

## 2020-08-27 NOTE — PROGRESS NOTES
Fabio Logan  59 year old  MRN:[id number]  PCP: Manoj Armenta  No ref. provider found    Allergies   Allergen Reactions     Iodine-131 Shortness Of Breath     Internal iodine;    Pt had Isovue-370 contrast dye on 5/8/2020 w/ no adverse reactions.     Iodine      Other reaction(s): Shortness of breath  internal iodine for tests         Current Outpatient Medications   Medication Sig Dispense Refill     albuterol (PROAIR HFA/PROVENTIL HFA/VENTOLIN HFA) 108 (90 BASE) MCG/ACT Inhaler Inhale 2 puffs into the lungs every 6 hours as needed for shortness of breath / dyspnea or wheezing 1 Inhaler 0     ALPRAZolam (XANAX) 0.5 MG tablet Take 1-2 tablets (0.5-1 mg) by mouth daily as needed for anxiety 60 tablet 1     atenolol (TENORMIN) 50 MG tablet Take 1 tablet (50 mg) by mouth daily 90 tablet 3     fluticasone (FLONASE) 50 MCG/ACT nasal spray SPRAY 1 TO 2 SPRAYS INTO BOTH NOSTRISL DAILY 16 g 7     losartan (COZAAR) 25 MG tablet Take 1 tablet (25 mg) by mouth daily 90 tablet 3     nystatin (MYCOSTATIN) 297403 UNIT/GM external powder Apply topically 3 times daily as needed (recurrent yeast infection at moist skin creases) 60 g 2     omeprazole (PRILOSEC) 40 MG DR capsule Take 1 capsule (40 mg) by mouth daily 20 capsule 0     OXcarbazepine (TRILEPTAL) 300 MG tablet Take 450 mg by mouth 2 times daily  1     sertraline (ZOLOFT) 100 MG tablet Take 1.5 tablets (150 mg) by mouth daily 135 tablet 1     SUMAtriptan (IMITREX) 50 MG tablet Take 1 tablet (50 mg) by mouth at onset of headache for migraine (may repeat dose in 2 hours. Do not exceed 200mg in 24 hours) 18 tablet 1     traZODone (DESYREL) 50 MG tablet Take 1-2 tablets ( mg) by mouth At Bedtime 180 tablet 3     triamcinolone (KENALOG) 0.1 % ointment Apply topically 2 times daily as needed for irritation       diphenoxylate-atropine (LOMOTIL) 2.5-0.025 MG tablet Take 1 tablet by mouth 3 times daily as needed for diarrhea (Patient not taking: Reported on  "8/27/2020) 21 tablet 0   Fabio Logan is a 59 year old male who is being evaluated via a billable video visit.      The patient has been notified of following:     \"This video visit will be conducted via a call between you and your physician/provider. We have found that certain health care needs can be provided without the need for an in-person physical exam.  This service lets us provide the care you need with a video conversation.  If a prescription is necessary we can send it directly to your pharmacy.  If lab work is needed we can place an order for that and you can then stop by our lab to have the test done at a later time.    Video visits are billed at different rates depending on your insurance coverage.  Please reach out to your insurance provider with any questions.    If during the course of the call the physician/provider feels a video visit is not appropriate, you will not be charged for this service.\"    Patient has given verbal consent for Video visit? {YES-  How would you like to obtain your AVS? Mail a copy  If you are dropped from the video visit, the video invite should be resent to: tocell phone  Will anyone else be joining your video visit? No        Video-Visit Details    Type of service:  Video Visit    Video Start Time:8:40 AM  Video End Time: 9:07 AM    Originating Location (pt. Location): Home    Distant Location (provider location):  [facility] [place]     Platform used for Video Visit: besomebody.    Berry Cameron MD        CHIEF COMPLAINT: Follow-up for seizures    HISTORY SINCE LAST VISIT: I saw Mr. Logan in neurologic follow-up.  He was seen in follow-up of seizures that occurred after a cerebral aneurysm clipping of a 3 by a 5 mm right MCA aneurysm.  He had had 2 seizures in the postoperative period, one starting with twitching left side of the face with secondary generalization.  Second occurred with left eye deviation followed by secondary generalization " lasting 2 minutes.  I had last seen him on 7/23/2019.  He initially had been on Keppra and phenytoin.  He was started on oxcarbazepine after he had problems from a psychiatric standpoint and Keppra was discontinued.  He had been tapered off of phenytoin last summer without any recurrence of seizures.  He is currently taking 450 mg of oxcarbazepine twice daily.  He has had some occasional diarrhea.  He had rotavirus infection in May of this year.  He does get occasional headaches.  Headaches are nothing like what they were before he had the aneurysm clipping.  He had had resolution of the small subdural hematoma over the operative site on follow-up CTA.  He had been seen by Dr. Clark on 2/28/2020 and had lab work done as outlined below, with his oxcarbazepine level having been 17.9.  There is been no loss of consciousness.  There have been no seizures that have occurred.  No strange taste, smell or déjà vu.  He has returned to his regular work schedule.      PAST MEDICAL HISTORY:   Past Medical History:   Diagnosis Date     Anxiety      Depression      Diverticulitis      h/o Mumps      Hypertension      Kidney stone      Testicular cancer - Right 1998     Patient Active Problem List   Diagnosis     CARDIOVASCULAR SCREENING; LDL GOAL LESS THAN 160     Mild major depression (H)     Obese     HTN, goal below 140/90     H/O testicular cancer     Diverticulosis of large intestine     Esophageal reflux     Chest pain     Migraine     Skin cancer screening     Skin eruption     Lentigines     Chronic dermatitis     Mild intermittent asthma without complication     Essential hypertension with goal blood pressure less than 140/90     Major depression in complete remission (H)     Impaired fasting glucose     Malignant neoplasm of testis, unspecified laterality, unspecified whether descended or undescended     Morbid obesity (H)     Symptomatic cholelithiasis     Partial seizure (H)     Past Surgical History:   Procedure  Laterality Date     COLONOSCOPY N/A 2014    Procedure: COLONOSCOPY;  Surgeon: Joe Garcia MD;  Location: [facility]     CYSTOSCOPY       LAPAROSCOPIC CHOLECYSTECTOMY N/A 2018    Procedure: LAPAROSCOPIC CHOLECYSTECTOMY;  LAPAROSCOPIC CHOLECYSTECTOMY ;  Surgeon: Shannan Chaves MD;  Location: [facility]     ORCHIECTOMY NOS Right 1998     TONSILLECTOMY         FAMILY HISTORY:  Family History   Problem Relation Age of Onset     Cerebrovascular Disease Mother      Hypertension Mother      Heart Disease Father         had 2 open heart surgery     Lipids Father      Hypertension Father      Cancer Maternal Grandfather         skin cancer     Skin Cancer Maternal Grandfather         skin cancer     Heart Disease Sister 45        heart attack     Cancer Sister 46        breast cancer     Skin Cancer Sister         BCC       SOCIAL HISTORY: 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  Social History     Socioeconomic History     Marital status: Single     Spouse name: Not on file     Number of children: Not on file     Years of education: Not on file     Highest education level: GED or equivalent   Occupational History     Not on file   Social Needs     Financial resource strain: Somewhat hard     Food insecurity     Worry: Never true     Inability: Never true     Transportation needs     Medical: No     Non-medical: No   Tobacco Use     Smoking status: Former Smoker     Last attempt to quit: 1997     Years since quittin.2     Smokeless tobacco: Former User   Substance and Sexual Activity     Alcohol use: No     Frequency: Never     Drinks per session: Patient refused     Binge frequency: Patient refused     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     Drug use: No     Sexual activity: Yes     Partners: Female   Lifestyle     Physical activity     Days per week: 2 days     Minutes per session: 30 min     Stress: Very much   Relationships     Social connections     Talks on  phone: More than three times a week     Gets together: More than three times a week     Attends Zoroastrian service: More than 4 times per year     Active member of club or organization: Yes     Attends meetings of clubs or organizations: More than 4 times per year     Relationship status: Never      Intimate partner violence     Fear of current or ex partner: Not on file     Emotionally abused: Not on file     Physically abused: Not on file     Forced sexual activity: Not on file   Other Topics Concern     Parent/sibling w/ CABG, MI or angioplasty before 65F 55M? No   Social History Narrative     Not on file       REVIEW OF SYSTEMS:    Constitutional: No fever, chills, weight loss/weight gain.  Eyes: No double vision or loss of vision  Respiratory: No shortness of breath or cough.  Cardiovascular: No chest pain or rapid heart rate.  Gastrointestinal: Has had diarrhea off and on.  Genitourinary: Positive for incontinence.  Neurologic: See above.  Psychiatric: Mood in general has been okay.  Has been going back to AA meetings.      PHYSICAL EXAMINATION:    General appearance: No acute distress.  Appropriately attired and groomed  :     NEUROLOGIC EXAMINATION:    Alert oriented x3.  Can tell me the name of the president.  Named objects appropriately and repeated appropriately.  Could spell world forward and backwards.  Extraocular movements are full.  Visual fields are full.  Face moves symmetrically.  Tongue midline.  No drift of upper extremities.  Normal finger-nose-finger.  Gait was normal base.    LAB DATA: Oxcarbazepine level of been 17.9 on 2/28/2020.  CMP had showed his sodium to be 137, with normal kidney function, and glucose mildly increased at 103 on 2/28/2020.  BMP had showed sodium to be 138 on 5/10/2020.  CBC showed white count to be 3900, with a hemoglobin of 14.2 and platelet count of 176,000 on 5/8/2020    IMPRESSION: Partial seizures with secondary generalization after aneurysm clipping.  He is  done well without any recurrent seizures.  We did discuss it would be potentially possible to have him come off of medication for seizure control.  I recommended that we consider that next May or June which would be roughly 2 years after his surgery.  We will first plan to do a sleep deprived EEG at that time with follow-up afterwards.  At the follow-up, we will discuss the possibility coming off of oxcarbazepine.  We will fill out his DMV form when available.  Follow-up in approximately 9 months time after EEG.            Berry Cameron MD

## 2020-08-27 NOTE — PATIENT INSTRUCTIONS
Good to see you are doing well.I refilled your oxcarbazepine. Will continue the same dose of oxcarbazepine at this time, 450 mg twice daily, and plan to do an EEG-sleep deprived- next spring/summer in May or June.  We will plan follow-up afterwards to discuss whether to continue on oxcarbazepine or not.  I will fill out your DMV form when we have it.

## 2020-08-27 NOTE — LETTER
8/27/2020         RE: Fabio Logan  [address]  [place] [address]        Dear Colleague,    Thank you for referring your patient, Fabio Logan, to the [facility] NEUROLOG[facility] [place]. Please see a copy of my visit note below.        Fabio Logan  59 year old  MRN:[id number]  PCP: Manoj Armenta  No ref. provider found    Allergies   Allergen Reactions     Iodine-131 Shortness Of Breath     Internal iodine;    Pt had Isovue-370 contrast dye on 5/8/2020 w/ no adverse reactions.     Iodine      Other reaction(s): Shortness of breath  internal iodine for tests         Current Outpatient Medications   Medication Sig Dispense Refill     albuterol (PROAIR HFA/PROVENTIL HFA/VENTOLIN HFA) 108 (90 BASE) MCG/ACT Inhaler Inhale 2 puffs into the lungs every 6 hours as needed for shortness of breath / dyspnea or wheezing 1 Inhaler 0     ALPRAZolam (XANAX) 0.5 MG tablet Take 1-2 tablets (0.5-1 mg) by mouth daily as needed for anxiety 60 tablet 1     atenolol (TENORMIN) 50 MG tablet Take 1 tablet (50 mg) by mouth daily 90 tablet 3     fluticasone (FLONASE) 50 MCG/ACT nasal spray SPRAY 1 TO 2 SPRAYS INTO BOTH NOSTRISL DAILY 16 g 7     losartan (COZAAR) 25 MG tablet Take 1 tablet (25 mg) by mouth daily 90 tablet 3     nystatin (MYCOSTATIN) 871157 UNIT/GM external powder Apply topically 3 times daily as needed (recurrent yeast infection at moist skin creases) 60 g 2     omeprazole (PRILOSEC) 40 MG DR capsule Take 1 capsule (40 mg) by mouth daily 20 capsule 0     OXcarbazepine (TRILEPTAL) 300 MG tablet Take 450 mg by mouth 2 times daily  1     sertraline (ZOLOFT) 100 MG tablet Take 1.5 tablets (150 mg) by mouth daily 135 tablet 1     SUMAtriptan (IMITREX) 50 MG tablet Take 1 tablet (50 mg) by mouth at onset of headache for migraine (may repeat dose in 2 hours. Do not exceed 200mg in 24 hours) 18 tablet 1     traZODone (DESYREL) 50 MG tablet Take 1-2 tablets ( mg) by mouth At Bedtime 180  "tablet 3     triamcinolone (KENALOG) 0.1 % ointment Apply topically 2 times daily as needed for irritation       diphenoxylate-atropine (LOMOTIL) 2.5-0.025 MG tablet Take 1 tablet by mouth 3 times daily as needed for diarrhea (Patient not taking: Reported on 8/27/2020) 21 tablet 0   Fabio Logan is a 59 year old male who is being evaluated via a billable video visit.      The patient has been notified of following:     \"This video visit will be conducted via a call between you and your physician/provider. We have found that certain health care needs can be provided without the need for an in-person physical exam.  This service lets us provide the care you need with a video conversation.  If a prescription is necessary we can send it directly to your pharmacy.  If lab work is needed we can place an order for that and you can then stop by our lab to have the test done at a later time.    Video visits are billed at different rates depending on your insurance coverage.  Please reach out to your insurance provider with any questions.    If during the course of the call the physician/provider feels a video visit is not appropriate, you will not be charged for this service.\"    Patient has given verbal consent for Video visit? {YES-  How would you like to obtain your AVS? Mail a copy  If you are dropped from the video visit, the video invite should be resent to: tocell phone  Will anyone else be joining your video visit? No        Video-Visit Details    Type of service:  Video Visit    Video Start Time:8:40 AM  Video End Time: 9:07 AM    Originating Location (pt. Location): Home    Distant Location (provider location):  [facility] NEUROLOGY [place]     Platform used for Video Visit: Vipul Cameron MD        CHIEF COMPLAINT: Follow-up for seizures    HISTORY SINCE LAST VISIT: I saw Mr. Logan in neurologic follow-up.  He was seen in follow-up of seizures that occurred after a cerebral aneurysm " clipping of a 3 by a 5 mm right MCA aneurysm.  He had had 2 seizures in the postoperative period, one starting with twitching left side of the face with secondary generalization.  Second occurred with left eye deviation followed by secondary generalization lasting 2 minutes.  I had last seen him on 7/23/2019.  He initially had been on Keppra and phenytoin.  He was started on oxcarbazepine after he had problems from a psychiatric standpoint and Keppra was discontinued.  He had been tapered off of phenytoin last summer without any recurrence of seizures.  He is currently taking 450 mg of oxcarbazepine twice daily.  He has had some occasional diarrhea.  He had rotavirus infection in May of this year.  He does get occasional headaches.  Headaches are nothing like what they were before he had the aneurysm clipping.  He had had resolution of the small subdural hematoma over the operative site on follow-up CTA.  He had been seen by Dr. Clark on 2/28/2020 and had lab work done as outlined below, with his oxcarbazepine level having been 17.9.  There is been no loss of consciousness.  There have been no seizures that have occurred.  No strange taste, smell or déjà vu.  He has returned to his regular work schedule.      PAST MEDICAL HISTORY:   Past Medical History:   Diagnosis Date     Anxiety      Depression      Diverticulitis      h/o Mumps      Hypertension      Kidney stone      Testicular cancer - Right 1998     Patient Active Problem List   Diagnosis     CARDIOVASCULAR SCREENING; LDL GOAL LESS THAN 160     Mild major depression (H)     Obese     HTN, goal below 140/90     H/O testicular cancer     Diverticulosis of large intestine     Esophageal reflux     Chest pain     Migraine     Skin cancer screening     Skin eruption     Lentigines     Chronic dermatitis     Mild intermittent asthma without complication     Essential hypertension with goal blood pressure less than 140/90     Major depression in complete  remission (H)     Impaired fasting glucose     Malignant neoplasm of testis, unspecified laterality, unspecified whether descended or undescended     Morbid obesity (H)     Symptomatic cholelithiasis     Partial seizure (H)     Past Surgical History:   Procedure Laterality Date     COLONOSCOPY N/A 2014    Procedure: COLONOSCOPY;  Surgeon: Joe Garcia MD;  Location: [facility]     CYSTOSCOPY       LAPAROSCOPIC CHOLECYSTECTOMY N/A 2018    Procedure: LAPAROSCOPIC CHOLECYSTECTOMY;  LAPAROSCOPIC CHOLECYSTECTOMY ;  Surgeon: Shannan Chaves MD;  Location: [facility]     ORCHIECTOMY NOS Right 1998     TONSILLECTOMY         FAMILY HISTORY:  Family History   Problem Relation Age of Onset     Cerebrovascular Disease Mother      Hypertension Mother      Heart Disease Father         had 2 open heart surgery     Lipids Father      Hypertension Father      Cancer Maternal Grandfather         skin cancer     Skin Cancer Maternal Grandfather         skin cancer     Heart Disease Sister 45        heart attack     Cancer Sister 46        breast cancer     Skin Cancer Sister         BCC       SOCIAL HISTORY: 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  Social History     Socioeconomic History     Marital status: Single     Spouse name: Not on file     Number of children: Not on file     Years of education: Not on file     Highest education level: GED or equivalent   Occupational History     Not on file   Social Needs     Financial resource strain: Somewhat hard     Food insecurity     Worry: Never true     Inability: Never true     Transportation needs     Medical: No     Non-medical: No   Tobacco Use     Smoking status: Former Smoker     Last attempt to quit: 1997     Years since quittin.2     Smokeless tobacco: Former User   Substance and Sexual Activity     Alcohol use: No     Frequency: Never     Drinks per session: Patient refused     Binge frequency: Patient refused      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     Drug use: No     Sexual activity: Yes     Partners: Female   Lifestyle     Physical activity     Days per week: 2 days     Minutes per session: 30 min     Stress: Very much   Relationships     Social connections     Talks on phone: More than three times a week     Gets together: More than three times a week     Attends [employer] service: More than 4 times per year     Active member of club or organization: Yes     Attends meetings of clubs or organizations: More than 4 times per year     Relationship status: Never      Intimate partner violence     Fear of current or ex partner: Not on file     Emotionally abused: Not on file     Physically abused: Not on file     Forced sexual activity: Not on file   Other Topics Concern     Parent/sibling w/ CABG, MI or angioplasty before 65F 55M? No   Social History Narrative     Not on file       REVIEW OF SYSTEMS:    Constitutional: No fever, chills, weight loss/weight gain.  Eyes: No double vision or loss of vision  Respiratory: No shortness of breath or cough.  Cardiovascular: No chest pain or rapid heart rate.  Gastrointestinal: Has had diarrhea off and on.  Genitourinary: Positive for incontinence.  Neurologic: See above.  Psychiatric: Mood in general has been okay.  Has been going back to AA meetings.      PHYSICAL EXAMINATION:    General appearance: No acute distress.  Appropriately attired and groomed  :     NEUROLOGIC EXAMINATION:    Alert oriented x3.  Can tell me the name of the president.  Named objects appropriately and repeated appropriately.  Could spell world forward and backwards.  Extraocular movements are full.  Visual fields are full.  Face moves symmetrically.  Tongue midline.  No drift of upper extremities.  Normal finger-nose-finger.  Gait was normal base.    LAB DATA: Oxcarbazepine level of been 17.9 on 2/28/2020.  CMP had showed his sodium to be 137, with normal kidney function, and glucose mildly increased at 103  on 2/28/2020.  BMP had showed sodium to be 138 on 5/10/2020.  CBC showed white count to be 3900, with a hemoglobin of 14.2 and platelet count of 176,000 on 5/8/2020    IMPRESSION: Partial seizures with secondary generalization after aneurysm clipping.  He is done well without any recurrent seizures.  We did discuss it would be potentially possible to have him come off of medication for seizure control.  I recommended that we consider that next May or June which would be roughly 2 years after his surgery.  We will first plan to do a sleep deprived EEG at that time with follow-up afterwards.  At the follow-up, we will discuss the possibility coming off of oxcarbazepine.  We will fill out his DMV form when available.  Follow-up in approximately 9 months time after EEG.            Berry Cameron MD      Again, thank you for allowing me to participate in the care of your patient.        Sincerely,        Berry Cameron MD, MD

## 2020-10-24 ENCOUNTER — OFFICE VISIT (OUTPATIENT)
Dept: URGENT CARE | Facility: URGENT CARE | Age: 60
End: 2020-10-24
Payer: COMMERCIAL

## 2020-10-24 ENCOUNTER — ANCILLARY PROCEDURE (OUTPATIENT)
Dept: GENERAL RADIOLOGY | Facility: CLINIC | Age: 60
End: 2020-10-24
Attending: FAMILY MEDICINE
Payer: COMMERCIAL

## 2020-10-24 VITALS
OXYGEN SATURATION: 96 % | HEART RATE: 74 BPM | TEMPERATURE: 99.9 F | SYSTOLIC BLOOD PRESSURE: 118 MMHG | DIASTOLIC BLOOD PRESSURE: 68 MMHG

## 2020-10-24 DIAGNOSIS — R05.9 COUGH: Primary | ICD-10-CM

## 2020-10-24 DIAGNOSIS — R50.9 FEVER IN ADULT: ICD-10-CM

## 2020-10-24 DIAGNOSIS — R05.9 COUGH: ICD-10-CM

## 2020-10-24 DIAGNOSIS — R06.00 DYSPNEA, UNSPECIFIED TYPE: ICD-10-CM

## 2020-10-24 PROCEDURE — U0003 INFECTIOUS AGENT DETECTION BY NUCLEIC ACID (DNA OR RNA); SEVERE ACUTE RESPIRATORY SYNDROME CORONAVIRUS 2 (SARS-COV-2) (CORONAVIRUS DISEASE [COVID-19]), AMPLIFIED PROBE TECHNIQUE, MAKING USE OF HIGH THROUGHPUT TECHNOLOGIES AS DESCRIBED BY CMS-2020-01-R: HCPCS | Performed by: FAMILY MEDICINE

## 2020-10-24 PROCEDURE — 71046 X-RAY EXAM CHEST 2 VIEWS: CPT | Performed by: RADIOLOGY

## 2020-10-24 PROCEDURE — 99214 OFFICE O/P EST MOD 30 MIN: CPT | Performed by: FAMILY MEDICINE

## 2020-10-24 NOTE — LETTER
[facility] [place]  [address]  [address]  [place] [id number] [phone]      October 24, 2020    RE:  Fabio Logan                                                                                                                                                       [address] [place] [address]            To whom it may concern:    Fabio Logan is under my professional care at the [facility] on October 24, 2020.  Due to Mr. Logan's symptoms of fevers, cough, and shortness of breath, please excuse his absence from work starting on Monday, October 26, 2020.  The test result from today's COVID-19 test is pending. Mr. Logan should self-quarantine at home in the meantime.      He  may return to work when all of the following criteria have been met:  1) No fevers for at least 24 hours.   2) The other symptoms have improved.  3) At least 10 days have passed since the start of symptoms.        Sincerely,        Darryl Youngblood MD    [place] [facility]

## 2020-10-24 NOTE — PATIENT INSTRUCTIONS
"Rest as much as possible    Drink plenty of liquids    Take Tylenol (Acetaminophen) and/or Ibuprofen for the fevers/pain    Self-quarantine at home for now.     Go to the emergency room if you develop severe, worsening shortness of breath, if you develop worsening chest discomfort, if you experience severe lightheadedness/confusion, if your lips/fingertips/tops become bluish.    follow up with the primary care provider if not better in 1-2 weeks.     Patient Education   After Your COVID-19 (Coronavirus) Test  You have been tested for COVID-19 (coronavirus).   If you'll have surgery in the next few days, we'll let you know ahead of time if you have the virus. Please call your surgeon's office with any questions.  For all other patients: Results are usually available in [employer] within 2 to 3 days.   If you do not have a [employer] account, you'll get a letter in the mail in about 7 to 10 days.   [employer] is often the fastest way to get test results. Please sign up if you do not already have a [employer] account. See the handout \"Getting COVID-19 Test Results in [employer]\" for help.   What if my test result is positive?  If your test result is positive, you'll also get a phone call letting you know. (A positive test means that you have the virus.)     Follow the tips under \"How do I self-isolate?\" below for 10 days (20 days if you have a weak immune system).    You don't need to be retested for COVID-19 before going back to school or work. As long as you're fever-free and feeling better, you can go back to school, work and other activities after waiting the 10 or 20 days.  What if I have questions after I get my results?  If you have questions about your results, please visit our testing website at [email or web address].  After 7 to 10 days, if you have not gotten your results:     Call [phone] ([phone]) and ask to speak with our COVID-19 results team.    If you're being treated at an " "infusion center: Call your infusion center directly.  What are the symptoms of COVID-19?  Cough, fever and trouble breathing are the most common signs of COVID-19.  Other symptoms can include new headaches, new muscle or body aches, new and unexplained fatigue (feeling very tired), chills, sore throat, congestion (stuffy or runny nose), diarrhea (loose poop), loss of taste or smell, belly pain, and nausea or vomiting (feeling sick to your stomach or throwing up).  You may already have symptoms of COVID-19, or they may show up later.  What should I do if I have symptoms?  If you're having surgery: Call your surgeon's office.  For all other patients: Stay home and away from others (self-isolate) until ...    You've had no fever--and no medicine that reduces fever--for 1 full day (24 hours), AND    Other symptoms have gotten better. For example, your cough or breathing has improved, AND    At least 10 days have passed since your symptoms first started.  How do I self-isolate?    Stay in your own room, even for meals. Use your own bathroom if you can.    Stay away from others in your home. No hugging, kissing or shaking hands. No visitors.    Don't go to work, school or anywhere else.    Clean \"high touch\" surfaces often (doorknobs, counters, handles). Use household cleaning spray or wipes. You'll find a full list of  on the EPA website: www.epa.gov/pesticide-registration/list-n-disinfectants-use-against-sars-cov-2.    Cover your mouth and nose with a mask or other face covering to avoid spreading germs.    Wash your hands and face often. Use soap and water.    Caregivers in these groups are at risk for severe illness due to COVID-19:  ? People 65 years and older  ? People who live in a nursing home or long-term care facility  ? People with chronic disease (lung, heart, cancer, diabetes, kidney, liver, immunologic)  ? People who have a weakened immune system, including those who:    Are in cancer " treatment    Take medicine that weakens the immune system, such as corticosteroids    Had a bone marrow or organ transplant    Have an immune deficiency    Have poorly controlled HIV or AIDS    Are obese (body mass index of 40 or higher)    Smoke regularly    Caregivers should wear gloves while washing dishes, handling laundry and cleaning bedrooms and bathrooms.    Use caution when washing and drying laundry: Don't shake dirty laundry and use the warmest water setting that you can.    For more tips on managing your health at home, go to www.cdc.gov/coronavirus/2019-ncov/downloads/10Things.pdf.  How can I take care of myself at home?  1. Get lots of rest. Drink extra fluids (unless a doctor has told you not to).  2. Take Tylenol (acetaminophen) for fever or pain. If you have liver or kidney problems, ask your family doctor if it's OK to take Tylenol.   Adults can take either:  ? 650 mg (two 325 mg pills) every 4 to 6 hours, or   ? 1,000 mg (two 500 mg pills) every 8 hours as needed.  ? Note: Don't take more than 3,000 mg in one day. Acetaminophen is found in many medicines (both prescribed and over-the-counter medicines). Read all labels to be sure you don't take too much.   For children, check the Tylenol bottle for the right dose. The dose is based on the child's age or weight.  3. If you have other health problems (like cancer, heart failure, an organ transplant or severe kidney disease): Call your specialty clinic if you don't feel better in the next 2 days.  4. Know when to call 911. Emergency warning signs include:  ? Trouble breathing or shortness of breath  ? Chest pain or pressure that doesn't go away  ? Feeling confused like you haven't felt before, or not being able to wake up  ? Bluish-colored lips or face  5. If your doctor prescribed a blood thinner medicine: Follow their instructions.  Where can I get more information?     [facility] - About COVID-19: [email or web address]    CDC - If  You're Sick: cdc.gov/coronavirus/2019-ncov/about/steps-when-sick.html    CDC - Ending Home Isolation: www.cdc.gov/coronavirus/2019-ncov/hcp/disposition-in-home-patients.html    CDC - Caring for Someone: www.cdc.gov/coronavirus/2019-ncov/if-you-are-sick/care-for-someone.html    [facility] - Interim Guidance for Hospital Discharge to Home: www.health.[facility].[place]./diseases/coronavirus/hcp/hospdischarge.pdf    [facility] clinical trials (COVID-19 research studies): [email or web address].[email or web address]-clinical-trials    Below are the COVID-19 hotlines at the [place] Department of Health ([facility]). Interpreters are available.  ? For health questions: Call 076-485-0552 or [phone] (7 a.m. to 7 p.m.)  ? For questions about schools and childcare: Call 763-085-3679 or [phone] (7 a.m. to 7 p.m.)    For informational purposes only. Not to replace the advice of your health care provider. Clinically reviewed by Infection Prevention and the [facility] COVID-19 Clinical Team. Copyright   2020 [facility]. All rights reserved. [employer] [id number] - Rev 10/2/20.       Patient Education     Understanding COVID-19 (Coronavirus Disease 2019)  COVID-19 is an illness of the lungs. It causes fever, coughing and trouble breathing. The illness is caused by a new virus in the coronavirus family called SARS-CoV-2.  First seen in late 2019, this virus has spread to many cities and countries around the world. Scientists are working to understand it better.   For the latest information, visit the CDC website at www.cdc.gov/coronavirus/2019-ncov. Or call 357-MVN-VIQU (715-701-5501).   How does COVID-19 spread?  The virus seems to spread and infect people fairly easily. It may spread by:    Breathing in droplets of fluid that someone coughs or sneezes into the air.    Touching your eyes, nose or mouth after touching an infected surface. (The virus can live on handles, objects, and other surfaces.)  What are the symptoms of  COVID-19?  Some people have no symptoms or only mild symptoms. Symptoms can vary from person to person. As experts learn more about COVID-19, new symptoms are being reported.  Symptoms may appear 2 to 14 days after contact with the virus. They include:    Fever or chills    Coughing    Trouble breathing or feeling short of breath    Sore throat    Stuffy or runny nose    Headache and body aches    Fatigue (feeling very tired)    Loss of appetite    Nausea or vomiting (feeling sick to your stomach or throwing up)    Diarrhea (loose, watery poop)    Abdominal (belly) pain    Loss of smell or taste  You can check your symptoms with the [facility] s Coronavirus Self-.   What are possible problems from COVID-19?  In many cases, this virus can cause infection (pneumonia) in both lungs. This can make a person very ill, and it can even cause death.  Your chances of severe illness are higher if:    You re an older adult    You have a serious health issue, such as heart or lung disease, diabetes or kidney disease    You have a health problem (or take a medicine) that suppresses the immune system  Rarely, some children develop a severe problem called MIS-C. This is similar to [place] disease, which causes blood vessels and body organs to be inflamed. We don t yet know if MIS-C happens only in children, or if adults are also at risk. We also don t know if it's related to COVID-19--many children with MIS-C test positive for the virus, but not all.  Experts continue to study MIS-C. The CDC has asked hospitals to report any person under 21 who has all of the following:     A fever over 100.4 F (38.0 C) for more than 24 hours and either a positive COVID-19 test or exposure to the virus in the last 4 weeks    Inflammation in at least 2 organs such as the heart, lungs or kidneys, with lab tests that show inflammation    No other diagnoses besides COVID-19 explain the child's symptoms  How is COVID-19 diagnosed?  Your care team will  ask about your symptoms, recent travel and contact with sick people.  Not everyone will be tested for the virus. If you need to be tested, we will use a cotton swab to take a sample of cells from your nose and throat.  You may have other tests as well, such as:    Antibody (blood test).  This test may show if you ve had the virus in the past--it won t tell us if you have it right now. (It takes a few weeks for the antibodies to show up in your blood). If you ve had the virus, you may have immunity (protection from the virus) in the future. We don t know yet how long you would be immune. Antibody tests aren t always accurate.    Sputum test (we may collect mucus that you have coughed up from your lungs).    Chest X-ray or CT scan.  Note about immunity  We don t yet know if people can catch COVID-19 more than once. If a person who has fully recovered is re-tested within 3 months, the test may still show low levels of the virus in their body. (In other words, the test may show that they still have COVID-19, even though they aren t spreading it to others.)  This doesn't mean they can't catch COVID-19 again. They may be protected from the virus for a time, but we don t know how long immunity might last.  How is COVID-19 treated?  At this time, there is no medicine to prevent or treat COVID-19. Experts are testing different medicines, trying to find one that works.  So far, the most proven treatment is to support your body while it fights the virus.    Getting extra rest.    Drink extra fluids (6 to 8 glasses of liquids each day), unless a doctor has told you not to. Ask your care team which fluids are best for you. Avoid fluids that contain caffeine or alcohol.    Take over-the-counter (OTC) pain medicine to reduce pain and fever. Your care team will tell you which medicine to use.  If you are very sick, you may need to stay in the hospital.    We may give you fluids through a vein to keep you hydrated (IV fluids).    To  make sure your body gets enough oxygen, we may give you an oxygen mask or a breathing machine (ventilator).    We may turn you onto your stomach (called  prone positioning ). This will increase the oxygen in your lungs.  Those who have recovered from COVID-19 may be asked to donate plasma. (This is called COVID-19 convalescent plasma donation.) The plasma may contain antibodies to help fight the virus in others. Scientists are testing whether this might be a treatment in the future. For more information, talk to your care team.  Are you at risk for COVID-19?  Some studies suggest that people without symptoms may spread COVID-19.  You re at risk for getting COVID-19 if:    You live in (or recently traveled to) an area with a COVID-19 outbreak.    You had close contact with someone who has or may have COVID-19. Close contact means being within 6 feet, living in the same house, or visiting.  Date last modified: 10/13/2020  Marisela last reviewed this educational content on 1/1/2020  This information has been modified by your health care provider with permission from the publisher.    [phone] [employer]. [address] [place] [address], [place] [address]. All rights reserved. This information is not intended as a substitute for professional medical care. Always follow your healthcare professional's instructions.

## 2020-10-24 NOTE — PROGRESS NOTES
Patient was seen for a Pre-employment Post- offer Functional Test for the  position at [employer] on this date.  Please see the scanned document for specifics.     SUBJECTIVE:   Fabio Logan is a 60 year old male presenting with a chief complaint of fevers up to 103F, cough (severe coughing attacks, mostly nonproductive), shortness of breath, chest heaviness, runny nose (clear nasal discharge).  Onset of symptoms was a few days ago but significantly worsening since yesterday.    Course of illness is worsening since two days ago.  .    Severity severe.   Current and Associated symptoms: as listed above.   Treatment measures tried include over the counter cold medicine, black tea with ginger.  Predisposing factors include Patient attended a [place]  last week in [facility].  His girlfriend also attended that wedding and also has fallen ill.  .      No loss of smell/taste  Patient complaints headache (right forehead, right lateral head)  Patient complains body aches  There has been a little bit of chest pain with coughing  No vomiting      Past Medical History:   Diagnosis Date     Anxiety      Depression      Diverticulitis      h/o Mumps      Hypertension      Kidney stone      Testicular cancer - Right 1998   Mild Intermittent Asthma  Essential Hypertension  Obesity  GERD  Lentigines  Chronic Dermatitis  Impaired Fasting Glucose  Symptomatic Cholelithiasis  Partial Seizure    Current Outpatient Medications   Medication Sig Dispense Refill     albuterol (PROAIR HFA/PROVENTIL HFA/VENTOLIN HFA) 108 (90 BASE) MCG/ACT Inhaler Inhale 2 puffs into the lungs every 6 hours as needed for shortness of breath / dyspnea or wheezing 1 Inhaler 0     ALPRAZolam (XANAX) 0.5 MG tablet Take 1-2 tablets (0.5-1 mg) by mouth daily as needed for anxiety 60 tablet 1     atenolol (TENORMIN) 50 MG tablet Take 1 tablet (50 mg) by mouth daily 90 tablet 3     fluticasone (FLONASE) 50 MCG/ACT nasal spray SPRAY 1 TO 2 SPRAYS INTO BOTH NOSTRISL DAILY 16 g 7     losartan (COZAAR) 25 MG tablet Take 1 tablet (25 mg) by mouth daily 90 tablet 3     nystatin (MYCOSTATIN) 004831 UNIT/GM external  powder Apply topically 3 times daily as needed (recurrent yeast infection at moist skin creases) 60 g 2     omeprazole (PRILOSEC) 40 MG DR capsule Take 1 capsule (40 mg) by mouth daily 20 capsule 0     OXcarbazepine (TRILEPTAL) 300 MG tablet Take 1.5 tablets (450 mg) by mouth 2 times daily 270 tablet 3     sertraline (ZOLOFT) 100 MG tablet Take 1.5 tablets (150 mg) by mouth daily 135 tablet 1     SUMAtriptan (IMITREX) 50 MG tablet Take 1 tablet (50 mg) by mouth at onset of headache for migraine (may repeat dose in 2 hours. Do not exceed 200mg in 24 hours) 18 tablet 1     traZODone (DESYREL) 50 MG tablet Take 1-2 tablets ( mg) by mouth At Bedtime 180 tablet 3     triamcinolone (KENALOG) 0.1 % ointment Apply topically 2 times daily as needed for irritation       diphenoxylate-atropine (LOMOTIL) 2.5-0.025 MG tablet Take 1 tablet by mouth 3 times daily as needed for diarrhea (Patient not taking: Reported on 2020) 21 tablet 0     Social History     Tobacco Use     Smoking status: Former Smoker     Quit date: 1997     Years since quittin.4     Smokeless tobacco: Former User   Substance Use Topics     Alcohol use: No     Frequency: Never     Drinks per session: Patient refused     Binge frequency: Patient refused     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   INTEGUMENTARY/SKIN: NEGATIVE for generalized rash.    EYES: Negative for vision problems/eye pain.   ENT/MOUTH: NEGATIVE for nasal congestion.   RESP: positive for shortness of breath and cough.    CV:  Positive for heaviness at the chest.   GI:  No vomiting.   MUSCULOSKELETAL: POSITIVE  for generalized body aches.     OBJECTIVE:  VITALS:  /68 (BP Location: Right arm, Cuff Size: Adult Large)   Pulse 74   Temp 99.9  F (37.7  C) (Tympanic)   SpO2 96%   GENERAL APPEARANCE: healthy, alert and no distress. Patient can speak in full sentences without shortness of breath.   NECK: supple, nontender, no  lymphadenopathy  RESP: lungs clear to auscultation - no rales, rhonchi or wheezes  CV: regular rates and rhythm, normal S1 S2, no murmur noted  SKIN: Skin is pink and warm.  No cyanosis.      chest x-ray:  I viewed all X-ray images during this clinic encounter:  The chest x-rays showed no acute changes compared to the September 2, 2018, CXRs.  No effusions/consolidations/masses/pneumothorax.        ASSESSMENT:  Cough.  chest x-ray showed no evidence of effusions/pneumonia/masses.  No pneumothorax.    Dyspnea  Fever in Adult  Symptoms consist with possible COVID-19 infection    PLAN:  Rest as much as possible  Drink plenty of liquids  Take Tylenol, Ibuprofen for fevers, pain.  Self-quarantine at home for now.    Patient can return to work once all of the following criteria have been satisfied: No fevers for 24 hours, the other symptoms have improved, and at least 10 days have passed since the start of symptoms.   Go to the emergency room if you develop worsening, severe shortness of breath, worsening chest pain, if you feel severe lightheadedness, if bluish lips/fingertips/toes appear. .    Pending lab:  COVID-19 test.      Darryl Youngblood MD

## 2020-10-25 ENCOUNTER — TELEPHONE (OUTPATIENT)
Dept: URGENT CARE | Facility: URGENT CARE | Age: 60
End: 2020-10-25

## 2020-10-25 LAB
SARS-COV-2 RNA SPEC QL NAA+PROBE: ABNORMAL
SPECIMEN SOURCE: ABNORMAL

## 2020-10-25 NOTE — TELEPHONE ENCOUNTER
SUBJECTIVE:  Patient's COVID-19 test performed on October 24, 2020, is positive.    Patient feels better today. For example, his fevers have gone down to the upper 90s and he was able to sleep.  He still feels a lot of fatigue.    PLAN:  I phoned the patient with this test result at around 5:45 pm on October 25, 2020.  Patient should continue to self-isolate and rest at home..  I told the patient to expect a phone call from one of the [place] RNs either today or tomorrow for further recommendations.      Darryl Youngblood MD

## 2020-10-26 ENCOUNTER — TELEPHONE (OUTPATIENT)
Dept: EMERGENCY MEDICINE | Facility: CLINIC | Age: 60
End: 2020-10-26

## 2020-10-26 NOTE — TELEPHONE ENCOUNTER
"Coronavirus (COVID-19) Notification    Caller Name (Patient, parent, daughter/son, grandparent, etc)  Patient     Reason for call  Notify of Positive Coronavirus (COVID-19) lab results, assess symptoms,  review [facility] recommendations    Lab Result    Lab test:  2019-nCoV rRt-PCR or SARS-CoV-2 PCR    Oropharyngeal AND/OR nasopharyngeal swabs is POSITIVE for 2019-nCoV RNA/SARS-COV-2 PCR (COVID-19 virus)    RN Recommendations/Instructions per [facility] Coronavirus COVID-19 recommendations    Brief introduction script  Introduce self then review script:  \"I am calling on behalf of [employer].  We were notified that your Coronavirus test (COVID-19) for was POSITIVE for the virus.  I have some information to relay to you but first I wanted to mention that the [place] Dept of Health will be contacting you shortly [it's possible MD already called Patient] to talk to you more about how you are feeling and other people you have had contact with who might now also have the virus.  Also, [facility] is Partnering with the [facility] for Covid-19 research, you may be contacted directly by research staff.\"    Assessment (Inquire about Patient's current symptoms)   Assessment   Current Symptoms at time of phone call: (if no symptoms, document No symptoms]  fatigue, tired, congestion   Symptoms onset (if applicable)  10/23/20     If at time of call, Patients symptoms hare worsened, the Patient should contact 911 or have someone drive them to Emergency Dept promptly:      If Patient calling 911, inform 911 personal that you have tested positive for the Coronavirus (COVID-19).  Place mask on and await 911 to arrive.    If Emergency Dept, If possible, please have another adult drive you to the Emergency Dept but you need to wear mask when in contact with other people.      Review information with Patient    Your result was positive. This means you have COVID-19 (coronavirus).  We have sent you a letter " that reviews the information that I'll be reviewing with you now.    How can I protect others?    If you have symptoms: stay home and away from others (self-isolate) until:    You've had no fever--and no medicine that reduces fever--for 1 full day (24 hours). And       Your other symptoms have gotten better. For example, your cough or breathing has improved. And     At least 10 days have passed since your symptoms started. (If you've been told by a doctor that you have a weak immune system, wait 20 days.)     If you don't have symptoms: Stay home and away from others (self-isolate) until at least 10 days have passed since your first positive COVID-19 test. (Date test collected)    During this time:    Stay in your own room, including for meals. Use your own bathroom if you can.    Stay away from others in your home. No hugging, kissing or shaking hands. No visitors.     Don't go to work, school or anywhere else.     Clean  high touch  surfaces often (doorknobs, counters, handles, etc.). Use a household cleaning spray or wipes. You'll find a full list on the EPA website at www.epa.gov/pesticide-registration/list-n-disinfectants-use-against-sars-cov-2.     Cover your mouth and nose with a mask, tissue or other face covering to avoid spreading germs.    Wash your hands and face often with soap and water.    Caregivers in these groups are at risk for severe illness due to COVID-19:  o People 65 years and older  o People who live in a nursing home or long-term care facility  o People with chronic disease (lung, heart, cancer, diabetes, kidney, liver, immunologic)  o People who have a weakened immune system, including those who:  - Are in cancer treatment  - Take medicine that weakens the immune system, such as corticosteroids  - Had a bone marrow or organ transplant  - Have an immune deficiency  - Have poorly controlled HIV or AIDS  - Are obese (body mass index of 40 or higher)  - Smoke regularly    Caregivers should  wear gloves while washing dishes, handling laundry and cleaning bedrooms and bathrooms.    Wash and dry laundry with special caution. Don't shake dirty laundry, and use the warmest water setting you can.    If you have a weakened immune system, ask your doctor about other actions you should take.    For more tips, go to www.cdc.gov/coronavirus/2019-ncov/downloads/10Things.pdf.    You should not go back to work until you meet the guidelines above for ending your home isolation. You don't need to be retested for COVID-19 before going back to work--studies show that you won't spread the virus if it's been at least 10 days since your symptoms started (or 20 days, if you have a weak immune system).    Employers: This document serves as formal notice of your employee's medical guidelines for going back to work. They must meet the above guidelines before going back to work in person.    How can I take care of myself?    1. Get lots of rest. Drink extra fluids (unless a doctor has told you not to).    2. Take Tylenol (acetaminophen) for fever or pain. If you have liver or kidney problems, ask your family doctor if it's okay to take Tylenol.     Take either:     650 mg (two 325 mg pills) every 4 to 6 hours, or     1,000 mg (two 500 mg pills) every 8 hours as needed.     Note: Don't take more than 3,000 mg in one day. Acetaminophen is found in many medicines (both prescribed and over-the-counter medicines). Read all labels to be sure you don't take too much.    For children, check the Tylenol bottle for the right dose (based on their age or weight).    3. If you have other health problems (like cancer, heart failure, an organ transplant or severe kidney disease): Call your specialty clinic if you don't feel better in the next 2 days.    4. Know when to call 911: Emergency warning signs include:    Trouble breathing or shortness of breath    Pain or pressure in the chest that doesn't go away    Feeling confused like you  haven't felt before, or not being able to wake up    Bluish-colored lips or face    5. Sign up for [employer]. We know it's scary to hear that you have COVID-19. We want to track your symptoms to make sure you're okay over the next 2 weeks. Please look for an email from [employer]--this is a free, online program that we'll use to keep in touch. To sign up, follow the link in the email. Learn more at [email or web address].    Where can I get more information?    [facility]: [email or web address]    Coronavirus Basics: www.health.[facility].[place]./diseases/coronavirus/basics.html    What to Do If You're Sick: www.cdc.gov/coronavirus/2019-ncov/about/steps-when-sick.html    Ending Home Isolation: www.cdc.gov/coronavirus/2019-ncov/hcp/disposition-in-home-patients.html     Caring for Someone with COVID-19: www.cdc.gov/coronavirus/2019-ncov/if-you-are-sick/care-for-someone.html     [facility] clinical trials (COVID-19 research studies): [email or web address].[email or web address]-clinical-trials     A Positive COVID-19 letter will be sent via [employer] or the mail. (Exception, no letters sent to Presurgerical/Preprocedure Patients)    [Name]  Stiven Young RN  [facility]  COVID19 Results Team RN  Ph# [phone]

## 2020-11-12 ENCOUNTER — TELEPHONE (OUTPATIENT)
Dept: PEDIATRICS | Facility: CLINIC | Age: 60
End: 2020-11-12

## 2020-11-12 DIAGNOSIS — J45.20 MILD INTERMITTENT ASTHMA WITHOUT COMPLICATION: Primary | ICD-10-CM

## 2020-11-12 DIAGNOSIS — Z23 NEEDS FLU SHOT: ICD-10-CM

## 2020-11-12 NOTE — TELEPHONE ENCOUNTER
Panel Management Review      Patient has the following on his problem list:     Asthma review     ACT Total Scores 5/20/2020   ACT TOTAL SCORE (Goal Greater than or Equal to 20) 25   In the past 12 months, how many times did you visit the emergency room for your asthma without being admitted to the hospital? 0   In the past 12 months, how many times were you hospitalized overnight because of your asthma? 0      1. Is Asthma diagnosis on the Problem List? Yes    2. Is Asthma listed on Health Maintenance? Yes    3. Patient is due for:  ACT and AAP      Composite cancer screening  Chart review shows that this patient is due/due soon for the following phq and px  Summary:    Patient is due/failing the following:   PHYSICAL    Action needed:   Patient needs to do ACT. and Patient needs to do PHQ9.    Type of outreach:    Sent [employer] message.    Questions for provider review:    None                                                                                                                                    Martha KONG, ALLISON,AAMA       Chart routed to Care Team .

## 2020-11-12 NOTE — LETTER
My Asthma Action Plan    Name: Fabio Logan   YOB: 1960  Date: 11/12/2020   My doctor: Manoj Armenta MD   My clinic: [facility]        My Control Medicine: flonase  My Rescue Medicine: Albuterol (Proair/Ventolin/Proventil HFA) 2-4 puffs EVERY 4 HOURS as needed. Use a spacer if recommended by your provider.  My Oral Steroid Medicine: none My Asthma Severity:   Mild Persistent  Know your asthma triggers: mold, humidity, exercise or sports and cold air               GREEN ZONE   Good Control    I feel good    No cough or wheeze    Can work, sleep and play without asthma symptoms       Take your asthma control medicine every day.     1. If exercise triggers your asthma, take your rescue medication    15 minutes before exercise or sports, and    During exercise if you have asthma symptoms  2. Spacer to use with inhaler: If you have a spacer, make sure to use it with your inhaler             YELLOW ZONE Getting Worse  I have ANY of these:    I do not feel good    Cough or wheeze    Chest feels tight    Wake up at night   1. Keep taking your Green Zone medications  2. Start taking your rescue medicine:    every 20 minutes for up to 1 hour. Then every 4 hours for 24-48 hours.  3. If you stay in the Yellow Zone for more than 12-24 hours, contact your doctor.  4. If you do not return to the Green Zone in 12-24 hours or you get worse, start taking your oral steroid medicine if prescribed by your provider.           RED ZONE Medical Alert - Get Help  I have ANY of these:    I feel awful    Medicine is not helping    Breathing getting harder    Trouble walking or talking    Nose opens wide to breathe       1. Take your rescue medicine NOW  2. If your provider has prescribed an oral steroid medicine, start taking it NOW  3. Call your doctor NOW  4. If you are still in the Red Zone after 20 minutes and you have not reached your doctor:    Take your rescue medicine again and    Call 911  or go to the emergency room right away    See your regular doctor within 2 weeks of an Emergency Room or Urgent Care visit for follow-up treatment.          Annual Reminders:  Meet with Asthma Educator,  Flu Shot in the Fall, consider Pneumonia Vaccination for patients with asthma (aged 19 and older).    Pharmacy:    [id number] - [place] - [phone]  WRITTEN PRESCRIPTION REQUESTED    Electronically signed by Manoj Armenta MD   Date: 11/12/20                      Asthma Triggers  How To Control Things That Make Your Asthma Worse    Triggers are things that make your asthma worse.  Look at the list below to help you find your triggers and what you can do about them.  You can help prevent asthma flare-ups by staying away from your triggers.      Trigger                                                          What you can do   Cigarette Smoke  Tobacco smoke can make asthma worse. Do not allow smoking in your home, car or around you.  Be sure no one smokes at a child s day care or school.  If you smoke, ask your health care provider for ways to help you quit.  Ask family members to quit too.  Ask your health care provider for a referral to Quit Plan to help you quit smoking, or call 2-785-921-PLAN.     Colds, Flu, Bronchitis  These are common triggers of asthma. Wash your hands often.  Don t touch your eyes, nose or mouth.  Get a flu shot every year.     Dust Mites  These are tiny bugs that live in cloth or carpet. They are too small to see. Wash sheets and blankets in hot water every week.   Encase pillows and mattress in dust mite proof covers.  Avoid having carpet if you can. If you have carpet, vacuum weekly.   Use a dust mask and HEPA vacuum.   Pollen and Outdoor Mold  Some people are allergic to trees, grass, or weed pollen, or molds. Try to keep your windows closed.  Limit time out doors when pollen count is high.   Ask you health care provider about taking medicine during allergy  season.     Animal Dander  Some people are allergic to skin flakes, urine or saliva from pets with fur or feathers. Keep pets with fur or feathers out of your home.    If you can t keep the pet outdoors, then keep the pet out of your bedroom.  Keep the bedroom door closed.  Keep pets off cloth furniture and away from stuffed toys.     Mice, Rats, and Cockroaches   Some people are allergic to the waste from these pests.   Cover food and garbage.  Clean up spills and food crumbs.  Store grease in the refrigerator.   Keep food out of the bedroom.   Indoor Mold  This can be a trigger if your home has high moisture. Fix leaking faucets, pipes, or other sources of water.   Clean moldy surfaces.  Dehumidify basement if it is damp and smelly.   Smoke, Strong Odors, and Sprays  These can reduce air quality. Stay away from strong odors and sprays, such as perfume, powder, hair spray, paints, smoke incense, paint, cleaning products, candles and new carpet.   Exercise or Sports  Some people with asthma have this trigger. Be active!  Ask your doctor about taking medicine before sports or exercise to prevent symptoms.    Warm up for 5-10 minutes before and after sports or exercise.     Other Triggers of Asthma  Cold air:  Cover your nose and mouth with a scarf.  Sometimes laughing or crying can be a trigger.  Some medicines and food can trigger asthma.

## 2020-11-27 NOTE — Clinical Note
"MATA. Back to [facility] for lab with lingering \"low grade fever\" & some incisional swelling (per sister). Also, wanted to mention urinary incontinence has been worse since surgery (sister said you had RX something previously but should know it's now worse).- Andreia"
no

## 2021-01-15 ENCOUNTER — HEALTH MAINTENANCE LETTER (OUTPATIENT)
Age: 61
End: 2021-01-15

## 2021-02-02 ENCOUNTER — MYC REFILL (OUTPATIENT)
Dept: PEDIATRICS | Facility: CLINIC | Age: 61
End: 2021-02-02

## 2021-02-02 DIAGNOSIS — I10 HTN, GOAL BELOW 140/90: ICD-10-CM

## 2021-02-04 RX ORDER — ATENOLOL 50 MG/1
50 TABLET ORAL DAILY
Qty: 90 TABLET | Refills: 0 | OUTPATIENT
Start: 2021-02-04

## 2021-03-27 DIAGNOSIS — Z00.00 ENCOUNTER FOR ROUTINE ADULT HEALTH EXAMINATION WITHOUT ABNORMAL FINDINGS: ICD-10-CM

## 2021-03-27 DIAGNOSIS — K21.9 GASTROESOPHAGEAL REFLUX DISEASE WITHOUT ESOPHAGITIS: ICD-10-CM

## 2021-03-29 ENCOUNTER — IMMUNIZATION (OUTPATIENT)
Dept: NURSING | Facility: CLINIC | Age: 61
End: 2021-03-29
Payer: COMMERCIAL

## 2021-03-29 PROCEDURE — 91300 PR COVID VAC PFIZER DIL RECON 30 MCG/0.3 ML IM: CPT

## 2021-03-29 PROCEDURE — 0001A PR COVID VAC PFIZER DIL RECON 30 MCG/0.3 ML IM: CPT

## 2021-04-11 DIAGNOSIS — F33.0 MAJOR DEPRESSIVE DISORDER, RECURRENT EPISODE, MILD (H): ICD-10-CM

## 2021-04-13 RX ORDER — SERTRALINE HYDROCHLORIDE 100 MG/1
150 TABLET, FILM COATED ORAL DAILY
Qty: 135 TABLET | Refills: 0 | Status: SHIPPED | OUTPATIENT
Start: 2021-04-13 | End: 2021-07-13

## 2021-04-13 NOTE — TELEPHONE ENCOUNTER
prescription sent, please have patient schedule with PCP or extender or resident for follow-up appointment, due for routine visit as well. thanks!

## 2021-04-13 NOTE — TELEPHONE ENCOUNTER
Routing refill request to provider for review/approval because:  Caitlin given x1 and patient did not follow up, please advise  Sapphire Graves RN, BSN  Message handled by CLINIC NURSE.

## 2021-04-13 NOTE — TELEPHONE ENCOUNTER
Called pt. Informed of prescription sent. Scheduled phys with PCP on 7/14/2021. No further questions or concerns at this time.     Encouraged pt to reach out with further questions or concerns. Pt agreeable to plan and verbalized understanding.    Jonathan Hill, EMT at 10:53 AM on April 13, 2021   [facility] Health Guide   [phone]

## 2021-04-19 ENCOUNTER — IMMUNIZATION (OUTPATIENT)
Dept: NURSING | Facility: CLINIC | Age: 61
End: 2021-04-19
Attending: INTERNAL MEDICINE
Payer: COMMERCIAL

## 2021-04-19 PROCEDURE — 0002A PR COVID VAC PFIZER DIL RECON 30 MCG/0.3 ML IM: CPT

## 2021-04-19 PROCEDURE — 91300 PR COVID VAC PFIZER DIL RECON 30 MCG/0.3 ML IM: CPT

## 2021-05-02 DIAGNOSIS — I10 HTN, GOAL BELOW 140/90: ICD-10-CM

## 2021-05-03 DIAGNOSIS — Z00.00 ENCOUNTER FOR ROUTINE ADULT HEALTH EXAMINATION WITHOUT ABNORMAL FINDINGS: ICD-10-CM

## 2021-05-03 DIAGNOSIS — I10 ESSENTIAL HYPERTENSION WITH GOAL BLOOD PRESSURE LESS THAN 140/90: ICD-10-CM

## 2021-05-03 RX ORDER — ATENOLOL 50 MG/1
TABLET ORAL
Qty: 90 TABLET | Refills: 1 | Status: SHIPPED | OUTPATIENT
Start: 2021-05-03 | End: 2021-07-14

## 2021-05-04 RX ORDER — LOSARTAN POTASSIUM 25 MG/1
TABLET ORAL
Qty: 90 TABLET | Refills: 0 | Status: SHIPPED | OUTPATIENT
Start: 2021-05-04 | End: 2021-07-14

## 2021-05-04 NOTE — TELEPHONE ENCOUNTER
Refill extended, pt has appointment scheduled  Prescription approved per [facility] Refill Protocol.  Sapphire Graves RN, BSN  Message handled by CLINIC NURSE.

## 2021-05-06 ENCOUNTER — MYC REFILL (OUTPATIENT)
Dept: PEDIATRICS | Facility: CLINIC | Age: 61
End: 2021-05-06

## 2021-05-06 ENCOUNTER — TELEPHONE (OUTPATIENT)
Dept: PEDIATRICS | Facility: CLINIC | Age: 61
End: 2021-05-06

## 2021-05-06 ENCOUNTER — MYC MEDICAL ADVICE (OUTPATIENT)
Dept: PEDIATRICS | Facility: CLINIC | Age: 61
End: 2021-05-06

## 2021-05-06 DIAGNOSIS — I10 ESSENTIAL HYPERTENSION WITH GOAL BLOOD PRESSURE LESS THAN 140/90: ICD-10-CM

## 2021-05-06 DIAGNOSIS — K21.9 GASTROESOPHAGEAL REFLUX DISEASE WITHOUT ESOPHAGITIS: ICD-10-CM

## 2021-05-06 DIAGNOSIS — Z00.00 ENCOUNTER FOR ROUTINE ADULT HEALTH EXAMINATION WITHOUT ABNORMAL FINDINGS: ICD-10-CM

## 2021-05-06 DIAGNOSIS — F33.0 MAJOR DEPRESSIVE DISORDER, RECURRENT EPISODE, MILD (H): ICD-10-CM

## 2021-05-06 RX ORDER — SERTRALINE HYDROCHLORIDE 100 MG/1
150 TABLET, FILM COATED ORAL DAILY
Qty: 135 TABLET | Refills: 0 | Status: CANCELLED | OUTPATIENT
Start: 2021-05-06

## 2021-05-06 RX ORDER — LOSARTAN POTASSIUM 25 MG/1
25 TABLET ORAL DAILY
Qty: 90 TABLET | Refills: 0 | Status: CANCELLED | OUTPATIENT
Start: 2021-05-06

## 2021-05-06 NOTE — TELEPHONE ENCOUNTER
Patient Quality Outreach      Summary:    Patient has the following on his problem list/HM:   Asthma review       ACT Total Scores 5/20/2020   ACT TOTAL SCORE (Goal Greater than or Equal to 20) 25   In the past 12 months, how many times did you visit the emergency room for your asthma without being admitted to the hospital? 0   In the past 12 months, how many times were you hospitalized overnight because of your asthma? 0          Patient is due/failing the following:   ACT needed    Type of outreach:    Phone, spoke to patient/parent. patien    Questions for provider review:    None                                                                                                                                     Martha KONG CMA,AAMA       Chart routed to Care Team.

## 2021-05-07 ASSESSMENT — ASTHMA QUESTIONNAIRES: ACT_TOTALSCORE: 22

## 2021-05-27 NOTE — PROGRESS NOTES
Dear Dr. Armenta:  I had the pleasure of seeing Fabio Logan in consultation in my neurosurgery clinic for the incidental finding of a right middle cerebral artery aneurysm.  Fabio does not have any familial history of aneurysms.  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  On imaging for his dizziness and incidental finding of a right middle cerebral artery aneurysm was found which measured 3 x 5 mm approximately.  I reviewed this finding with him and his spouse in detail.  I reviewed the natural history of cerebral aneurysms with Fabio as well as the risks of rupture and morbidity and risk factors of rupture including hypertension.  I did review treatment options including surveillance monitoring versus coil embolization versus clipping.  Given his age of 58 and the location and morphology of his aneurysm I would recommend craniotomy and clipping of the aneurysm.  They understood the risks benefits and alternatives and will discuss and think about it.  They would let me know if they wish to proceed.   Thank you for giving me the opportunity to see this very pleasant patient.  If you have any questions about Fabio or any other patients please feel free to contact me.  Of note I spent greater than 45 minutes counseling the patient regarding his pathology and treatment plan, greater than 50 percent of which was spent in direct face to face counseling.    Jamir Rebollar MD, FAANS

## 2021-05-28 NOTE — ANESTHESIA PROCEDURE NOTES
Arterial Line  Reason for Procedure: hemodynamic monitoring  Patient location during procedure: Pre-op  Start time: 5/7/2019 9:10 AM  End time: 5/7/2019 9:19 AM  Staffing:  Performing  Anesthesiologist: Clarissa Ludwig MD  Sterile Precautions:  sterile barriers used during insertion: cap, mask, sterile gloves, large sheet, and hand hygiene used.  Arterial Line:   Immediately prior to procedure a time out was called to verify the correct patient, procedure, equipment, support staff and site/side marked as required  Laterality: left  Location: radial  Prepped with: ChloroPrep    Needle gauge: 20 G  Number of Attempts: 1  Secured with: tape, transparent dressing and pressure dressing  Flushed with: saline  1% lidocaine local anesthesia used for skin prep.   See MAR for additional medications given.

## 2021-05-28 NOTE — TELEPHONE ENCOUNTER
Pt is s/p RIGHT CRANIOTOMY AND CLIPPING OF COMPLEX MIDDLE CEREBRAL ARTERY ANEURYMS  By Dr. corcoran on 5-7-19.  He was discharged 5-11-19.  He came to ED on 5-13-19 due to fever >101 and no acute infection detected.   He called this morning to report increased incision swelling on the right side and swelling on the right side of the face and temple area.  I spoke with the  nurse who reports temp 99.6, incision is boggy and soft/not tight and there is no redness or drainage.  Pt's sister states temp has been below 100 since returning from ED on 5-13-19.  He denies frequency/urgency or burning with urination. CBC on 5-13-19 was 14.8, UA was clear.    I spoke with Dr. Rebollar.  He said continue to monitor swelling and temp.  We will get repeat CBC, ESR and CRP tomorrow.    I called pt and spoke to sister/caregiver, Zari, and requested they take him for labs tomorrow.  He has had some diarrhea today.  He is supposed to have AED levels checked on Friday and requests those levels be drawn at the same time. I will fax AED levels to Dr. Cameron when they are back.     Argentina Cooper RN

## 2021-05-28 NOTE — TELEPHONE ENCOUNTER
PATIENT NAME:  Fabio Logan  YOB: 1960  MRN: [id number]  SURGEON: Dr. Rebollar and Dr. Blankenship  DATE of SURGERY: 5-7-19  PROCEDURE: RIGHT CRANIOTOMY AND CLIPPING OF COMPLEX MIDDLE CEREBRAL ARTERY ANEURYMS      FOLLOW-UP:    [facility] Out : 14 Days [On nurse schedule unless noted otherwise] appt 5-21-19  Post Op Visit: 6+ weeks appt 6-20-19  Post-op Provider: NP  DIAGNOSTICS:  none  DISPOSITION:  Home with home care 5-11-19    ADDITIONAL INSTRUCTIONS FOR MEDICAL STAFF:

## 2021-05-28 NOTE — ANESTHESIA POSTPROCEDURE EVALUATION
Patient: Fabio Logan  RIGHT CRANIOTOMY AND CLIPPING OF COMPLEX MIDDLE CEREBRAL ARTERY ANEURYMS; INTRAOPERATIVE ANGIOGRAM  Anesthesia type: general    Patient location: PACU  Last vitals:   Vitals Value Taken Time   /59 5/7/2019  3:45 PM   Temp 37.1  C (98.8  F) 5/7/2019  3:00 PM   Pulse 109 5/7/2019  3:48 PM   Resp 19 5/7/2019  3:48 PM   SpO2 93 % 5/7/2019  3:48 PM   Vitals shown include unvalidated device data.  Post vital signs: stable  Level of consciousness: awake and responds to simple questions  Post-anesthesia pain: pain controlled  Post-anesthesia nausea and vomiting: no  Pulmonary: unassisted, return to baseline  Cardiovascular: stable and blood pressure at baseline  Hydration: adequate  Anesthetic events: yes: seizures x 2 while in PACU during emergence.  See note below.    QCDR Measures:  ASA# 11 - Larissa-op Cardiac Arrest: ASA11B - Patient did NOT experience unanticipated cardiac arrest  ASA# 12 - Larissa-op Mortality Rate: ASA12B - Patient did NOT die  ASA# 13 - PACU Re-Intubation Rate: ASA13B - Patient did NOT require a new airway mgmt  ASA# 10 - Composite Anes Safety: ASA10A - No serious adverse event    Additional Notes:  Per Dr. Ludwig and Sapphire UGARTE RN PACU, pt had 2 x grand mal seizures during emergence despite having been given Keppra.  Per Dr. Rebollar's request, pt was not to be intubated unless there his respirations/airway were comprimised.  Pt then received ativan and Dilantin load.  Pt is now off oxygen and answering simple questions.  VSS.  Dr. Sosa received a sign out and was able to evaluate the patient in PACU.  Plan is to transfer him to the ICU for close monitoring.  Surgeon requests that his SBP be maintained between 110 and 160.

## 2021-05-28 NOTE — PATIENT INSTRUCTIONS - HE
To OR as planned 5-7-19 at 9:30 AM.     Please arrive 2.5 hours prior to scheduled surgery. (7 AM)    Nothing to eat or drink after midnight the night before surgery unless directed by PCP to take morning meds with sip of water.     Bring all pertinent films to hospital the day of surgery.     Continue to refrain from NSAIDS (Ibuprofen, Aleve, Naprosyn), ASA, Over the counter herbal medications or supplements, anti-coagulants and blood thinners.     Using a washcloth and a bottle of provided Hibiclens, wash your body, avoiding your face and genitals. Preferably, shower the night before surgery and the morning of surgery using a half a bottle each time for your whole body shower. If you are unable to take a shower in the morning of surgery, please discuss your options with the nurse at your readiness visit.

## 2021-05-28 NOTE — ANESTHESIA PREPROCEDURE EVALUATION
Anesthesia Evaluation      Patient summary reviewed   No history of anesthetic complications     Airway   Mallampati: II  Neck ROM: full  Comment: Smaller mouth opening   Pulmonary - normal exam   (+) asthma   well controlled, a smoker (former)  (-) shortness of breath                         Cardiovascular - normal exam  (+) hypertension, ,     ECG reviewed        Neuro/Psych    (+) anxiety/panic attacks,     Comments: R MCA anuerysm    Endo/Other    (+) obesity (BMI 40),      GI/Hepatic/Renal    (+) GERD well controlled,             Dental - normal exam                        Anesthesia Plan  Planned anesthetic: general endotracheal  - PIV x 2  - arterial line  - T&S, okay to receive blood  - vasopressors/dilators available  - precedex/propofol gtt d/t neuromonitoring  - glidescope available  ASA 3   Induction: intravenous   Anesthetic plan and risks discussed with: patient  Anesthesia plan special considerations: video-assisted, arterial catheterization, IV therapy two IVs, dexmedetomidine  Post-op plan: routine recovery

## 2021-05-28 NOTE — TELEPHONE ENCOUNTER
Pt is s/p RIGHT CRANIOTOMY AND CLIPPING OF COMPLEX MIDDLE CEREBRAL ARTERY ANEURYMS on 5-7-19 by Dr. Rebollar.  He was discharged home 5-11-19.    I received a call from his caregiver/sister Zari.  Pt had a busy morning with PT and was napping this afternoon and woke up with chills and sweatiness.  His temp was 101.  She gave hime 2 ES tylenol and temp still 101 and now 101.9.  She says incision looks fine with no drainage, swelling or redness.  He has some urinary urgency but no burning.  He reports diarrhea since he came home and is no longer on stool softeners. I spoke with NP.  I directed her to take pt to [place]'s ED.  She verbalized understanding.   Argentina Cooper RN

## 2021-05-28 NOTE — PROGRESS NOTES
Preop Assessment: Fabio Logan presents for pre-op review.  Surgeon: Dr. Rebollar  Name of Surgery: RIGHT CRANIOTOMY AND CLIPPING OF COMPLEX MIDDLE CEREBRAL ARTERY ANEURYMS; INTRAOPERATIVE ANGIOGRAM  Diagnosis: Cerebral aneurysm  Date of Surgery: 5-7-19  Time of Surgery: 0930  Hospital: [facility]  H&P: 4-18-19 Dr. lEigio Bah cleared pt for surgery  History of ASA, NSAIDS, vitamin and/or herbal supplements within 10 days: No  History of blood thinners: No  History of anti-seizure med's: No  Review of systems: feels well today    Diagnostics:  Labs: 5-6-19:  CXR: not ordered   EKG: 3-14-19  Films: MRI/MRA brain 3-14-19 in [facility]    All questions answered regarding surgery and expected pre and postoperative course including rehabilitation phase.     Reviewed with patient: Arrive 2.5 hours prior to scheduled surgery, nothing to eat or drink after midnight the night before surgery and bring all pertinent films to the hospital the day of surgery.  Continue to refrain from NSAIDS (Ibuprofen, Aleve, Naprosyn) ASA or over the counter herbal medication or supplements, anticoagulants and blood thinners.    Incentive Spirometer usage instructions provided.     Patient confirmed they have help/assistance in place at home upon discharge.     Patient was informed that we will provide up to 1 week prescription of pain medication for post-operative pain.     Surgical consent was signed.    Argentina Cooper RN

## 2021-05-28 NOTE — ANESTHESIA CARE TRANSFER NOTE
Last vitals:   Vitals:    05/07/19 1308   BP:    Pulse:    Resp:    Temp: 36.9  C (98.5  F)   SpO2:      Patient's level of consciousness is drowsy  Spontaneous respirations: yes  Maintains airway independently: yes  Dentition unchanged: yes  Oropharynx: nasal airway in place    QCDR Measures:  ASA# 20 - Surgical Safety Checklist: WHO surgical safety checklist completed prior to induction    PQRS# 430 - Adult PONV Prevention: 4558F - Pt received => 2 anti-emetic agents (different classes) preop & intraop  ASA# 8 - Peds PONV Prevention: NA - Not pediatric patient, not GA or 2 or more risk factors NOT present  PQRS# 424 - Larissa-op Temp Management: 4559F - At least one body temp DOCUMENTED => 35.5C or 95.9F within required timeframe  PQRS# 426 - PACU Transfer Protocol: - Transfer of care checklist used  ASA# 14 - Acute Post-op Pain: ASA14B - Patient did NOT experience pain >= 7 out of 10

## 2021-05-29 NOTE — PATIENT INSTRUCTIONS - HE
WOUND CARE:    A dressing is not required. TAKE THE CURRENT BANDAGE OFF TOMORROW.      Keep the wound clean.    Wash your hands before touching the wound.  Ensure that anyone assisting you in the care of your wound washes her/his hands before touching the wound. Good handwashing can decrease the risk of serious infection.    If you are unable to see your wound, have someone check the wound daily for redness, swelling,or drainage. A small amount of drainage is normal.    You may shower.  Pat the wound dry. Do not rub, SCRUB OR SOAK THE INCISION UNTIL COMPLETELY HEALED.    If you develop redness, swelling, drainage, or temp 101 or greater, call our office at [phone].

## 2021-05-29 NOTE — PROGRESS NOTES
"NEUROSURGERY FOLLOW UP EVALUATION:    ASSESSMENT  Fabio Logan is a 58 y.o. male, who presents today status post right craniotomy and clipping of complex middle cerebral artery aneurysm with Dr Rebollar on 5/7/2019. Post op recovery complicated by seizure, following with Neurology and on extended antiepileptic.     Today he reports   1. Significant anxiety (recently started on alprazolam) and it is better  2. Dull feeling at his incision site, extending into right side of his face  3. No headaches/migraines  4. Right eye \"out of focus\" (has appt with eye doctor tomorrow - had cataracts January 2019)  5. Has appt with Dr Mondragon (Neurology) next Friday to discuss recovery, antiseizure medications  6. Hands tremble when he is fatigued  7. Has been doing PT/OT/Speech  8. Altered thought process, cannot focus on reading a book  9. Tolerating walks around his neighborhood, is taking the bus and metro mobility to appt's since he cannot drive due to seizure while in the hospital for 3 months    PLAN:  1. 1 week nurse wound check, 1 month to follow up on above symptoms and 3 month follow up   2. 1 year follow up with Dr Rebollar and MRA  3. OK to slowly loosen lifting restrictions. OK to add 5 lbs per week until you return to normal amount. If you develop pain, fatigue, stop and return to previous weeks weight amount.   4. OK to return to work in 2-4 weeks on a part time basis. Use your judgement on how well you tolerate it.   5. Continue PT/OT/Speech  6. Avoid picking at wound. OK for shampoo to run over your head.    HPI: Fabio Logan is a 58 year old who presented to the office with right middle cerebral artery aneurysm, 3 x 5 mm, found on imaging to evaluate dizziness. Based on location and morphology Dr Rebollar recommended craniotomy and clipping.     Past Medical History:   Diagnosis Date     Anxiety      Asthma      Cerebral aneurysm      Chest pain      Chronic dermatitis      Diverticulosis of large " intestine      Esophageal reflux      Hypertension      Impaired fasting glucose      Lentigines      Major depression in complete remission (H)      Malignant neoplasm of testis (H)      Migraine      Morbid obesity (H)      Skin cancer screening      Skin eruption       Past Surgical History:   Procedure Laterality Date     COLONOSCOPY       CRANIOTOMY FOR ANEURYSM / VERTEBROBASILAR / CAROTID CIRCULATION Right 5/7/2019    Procedure: RIGHT CRANIOTOMY AND CLIPPING OF COMPLEX MIDDLE CEREBRAL ARTERY ANEURYMS; INTRAOPERATIVE ANGIOGRAM;  Surgeon: Jamir Rebollar MD;  Location: [facility];  Service: Neurosurgery     EYE SURGERY      ? not on H&P     IR CEREBRAL ANGIOGRAM  5/7/2019     LAPAROSCOPIC CHOLECYSTECTOMY       ORCHIECTOMY Right 1998     TONSILLECTOMY         Current Outpatient Medications   Medication Sig Note     acetaminophen (TYLENOL) 500 MG tablet Take 1,000 mg by mouth daily as needed for pain.      atenolol (TENORMIN) 50 MG tablet TAKE ONE TABLET BY MOUTH ONE TIME DAILY      docusate sodium (COLACE) 100 MG capsule Take 1 capsule (100 mg total) by mouth 2 (two) times a day. (Patient taking differently: Take 100 mg by mouth 2 (two) times a day as needed.       )      fluticasone propionate (FLONASE) 50 mcg/actuation nasal spray Apply 1 spray into each nostril daily as needed for rhinitis.      HYDROcodone-acetaminophen 5-325 mg per tablet Take 1-2 tablets by mouth every 4 (four) hours as needed. Do not exceed 3000mg/day of acetaminophen      hydrOXYzine HCl (ATARAX) 25 MG tablet Take 25-50 mg by mouth at bedtime as needed for anxiety.      levETIRAcetam (KEPPRA) 1000 MG tablet Take 1 tablet (1,000 mg total) by mouth 2 (two) times a day.      losartan (COZAAR) 25 MG tablet Take 25 mg by mouth daily.             omeprazole (PRILOSEC) 20 MG capsule Take 20 mg by mouth daily before breakfast.      phenytoin (DILANTIN) 30 MG ER capsule Take 8 capsules (240 mg total) by mouth every morning.       phenytoin (DILANTIN) 300 MG ER capsule Take 1 capsule (300 mg total) by mouth at bedtime.      sertraline (ZOLOFT) 100 MG tablet Take 100 mg by mouth daily.             SUMAtriptan (IMITREX) 50 MG tablet TAKE 1 TABLET (50 MG) BY MOUTH AT ONSET OF HEADACHE FOR MIGRAINE MAY REPEAT DOSE IN 2 HOURS.  DO NOT EXCEED 200 MG IN 24 HOURS 5/13/2019: Instructed to not take until 6 week follow up appointment       Allergies   Allergen Reactions     Iodine-131 Shortness Of Breath     Internal iodine     Iodine      PHYSICAL EXAM:  Visit Vitals  /74   Pulse 68   Resp 18     Alert and oriented x3, speech normal  PERRL, EOMI, face symmetric, tongue midline, shoulder shrug equal  No pronator drift, finger to nose smooth and accurate bilaterally  Arm strength: 5/5  Leg strength: 5/5   Bulk and tone: normal  Reflexes:   No pathological reflexes   Coordination/Gait steady with cane  Incision two areas of dark colored scab, distal wound with area of recent scab removal and appears raw. No sign of infection. Patient states he wears a cap when he is out and about. No cap on today.   Skin: right temporal edema, slightly soft. Patient states not new.     Payal Prieto, -[facility], [facility] Neurosurgery  O: [phone]

## 2021-05-29 NOTE — PATIENT INSTRUCTIONS - HE
PLAN:  1. 1 week with nurse for wound check, 1 month and 3 month follow up with provider  2. 1 year follow up with Dr Rebollar and MRA  3. OK to slowly loosen lifting restrictions. OK to add 5 lbs per week until you return to normal amount. If you develop pain, fatigue, stop and return to previous weeks weight amount.   4. OK to return to work in 2-4 weeks on a part time basis. Use your judgement on how well you tolerate it.   5. Continue PT/OT/Speech  6. Avoid picking at wound. OK for shampoo to run over your head.

## 2021-05-29 NOTE — PROGRESS NOTES
Pt is here for staple removal s/p RIGHT CRANIOTOMY AND CLIPPING OF COMPLEX MIDDLE CEREBRAL ARTERY ANEURYMS on 5-7-19 by Dr. Rebollar. He had seizures in PACU and was started on keppra then dilantin.     Today, he reports minimal incisional discomfort/headache; hasn't had even a tylenol in a few days. Incisional and facial swelling has been gone since 5-16-19 and he has not had any further fever since then.  No more diarrhea.  Denies nausea/vominting. No seizure activity that he is aware of.  His sisters have been staying with him but went home for a few days as they live out of town.  His right eye is sensitive to light when he goes outside.  He denies gait disturbance or dizziness.      Surgical wound WNL- CDI, no signs of infection or skin breakdown.  Incision well-healed: good skin approximation, no redness or visible/palpable edema, no tenderness to palpation.  PT. AF, denies fever, chills or sweats.      Staples - intact removed without difficulty. Wound prepped with Betadine before and after removal.  Surrounding skin has no signs of breakdown.  Verbal instructions regarding incision care are given.  Pt. advised to call us if any s/s of infection noted - all discussed in detail.     He had AED levels drawn 5-17-19. Pt will call [facility] to make sure he has appt to f/u with Dr. Cameron in 4-6 weeks and will inquire if he needs to make any adjustments to his keppra and dilantin.  I reminded him he is not able to drive for 3 months post seizure per state law. Will remain on anti epileptics 3-6 months per neurology       Argentina Cooper RN

## 2021-05-30 NOTE — PROGRESS NOTES
CHART NOTE     1960     DATE OF SERVICE: 7/9/2019     FOLLOW UP EVALUATION:      ASSESSMENT AND PLAN:Fabio Logan is post op  status post right craniotomy and clipping of complex middle cerebral artery aneurysm with Dr Rebollar on 5/7/2019. Post op recovery complicated by seizure, following with Neurology and on extended antiepileptic.  On 6/25/2019 he presented to the ER w anxiety and depression issues and was admitted to for psyche care.  On imaging he was found to have bilateral SDH--he's here today for follow up.  He reports no seizures--he continues on antiepileptics.  He has scheduled follow up w Dr Cameron on the 23 rd.  His head CT shows decrease in the size of right now about 9 mm w decrease in ML shift.  There is no acute component. He will continue to remain off work--we will revisit that issue later.  Plan watchful follow up--he will hv repeat head CT on the 23 rd and see us on the same day that he is scheduled to see Dr Cameron.  He will return sooner if new or worsening symptoms.  He has a small 5-6 mm scab mid way of the suture line--it does not appear infected and he has had one round of abx.--plan close follow up. Told him not to pick at the scab and to call if redness, swelling or drainage.   PAST MEDICAL HISTORY, SURGICAL HISTORY, REVIEW OF SYMPTOMS, MEDICATIONS AND ALLERGIES:  Past medical history, surgical history, review of symptoms, medications and allergies remain unchanged.    Vitals:    07/09/19 1352   BP: 148/89   Pulse: 76   Resp: 16   SpO2: 97%        PHYSICAL EXAM:  Neurological exam reveals:  Alert and oriented x3, speech fluent and appropriate.   PERRL, EOMI, face symmetric, tongue midline, shoulder shrug equal  No pronator drift, finger to nose smooth and accurate bilaterally  Arm strength bilateral grasp, biceps, triceps, and deltoids 5/5   Leg strength bilateral dorsiflexion, plantar flexion, and hip flexion 5/5  Muscle Bulk and tone wnl.   Reflexes:No pathological reflexes   Gait  and station:Normal  Incision:Healing well- w exception of -5-6 mm scab in the suture line w/o redness or swelling--no drainage--no bogginess.         RADIOGRAPHIC IMAGING: I personally reviewed any new diagnostic studies. Right pterional craniotomy. Aneurysm clip in the right sylvian fissure at the expected location of the right MCA bifurcation. Mixed attenuating subdural collection over the right cerebral convexity measures 9 mm in maximal radial   thickness, previously measuring 11 mm. Mass effect on the right cerebral hemisphere with 3 mm of right to left midline shift, previously 4 mm

## 2021-05-30 NOTE — PROGRESS NOTES
"Fabio Logan is status post right craniotomy and clipping of complex middle cerebral artery aneurysm with Dr Rebollar on 5/7/2019. Post op recovery complicated by seizure, following with Neurology and on extended antiepileptic.  Last seen on 6/25/2019, was admitted to psych unit due to suicide ideation. CT head revealed a small right SDH with 4 mm mass effect, likely post surgical related. ID consulted for 2 wounds with scabs along incision. Has started on Keflex 500 mg.  Today he returns in follow up for wound check. He is doing very well - denies HA, shows no signs of depression. States \"I feel much better now\". Verbalizes no concerns.  Incision CDI, no drainage, no redness, no TTP, no edema. There is a small superficial scab on mid part of wound.  Analia Swift RN, CNRN      "

## 2021-05-30 NOTE — PROGRESS NOTES
"Pt is here for a wound check.  He was here one week ago for 6 week po s/p right craniotomy and clipping of complex middle cerebral artery aneurysm with Dr Rebollar on 5/7/2019. Post op recovery complicated by seizure, following with Neurology and on extended antiepileptic.  He is very anxious and weepy/sobbing today.  States \"Im a basket case and I don't want to be. I can't stand it anymore.\"  States he cannot sleep, cannot eat, has throat tightness, and passing thoughts of suicide:  \"I have an arsinnal of meds that could end it and today I had a knife on the counter and it crossed my mind; where did that come from?!\"  States he is unable to sleep and has been up pacing the past few nights.  He worries if he will have another seizure.  He is overwhelmed with managing appointments, meds, rides since his sister's went home a couple weeks ago.  He us taking xanax for anxiety at  and states it doesn't help.  I spoke to his sister, Clarisse, and she has been in contact the past 2 days frequently trying to advise her brother.  Dr. Rebollar came in to see pt's wound and saw his current state and advised inpt psychiatric admission which we are in the process of arranging.  Pt had appt at a [facility] clinic this afternoon, but in light of his current state it seem safer for pt to be admitted for evaluation and treatment.  He agrees with this plan. States he has had previous bouts of anxiety, but never this bad.     His wound has 2 scabs.  When I pressed on the posterior scab, there was thick white liquid that came out.  Scabs removed by Dr. Rebollar. Tissue looks pink, but he would like a wound care consult when pt is admitted.  The front scab had no pus and incision well approximated underneath. He denies fever, states he was cold this morning.  Denies headache.  His emotional health seems to be overwhelming his thoughts about his incision. Will start keflex x 10 days and get wound care consult after " admitted.     Argentina Cooper RN

## 2021-05-30 NOTE — PATIENT INSTRUCTIONS - HE
A dressing is not required.    Keep the wound clean.    Wash your hands before touching the wound.  Ensure that anyone assisting you in the care of your wound washes her/his hands before touching the wound. Good handwashing can decrease the risk of serious infection.    If you are unable to see your wound, have someone check the wound daily for redness, swelling,or drainage. A small amount of drainage is normal.    You may shower.  Pat the wound dry. Do not rub.    No tub baths until the wound is well healed.  Usually 5-6 weeks.     If you develop redness, swelling, drainage, or temp 101 or greater, call our office at [phone].        * No lifting, pushing or pulling greater than 5-10 pound (this is about a gallon of milk).  *No repetitive bending, twisting, or jarring activities  *No overhead work  *No aerobic or strenuous activity  *No activities with increased risk of falls  *You may move about your home as tolerated  *You may walk up and down stairs as tolerated  *You may increase your activity slowly over the next 4-6 weeks    * WALKING PROGRAM: As you can tolerate, walk daily-start with 5-10 minutes of continuous walking. This is in addition to the walking that you do as part of your daily activities. Increase the time that you walk by 5 minutes every couple of days. Do not exceed 30-45 minutes of continuous walking until seen in follow-up. Walking is the best exercise after surgery.  **Listen to your body, if you find that you are more painful or fatigued, you may need to proceed more slowly.    **Do not smoke or expose yourself to second hand smoke. Cigarette smoke can delay healing and cause complications.     DRIVING: Do not drive. Plan to discuss your return to driving at your 4-6 weeks follow-up appointment.    WORK: If you plan to return to work before you 4-6 weeks appointment, call and discuss with one of the nurses in the neurosurgery office.

## 2021-05-30 NOTE — TELEPHONE ENCOUNTER
Per Caryn FLANNERY, patient to be seen this week if possible w CT prior. Canceling CT scheduled for 7-23-19.

## 2021-05-30 NOTE — PATIENT INSTRUCTIONS - HE
Diminished size of subdural collection over the right cerebral convexity, was 11 mm now 9 mm.  Return on 23 July w repeat head CT

## 2021-05-30 NOTE — TELEPHONE ENCOUNTER
PATIENT NAME:  Fabio Logan  YOB: 1960  MRN: [id number]  SURGEON: Dr. Rebollar  DATE of ED consult:  6-25-19  DIAGNOSIS:  ANXIETY AND DEPRESSION S/P CRANI FOR ANEURYSM CLIPPING, HAD CT IN ED SHOWING SMALL SDH THOUGHT TO BE Most likely post surgical related. .          FOLLOW-UP:  Post HOSPITAL F/U Visit: 2 weeks   Post-op Provider: NP  DIAGNOSTICS:  radiology: CT scan: HEAD WITHOUT    DISPOSITION:  DISCHARGED FROM IN PSYCH 6-28-19    ADDITIONAL INSTRUCTIONS FOR MEDICAL STAFF:

## 2021-05-31 NOTE — TELEPHONE ENCOUNTER
He is also concerned that his wound still has scab, dark area. Denies drainage, fever, chills.  It may be a little better but he's not sure.  He would like to come to clinic for a wound check when Dr. Rebollar is in clinic.   Appointment made for 8-13-19 and he will call to come sooner if it gets worse.   Argentina Cooper RN

## 2021-05-31 NOTE — PATIENT INSTRUCTIONS - HE
Okay to increase all activities as tolerated, okay to add back 10 lbs a week to your lifting restriction. No further head CTs going forward. I would recommend talking to your primary doctor about your blood pressure, to see if this is contributing to your dizziness. You can also see a physical therapist about vertigo treatment. I will talk to Dr. Rebollar about surveillance imaging.

## 2021-05-31 NOTE — TELEPHONE ENCOUNTER
Please call patient in regards to being able to drive.  Also, per NP note patient was to come back in 6 weeks with a new CT. It was not ordered. Please place CT order and call patient.

## 2021-05-31 NOTE — PROGRESS NOTES
CHART NOTE     1960     DATE OF SERVICE: 9/3/2019     FOLLOW UP EVALUATION:      ASSESSMENT :Fabio Logan was last seen by by ODIN, Myesha Ovalles on 7/23 - pt is s/p MCA clipping in May, post-op course complicated by seizure, exacerbation of anxiety and depression, development of subdural hematomas. Today, he is back to work. Head CT with resolved subdural hematomas, he has not had headaches. Neurology is weaning his antiseizure meds. Scab has healed well. He is overall doing quite well. States he occasionally gets light headed when he moves from sitting to standing. He is on atenolol.     PLAN:   1. No further CTs  2. No follow up needed from neurosurgery perspective for SDH  3. Increase activities as tolerated, add back to weight lifting restriction as tolerated  4. Continue to follow with neurology for headaches and seizure med adjustment.     FOLLOW UP: Not needed at this point.     PAST MEDICAL HISTORY, SURGICAL HISTORY, REVIEW OF SYMPTOMS, MEDICATIONS AND ALLERGIES:  Past medical history, surgical history, review of symptoms, medications and allergies remain unchanged.    Vitals:    09/03/19 0802   BP: 135/76   Pulse: 70   SpO2: 97%        PHYSICAL EXAM:  Neurological exam reveals:  Alert and oriented x3, speech fluent and appropriate.   PERRL, EOMI, face symmetric, tongue midline, shoulder shrug equal  No pronator drift, finger to nose smooth and accurate bilaterally  Arm strength bilateral grasp, biceps, triceps, and deltoids 5/5   Leg strength bilateral dorsiflexion, plantar flexion, and hip flexion 5/5  Muscle Bulk and tone wnl.   Gait and station:Normal   Wound: well healed  RADIOGRAPHIC IMAGING: Head CT personally reviewed by me:    Resolved SDH    Britney Woods CNP  [facility] Neurosurgery  O: [phone]  P: [phone]

## 2021-05-31 NOTE — PATIENT INSTRUCTIONS - HE
A dressing is not required.    Keep the wound clean.    Wash your hands before touching the wound.  Ensure that anyone assisting you in the care of your wound washes her/his hands before touching the wound. Good handwashing can decrease the risk of serious infection.    If you are unable to see your wound, have someone check the wound daily for redness, swelling,or drainage. A small amount of drainage is normal.    You may shower.  Pat the wound dry. Do not rub.    No tub baths until the wound is well healed.     If you develop redness, swelling, drainage, or temp 101 or greater, call our office at [phone].

## 2021-05-31 NOTE — TELEPHONE ENCOUNTER
Instructed pt to call his neurologist re driving restrictions secondary to seizures. I ordered CT scan for 6 weeks from last.   Argentina Cooper RN

## 2021-05-31 NOTE — PROGRESS NOTES
Fabio Desmond is status post right craniotomy and clipping of complex middle cerebral artery aneurysm with Dr Rebollar on 5/7/2019.  Post op recovery complicated by seizure, following with Neurology and on extended antiepileptic. On 6/25/2019 he presented to the ER w anxiety and depression issues and was admitted to for psyche care.  On imaging he was found to have bilateral SDH.    Last seen on 7/23/2019.  Today he reports no seizures.  He continues to follow w neurology for seizure med management. His recent imaging showed further decrease in size of right sided SDH.  He still has a 4-5 mm scab without evidence of infection. No new symptoms in the interim.    Dr. Rebollar is in to see Fabio. Instructed pt to use peroxide for cleaning the wound.  RTC on 9/3/2019.  Analia Swift, RN, CNRN

## 2021-06-02 VITALS — HEIGHT: 66 IN | BODY MASS INDEX: 39.1 KG/M2 | WEIGHT: 243.3 LBS

## 2021-06-02 VITALS — HEIGHT: 66 IN | WEIGHT: 240 LBS | BODY MASS INDEX: 38.57 KG/M2

## 2021-06-03 VITALS — BODY MASS INDEX: 38.65 KG/M2 | HEIGHT: 66 IN | WEIGHT: 240.5 LBS

## 2021-06-03 VITALS
HEIGHT: 66 IN | WEIGHT: 240 LBS | OXYGEN SATURATION: 97 % | DIASTOLIC BLOOD PRESSURE: 76 MMHG | SYSTOLIC BLOOD PRESSURE: 135 MMHG | BODY MASS INDEX: 38.57 KG/M2 | HEART RATE: 70 BPM

## 2021-06-03 VITALS — HEIGHT: 66 IN | BODY MASS INDEX: 38.09 KG/M2 | WEIGHT: 237 LBS

## 2021-06-17 NOTE — PATIENT INSTRUCTIONS - HE
Patient Instructions by Kena Boyer LPN at 3/21/2019  8:00 AM     Author: Kena Boyer LPN Service: -- Author Type: Licensed Nurse    Filed: 3/21/2019  8:56 AM Encounter Date: 3/21/2019 Status: Addendum    : Kena Boyer LPN (Licensed Nurse)    Related Notes: Original Note by Kena Boyer LPN (Licensed Nurse) filed at 3/21/2019  8:54 AM

## 2021-06-19 NOTE — LETTER
Letter by Jamir Rebollar MD at      Author: Jamir Rebollar MD Service: -- Author Type: --    Filed:  Encounter Date: 4/2/2019 Status: (Other)       Dear Mr. Logan,    This letter will help in preparation for your upcoming surgery. Please contact us with any additional questions you may have regarding your surgery. 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 Jamir Rebollar  Date/Time: Tuesday, May 7, 2019 at 9:30 am  (time subject to change)  Location: [place] [address]    Check in at the Welcome Desk inside the main doors of the [facility]. An escort will take you to the surgery waiting area. A nurse from [facility] (surgery admit unit) at the hospital will call you with your exact arrival time to the hospital - typically one-and-a-half to two-and-a-half hours prior to your scheduled surgery time.     In the event of an emergency surgery case, there may be an adjustment to your start time for surgery.     PREPARING FOR YOUR SURGERY    *Pre-op Physical: Thursday, April 18, 2019 at 8:10 am with Dr. Eligio Bah at the [facility].      *Please discuss the necessity of receiving a pneumococcal vaccine prior to surgery at your pre-op physical. Recommended for all patients over the age of 65, or based on certain medical conditions.     *After the pre-op physical is complete, please have your clinic fax the visit note to your surgery scheduler at [phone].    *Pre-op Lab Work: Friday, May 3, 2019 at 9:00 am at the [facility] lab. I have included a flyer for the lab in with this letter. The labs are ordered in the computer and you do not need an appointment.      *Readiness Visit: Friday,  May 3, 2019 at 10:00 am at our office in [facility] in the [facility] at [address] [place] [address], [address], [place] [address].     *Bring your images on disk or film to your Readiness Visit so we can have them loaded into our system and available for your surgery, if they have not previously been brought to our office. Failure to bring your images to our office will result in cancellation of your surgery.     *Ensure that you have completed your pre-op physical, along with any other necessary tests/appointments (listed above), prior to your Readiness Visit.         ADDITIONAL INFORMATION REGARDING YOUR SURGERY    Medications    Bring a list ALL of your medications, including any over-the-counter vitamins and herbal supplements to your Readiness Visit, and on the day of surgery.    DO NOT bring your medications with you the day of surgery.    Please see attached third sheet for more details on medications/vitamins/herbal supplements that should be discontinued prior to your surgery date.     If you are unsure if you should discontinue a medication/ vitamin/herbal supplement, please call our office and discuss with a nurse.    Continue taking your medications/vitamins/herbal supplements unless they are on the attached list.     Failure to follow the instructions regarding medications/vitamins/herbal supplements will result in cancellation of your surgery.    Day BEFORE Surgery    DO NOT shave near your surgical site. This can cause irritation of the skin    Using a washcloth and provided bottle of Hibiclens, shower the night BEFORE surgery, using a half bottle of Hibiclens to wash your body, avoiding face and genitals. The morning OF surgery, shower and use the second half of the bottle to wash your body, avoiding face and genitals. If you are unable to take a shower the morning of surgery, please discuss your options with the nurse at your readiness visit.     NOTHING  to eat after 11:00 p.m. the night  prior to your procedure    CLEAR LIQUIDS: May have the following liquids up to two (2) hours before your arrival time at the hospital: water, plain black coffee (no cream or milk), plain black tea or plain green tea (no cream or milk), Gatorade or Propel Water.    SMOKING: Stop smoking as far before surgery as possible, or as directed by your surgeon. NO tobacco products of any kind (cigarettes, e-cigarettes, chewing tobacco) beginning at 11:00 p.m. the night prior to your procedure.     ALCOHOL: You should stop drinking alcohol beginning at 11:00 p.m. the night prior to your procedure    Contact our office if you have symptoms of illness such as a fever of 101 or greater, chills, cough, sore throat, or if you develop a rash or any open sore    Day OF Surgery    If youve been instructed to take a medication(s) on the morning of surgery, please take with a very small sip of water.    Wear loose & comfortable clothing and flat shoes, Leave jewelry/valuables at home. If you wear contact lenses, remove them at home and wear glasses. Remove any body piercings. Remove nail polish.     Planning for Discharge    Start planning for your care after discharge as soon as you receive this letter.    If you have not made arrangements to have someone take you home and stay with you for the first 48 hours after discharge, your surgery will be cancelled.      PRE-OPERATIVE MEDICATION INSTRUCTIONS  Review this information with your primary care physician prior to discontinuing any of the medications listed below.  Notify your primary care physician that you have been instructed to discontinue these medications.    TEN (10) Days Prior to Surgery, STOP the Following Medications   [place]  Anacin  Aspirin  Excedrin  Pepto Bismol    **Before taking ANY over-the-counter medications, check the label for Aspirin   Non-steroidal   Anti-inflammatory Medications (NSAIDS)    Celebrex  Diclofenac (Cataflam)  Etodolac (Iodine)  Fenoprofen  (Nalfon)  Ibuprofen (Advil, Motrin, Nuprin)  Indomethacin (Indocin)  Ketoprofen  Ketorolac (Toradol)  Melaxicam (Mobic)  Naproxen (AnaProx, Aleve, Naprosyn)  Relafen (Nabumetone)   Herbal Supplements (this is a partial list of herbals to be discontinued)    Oxana Oliva Quai  Ephedra  Feverfew  Fish Oil  Flaxseed Oil  Garlic  Katie  Gingko  Ginseng  Goldenseal  Imitrex (Sumatriptan)  Kava  Krill Oil  Licorice  Multi Vitamins  [facility]  Valerian  Vitamin E  Yohimbe   CHECK WITH YOUR PRESCRIBING DOCTOR BEFORE STOPPING ANY BLOOD THINNERS (approximately 7 days prior to surgery)  (Coumadin, Plavix, Platel, Aggrenox, Effient (Prasugrel), Ticlid), Xarelto, and Pradaxa      ALWAYS CHECK WITH YOUR PRESCRIBING DOCTOR REGARDING THE MEDICATIONS LISTED BELOW; RECOMMENDED STOP TIME IS ALSO LISTED      If you are taking Lovenox, discontinue 24 HOURS prior to surgery    If you are taking weight loss medication, discontinue 7 days prior to surgery    If you are taking Metformin or Simvastatin, check with your primary care physician (or whoever has prescribed you this medication) regarding when to discontinue prior to surgery        and traditional parking is located at [facility] at [address] [place] [address]. Validation is done at the Welcome Desk in the [facility].

## 2021-06-19 NOTE — LETTER
Letter by Argentina Cooper RN at      Author: Argentina Cooper RN Service: -- Author Type: --    Filed:  Encounter Date: 5/6/2019 Status: (Other)         May 6, 2019     Patient: Fabio Logan   YOB: 1960   Date of Visit: 5/6/2019       To Whom It May Concern:    It is my medical opinion that Fabio Logan will not be able to attend [employer] for approximately 3-6 months.  He is having brain surgery on 5-7-19.     Sincerely,        Electronically signed by Dr. Jamir Rebollar MD

## 2021-06-25 NOTE — PROGRESS NOTES
Neurosurgery consultation was requested by: Shayan Jain MD  Pain: denies  Radicular Pain is present: denies  Lhermitte sign: no  Motor complaints: weakness in LE, bilateral  Sensory complaints: numbness and tingling in lips and face, blurred vision, dizziness when ambulating, lightheadedness, mumbling/slurring speech  Gait and balance issues: yes, this AM feeling dizzy and unsteady  Bowel or bladder issues: yes, urinary urgency  Duration of SX is: unknown, but worsened in the lat 1-2 weeks  Patient has tried the following conservative measures: medication     Kena Boyer LPN

## 2021-06-27 NOTE — PROGRESS NOTES
Progress Notes by Myesha Lopez CNP at 7/23/2019  1:30 PM     Author: Myesha Lopez CNP Service: -- Author Type: Nurse Practitioner    Filed: 7/23/2019  1:52 PM Encounter Date: 7/23/2019 Status: Signed    : Myesha Lopez CNP (Nurse Practitioner)       CHART NOTE     1960     DATE OF SERVICE: 7/23/2019     FOLLOW UP EVALUATION:      ASSESSMENT :Fabio Logan was last seen by me on 7/9/2019  in clinic.   Fabio Logan's attending  neurosurgeon is  Dr. Rebollar.   Today, Fabio Logan is 10 weeks  status post right craniotomy and clipping of complex middle cerebral artery aneurysm with Dr Rebollar on 5/7/2019.  Post op recovery complicated by seizure, following with Neurology and on extended antiepileptic.  On 6/25/2019 he presented to the ER w anxiety and depression issues and was admitted to for psyche care.  On imaging he was found to have bilateral SDH--he's here today for follow up.  He reports no seizures--he continues on antiepileptics.  He is followed by neurology..  Today he reports that he's doing well--no seizures.  He continues to follow w neurology for seizure med management. His imaging shows further decrease in size of right sided SDH.  He still has a 4-5 mm scab ,as pictured below w/o evidence of infection. I will defer to his PCP regarding return to [facility]ok, as he has a comprehensive overview of patient's medical issues.  From neurosurgery perspective patient can return to work as tolerated.  Told him not to pick off scab--allow to fall off--call redness swelling or drainage.     RADIOGRAPHIC IMAGING: CONCLUSION:  1.  Diminished volume of the subacute right-sided subdural hemorrhage. Thin subdural hematoma measuring 3 to 4 mm in thickness remains.  2.  No new hemorrhage.       PLAN: Plan follow up in 6 weeks for final CT and check of wound.  He will call if he needs assistance from us in regards to return to work.  Observe wound closely--call redness  swelling or drainage    FOLLOW UP:  Follow up in 6on the NP schedule with CT of the head.    PAST MEDICAL HISTORY, SURGICAL HISTORY, REVIEW OF SYMPTOMS, MEDICATIONS AND ALLERGIES:  Past medical history, surgical history, review of symptoms, medications and allergies remain unchanged.    Vitals:    07/23/19 1315   BP: 120/80   Pulse: 67   Resp: (!) 98        PHYSICAL EXAM:  Neurological exam reveals:  Alert and oriented x3, speech fluent and appropriate.   PERRL, EOMI, face symmetric, tongue midline, shoulder shrug equal  No pronator drift, finger to nose smooth and accurate bilaterally  Arm strength bilateral grasp, biceps, triceps, and deltoids 5/5   Leg strength bilateral dorsiflexion, plantar flexion, and hip flexion 5/5  Muscle Bulk and tone wnl.   Reflexes:No pathological reflexes   Gait and station:Normal   Wound: CDI 5-6 mm scab w/o evidence of infection

## 2021-07-03 DIAGNOSIS — Z00.00 ENCOUNTER FOR ROUTINE ADULT HEALTH EXAMINATION WITHOUT ABNORMAL FINDINGS: ICD-10-CM

## 2021-07-03 DIAGNOSIS — K21.9 GASTROESOPHAGEAL REFLUX DISEASE WITHOUT ESOPHAGITIS: ICD-10-CM

## 2021-07-10 DIAGNOSIS — F33.0 MAJOR DEPRESSIVE DISORDER, RECURRENT EPISODE, MILD (H): ICD-10-CM

## 2021-07-12 NOTE — TELEPHONE ENCOUNTER
Routing refill request to provider for review/approval because:  SSRIs Protocol [id number]/10/2021 10:03 PM   PHQ-9 score less than 5 in past 6 months     PHQ 2/6/2020 5/20/2020 2/4/2021   PHQ-9 Total Score 3 0 5   Q9: Thoughts of better off dead/self-harm past 2 weeks Not at all Not at all Not at all   PHQ-A Total Score - - -   PHQ-A Depressed most days in past year - - -   PHQ-A Mood affect on daily activities - - -   PHQ-A Suicide Ideation past 2 weeks - - -   PHQ-A Suicide Ideation past month - - -   PHQ-A Previous suicide attempt - - -

## 2021-07-13 ENCOUNTER — OFFICE VISIT (OUTPATIENT)
Dept: NEUROLOGY | Facility: CLINIC | Age: 61
End: 2021-07-13
Attending: PSYCHIATRY & NEUROLOGY
Payer: COMMERCIAL

## 2021-07-13 VITALS — HEART RATE: 67 BPM | OXYGEN SATURATION: 98 % | DIASTOLIC BLOOD PRESSURE: 85 MMHG | SYSTOLIC BLOOD PRESSURE: 145 MMHG

## 2021-07-13 DIAGNOSIS — Z98.890 HISTORY OF CEREBRAL ANEURYSM REPAIR: ICD-10-CM

## 2021-07-13 DIAGNOSIS — Z86.79 HISTORY OF CEREBRAL ANEURYSM REPAIR: ICD-10-CM

## 2021-07-13 DIAGNOSIS — G40.909 SEIZURE DISORDER (H): Primary | ICD-10-CM

## 2021-07-13 PROCEDURE — G0463 HOSPITAL OUTPT CLINIC VISIT: HCPCS

## 2021-07-13 PROCEDURE — 99215 OFFICE O/P EST HI 40 MIN: CPT | Performed by: PSYCHIATRY & NEUROLOGY

## 2021-07-13 RX ORDER — SERTRALINE HYDROCHLORIDE 100 MG/1
TABLET, FILM COATED ORAL
Qty: 135 TABLET | Refills: 0 | Status: SHIPPED | OUTPATIENT
Start: 2021-07-13 | End: 2021-07-14

## 2021-07-13 RX ORDER — OXCARBAZEPINE 300 MG/1
450 TABLET, FILM COATED ORAL 2 TIMES DAILY
Qty: 273 TABLET | Refills: 3 | Status: SHIPPED | OUTPATIENT
Start: 2021-07-13 | End: 2022-09-07

## 2021-07-13 NOTE — LETTER
7/13/2021         RE: Fabio Logan  [address] [id number]        Dear Colleague,    Thank you for referring your patient, Fabio Logan, to the [facility] [place]. Please see a copy of my visit note below.    ESTABLISHED PATIENT NEUROLOGY NOTE    DATE OF VISIT: 7/13/2021  CLINIC LOCATION: [facility]  MRN: [id number]  PATIENT NAME: Fabio Logan  YOB: 1960    PCP: Manoj Armenta MD    REASON FOR VISIT:   Chief Complaint   Patient presents with     Seizures     follow up     SUBJECTIVE:                                                      HISTORY OF PRESENT ILLNESS: Patient is here to follow up regarding seizure disorder. Please refer to my initial note from 2/28/2020 for further information.  The plan was to follow-up in 6 months, but he did not come until now (saw Dr. Cameron once in the interim).    Since the last visit, the patient reports no additional seizures.  He is on oxcarbazepine 450 mg twice daily without noticeable side effects, though notes that his cognition is sometimes sluggish (no obvious memory issues).  BMP and CBC were unremarkable in May 2020.  Latest 10 hydroxy metabolite level was 17.9 in February 2020.  Denies interval development of new focal neurological symptoms.    On review of systems, patient endorses no additional active complaints. Medications, allergies, family and social history were also reviewed. There are no changes reported by patient.  REVIEW OF SYSTEMS:                                                    10-system review was completed. Pertinent positives are included in HPI. The remainder of ROS is negative.  EXAM:                                                    Physical Exam:   Vitals: BP (!) 145/85   Pulse 67   SpO2 98%     General: pt is in NAD, cooperative.  Skin: normal turgor, moist mucous membranes, no lesions/rashes noticed.  HEENT: ATNC, white sclera, normal  conjunctiva.  Respiratory: Symmetric lung excursion, no accessory respiratory muscle use.  Abdomen: Non distended.  Neurological: awake, cooperative, follows commands, no aphasia or dysarthria noted, cranial nerves II-XII: no ptosis, face is symmetric, equally moves all extremities, no dysmetria bilaterally, casual gait is normal.  ASSESSMENT AND PLAN:                                                    Assessment: 60-year-old male patient with seizure disorder after right MCA aneurysm repair in May 2019 presents for follow-up.  He remains seizure-free on current therapy with Trileptal.  No side effects.    We had quite extensive discussion with the patient regarding the plan.  We discussed 2 options of continuing Trileptal without changes (along with checking screening labs and Trileptal level) versus considering tapering off Trileptal after performing 3-hour video EEG monitoring.  We decided to continue Trileptal for another year.  We will revisit the second option at that time.  The prescription was refilled.  I also placed orders for screening labs.  The patient was advised to contact me if he has any breakthrough seizures prior to next follow-up visit.    Nicolas to follow up with Primary Care provider regarding elevated blood pressure.     Diagnoses:    ICD-10-CM    1. Seizure disorder (H)  G40.909    2. History of cerebral aneurysm repair  Z98.890     Z86.79      Plan: At today's visit we thoroughly discussed various diagnostic possibilities for your symptoms, necessary evaluation (lab work), and the plan.    The patient advised to call his PCP clinic to schedule lab only visit.    No medication changes. Trileptal prescription was refilled. We might adjust the dose based on results.    [place] driving laws regarding spells of loss of consciousness or postural control were discussed.  No driving for 90 days after the last event.  Every episode should be reported to DMV by the  within 30 days.  Other safety  precautions were discussed.    Next follow-up appointment is in the next 1 year or earlier if needed.    Total Time: 40 minutes spent on the date of the encounter doing chart review, history and exam, documentation and further activities per the note.    Humberto Clark MD  [facility]  (Chart documentation was completed in part with Dragon voice-recognition software. Even though reviewed, some grammatical, spelling, and word errors may remain.)      Again, thank you for allowing me to participate in the care of your patient.        Sincerely,        Humberto Clark MD

## 2021-07-13 NOTE — PATIENT INSTRUCTIONS
AFTER VISIT SUMMARY (AVS):    At today's visit we thoroughly discussed current symptoms, necessary evaluation (lab work), and the plan.    Please call your PCP clinic to schedule lab only visit.    No medication changes. Trileptal prescription was refilled. We might adjust the dose based on results.    [place] driving laws regarding spells of loss of consciousness or postural control were discussed.  No driving for 90 days after the last event.  Every episode should be reported to DMV by the  within 30 days.  Other safety precautions were discussed.    See PCP regarding high blood pressure.    Next follow-up appointment is in the next 1 year or earlier if needed.    Please do not hesitate to call me with any questions or concerns.    Thanks.

## 2021-07-13 NOTE — PROGRESS NOTES
ESTABLISHED PATIENT NEUROLOGY NOTE    DATE OF VISIT: 7/13/2021  CLINIC LOCATION: [facility]  MRN: [id number]  PATIENT NAME: Fabio Logan  YOB: 1960    PCP: Manoj Armenta MD    REASON FOR VISIT:   Chief Complaint   Patient presents with     Seizures     follow up     SUBJECTIVE:                                                      HISTORY OF PRESENT ILLNESS: Patient is here to follow up regarding seizure disorder. Please refer to my initial note from 2/28/2020 for further information.  The plan was to follow-up in 6 months, but he did not come until now (saw Dr. Cameron once in the interim).    Since the last visit, the patient reports no additional seizures.  He is on oxcarbazepine 450 mg twice daily without noticeable side effects, though notes that his cognition is sometimes sluggish (no obvious memory issues).  BMP and CBC were unremarkable in May 2020.  Latest 10 hydroxy metabolite level was 17.9 in February 2020.  Denies interval development of new focal neurological symptoms.    On review of systems, patient endorses no additional active complaints. Medications, allergies, family and social history were also reviewed. There are no changes reported by patient.  REVIEW OF SYSTEMS:                                                    10-system review was completed. Pertinent positives are included in HPI. The remainder of ROS is negative.  EXAM:                                                    Physical Exam:   Vitals: BP (!) 145/85   Pulse 67   SpO2 98%     General: pt is in NAD, cooperative.  Skin: normal turgor, moist mucous membranes, no lesions/rashes noticed.  HEENT: ATNC, white sclera, normal conjunctiva.  Respiratory: Symmetric lung excursion, no accessory respiratory muscle use.  Abdomen: Non distended.  Neurological: awake, cooperative, follows commands, no aphasia or dysarthria noted, cranial nerves II-XII: no ptosis, face is symmetric, equally moves all  extremities, no dysmetria bilaterally, casual gait is normal.  ASSESSMENT AND PLAN:                                                    Assessment: 60-year-old male patient with seizure disorder after right MCA aneurysm repair in May 2019 presents for follow-up.  He remains seizure-free on current therapy with Trileptal.  No side effects.    We had quite extensive discussion with the patient regarding the plan.  We discussed 2 options of continuing Trileptal without changes (along with checking screening labs and Trileptal level) versus considering tapering off Trileptal after performing 3-hour video EEG monitoring.  We decided to continue Trileptal for another year.  We will revisit the second option at that time.  The prescription was refilled.  I also placed orders for screening labs.  The patient was advised to contact me if he has any breakthrough seizures prior to next follow-up visit.    Nicolas to follow up with Primary Care provider regarding elevated blood pressure.     Diagnoses:    ICD-10-CM    1. Seizure disorder (H)  G40.909    2. History of cerebral aneurysm repair  Z98.890     Z86.79      Plan: At today's visit we thoroughly discussed various diagnostic possibilities for your symptoms, necessary evaluation (lab work), and the plan.    The patient advised to call his PCP clinic to schedule lab only visit.    No medication changes. Trileptal prescription was refilled. We might adjust the dose based on results.    [place] driving laws regarding spells of loss of consciousness or postural control were discussed.  No driving for 90 days after the last event.  Every episode should be reported to DMV by the  within 30 days.  Other safety precautions were discussed.    Next follow-up appointment is in the next 1 year or earlier if needed.    Total Time: 40 minutes spent on the date of the encounter doing chart review, history and exam, documentation and further activities per the note.    Humberto ROMERO  MD Amber  [facility]  (Chart documentation was completed in part with Dragon voice-recognition software. Even though reviewed, some grammatical, spelling, and word errors may remain.)

## 2021-07-14 ENCOUNTER — OFFICE VISIT (OUTPATIENT)
Dept: PEDIATRICS | Facility: CLINIC | Age: 61
End: 2021-07-14
Payer: COMMERCIAL

## 2021-07-14 VITALS
OXYGEN SATURATION: 97 % | BODY MASS INDEX: 39.86 KG/M2 | SYSTOLIC BLOOD PRESSURE: 132 MMHG | HEART RATE: 66 BPM | TEMPERATURE: 97.6 F | HEIGHT: 66 IN | RESPIRATION RATE: 16 BRPM | WEIGHT: 248 LBS | DIASTOLIC BLOOD PRESSURE: 72 MMHG

## 2021-07-14 DIAGNOSIS — I10 ESSENTIAL HYPERTENSION WITH GOAL BLOOD PRESSURE LESS THAN 140/90: ICD-10-CM

## 2021-07-14 DIAGNOSIS — Z12.5 PROSTATE CANCER SCREENING: ICD-10-CM

## 2021-07-14 DIAGNOSIS — G40.909 SEIZURE DISORDER (H): ICD-10-CM

## 2021-07-14 DIAGNOSIS — F33.0 MAJOR DEPRESSIVE DISORDER, RECURRENT EPISODE, MILD (H): ICD-10-CM

## 2021-07-14 DIAGNOSIS — I72.9 ANEURYSM (H): ICD-10-CM

## 2021-07-14 DIAGNOSIS — C62.90 MALIGNANT NEOPLASM OF TESTICLE, UNSPECIFIED LATERALITY, UNSPECIFIED WHETHER DESCENDED OR UNDESCENDED (H): ICD-10-CM

## 2021-07-14 DIAGNOSIS — Z00.00 ROUTINE GENERAL MEDICAL EXAMINATION AT A HEALTH CARE FACILITY: Primary | ICD-10-CM

## 2021-07-14 DIAGNOSIS — N39.46 MIXED INCONTINENCE: ICD-10-CM

## 2021-07-14 DIAGNOSIS — E66.01 MORBID OBESITY (H): ICD-10-CM

## 2021-07-14 DIAGNOSIS — F32.A DEPRESSION, UNSPECIFIED DEPRESSION TYPE: ICD-10-CM

## 2021-07-14 DIAGNOSIS — K21.9 GASTROESOPHAGEAL REFLUX DISEASE WITHOUT ESOPHAGITIS: ICD-10-CM

## 2021-07-14 DIAGNOSIS — I10 HTN, GOAL BELOW 140/90: ICD-10-CM

## 2021-07-14 DIAGNOSIS — Z00.00 ROUTINE GENERAL MEDICAL EXAMINATION AT A HEALTH CARE FACILITY: ICD-10-CM

## 2021-07-14 DIAGNOSIS — G40.109 LOCALIZATION-RELATED FOCAL EPILEPSY WITH SIMPLE PARTIAL SEIZURES (H): ICD-10-CM

## 2021-07-14 LAB
ALBUMIN UR-MCNC: NEGATIVE MG/DL
APPEARANCE UR: CLEAR
BACTERIA #/AREA URNS HPF: ABNORMAL /HPF
BILIRUB UR QL STRIP: NEGATIVE
COLOR UR AUTO: YELLOW
GLUCOSE UR STRIP-MCNC: NEGATIVE MG/DL
HBA1C MFR BLD: 5.4 % (ref 0–5.6)
HGB UR QL STRIP: ABNORMAL
KETONES UR STRIP-MCNC: NEGATIVE MG/DL
LEUKOCYTE ESTERASE UR QL STRIP: NEGATIVE
NITRATE UR QL: NEGATIVE
PH UR STRIP: 5.5 [PH] (ref 5–7)
RBC #/AREA URNS AUTO: ABNORMAL /HPF
SP GR UR STRIP: 1.02 (ref 1–1.03)
SQUAMOUS #/AREA URNS AUTO: ABNORMAL /LPF
UROBILINOGEN UR STRIP-ACNC: 0.2 E.U./DL
WBC #/AREA URNS AUTO: ABNORMAL /HPF

## 2021-07-14 PROCEDURE — G0103 PSA SCREENING: HCPCS

## 2021-07-14 PROCEDURE — 96127 BRIEF EMOTIONAL/BEHAV ASSMT: CPT | Performed by: INTERNAL MEDICINE

## 2021-07-14 PROCEDURE — 81001 URINALYSIS AUTO W/SCOPE: CPT

## 2021-07-14 PROCEDURE — 83721 ASSAY OF BLOOD LIPOPROTEIN: CPT

## 2021-07-14 PROCEDURE — 80053 COMPREHEN METABOLIC PANEL: CPT

## 2021-07-14 PROCEDURE — 36415 COLL VENOUS BLD VENIPUNCTURE: CPT

## 2021-07-14 PROCEDURE — 83036 HEMOGLOBIN GLYCOSYLATED A1C: CPT

## 2021-07-14 PROCEDURE — 99214 OFFICE O/P EST MOD 30 MIN: CPT | Mod: 25 | Performed by: INTERNAL MEDICINE

## 2021-07-14 PROCEDURE — 99396 PREV VISIT EST AGE 40-64: CPT | Performed by: INTERNAL MEDICINE

## 2021-07-14 RX ORDER — TRAZODONE HYDROCHLORIDE 50 MG/1
50-100 TABLET, FILM COATED ORAL AT BEDTIME
Qty: 180 TABLET | Refills: 3 | Status: SHIPPED | OUTPATIENT
Start: 2021-07-14 | End: 2022-09-13

## 2021-07-14 RX ORDER — LOSARTAN POTASSIUM 25 MG/1
25 TABLET ORAL DAILY
Qty: 90 TABLET | Refills: 3 | Status: SHIPPED | OUTPATIENT
Start: 2021-07-14 | End: 2022-07-08

## 2021-07-14 RX ORDER — ATENOLOL 50 MG/1
50 TABLET ORAL DAILY
Qty: 90 TABLET | Refills: 3 | Status: SHIPPED | OUTPATIENT
Start: 2021-07-14 | End: 2022-07-08

## 2021-07-14 RX ORDER — SERTRALINE HYDROCHLORIDE 100 MG/1
TABLET, FILM COATED ORAL
Qty: 135 TABLET | Refills: 3 | Status: SHIPPED | OUTPATIENT
Start: 2021-07-14 | End: 2022-10-03

## 2021-07-14 ASSESSMENT — ANXIETY QUESTIONNAIRES
1. FEELING NERVOUS, ANXIOUS, OR ON EDGE: SEVERAL DAYS
4. TROUBLE RELAXING: NOT AT ALL
GAD7 TOTAL SCORE: 2
7. FEELING AFRAID AS IF SOMETHING AWFUL MIGHT HAPPEN: NOT AT ALL
3. WORRYING TOO MUCH ABOUT DIFFERENT THINGS: SEVERAL DAYS
7. FEELING AFRAID AS IF SOMETHING AWFUL MIGHT HAPPEN: NOT AT ALL
2. NOT BEING ABLE TO STOP OR CONTROL WORRYING: NOT AT ALL
5. BEING SO RESTLESS THAT IT IS HARD TO SIT STILL: NOT AT ALL
6. BECOMING EASILY ANNOYED OR IRRITABLE: NOT AT ALL
8. IF YOU CHECKED OFF ANY PROBLEMS, HOW DIFFICULT HAVE THESE MADE IT FOR YOU TO DO YOUR WORK, TAKE CARE OF THINGS AT HOME, OR GET ALONG WITH OTHER PEOPLE?: NOT DIFFICULT AT ALL

## 2021-07-14 ASSESSMENT — ENCOUNTER SYMPTOMS
HEMATOCHEZIA: 0
ARTHRALGIAS: 1
ABDOMINAL PAIN: 0
MYALGIAS: 1
NAUSEA: 0
DIZZINESS: 1
FEVER: 0
CONSTIPATION: 0
HEADACHES: 0
DIARRHEA: 1
CHILLS: 0
FREQUENCY: 1
JOINT SWELLING: 1
COUGH: 0
HEARTBURN: 0
WEAKNESS: 1
DYSURIA: 0
EYE PAIN: 0
HEMATURIA: 0
PARESTHESIAS: 1
PALPITATIONS: 0

## 2021-07-14 ASSESSMENT — MIFFLIN-ST. JEOR: SCORE: 1877.67

## 2021-07-14 ASSESSMENT — PATIENT HEALTH QUESTIONNAIRE - PHQ9
10. IF YOU CHECKED OFF ANY PROBLEMS, HOW DIFFICULT HAVE THESE PROBLEMS MADE IT FOR YOU TO DO YOUR WORK, TAKE CARE OF THINGS AT HOME, OR GET ALONG WITH OTHER PEOPLE: NOT DIFFICULT AT ALL
SUM OF ALL RESPONSES TO PHQ QUESTIONS 1-9: 4
SUM OF ALL RESPONSES TO PHQ QUESTIONS 1-9: 4

## 2021-07-14 NOTE — PROGRESS NOTES
SUBJECTIVE:   CC: Fabio Logan is an 60 year old male who presents for preventative health visit.       Patient has been advised of split billing requirements and indicates understanding: Yes  Healthy Habits:     Getting at least 3 servings of Calcium per day:  Yes    Bi-annual eye exam:  Yes    Dental care twice a year:  Yes    Sleep apnea or symptoms of sleep apnea:  Daytime drowsiness    Diet:  Regular (no restrictions) and Other    Frequency of exercise:  2-3 days/week    Duration of exercise:  30-45 minutes    Taking medications regularly:  Yes    Medication side effects:  None    PHQ-2 Total Score: 0    Additional concerns today:  No    patient here for routine health maintenance exam, overall has been doing pretty well. Mood has been good and taking the trazodone as needed, does take most nights and helps with sleep. Did have a good visit with Dr. Freitas and they hafve a plan to recheck EEG and decide about adjustment or stopping the medication. he does note some urinary frequency at times no dysuria but worse when he drinks more fluid, etc. he is looking forward to this weekend when he and his sig other will be going to to a [facility] where there will be a lot of home cooked ethnic foods and they also have some graduation parties to go to, etc. patient notes he is taking all of his medications and no side effects noted. is due for labs today. No other concerns or complaints today.     Today's PHQ-2 Score:   PHQ-2 ( 1999 Pfizer) 7/14/2021   Q1: Little interest or pleasure in doing things 0   Q2: Feeling down, depressed or hopeless 0   PHQ-2 Score 0   Q1: Little interest or pleasure in doing things Not at all   Q2: Feeling down, depressed or hopeless Not at all   PHQ-2 Score 0       Abuse: Current or Past(Physical, Sexual or Emotional)- No  Do you feel safe in your environment? Yes        Social History     Tobacco Use     Smoking status: Former Smoker     Quit date: 6/6/1997     Years since quitting:  "24.1     Smokeless tobacco: Former User   Substance Use Topics     Alcohol use: No     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         Alcohol Use 7/14/2021   Prescreen: >3 drinks/day or >7 drinks/week? No   Prescreen: >3 drinks/day or >7 drinks/week? -       Last PSA:   PSA   Date Value Ref Range Status   05/24/2019 1.07 0 - 4 ug/L Final     Comment:     Assay Method:  Chemiluminescence using Siemens Vista analyzer       Reviewed orders with patient. Reviewed health maintenance and updated orders accordingly - Yes      Reviewed and updated as needed this visit by clinical staff                 Reviewed and updated as needed this visit by Provider                    Review of Systems   Constitutional: Negative for chills and fever.   HENT: Negative for congestion, ear pain and hearing loss.    Eyes: Negative for pain.   Respiratory: Negative for cough.    Cardiovascular: Positive for peripheral edema. Negative for chest pain and palpitations.   Gastrointestinal: Positive for diarrhea. Negative for abdominal pain, constipation, heartburn, hematochezia and nausea.   Genitourinary: Positive for frequency, impotence and urgency. Negative for discharge, dysuria, genital sores and hematuria.   Musculoskeletal: Positive for arthralgias, joint swelling and myalgias.   Neurological: Positive for dizziness, weakness and paresthesias. Negative for headaches.   Psychiatric/Behavioral: Positive for mood changes.     discussed with patient (or patient's parents/caregiver) pathophysiology of condition and treatment options.  reviewed labs, medications, diet ,etc.       OBJECTIVE:   /72 (BP Location: Right arm, Patient Position: Chair, Cuff Size: Adult Large)   Pulse 66   Temp 97.6  F (36.4  C) (Tympanic)   Resp 16   Ht 1.676 m (5' 6\")   Wt 112.5 kg (248 lb)   SpO2 97%   BMI 40.03 kg/m      Wt Readings from Last 5 Encounters:   07/14/21 112.5 kg (248 lb)   08/27/20 104.3 kg (230 lb)   05/20/20 104.9 kg (231 lb 4.8 oz)   05/08/20 " 109.3 kg (241 lb)   05/08/20 110.7 kg (244 lb)       Physical Exam  GENERAL: healthy, alert and no distress  EYES: Eyes grossly normal to inspection, PERRL and conjunctivae and sclerae normal  HENT: ear canals and TM's normal, nose and mouth covered with mask due to covid  NECK: no adenopathy, no asymmetry  RESP: lungs clear to auscultation - no rales, rhonchi or wheezes  CV: regular rate and rhythm, normal S1 S2, no S3 or S4, no murmur, click or rub, no peripheral edema and peripheral pulses strong  ABDOMEN: soft, nontender, no hepatosplenomegaly, no masses and bowel sounds normal  MS: no gross musculoskeletal defects noted, no edema  SKIN: no suspicious lesions or rashes  NEURO: Normal strength and tone, mentation intact and speech normal  PSYCH: mentation appears normal, affect normal/bright    Results for orders placed or performed in visit on 07/14/21   Comprehensive metabolic panel     Status: Normal   Result Value Ref Range    Sodium 141 133 - 144 mmol/L    Potassium 3.9 3.4 - 5.3 mmol/L    Chloride 109 94 - 109 mmol/L    Carbon Dioxide (CO2) 24 20 - 32 mmol/L    Anion Gap 8 3 - 14 mmol/L    Urea Nitrogen 20 7 - 30 mg/dL    Creatinine 0.97 0.66 - 1.25 mg/dL    Calcium 9.6 8.5 - 10.1 mg/dL    Glucose 97 70 - 99 mg/dL    Alkaline Phosphatase 102 40 - 150 U/L    AST 29 0 - 45 U/L    ALT 37 0 - 70 U/L    Protein Total 7.3 6.8 - 8.8 g/dL    Albumin 4.0 3.4 - 5.0 g/dL    Bilirubin Total 0.3 0.2 - 1.3 mg/dL    GFR Estimate 84 >60 mL/min/1.73m2   LDL cholesterol direct     Status: Abnormal   Result Value Ref Range    LDL Cholesterol Direct 125 (H) <100 mg/dL   Hemoglobin A1c     Status: Normal   Result Value Ref Range    Hemoglobin A1C 5.4 0.0 - 5.6 %   PSA, screen     Status: None   Result Value Ref Range    Prostate Specific Antigen Screen 0.12 ug/L   UA Macro with Reflex to Micro and Culture - lab collect     Status: Abnormal    Specimen: Urine, Midstream   Result Value Ref Range    Color Urine Yellow Colorless,  Straw, Light Yellow, Yellow    Appearance Urine Clear Clear    Glucose Urine Negative Negative mg/dL    Bilirubin Urine Negative Negative    Ketones Urine Negative Negative mg/dL    Specific Gravity Urine 1.025 1.003 - 1.035    Blood Urine Small (A) Negative    pH Urine 5.5 5.0 - 7.0    Protein Albumin Urine Negative Negative mg/dL    Urobilinogen Urine 0.2 0.2, 1.0 E.U./dL    Nitrite Urine Negative Negative    Leukocyte Esterase Urine Negative Negative   Urine Microscopic     Status: Abnormal   Result Value Ref Range    Bacteria Urine Few (A) None Seen /HPF    RBC Urine 0-2 0-2 /HPF /HPF    WBC Urine 0-5 0-5 /HPF /HPF    Squamous Epithelials Urine Few (A) None Seen /LPF    Narrative    Urine Culture not indicated       PHQ 5/20/2020 2/4/2021 7/14/2021   PHQ-9 Total Score 0 5 4   Q9: Thoughts of better off dead/self-harm past 2 weeks Not at all Not at all Not at all   PHQ-A Total Score - - -   PHQ-A Depressed most days in past year - - -   PHQ-A Mood affect on daily activities - - -   PHQ-A Suicide Ideation past 2 weeks - - -   PHQ-A Suicide Ideation past month - - -   PHQ-A Previous suicide attempt - - -       ASSESSMENT/PLAN:   1. Routine general medical examination at a health care facility  d/w pt preventative care measures including seat belt use, bike helmet, moderation of EtOH, avoiding tobacco, avoiding excessive sun exposure/sunscreen, wt management or wt loss if BMI > 30, need to screen for lipid disorders, mood disorders, CAD risk factors, etc. Also discussed accident prevention and future RHM schedule.   - LDL cholesterol direct; Future  - Hemoglobin A1c; Future    2. Major depressive disorder, recurrent episode, mild (H)  discussed with patient (or patient's parents/caregiver) pathophysiology of condition and treatment options.  PHQ9 at goal and toleraitng medicaion well. continue cares and plan. Reviewed concept of depression as function of biochemical imbalance of neurotransmitters/rationale for  treatment.  Risks and benefits of medication(s) reviewed with patient.  Questions answered.   - sertraline (ZOLOFT) 100 MG tablet; Take 1 and 1/2  tablets (150 mg) by mouth daily.  Dispense: 135 tablet; Refill: 3    3. Gastroesophageal reflux disease without esophagitis  discussed with patient (or patient's parents/caregiver) pathophysiology of condition and treatment options.  Well controlled on current medication(s). revied diet, etc.   - omeprazole (PRILOSEC) 20 MG DR capsule; Take 1 capsule (20 mg) by mouth daily  Dispense: 90 capsule; Refill: 3    4. HTN, goal below 140/90  Well controlled on current medication(s). discussed with patient (or patient's parents/caregiver) pathophysiology of condition and treatment options.  reviewed labs, medications, diet ,etc.    - atenolol (TENORMIN) 50 MG tablet; Take 1 tablet (50 mg) by mouth daily  Dispense: 90 tablet; Refill: 3    5. Essential hypertension with goal blood pressure less than 140/90  as above  - losartan (COZAAR) 25 MG tablet; Take 1 tablet (25 mg) by mouth daily  Dispense: 90 tablet; Refill: 3    6. Depression, unspecified depression type  as above, reivwed okay to take trazodone nightly if needed.   - traZODone (DESYREL) 50 MG tablet; Take 1-2 tablets ( mg) by mouth At Bedtime  Dispense: 180 tablet; Refill: 3    7. Prostate cancer screening  - PSA, screen; Future    8. Mixed incontinence  discussed with patient (or patient's parents/caregiver) pathophysiology of condition and treatment options.  will get UA with labs to day to evaluation for cause, givne history suspect multifactorial and reviwed symptom journal, etc and referral to urology for evaluation as well. patient prefers to get UA today and hold for now.   - UA Macro with Reflex to Micro and Culture - lab collect; Future    9. Malignant neoplasm of testicle, unspecified laterality, unspecified whether descended or undescended (H)  discussed with patient (or patient's parents/caregiver)  "pathophysiology of condition and treatment options.  had radiation wich could be playing role, again reviwed urology referral if persists rule-out worse. Patient verbalized understanding and is agreeable to this plan.    - UA Macro with Reflex to Micro and Culture - lab collect; Future    10. Morbid obesity (H)  reviwed weight management strategies, etc.   - LDL cholesterol direct; Future  - Hemoglobin A1c; Future    11. Localization-related focal epilepsy with simple partial seizures (H)  discussed with patient (or patient's parents/caregiver) pathophysiology of condition and treatment options.  reviwed Dr. Doan'snote and reccs.   - Oxcarbazepine level; Future    12. Aneurysm (H) MCA s/p clip  as above, s/p clip with resultant sz and followed by neurology. continue cares and plan and neurology follow-up as scheduled, after EEG complete plan to check in again with update. Patient verbalized understanding and is agreeable to this plan.   - LDL cholesterol direct; Future    Patient has been advised of split billing requirements and indicates understanding: Yes  COUNSELING:   Reviewed preventive health counseling, as reflected in patient instructions    Estimated body mass index is 37.12 kg/m  as calculated from the following:    Height as of 8/27/20: 1.676 m (5' 6\").    Weight as of 8/27/20: 104.3 kg (230 lb).     Weight management plan: Discussed healthy diet and exercise guidelines    He reports that he quit smoking about 24 years ago. He has quit using smokeless tobacco.      Counseling Resources:  ATP IV Guidelines  Pooled Cohorts Equation Calculator  FRAX Risk Assessment  ICSI Preventive Guidelines  Dietary Guidelines for Americans, 2010  USDA's MyPlate  ASA Prophylaxis  Lung CA Screening    I have discussed with patient the risks, benefits, medications, treatment options and modalities.   I have instructed the patient to call or schedule a follow-up appointment if any problems or failure to improve.   "     Manoj Armenta MD  [facility]

## 2021-07-15 ENCOUNTER — LAB (OUTPATIENT)
Dept: LAB | Facility: CLINIC | Age: 61
End: 2021-07-15
Payer: COMMERCIAL

## 2021-07-15 ENCOUNTER — TELEPHONE (OUTPATIENT)
Dept: PEDIATRICS | Facility: CLINIC | Age: 61
End: 2021-07-15

## 2021-07-15 DIAGNOSIS — G40.909 SEIZURE DISORDER (H): ICD-10-CM

## 2021-07-15 DIAGNOSIS — G40.109 LOCALIZATION-RELATED FOCAL EPILEPSY WITH SIMPLE PARTIAL SEIZURES (H): ICD-10-CM

## 2021-07-15 LAB
ALBUMIN SERPL-MCNC: 4 G/DL (ref 3.4–5)
ALP SERPL-CCNC: 102 U/L (ref 40–150)
ALT SERPL W P-5'-P-CCNC: 37 U/L (ref 0–70)
ANION GAP SERPL CALCULATED.3IONS-SCNC: 8 MMOL/L (ref 3–14)
AST SERPL W P-5'-P-CCNC: 29 U/L (ref 0–45)
BASOPHILS # BLD AUTO: 0 10E3/UL (ref 0–0.2)
BASOPHILS NFR BLD AUTO: 0 %
BILIRUB SERPL-MCNC: 0.3 MG/DL (ref 0.2–1.3)
BUN SERPL-MCNC: 20 MG/DL (ref 7–30)
CALCIUM SERPL-MCNC: 9.6 MG/DL (ref 8.5–10.1)
CHLORIDE BLD-SCNC: 109 MMOL/L (ref 94–109)
CO2 SERPL-SCNC: 24 MMOL/L (ref 20–32)
CREAT SERPL-MCNC: 0.97 MG/DL (ref 0.66–1.25)
EOSINOPHIL # BLD AUTO: 0.1 10E3/UL (ref 0–0.7)
EOSINOPHIL NFR BLD AUTO: 1 %
ERYTHROCYTE [DISTWIDTH] IN BLOOD BY AUTOMATED COUNT: 12.8 % (ref 10–15)
GFR SERPL CREATININE-BSD FRML MDRD: 84 ML/MIN/1.73M2
GLUCOSE BLD-MCNC: 97 MG/DL (ref 70–99)
HCT VFR BLD AUTO: 41.1 % (ref 40–53)
HGB BLD-MCNC: 13.5 G/DL (ref 13.3–17.7)
LDLC SERPL CALC-MCNC: 125 MG/DL
LYMPHOCYTES # BLD AUTO: 2 10E3/UL (ref 0.8–5.3)
LYMPHOCYTES NFR BLD AUTO: 38 %
MCH RBC QN AUTO: 30.2 PG (ref 26.5–33)
MCHC RBC AUTO-ENTMCNC: 32.8 G/DL (ref 31.5–36.5)
MCV RBC AUTO: 92 FL (ref 78–100)
MONOCYTES # BLD AUTO: 0.5 10E3/UL (ref 0–1.3)
MONOCYTES NFR BLD AUTO: 9 %
NEUTROPHILS # BLD AUTO: 2.8 10E3/UL (ref 1.6–8.3)
NEUTROPHILS NFR BLD AUTO: 52 %
PLATELET # BLD AUTO: 183 10E3/UL (ref 150–450)
POTASSIUM BLD-SCNC: 3.9 MMOL/L (ref 3.4–5.3)
PROT SERPL-MCNC: 7.3 G/DL (ref 6.8–8.8)
PSA SERPL-MCNC: 0.12 UG/L
RBC # BLD AUTO: 4.47 10E6/UL (ref 4.4–5.9)
SODIUM SERPL-SCNC: 141 MMOL/L (ref 133–144)
WBC # BLD AUTO: 5.4 10E3/UL (ref 4–11)

## 2021-07-15 PROCEDURE — 99000 SPECIMEN HANDLING OFFICE-LAB: CPT

## 2021-07-15 PROCEDURE — 80183 DRUG SCRN QUANT OXCARBAZEPIN: CPT | Mod: 90

## 2021-07-15 PROCEDURE — 36415 COLL VENOUS BLD VENIPUNCTURE: CPT

## 2021-07-15 PROCEDURE — 85025 COMPLETE CBC W/AUTO DIFF WBC: CPT

## 2021-07-15 ASSESSMENT — PATIENT HEALTH QUESTIONNAIRE - PHQ9: SUM OF ALL RESPONSES TO PHQ QUESTIONS 1-9: 4

## 2021-07-15 ASSESSMENT — ANXIETY QUESTIONNAIRES: GAD7 TOTAL SCORE: 2

## 2021-07-15 NOTE — TELEPHONE ENCOUNTER
Pt had labs drawn yesterday and one tub was drawn in the wrong tube, needs to come in and have it redrawn. Lab cancelled order and put in as future. Needs to be red tube no gel.  Annia Hernandez LPN

## 2021-07-15 NOTE — TELEPHONE ENCOUNTER
The pt is aware and scheduled for his upcoming appointment.  Zenaida Mcduffie on 7/15/2021 at 9:53 AM

## 2021-07-21 LAB — 10OH-CARBAZEPINE SERPL-MCNC: 14 UG/ML

## 2021-08-01 DIAGNOSIS — B37.2 CANDIDAL INTERTRIGO: ICD-10-CM

## 2021-08-01 DIAGNOSIS — Z00.00 ENCOUNTER FOR ROUTINE ADULT HEALTH EXAMINATION WITHOUT ABNORMAL FINDINGS: ICD-10-CM

## 2021-08-03 RX ORDER — NYSTATIN 100000 [USP'U]/G
POWDER TOPICAL
Qty: 60 G | Refills: 0 | Status: SHIPPED | OUTPATIENT
Start: 2021-08-03 | End: 2021-08-18

## 2021-08-03 NOTE — TELEPHONE ENCOUNTER
Routing refill request to provider for review/approval because:  A break in medication  See recent visit, ok for ongoing refills?  Sapphire Graves RN, BSN  Message handled by CLINIC NURSE.

## 2021-08-06 ENCOUNTER — TELEPHONE (OUTPATIENT)
Dept: PEDIATRICS | Facility: CLINIC | Age: 61
End: 2021-08-06

## 2021-08-06 DIAGNOSIS — L30.4 INTERTRIGO: Primary | ICD-10-CM

## 2021-08-06 NOTE — TELEPHONE ENCOUNTER
Prior Authorization Retail Medication Request    Medication/Dose: nyamye 100,000 units/gm  ICD code (if different than what is on RX):    Previously Tried and Failed:  nystatin  Rationale:      Insurance Name:    Coverage information:     Subscriber: [id number] AROLDO MULLINS     Rel to sub: 01 - Self     Member ID: [id number]     Payor: [address] [place] [address] Ph: [phone]     Benefit plan: [address] [place] [address] INDIVIDUAL FAMILY PLANS WITH  Ph: [phone]     Group number: Not given     Member effective dates: from 01/01/20              Pharmacy Information (if different than what is on RX)  [facility]  [facility]   Phone:  [phone]

## 2021-08-09 NOTE — TELEPHONE ENCOUNTER
Central Prior Authorization Team   Phone: [phone]    PA Initiation    Medication:   Insurance Company: [employer] - Phone [phone] Fax [phone]  Pharmacy Filling the Rx: [id number] - [place] - [address] [place] [address]  Filling Pharmacy Phone: [phone]  Filling Pharmacy Fax:    Start Date: 8/9/2021

## 2021-08-11 NOTE — TELEPHONE ENCOUNTER
PRIOR AUTHORIZATION DENIED    Medication:     Denial Date: 8/11/2021    Denial Rational: Patient needs to try and fail ketoconazole and econazole cream        Appeal Information: If provider would like to appeal please provide a letter of medical necessity stating why formulary alternatives would not be clinically appropriate for patient and route back to the PA team.

## 2021-08-18 RX ORDER — KETOCONAZOLE 20 MG/G
CREAM TOPICAL DAILY
Qty: 60 G | Refills: 3 | Status: SHIPPED | OUTPATIENT
Start: 2021-08-18 | End: 2024-07-28

## 2021-08-18 NOTE — TELEPHONE ENCOUNTER
Please let him know Nystatin is not covered. I sent prescription for ketoconazole to use instead. Apply thin layer to skin for yeast infections daily for 2-3 weeks as needed for flares. Keep skin clean and dry

## 2021-08-19 NOTE — TELEPHONE ENCOUNTER
Please check with patient on this and then notify pharmacy  I did not see patient and area not specified in primary care provider's note

## 2021-08-19 NOTE — TELEPHONE ENCOUNTER
Spoke to pt, he states that he uses it in the crease of his legs, in the groin area .Annia Hernandez LPN

## 2021-08-19 NOTE — TELEPHONE ENCOUNTER
Pharmacy called requesting to know where specifically the patient should apply cream. They said that just stating apply to effected area would not be specific enough. Sophia Acosta RN on 8/19/2021 at 9:38 AM

## 2021-08-24 ENCOUNTER — TELEPHONE (OUTPATIENT)
Dept: PEDIATRICS | Facility: CLINIC | Age: 61
End: 2021-08-24

## 2021-08-24 NOTE — TELEPHONE ENCOUNTER
Prior Authorization Retail Medication Request    Medication/Dose: [facility] 100,000 UNIT / GM   ICD code (if different than what is on RX):    Previously Tried and Failed:    Rationale:      Insurance Name:  [employer] INDIVIDUAL FAMILY PLANS WITH [employer]  Insurance ID:    [id number]    Pharmacy Information (if different than what is on RX)  Name:  [facility] [address]  Phone:  [phone]

## 2021-08-24 NOTE — TELEPHONE ENCOUNTER
This is a duplicate request.There is already an encounter dated 08/06/21 where the medication was submitted for review and denied. The next step in the process would be to appeal the denial if that is what is wanted by the Provider/Patient. Please see the encounter dated 08/06/21 for the appeal process and what is needed in order to submit one.

## 2021-09-04 ENCOUNTER — HEALTH MAINTENANCE LETTER (OUTPATIENT)
Age: 61
End: 2021-09-04

## 2021-09-14 ENCOUNTER — TELEPHONE (OUTPATIENT)
Dept: NEUROLOGY | Facility: CLINIC | Age: 61
End: 2021-09-14

## 2021-09-14 NOTE — TELEPHONE ENCOUNTER
Received faxed Loss of Consciousness form. Completed and faxed back to  [employer].       Outbound call to [facility]. Left Message to adv we had received the faxed form it was completed and faxed back to  [employer]

## 2021-09-16 DIAGNOSIS — G43.809 OTHER MIGRAINE WITHOUT STATUS MIGRAINOSUS, NOT INTRACTABLE: ICD-10-CM

## 2021-09-16 NOTE — TELEPHONE ENCOUNTER
Routing refill request to provider for review/approval because:  A break in medication-last in 2019  Migraines not discussed at recent appointment, confirm refills  Sapphire Graves RN, BSN  Message handled by CLINIC NURSE.

## 2021-09-16 NOTE — TELEPHONE ENCOUNTER
Please veriyf if there truly has been a break in meds. If so, needs to schedule visit to review. If not, figure out when he took last dose.

## 2021-09-17 RX ORDER — SUMATRIPTAN 50 MG/1
TABLET, FILM COATED ORAL
Qty: 18 TABLET | Refills: 0 | Status: SHIPPED | OUTPATIENT
Start: 2021-09-17 | End: 2023-03-01

## 2021-09-17 NOTE — TELEPHONE ENCOUNTER
Called patient to discuss as requested, no answer. M requesting a call back directly to PAL extension.     Annabelle Almazan, [facility]  [phone]  12:00 PM, September 17, 2021

## 2021-09-17 NOTE — TELEPHONE ENCOUNTER
Patient returned call:    Stated last dose was last night due to weather. Takes it as needed, has been for years with no issues. Patient was under the impression that Dr. Armenta was going to renew during last appointment.    Patient also had a two bottle supply from last pick-up.    Annabelle Almazan, [facility]  [phone]  12:03 PM, September 17, 2021

## 2021-10-19 PROBLEM — F32.9 MAJOR DEPRESSION: Status: ACTIVE | Noted: 2018-01-17

## 2021-12-07 ENCOUNTER — IMMUNIZATION (OUTPATIENT)
Dept: FAMILY MEDICINE | Facility: CLINIC | Age: 61
End: 2021-12-07
Payer: COMMERCIAL

## 2021-12-07 DIAGNOSIS — Z23 HIGH PRIORITY FOR 2019-NCOV VACCINE: Primary | ICD-10-CM

## 2021-12-07 PROCEDURE — 91300 COVID-19,PF,PFIZER (12+ YRS): CPT

## 2021-12-07 PROCEDURE — 0004A COVID-19,PF,PFIZER (12+ YRS): CPT

## 2021-12-07 PROCEDURE — 99207 PR NO CHARGE LOS: CPT

## 2022-02-17 PROBLEM — C62.90: Status: ACTIVE | Noted: 2018-05-18

## 2022-03-11 NOTE — NURSING NOTE
Chief Complaint   Patient presents with     Follow Up     med check- OXcarbazepine (TRILEPTAL) 300 MG tablet - need appt to refill medications     DMV- needs form to be complete and sent in- Pt reports he sent it to [facility].  Also to discuss Rx medication    Pt AW [facility]- virtual phone visit: [phone].  Janny Mcduffie, ATC     [address](Attending)

## 2022-07-06 DIAGNOSIS — I10 HTN, GOAL BELOW 140/90: ICD-10-CM

## 2022-07-06 DIAGNOSIS — I10 ESSENTIAL HYPERTENSION WITH GOAL BLOOD PRESSURE LESS THAN 140/90: ICD-10-CM

## 2022-07-08 RX ORDER — LOSARTAN POTASSIUM 25 MG/1
TABLET ORAL
Qty: 90 TABLET | Refills: 0 | Status: SHIPPED | OUTPATIENT
Start: 2022-07-08 | End: 2022-10-03

## 2022-07-08 RX ORDER — ATENOLOL 50 MG/1
TABLET ORAL
Qty: 90 TABLET | Refills: 0 | Status: SHIPPED | OUTPATIENT
Start: 2022-07-08 | End: 2022-10-03

## 2022-07-08 NOTE — TELEPHONE ENCOUNTER
Prescription approved per [facility] Refill Protocol.    Yolette Huerta RN

## 2022-08-01 ENCOUNTER — OFFICE VISIT (OUTPATIENT)
Dept: URGENT CARE | Facility: URGENT CARE | Age: 62
End: 2022-08-01
Payer: COMMERCIAL

## 2022-08-01 VITALS
HEART RATE: 70 BPM | TEMPERATURE: 98 F | SYSTOLIC BLOOD PRESSURE: 128 MMHG | DIASTOLIC BLOOD PRESSURE: 75 MMHG | OXYGEN SATURATION: 96 %

## 2022-08-01 DIAGNOSIS — R30.0 DYSURIA: Primary | ICD-10-CM

## 2022-08-01 LAB
ALBUMIN UR-MCNC: NEGATIVE MG/DL
APPEARANCE UR: CLEAR
BACTERIA #/AREA URNS HPF: ABNORMAL /HPF
BILIRUB UR QL STRIP: NEGATIVE
COLOR UR AUTO: YELLOW
GLUCOSE UR STRIP-MCNC: NEGATIVE MG/DL
HGB UR QL STRIP: ABNORMAL
KETONES UR STRIP-MCNC: ABNORMAL MG/DL
LEUKOCYTE ESTERASE UR QL STRIP: NEGATIVE
NITRATE UR QL: NEGATIVE
PH UR STRIP: 6.5 [PH] (ref 5–7)
RBC #/AREA URNS AUTO: ABNORMAL /HPF
SP GR UR STRIP: 1.02 (ref 1–1.03)
SQUAMOUS #/AREA URNS AUTO: ABNORMAL /LPF
UROBILINOGEN UR STRIP-ACNC: 0.2 E.U./DL
WBC #/AREA URNS AUTO: ABNORMAL /HPF

## 2022-08-01 PROCEDURE — 99213 OFFICE O/P EST LOW 20 MIN: CPT | Performed by: PHYSICIAN ASSISTANT

## 2022-08-01 PROCEDURE — 81001 URINALYSIS AUTO W/SCOPE: CPT | Performed by: PHYSICIAN ASSISTANT

## 2022-08-01 RX ORDER — CEFDINIR 300 MG/1
300 CAPSULE ORAL 2 TIMES DAILY
Qty: 14 CAPSULE | Refills: 0 | Status: SHIPPED | OUTPATIENT
Start: 2022-08-01 | End: 2022-08-08

## 2022-08-10 NOTE — PROGRESS NOTES
SUBJECTIVE:  Fabio Logan is a 61 year old male comes in with continued issues with UTI symptoms.  States that he has some discomfort when urinating for a few weeks.  He does have a history of kidney stones and testicular cancer.  He does not note any blood in his urine.  Denies fever, nausea, vomiting,.  Stream seems mostly normal.  No flank pain is present denies abdominal pain.  He is requesting to be seen by urology for ongoing symptoms      Past Medical History:   Diagnosis Date     Anxiety      Depression      Diverticulitis      h/o Mumps      Hypertension      Kidney stone      Testicular cancer - Right      Current Outpatient Medications   Medication     albuterol (PROAIR HFA/PROVENTIL HFA/VENTOLIN HFA) 108 (90 BASE) MCG/ACT Inhaler     ALPRAZolam (XANAX) 0.5 MG tablet     fluticasone (FLONASE) 50 MCG/ACT nasal spray     ketoconazole (NIZORAL) 2 % external cream     losartan (COZAAR) 25 MG tablet     omeprazole (PRILOSEC) 20 MG DR capsule     OXcarbazepine (TRILEPTAL) 300 MG tablet     sertraline (ZOLOFT) 100 MG tablet     SUMAtriptan (IMITREX) 50 MG tablet     traZODone (DESYREL) 50 MG tablet     triamcinolone (KENALOG) 0.1 % ointment     atenolol (TENORMIN) 50 MG tablet     diphenoxylate-atropine (LOMOTIL) 2.5-0.025 MG tablet     No current facility-administered medications for this visit.     Social History     Socioeconomic History     Marital status: Single     Spouse name: Not on file     Number of children: Not on file     Years of education: Not on file     Highest education level: GED or equivalent   Occupational History     Not on file   Tobacco Use     Smoking status: Former Smoker     Quit date: 1997     Years since quittin.1     Smokeless tobacco: Former User   Substance and Sexual Activity     Alcohol use: No     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     Drug use: No     Sexual activity: Yes     Partners: Female   Other Topics Concern     Parent/sibling w/ CABG, MI or angioplasty  before 65F 55M? No   Social History Narrative     Not on file     Social Determinants of Health     Financial Resource Strain: Not on file   Food Insecurity: Not on file   Transportation Needs: Not on file   Physical Activity: Not on file   Stress: Not on file   Social Connections: Not on file   Intimate Partner Violence: Not on file   Housing Stability: Not on file     ROS  Negative other than stated above    Exam:  GENERAL APPEARANCE: healthy, alert and no distress  EYES: EOMI,  PERRL  RESP: lungs clear to auscultation - no rales, rhonchi or wheezes  CV: regular rates and rhythm, normal S1 S2, no S3 or S4 and no murmur, click or rub -  ABDOMEN:  soft, nontender, no HSM or masses and bowel sounds normal no CVA tenderness  SKIN: no suspicious lesions or rashes    Results for orders placed or performed in visit on 08/01/22   UA macro with reflex to Microscopic and Culture - Clinc Collect     Status: Abnormal    Specimen: Urine, Clean Catch   Result Value Ref Range    Color Urine Yellow Colorless, Straw, Light Yellow, Yellow    Appearance Urine Clear Clear    Glucose Urine Negative Negative mg/dL    Bilirubin Urine Negative Negative    Ketones Urine Trace (A) Negative mg/dL    Specific Gravity Urine 1.020 1.003 - 1.035    Blood Urine Moderate (A) Negative    pH Urine 6.5 5.0 - 7.0    Protein Albumin Urine Negative Negative mg/dL    Urobilinogen Urine 0.2 0.2, 1.0 E.U./dL    Nitrite Urine Negative Negative    Leukocyte Esterase Urine Negative Negative   Urine Microscopic     Status: Abnormal   Result Value Ref Range    Bacteria Urine Few (A) None Seen /HPF    RBC Urine 5-10 (A) 0-2 /HPF /HPF    WBC Urine 0-5 0-5 /HPF /HPF    Squamous Epithelials Urine Few (A) None Seen /LPF    Narrative    Urine Culture not indicated     assessment/plan:  (R30.0) Dysuria  (primary encounter diagnosis)  Comment:   Plan: UA macro with reflex to Microscopic and Culture        - Clinc Collect, Urine Microscopic, cefdinir         (OMNICEF)  300 MG capsule, Adult Urology          Referral        No clear infection noted at this time the patient does have symptoms of UTI for the past several weeks.  He does have a history of this.  He is requesting urology consult.  1 was placed.  Due to symptoms we will cover with Omnicef at this time.  Advised to continue pushing fluids

## 2022-08-24 ENCOUNTER — OFFICE VISIT (OUTPATIENT)
Dept: UROLOGY | Facility: CLINIC | Age: 62
End: 2022-08-24
Payer: COMMERCIAL

## 2022-08-24 VITALS
BODY MASS INDEX: 39.37 KG/M2 | SYSTOLIC BLOOD PRESSURE: 138 MMHG | DIASTOLIC BLOOD PRESSURE: 82 MMHG | WEIGHT: 245 LBS | HEIGHT: 66 IN

## 2022-08-24 DIAGNOSIS — N39.41 URGENCY INCONTINENCE: ICD-10-CM

## 2022-08-24 DIAGNOSIS — R35.0 URINARY FREQUENCY: ICD-10-CM

## 2022-08-24 DIAGNOSIS — R30.0 DYSURIA: Primary | ICD-10-CM

## 2022-08-24 LAB
ALBUMIN UR-MCNC: NEGATIVE MG/DL
APPEARANCE UR: CLEAR
BILIRUB UR QL STRIP: NEGATIVE
COLOR UR AUTO: YELLOW
GLUCOSE UR STRIP-MCNC: NEGATIVE MG/DL
HGB UR QL STRIP: ABNORMAL
KETONES UR STRIP-MCNC: NEGATIVE MG/DL
LEUKOCYTE ESTERASE UR QL STRIP: NEGATIVE
NITRATE UR QL: NEGATIVE
PH UR STRIP: 7.5 [PH] (ref 5–7)
RESIDUAL VOLUME (RV) (EXTERNAL): 60
SP GR UR STRIP: 1.02 (ref 1–1.03)
UROBILINOGEN UR STRIP-ACNC: 0.2 E.U./DL

## 2022-08-24 PROCEDURE — 81003 URINALYSIS AUTO W/O SCOPE: CPT | Mod: QW | Performed by: UROLOGY

## 2022-08-24 PROCEDURE — 51798 US URINE CAPACITY MEASURE: CPT | Performed by: UROLOGY

## 2022-08-24 PROCEDURE — 99203 OFFICE O/P NEW LOW 30 MIN: CPT | Mod: 25 | Performed by: UROLOGY

## 2022-08-24 ASSESSMENT — PAIN SCALES - GENERAL: PAINLEVEL: NO PAIN (0)

## 2022-08-24 NOTE — NURSING NOTE
Chief Complaint   Patient presents with     Dysuria     Incontinence     Pt states he has pain with urination, pt took course of abx which helped with symptoms but they then returned. Pt has difficulty controlling urinary leakage, vibrations at work make this worse. Has hx of testicular cancer, right testicle was removed.     PVR: 60 mL    Donna Dean, CMA

## 2022-08-24 NOTE — LETTER
8/24/2022       RE: Fabio Logan  [address]  [place] [address]     Dear Colleague,    Thank you for referring your patient, Fabio Logan, to the [facility] [place] [facility]. Please see a copy of my visit note below.    [facility]  Main Office: [address]  [address]  [place] [address]       CHIEF COMPLAINT:  Postvoid dribbling    HISTORY:   This is a 61-year-old gentleman who I saw in 2019 for postvoid urinary dribbling.  At that time his PSA was normal and he was emptying his bladder well.  He declined digital rectal examination at that time.  He did have a small amount of microscopic hematuria present at the time and a urine cytology was sent that was negative.  He did end up having a noncontrast CT scan of the abdomen pelvis in 2020 and it showed a tiny nonobstructing stone in the left kidney, otherwise normal.  His most recent PSA from last year was very low at 0.12.    He complains of mainly daytime urinary urgency and frequency.  He says that the symptoms do kiera somewhat in the evening.  He drinks 2 cups of coffee in the morning and does drink water throughout the day.  He says that sometimes the urgency is so strong that he has some dribbling before he urinates.  He otherwise reports a normal urinary stream.    PAST MEDICAL HISTORY:   Past Medical History:   Diagnosis Date     Anxiety      Depression      Diverticulitis      h/o Mumps      Hypertension      Kidney stone      Testicular cancer - Right 1998       PAST SURGICAL HISTORY:   Past Surgical History:   Procedure Laterality Date     COLONOSCOPY N/A 08/22/2014    Procedure: COLONOSCOPY;  Surgeon: Joe Garcia MD;  Location:  GI     COLONOSCOPY       CRANIOTOMY, REPAIR ANEURYSM, COMBINED Right 05/07/2019    Procedure: RIGHT CRANIOTOMY AND CLIPPING OF COMPLEX MIDDLE CEREBRAL ARTERY ANEURYMS; INTRAOPERATIVE ANGIOGRAM;  Surgeon:  Jamir Rebollar MD;  Location: [facility] OR;  Service: Neurosurgery     CYSTOSCOPY       EYE SURGERY      ? not on H&P     IR CEREBRAL ANGIOGRAM  2019     IR CEREBRAL ANGIOGRAM  2019     LAPAROSCOPIC CHOLECYSTECTOMY N/A 2018    Procedure: LAPAROSCOPIC CHOLECYSTECTOMY;  LAPAROSCOPIC CHOLECYSTECTOMY ;  Surgeon: Shannan Chaves MD;  Location: [facility] OR     LAPAROSCOPIC CHOLECYSTECTOMY       ORCHIECTOMY NOS Right      ORCHIECTOMY SCROTAL Right 1998     TESTICLE SURGERY       TONSILLECTOMY       TONSILLECTOMY         FAMILY HISTORY:   Family History   Problem Relation Age of Onset     Cerebrovascular Disease Mother      Hypertension Mother      Heart Disease Father         had 2 open heart surgery     Lipids Father      Hypertension Father      Cancer Maternal Grandfather         skin cancer     Skin Cancer Maternal Grandfather         skin cancer     Heart Disease Sister 45        heart attack     Cancer Sister 46        breast cancer     Skin Cancer Sister         BCC     Varicose Veins Mother      Coronary Artery Disease Father      Cancer Sister      Coronary Artery Disease Sister      Hypertension Other         all siblings       SOCIAL HISTORY:   Social History     Tobacco Use     Smoking status: Former Smoker     Quit date: 1997     Years since quittin.2     Smokeless tobacco: Former User   Substance Use Topics     Alcohol use: No     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     Iodine      Other reaction(s): Shortness of breath  internal iodine for tests           Current Outpatient Medications:      albuterol (PROAIR HFA/PROVENTIL HFA/VENTOLIN HFA) 108 (90 BASE) MCG/ACT Inhaler, Inhale 2 puffs into the lungs every 6 hours as needed for shortness of breath / dyspnea or wheezing, Disp: 1 Inhaler, Rfl: 0      atenolol (TENORMIN) 50 MG tablet, TAKE ONE TABLET BY MOUTH ONE TIME DAILY, Disp: 90 tablet, Rfl: 0     ketoconazole (NIZORAL) 2 % external cream, Apply topically daily, Disp: 60 g, Rfl: 3     losartan (COZAAR) 25 MG tablet, TAKE ONE TABLET BY MOUTH ONE TIME DAILY, Disp: 90 tablet, Rfl: 0     omeprazole (PRILOSEC) 20 MG DR capsule, Take 1 capsule (20 mg) by mouth daily, Disp: 90 capsule, Rfl: 3     OXcarbazepine (TRILEPTAL) 300 MG tablet, Take 1.5 tablets (450 mg) by mouth 2 times daily, Disp: 273 tablet, Rfl: 3     sertraline (ZOLOFT) 100 MG tablet, Take 1 and 1/2  tablets (150 mg) by mouth daily., Disp: 135 tablet, Rfl: 3     SUMAtriptan (IMITREX) 50 MG tablet, Take 1 tablet (50 mg) by mouth at onset of headache for migraine (may repeat dose in 2 hours. Do not exceed 200mg in 24 hours DUE FOR PHYSICAL PER CLINIC, Disp: 18 tablet, Rfl: 0     traZODone (DESYREL) 50 MG tablet, Take 1-2 tablets ( mg) by mouth At Bedtime, Disp: 180 tablet, Rfl: 3     triamcinolone (KENALOG) 0.1 % ointment, Apply topically 2 times daily as needed for irritation, Disp: , Rfl:      ALPRAZolam (XANAX) 0.5 MG tablet, Take 1-2 tablets (0.5-1 mg) by mouth daily as needed for anxiety (Patient not taking: Reported on 8/24/2022), Disp: 60 tablet, Rfl: 1     diphenoxylate-atropine (LOMOTIL) 2.5-0.025 MG tablet, Take 1 tablet by mouth 3 times daily as needed for diarrhea, Disp: 21 tablet, Rfl: 0     fluticasone (FLONASE) 50 MCG/ACT nasal spray, SPRAY 1 TO 2 SPRAYS INTO BOTH NOSTRISL DAILY (Patient not taking: Reported on 8/24/2022), Disp: 16 g, Rfl: 7    Review Of Systems:  Skin: No rash, pruritis, or skin pigmentation  Eyes: No changes in vision  Ears/Nose/Throat: No changes in hearing, no nosebleeds  Respiratory: No shortness of breath, dyspnea on exertion, cough, or hemoptysis  Cardiovascular: No chest pain or palpitations  Gastrointestinal: No diarrhea or constipation. No abdominal pain. No hematochezia  Genitourinary: see  "HPI  Musculoskeletal: No pain or swelling of joints, normal range of motion  Neurologic: No weakness or tremors  Psychiatric: No recent changes in memory or mood  Hematologic/Lymphatic/Immunologic: No easy bruising or enlarged lymph nodes  Endocrine: No weight gain or loss      PHYSICAL EXAM:    /82   Ht 1.676 m (5' 6\")   Wt 111.1 kg (245 lb)   BMI 39.54 kg/m    General appearance: In NAD, conversant  HEENT: Normocephalic and atraumatic, anicteric sclera  Cardiovascular: Not examined  Respiratory: normal, non-labored breathing  Gastrointestinal: negative, Abdomen soft, non-tender, and non-distended.   Musculoskeletal: Not Examined  Peripheral Vascular/extremity: No peripheral edema  Skin: Normal temperature, turgor, and texture. No rash  Psychiatric: Appropriate affect, alert and oriented to person, place, and time    Penis: Normal  Scrotal skin: Normal, no lesions  Testicles: Absent right testicle, prior orchiectomy for testicular cancer.  The left testicle is small in caliber but normal.  Epididymis: Normal to palpation on the left  Lymphatic: Normal inguinal lymph nodes  Digital Rectal Exam: The prostate is small.  No firmness or abnormalities identified.    Cystoscopy: Not done      PSA: 0.12    UA RESULTS:  Recent Labs   Lab Test 08/24/22  0944 08/01/22  1654   COLOR Yellow Yellow   APPEARANCE Clear Clear   URINEGLC Negative Negative   URINEBILI Negative Negative   URINEKETONE Negative Trace*   SG 1.020 1.020   UBLD Trace* Moderate*   URINEPH 7.5* 6.5   PROTEIN Negative Negative   UROBILINOGEN 0.2 0.2   NITRITE Negative Negative   LEUKEST Negative Negative   RBCU  --  5-10*   WBCU  --  0-5       Bladder Scan: 60mL    Other Labs:      Imaging Studies: None      CLINICAL IMPRESSION:   Urinary frequency and urgency    PLAN:   He has urinary frequency and urgency of unknown etiology.  His evaluation is overall normal with a normal urinalysis, good bladder emptying, and what appears to be a small prostate " on exam.  We discussed conservative measures such as cutting down coffee and caffeine intake and cutting down fluid intake.  If he wished to pursue these symptoms further the neck step would be to perform an office cystoscopy to evaluate the anatomy of the urethra and prostate.  He will take this into consideration.  He will proceed with conservative measures and follow-up with me as needed in the future.  He will continue to have his primary care provider check his PSA annually.      Ty Ivey MD

## 2022-08-24 NOTE — PROGRESS NOTES
[facility]  Main Office: [address]  [address]  [place] [address]       CHIEF COMPLAINT:  Postvoid dribbling    HISTORY:   This is a 61-year-old gentleman who I saw in 2019 for postvoid urinary dribbling.  At that time his PSA was normal and he was emptying his bladder well.  He declined digital rectal examination at that time.  He did have a small amount of microscopic hematuria present at the time and a urine cytology was sent that was negative.  He did end up having a noncontrast CT scan of the abdomen pelvis in 2020 and it showed a tiny nonobstructing stone in the left kidney, otherwise normal.  His most recent PSA from last year was very low at 0.12.    He complains of mainly daytime urinary urgency and frequency.  He says that the symptoms do kiera somewhat in the evening.  He drinks 2 cups of coffee in the morning and does drink water throughout the day.  He says that sometimes the urgency is so strong that he has some dribbling before he urinates.  He otherwise reports a normal urinary stream.    PAST MEDICAL HISTORY:   Past Medical History:   Diagnosis Date     Anxiety      Depression      Diverticulitis      h/o Mumps      Hypertension      Kidney stone      Testicular cancer - Right 1998       PAST SURGICAL HISTORY:   Past Surgical History:   Procedure Laterality Date     COLONOSCOPY N/A 08/22/2014    Procedure: COLONOSCOPY;  Surgeon: Joe Garcia MD;  Location:  GI     COLONOSCOPY       CRANIOTOMY, REPAIR ANEURYSM, COMBINED Right 05/07/2019    Procedure: RIGHT CRANIOTOMY AND CLIPPING OF COMPLEX MIDDLE CEREBRAL ARTERY ANEURYMS; INTRAOPERATIVE ANGIOGRAM;  Surgeon: Jamir Rebollar MD;  Location: [facility];  Service: Neurosurgery     CYSTOSCOPY       EYE SURGERY      ? not on H&P     IR CEREBRAL ANGIOGRAM  05/07/2019     IR CEREBRAL ANGIOGRAM  05/07/2019     LAPAROSCOPIC CHOLECYSTECTOMY N/A 09/02/2018    Procedure: LAPAROSCOPIC CHOLECYSTECTOMY;  LAPAROSCOPIC  CHOLECYSTECTOMY ;  Surgeon: Shannan Chaves MD;  Location: [facility] OR     LAPAROSCOPIC CHOLECYSTECTOMY       ORCHIECTOMY NOS Right      ORCHIECTOMY SCROTAL Right 1998     TESTICLE SURGERY       TONSILLECTOMY       TONSILLECTOMY         FAMILY HISTORY:   Family History   Problem Relation Age of Onset     Cerebrovascular Disease Mother      Hypertension Mother      Heart Disease Father         had 2 open heart surgery     Lipids Father      Hypertension Father      Cancer Maternal Grandfather         skin cancer     Skin Cancer Maternal Grandfather         skin cancer     Heart Disease Sister 45        heart attack     Cancer Sister 46        breast cancer     Skin Cancer Sister         BCC     Varicose Veins Mother      Coronary Artery Disease Father      Cancer Sister      Coronary Artery Disease Sister      Hypertension Other         all siblings       SOCIAL HISTORY:   Social History     Tobacco Use     Smoking status: Former Smoker     Quit date: 1997     Years since quittin.2     Smokeless tobacco: Former User   Substance Use Topics     Alcohol use: No     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     Iodine      Other reaction(s): Shortness of breath  internal iodine for tests           Current Outpatient Medications:      albuterol (PROAIR HFA/PROVENTIL HFA/VENTOLIN HFA) 108 (90 BASE) MCG/ACT Inhaler, Inhale 2 puffs into the lungs every 6 hours as needed for shortness of breath / dyspnea or wheezing, Disp: 1 Inhaler, Rfl: 0     atenolol (TENORMIN) 50 MG tablet, TAKE ONE TABLET BY MOUTH ONE TIME DAILY, Disp: 90 tablet, Rfl: 0     ketoconazole (NIZORAL) 2 % external cream, Apply topically daily, Disp: 60 g, Rfl: 3     losartan (COZAAR) 25 MG tablet, TAKE ONE TABLET BY MOUTH ONE TIME DAILY, Disp: 90 tablet, Rfl: 0     omeprazole (PRILOSEC) 20  "MG DR capsule, Take 1 capsule (20 mg) by mouth daily, Disp: 90 capsule, Rfl: 3     OXcarbazepine (TRILEPTAL) 300 MG tablet, Take 1.5 tablets (450 mg) by mouth 2 times daily, Disp: 273 tablet, Rfl: 3     sertraline (ZOLOFT) 100 MG tablet, Take 1 and 1/2  tablets (150 mg) by mouth daily., Disp: 135 tablet, Rfl: 3     SUMAtriptan (IMITREX) 50 MG tablet, Take 1 tablet (50 mg) by mouth at onset of headache for migraine (may repeat dose in 2 hours. Do not exceed 200mg in 24 hours DUE FOR PHYSICAL PER CLINIC, Disp: 18 tablet, Rfl: 0     traZODone (DESYREL) 50 MG tablet, Take 1-2 tablets ( mg) by mouth At Bedtime, Disp: 180 tablet, Rfl: 3     triamcinolone (KENALOG) 0.1 % ointment, Apply topically 2 times daily as needed for irritation, Disp: , Rfl:      ALPRAZolam (XANAX) 0.5 MG tablet, Take 1-2 tablets (0.5-1 mg) by mouth daily as needed for anxiety (Patient not taking: Reported on 8/24/2022), Disp: 60 tablet, Rfl: 1     diphenoxylate-atropine (LOMOTIL) 2.5-0.025 MG tablet, Take 1 tablet by mouth 3 times daily as needed for diarrhea, Disp: 21 tablet, Rfl: 0     fluticasone (FLONASE) 50 MCG/ACT nasal spray, SPRAY 1 TO 2 SPRAYS INTO BOTH NOSTRISL DAILY (Patient not taking: Reported on 8/24/2022), Disp: 16 g, Rfl: 7    Review Of Systems:  Skin: No rash, pruritis, or skin pigmentation  Eyes: No changes in vision  Ears/Nose/Throat: No changes in hearing, no nosebleeds  Respiratory: No shortness of breath, dyspnea on exertion, cough, or hemoptysis  Cardiovascular: No chest pain or palpitations  Gastrointestinal: No diarrhea or constipation. No abdominal pain. No hematochezia  Genitourinary: see HPI  Musculoskeletal: No pain or swelling of joints, normal range of motion  Neurologic: No weakness or tremors  Psychiatric: No recent changes in memory or mood  Hematologic/Lymphatic/Immunologic: No easy bruising or enlarged lymph nodes  Endocrine: No weight gain or loss      PHYSICAL EXAM:    /82   Ht 1.676 m (5' 6\")   " Wt 111.1 kg (245 lb)   BMI 39.54 kg/m    General appearance: In NAD, conversant  HEENT: Normocephalic and atraumatic, anicteric sclera  Cardiovascular: Not examined  Respiratory: normal, non-labored breathing  Gastrointestinal: negative, Abdomen soft, non-tender, and non-distended.   Musculoskeletal: Not Examined  Peripheral Vascular/extremity: No peripheral edema  Skin: Normal temperature, turgor, and texture. No rash  Psychiatric: Appropriate affect, alert and oriented to person, place, and time    Penis: Normal  Scrotal skin: Normal, no lesions  Testicles: Absent right testicle, prior orchiectomy for testicular cancer.  The left testicle is small in caliber but normal.  Epididymis: Normal to palpation on the left  Lymphatic: Normal inguinal lymph nodes  Digital Rectal Exam: The prostate is small.  No firmness or abnormalities identified.    Cystoscopy: Not done      PSA: 0.12    UA RESULTS:  Recent Labs   Lab Test 08/24/22  0944 08/01/22  1654   COLOR Yellow Yellow   APPEARANCE Clear Clear   URINEGLC Negative Negative   URINEBILI Negative Negative   URINEKETONE Negative Trace*   SG 1.020 1.020   UBLD Trace* Moderate*   URINEPH 7.5* 6.5   PROTEIN Negative Negative   UROBILINOGEN 0.2 0.2   NITRITE Negative Negative   LEUKEST Negative Negative   RBCU  --  5-10*   WBCU  --  0-5       Bladder Scan: 60mL    Other Labs:      Imaging Studies: None      CLINICAL IMPRESSION:   Urinary frequency and urgency    PLAN:   He has urinary frequency and urgency of unknown etiology.  His evaluation is overall normal with a normal urinalysis, good bladder emptying, and what appears to be a small prostate on exam.  We discussed conservative measures such as cutting down coffee and caffeine intake and cutting down fluid intake.  If he wished to pursue these symptoms further the neck step would be to perform an office cystoscopy to evaluate the anatomy of the urethra and prostate.  He will take this into consideration.  He will  proceed with conservative measures and follow-up with me as needed in the future.  He will continue to have his primary care provider check his PSA annually.      Ty Ivey MD

## 2022-09-07 DIAGNOSIS — G40.909 SEIZURE DISORDER (H): ICD-10-CM

## 2022-09-07 RX ORDER — OXCARBAZEPINE 300 MG/1
450 TABLET, FILM COATED ORAL 2 TIMES DAILY
Qty: 90 TABLET | Refills: 4 | Status: SHIPPED | OUTPATIENT
Start: 2022-09-07 | End: 2023-01-18

## 2022-09-07 NOTE — TELEPHONE ENCOUNTER
Routing refill request to provider for review/approval because:  Drug not on the [facility] refill protocol   Pt overdue for an appointment, called and scheduled him for next available 1/18/22.  He says that his seizures have been stable, but he is almost out of his medication, so he needs refill now.   Edith FERMIN RN, BSN  [facility]

## 2022-09-10 DIAGNOSIS — F32.A DEPRESSION, UNSPECIFIED DEPRESSION TYPE: ICD-10-CM

## 2022-09-13 RX ORDER — TRAZODONE HYDROCHLORIDE 50 MG/1
TABLET, FILM COATED ORAL
Qty: 180 TABLET | Refills: 0 | Status: SHIPPED | OUTPATIENT
Start: 2022-09-13 | End: 2023-03-01

## 2022-09-13 NOTE — TELEPHONE ENCOUNTER
MA/ to assist with updating needed screenings and/or scheduling follow up appointment.    Prescription approved per [facility] Refill Protocol.    Adrienne Roberson RN

## 2022-10-03 DIAGNOSIS — I10 ESSENTIAL HYPERTENSION WITH GOAL BLOOD PRESSURE LESS THAN 140/90: ICD-10-CM

## 2022-10-03 DIAGNOSIS — I10 HTN, GOAL BELOW 140/90: ICD-10-CM

## 2022-10-03 DIAGNOSIS — F33.0 MAJOR DEPRESSIVE DISORDER, RECURRENT EPISODE, MILD (H): ICD-10-CM

## 2022-10-03 RX ORDER — SERTRALINE HYDROCHLORIDE 100 MG/1
TABLET, FILM COATED ORAL
Qty: 135 TABLET | Refills: 1 | Status: SHIPPED | OUTPATIENT
Start: 2022-10-03 | End: 2023-03-01

## 2022-10-03 RX ORDER — ATENOLOL 50 MG/1
TABLET ORAL
Qty: 90 TABLET | Refills: 1 | Status: SHIPPED | OUTPATIENT
Start: 2022-10-03 | End: 2023-03-01

## 2022-10-03 RX ORDER — LOSARTAN POTASSIUM 25 MG/1
TABLET ORAL
Qty: 90 TABLET | Refills: 1 | Status: SHIPPED | OUTPATIENT
Start: 2022-10-03 | End: 2023-03-01

## 2022-10-03 NOTE — TELEPHONE ENCOUNTER
prescriptions sent, patient has follow-up appointment/annual schedule and filled to get patient to that appointment.

## 2022-10-03 NOTE — TELEPHONE ENCOUNTER
Routing refill request to provider for review/approval because:  Caitlin given x1 and patient did not follow up, please advise  Labs not current:  JIM TERRY  Patient needs to be seen because it has been more than 1 year since last office visit.    Sophia Acosta RN on 10/3/2022 at 10:32 AM

## 2022-10-22 ENCOUNTER — HEALTH MAINTENANCE LETTER (OUTPATIENT)
Age: 62
End: 2022-10-22

## 2022-10-29 ENCOUNTER — MYC REFILL (OUTPATIENT)
Dept: PEDIATRICS | Facility: CLINIC | Age: 62
End: 2022-10-29

## 2022-10-29 DIAGNOSIS — J20.9 ACUTE BRONCHITIS WITH COEXISTING CONDITION REQUIRING PROPHYLACTIC TREATMENT: ICD-10-CM

## 2022-10-31 NOTE — TELEPHONE ENCOUNTER
Routing refill request to provider for review/approval because:  AcT  Last prescribed 2017?        Return in about 1 year (around 7/14/2022) for Routine Visit, or sooner if symptoms persist or worsen       Appt scheduled in MARCH    Ophelia LIU RN

## 2022-11-01 RX ORDER — ALBUTEROL SULFATE 90 UG/1
2 AEROSOL, METERED RESPIRATORY (INHALATION) EVERY 6 HOURS PRN
Qty: 36 G | Refills: 3 | Status: SHIPPED | OUTPATIENT
Start: 2022-11-01 | End: 2023-03-01

## 2022-11-02 ENCOUNTER — TELEPHONE (OUTPATIENT)
Dept: NEUROLOGY | Facility: CLINIC | Age: 62
End: 2022-11-02

## 2022-11-02 NOTE — TELEPHONE ENCOUNTER
Patient has not been seen since July 2021 with request for annual f/u. Patient did not call in to schedule until September 2022 and dose not have an appointment until Jan 2023.    Loss of Consciousness or Voluntary Control form was received via fax. Due to patient being very overdue for f/u Dr Clark was asked if he would be comfortable signing off on it, he agreed to do it but wants Nicolas to be informed in the future he will need to see us for annual appointment prior to us completing the form.    Outbound call to Nicolas [phone]. S/W patient and advised I had faxed the completed form to [employer](Confirmed via logging in to [employer]ax that it was received) and will mail the original to him (after it is scanned in to media). Also advised him when he is in for his f/u in January I can help him schedule an appointment next year for end of September or start of October since he gets form mid September each year and we would then be able to complete it at the appointment as well as help ensure he dose not run out of meds like happened in September this year.

## 2023-01-07 DIAGNOSIS — K21.9 GASTROESOPHAGEAL REFLUX DISEASE WITHOUT ESOPHAGITIS: ICD-10-CM

## 2023-01-10 NOTE — TELEPHONE ENCOUNTER
Routing refill request to provider for review/approval because:  Patient needs to be seen because it has been more than 1 year since last office visit.    Angie Quach RN          
t has appointment scheduled. prescription sent  
negative...

## 2023-01-17 ENCOUNTER — MYC MEDICAL ADVICE (OUTPATIENT)
Dept: NEUROLOGY | Facility: CLINIC | Age: 63
End: 2023-01-17

## 2023-01-18 ENCOUNTER — OFFICE VISIT (OUTPATIENT)
Dept: NEUROLOGY | Facility: CLINIC | Age: 63
End: 2023-01-18
Payer: COMMERCIAL

## 2023-01-18 VITALS — DIASTOLIC BLOOD PRESSURE: 81 MMHG | HEART RATE: 68 BPM | SYSTOLIC BLOOD PRESSURE: 134 MMHG | OXYGEN SATURATION: 96 %

## 2023-01-18 DIAGNOSIS — G40.909 SEIZURE DISORDER (H): Primary | ICD-10-CM

## 2023-01-18 DIAGNOSIS — Z86.79 HISTORY OF CEREBRAL ANEURYSM REPAIR: ICD-10-CM

## 2023-01-18 DIAGNOSIS — Z98.890 HISTORY OF CEREBRAL ANEURYSM REPAIR: ICD-10-CM

## 2023-01-18 PROCEDURE — 99214 OFFICE O/P EST MOD 30 MIN: CPT | Performed by: PSYCHIATRY & NEUROLOGY

## 2023-01-18 RX ORDER — OXCARBAZEPINE 300 MG/1
450 TABLET, FILM COATED ORAL 2 TIMES DAILY
Qty: 270 TABLET | Refills: 3 | Status: SHIPPED | OUTPATIENT
Start: 2023-01-18 | End: 2024-01-17

## 2023-01-18 NOTE — PROGRESS NOTES
"ESTABLISHED PATIENT NEUROLOGY NOTE    DATE OF VISIT: 1/18/2023  CLINIC LOCATION: [facility]  MRN: [id number]  PATIENT NAME: Fabio Logan  YOB: 1960    REASON FOR VISIT:   Chief Complaint   Patient presents with     Follow Up     Seizure- patient has been seizure free. Wants to look at 90 day supply of meds      SUBJECTIVE:                                                      HISTORY OF PRESENT ILLNESS: Patient is here to follow up regarding seizure disorder.  The last visit was on 7/13/2021.  Please refer to my initial/other prior notes for further information.    Since the last visit, the patient remains seizure-free per his report.  He is on 450 mg of oxcarbazepine twice daily without noticeable side effects, except some \"moodiness\".  CBC, CMP, and 10 hydroxy metabolite level (14) from July 2021 were normal.  EXAM:                                                    Physical Exam:   Vitals: /81 (BP Location: Right arm, Patient Position: Sitting, Cuff Size: Adult Large)   Pulse 68   SpO2 96%     General: pt is in NAD, cooperative.  Skin: normal turgor, moist mucous membranes, no lesions/rashes noticed.  HEENT: ATNC, white sclera, normal conjunctiva.  Respiratory: Symmetric lung excursion, no accessory respiratory muscle use.  Abdomen: Non distended.  Neurological: awake, cooperative, follows commands, no exam changes compared to the previous visits.  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  He continues to be seizure-free on current therapy of his oxcarbazepine, which is tolerated well.  In the past we discussed option of tapering off Trileptal after repeating his EEG.  Today we reviewed it again and decided to continue carbamazepine without changes.  He will need to check his monitoring labs, and I will see him " back in 1 year    Diagnoses:    ICD-10-CM    1. Seizure disorder (H)  G40.909       2. History of cerebral aneurysm repair  Z98.890     Z86.79         Plan: At today's visit we thoroughly discussed current symptoms, evaluation results, available treatment options, and the plan.    We decided not to make any medication changes.  His oxcarbazepine prescription was refilled.  He need to complete the recommended lab work-up including CBC, CMP, and oxcarbazepine level.    Next follow-up appointment is in the next 1 year or earlier if needed.    Total Time: 32 minutes spent on the date of the encounter doing chart review, history and exam, documentation and further activities per the note.    Humberto Clark MD  [facility]  (Chart documentation was completed in part with Dragon voice-recognition software. Even though reviewed, some grammatical, spelling, and word errors may remain.)

## 2023-01-18 NOTE — LETTER
"    1/18/2023         RE: Fabio Logan  [address] [facility] [address]        Dear Colleague,    Thank you for referring your patient, Fabio Logan, to the [facility]. Please see a copy of my visit note below.    ESTABLISHED PATIENT NEUROLOGY NOTE    DATE OF VISIT: 1/18/2023  CLINIC LOCATION: [facility]  MRN: [id number]  PATIENT NAME: Fabio Logan  YOB: 1960    REASON FOR VISIT:   Chief Complaint   Patient presents with     Follow Up     Seizure- patient has been seizure free. Wants to look at 90 day supply of meds      SUBJECTIVE:                                                      HISTORY OF PRESENT ILLNESS: Patient is here to follow up regarding seizure disorder.  The last visit was on 7/13/2021.  Please refer to my initial/other prior notes for further information.    Since the last visit, the patient remains seizure-free per his report.  He is on 450 mg of oxcarbazepine twice daily without noticeable side effects, except some \"moodiness\".  CBC, CMP, and 10 hydroxy metabolite level (14) from July 2021 were normal.  EXAM:                                                    Physical Exam:   Vitals: /81 (BP Location: Right arm, Patient Position: Sitting, Cuff Size: Adult Large)   Pulse 68   SpO2 96%     General: pt is in NAD, cooperative.  Skin: normal turgor, moist mucous membranes, no lesions/rashes noticed.  HEENT: ATNC, white sclera, normal conjunctiva.  Respiratory: Symmetric lung excursion, no accessory respiratory muscle use.  Abdomen: Non distended.  Neurological: awake, cooperative, follows commands, no exam changes compared to the previous visits.  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  He continues to be seizure-free on current therapy of his " "oxcarbazepine, which is tolerated well.  In the past we discussed option of tapering off Trileptal after repeating his EEG.  Today we reviewed it again and decided to continue carbamazepine without changes.  He will need to check his monitoring labs, and I will see him back in 1 year    Diagnoses:    ICD-10-CM    1. Seizure disorder (H)  G40.909       2. History of cerebral aneurysm repair  Z98.890     Z86.79         Plan: At today's visit we thoroughly discussed current symptoms, evaluation results, available treatment options, and the plan.    We decided not to make any medication changes.  His oxcarbazepine prescription was refilled.  He need to complete the recommended lab work-up including CBC, CMP, and oxcarbazepine level.    Next follow-up appointment is in the next 1 year or earlier if needed.    Total Time: 32 minutes spent on the date of the encounter doing chart review, history and exam, documentation and further activities per the note.    Humberto Clark MD  [facility] Neurology  (Chart documentation was completed in part with Dragon voice-recognition software. Even though reviewed, some grammatical, spelling, and word errors may remain.)      Fabio Logan is a 62 year old male who presents for:  Chief Complaint   Patient presents with     Follow Up     Seizure- patient has been seizure free. Wants to look at 90 day supply of meds         Initial Vitals:  /81 (BP Location: Right arm, Patient Position: Sitting, Cuff Size: Adult Large)   Pulse 68   SpO2 96%  Estimated body mass index is 39.54 kg/m  as calculated from the following:    Height as of 8/24/22: 1.676 m (5' 6\").    Weight as of 8/24/22: 111.1 kg (245 lb).. There is no height or weight on file to calculate BSA. BP completed using cuff size: large        Erma Tapia        Again, thank you for allowing me to participate in the care of your patient.        Sincerely,        Humberto Clark MD    "

## 2023-01-18 NOTE — PATIENT INSTRUCTIONS
AFTER VISIT SUMMARY (AVS):    At today's visit we thoroughly discussed current symptoms, evaluation results, available treatment options, and the plan.    We decided not to make any medication changes.  Your oxcarbazepine prescription was refilled.  You need to complete the recommended lab work-up including CBC, CMP, and oxcarbazepine level.    Next follow-up appointment is in the next 1 year or earlier if needed.    Please do not hesitate to call me with any questions or concerns.    Thanks.

## 2023-01-18 NOTE — PROGRESS NOTES
"Fabio Logan is a 62 year old male who presents for:  Chief Complaint   Patient presents with     Follow Up     Seizure- patient has been seizure free. Wants to look at 90 day supply of meds         Initial Vitals:  /81 (BP Location: Right arm, Patient Position: Sitting, Cuff Size: Adult Large)   Pulse 68   SpO2 96%  Estimated body mass index is 39.54 kg/m  as calculated from the following:    Height as of 8/24/22: 1.676 m (5' 6\").    Weight as of 8/24/22: 111.1 kg (245 lb).. There is no height or weight on file to calculate BSA. BP completed using cuff size: howard Tapia    "

## 2023-02-04 ENCOUNTER — LAB (OUTPATIENT)
Dept: LAB | Facility: CLINIC | Age: 63
End: 2023-02-04
Payer: COMMERCIAL

## 2023-02-04 DIAGNOSIS — G40.909 SEIZURE DISORDER (H): ICD-10-CM

## 2023-02-04 LAB
ALBUMIN SERPL BCG-MCNC: 4.2 G/DL (ref 3.5–5.2)
ALP SERPL-CCNC: 98 U/L (ref 40–129)
ALT SERPL W P-5'-P-CCNC: 24 U/L (ref 10–50)
ANION GAP SERPL CALCULATED.3IONS-SCNC: 9 MMOL/L (ref 7–15)
AST SERPL W P-5'-P-CCNC: 27 U/L (ref 10–50)
BASOPHILS # BLD AUTO: 0 10E3/UL (ref 0–0.2)
BASOPHILS NFR BLD AUTO: 1 %
BILIRUB SERPL-MCNC: 0.2 MG/DL
BUN SERPL-MCNC: 17.7 MG/DL (ref 8–23)
CALCIUM SERPL-MCNC: 9.4 MG/DL (ref 8.8–10.2)
CHLORIDE SERPL-SCNC: 104 MMOL/L (ref 98–107)
CREAT SERPL-MCNC: 0.84 MG/DL (ref 0.67–1.17)
DEPRECATED HCO3 PLAS-SCNC: 24 MMOL/L (ref 22–29)
EOSINOPHIL # BLD AUTO: 0.1 10E3/UL (ref 0–0.7)
EOSINOPHIL NFR BLD AUTO: 2 %
ERYTHROCYTE [DISTWIDTH] IN BLOOD BY AUTOMATED COUNT: 13 % (ref 10–15)
GFR SERPL CREATININE-BSD FRML MDRD: >90 ML/MIN/1.73M2
GLUCOSE SERPL-MCNC: 150 MG/DL (ref 70–99)
HCT VFR BLD AUTO: 42.5 % (ref 40–53)
HGB BLD-MCNC: 14 G/DL (ref 13.3–17.7)
LYMPHOCYTES # BLD AUTO: 1.6 10E3/UL (ref 0.8–5.3)
LYMPHOCYTES NFR BLD AUTO: 34 %
MCH RBC QN AUTO: 30 PG (ref 26.5–33)
MCHC RBC AUTO-ENTMCNC: 32.9 G/DL (ref 31.5–36.5)
MCV RBC AUTO: 91 FL (ref 78–100)
MONOCYTES # BLD AUTO: 0.4 10E3/UL (ref 0–1.3)
MONOCYTES NFR BLD AUTO: 8 %
NEUTROPHILS # BLD AUTO: 2.6 10E3/UL (ref 1.6–8.3)
NEUTROPHILS NFR BLD AUTO: 55 %
PLATELET # BLD AUTO: 179 10E3/UL (ref 150–450)
POTASSIUM SERPL-SCNC: 4.1 MMOL/L (ref 3.4–5.3)
PROT SERPL-MCNC: 7 G/DL (ref 6.4–8.3)
RBC # BLD AUTO: 4.66 10E6/UL (ref 4.4–5.9)
SODIUM SERPL-SCNC: 137 MMOL/L (ref 136–145)
WBC # BLD AUTO: 4.7 10E3/UL (ref 4–11)

## 2023-02-04 PROCEDURE — 99000 SPECIMEN HANDLING OFFICE-LAB: CPT

## 2023-02-04 PROCEDURE — 80053 COMPREHEN METABOLIC PANEL: CPT

## 2023-02-04 PROCEDURE — 80183 DRUG SCRN QUANT OXCARBAZEPIN: CPT | Mod: 90

## 2023-02-04 PROCEDURE — 85025 COMPLETE CBC W/AUTO DIFF WBC: CPT

## 2023-02-04 PROCEDURE — 36415 COLL VENOUS BLD VENIPUNCTURE: CPT

## 2023-02-08 ENCOUNTER — OFFICE VISIT (OUTPATIENT)
Dept: PEDIATRICS | Facility: CLINIC | Age: 63
End: 2023-02-08
Payer: COMMERCIAL

## 2023-02-08 ENCOUNTER — ANCILLARY PROCEDURE (OUTPATIENT)
Dept: GENERAL RADIOLOGY | Facility: CLINIC | Age: 63
End: 2023-02-08
Attending: PHYSICIAN ASSISTANT
Payer: COMMERCIAL

## 2023-02-08 VITALS
DIASTOLIC BLOOD PRESSURE: 80 MMHG | WEIGHT: 249 LBS | HEART RATE: 67 BPM | SYSTOLIC BLOOD PRESSURE: 144 MMHG | TEMPERATURE: 98.1 F | OXYGEN SATURATION: 97 % | RESPIRATION RATE: 20 BRPM | BODY MASS INDEX: 40.19 KG/M2

## 2023-02-08 DIAGNOSIS — M54.2 NECK PAIN: ICD-10-CM
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
## 2023-02-08 DIAGNOSIS — R07.81 RIB PAIN: ICD-10-CM

## 2023-02-08 LAB
10OH-CARBAZEPINE SERPL-MCNC: 10 UG/ML
ANION GAP SERPL CALCULATED.3IONS-SCNC: 6 MMOL/L (ref 3–14)
BASOPHILS # BLD AUTO: 0 10E3/UL (ref 0–0.2)
BASOPHILS NFR BLD AUTO: 0 %
BUN SERPL-MCNC: 15 MG/DL (ref 7–30)
CALCIUM SERPL-MCNC: 10.2 MG/DL (ref 8.5–10.1)
CHLORIDE BLD-SCNC: 104 MMOL/L (ref 94–109)
CO2 SERPL-SCNC: 29 MMOL/L (ref 20–32)
CREAT SERPL-MCNC: 0.8 MG/DL (ref 0.66–1.25)
EOSINOPHIL # BLD AUTO: 0.1 10E3/UL (ref 0–0.7)
EOSINOPHIL NFR BLD AUTO: 2 %
ERYTHROCYTE [DISTWIDTH] IN BLOOD BY AUTOMATED COUNT: 12.9 % (ref 10–15)
GFR SERPL CREATININE-BSD FRML MDRD: >90 ML/MIN/1.73M2
GLUCOSE BLD-MCNC: 97 MG/DL (ref 70–99)
HCT VFR BLD AUTO: 42.1 % (ref 40–53)
HGB BLD-MCNC: 14 G/DL (ref 13.3–17.7)
LYMPHOCYTES # BLD AUTO: 1.6 10E3/UL (ref 0.8–5.3)
LYMPHOCYTES NFR BLD AUTO: 26 %
MCH RBC QN AUTO: 30.1 PG (ref 26.5–33)
MCHC RBC AUTO-ENTMCNC: 33.3 G/DL (ref 31.5–36.5)
MCV RBC AUTO: 91 FL (ref 78–100)
MONOCYTES # BLD AUTO: 0.7 10E3/UL (ref 0–1.3)
MONOCYTES NFR BLD AUTO: 12 %
NEUTROPHILS # BLD AUTO: 3.6 10E3/UL (ref 1.6–8.3)
NEUTROPHILS NFR BLD AUTO: 60 %
PLATELET # BLD AUTO: 189 10E3/UL (ref 150–450)
POTASSIUM BLD-SCNC: 4 MMOL/L (ref 3.4–5.3)
RBC # BLD AUTO: 4.65 10E6/UL (ref 4.4–5.9)
SODIUM SERPL-SCNC: 139 MMOL/L (ref 133–144)
WBC # BLD AUTO: 6 10E3/UL (ref 4–11)

## 2023-02-08 PROCEDURE — 80048 BASIC METABOLIC PNL TOTAL CA: CPT | Performed by: PHYSICIAN ASSISTANT

## 2023-02-08 PROCEDURE — 71046 X-RAY EXAM CHEST 2 VIEWS: CPT | Mod: TC | Performed by: RADIOLOGY

## 2023-02-08 PROCEDURE — 36415 COLL VENOUS BLD VENIPUNCTURE: CPT | Performed by: PHYSICIAN ASSISTANT

## 2023-02-08 PROCEDURE — 71100 X-RAY EXAM RIBS UNI 2 VIEWS: CPT | Mod: TC | Performed by: RADIOLOGY

## 2023-02-08 PROCEDURE — 85025 COMPLETE CBC W/AUTO DIFF WBC: CPT | Performed by: PHYSICIAN ASSISTANT

## 2023-02-08 PROCEDURE — 99214 OFFICE O/P EST MOD 30 MIN: CPT | Performed by: PHYSICIAN ASSISTANT

## 2023-02-08 PROCEDURE — 72040 X-RAY EXAM NECK SPINE 2-3 VW: CPT | Mod: TC | Performed by: RADIOLOGY

## 2023-02-08 RX ORDER — PHENYTOIN SODIUM 100 MG/1
CAPSULE, EXTENDED RELEASE ORAL
COMMUNITY
Start: 2019-08-12 | End: 2023-02-08

## 2023-02-08 ASSESSMENT — PATIENT HEALTH QUESTIONNAIRE - PHQ9
SUM OF ALL RESPONSES TO PHQ QUESTIONS 1-9: 4
SUM OF ALL RESPONSES TO PHQ QUESTIONS 1-9: 4
10. IF YOU CHECKED OFF ANY PROBLEMS, HOW DIFFICULT HAVE THESE PROBLEMS MADE IT FOR YOU TO DO YOUR WORK, TAKE CARE OF THINGS AT HOME, OR GET ALONG WITH OTHER PEOPLE: SOMEWHAT DIFFICULT

## 2023-02-08 ASSESSMENT — ASTHMA QUESTIONNAIRES
QUESTION_2 LAST FOUR WEEKS HOW OFTEN HAVE YOU HAD SHORTNESS OF BREATH: THREE TO SIX TIMES A WEEK
QUESTION_3 LAST FOUR WEEKS HOW OFTEN DID YOUR ASTHMA SYMPTOMS (WHEEZING, COUGHING, SHORTNESS OF BREATH, CHEST TIGHTNESS OR PAIN) WAKE YOU UP AT NIGHT OR EARLIER THAN USUAL IN THE MORNING: NOT AT ALL
ACT_TOTALSCORE: 16
QUESTION_1 LAST FOUR WEEKS HOW MUCH OF THE TIME DID YOUR ASTHMA KEEP YOU FROM GETTING AS MUCH DONE AT WORK, SCHOOL OR AT HOME: A LITTLE OF THE TIME
QUESTION_4 LAST FOUR WEEKS HOW OFTEN HAVE YOU USED YOUR RESCUE INHALER OR NEBULIZER MEDICATION (SUCH AS ALBUTEROL): ONE OR TWO TIMES PER DAY
QUESTION_5 LAST FOUR WEEKS HOW WOULD YOU RATE YOUR ASTHMA CONTROL: POORLY CONTROLLED
ACT_TOTALSCORE: 16
ACUTE_EXACERBATION_TODAY: NO

## 2023-02-08 ASSESSMENT — PAIN SCALES - GENERAL: PAINLEVEL: SEVERE PAIN (7)
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
## 2023-02-08 NOTE — PATIENT INSTRUCTIONS
Please monitor closely for symptoms.  You can try ice and heat to the muscles for relief.  Please followup early next week.  Please seek more immediate care if symptoms change or worsen in any way.
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
## 2023-02-15 ENCOUNTER — OFFICE VISIT (OUTPATIENT)
Dept: PEDIATRICS | Facility: CLINIC | Age: 63
End: 2023-02-15
Payer: COMMERCIAL

## 2023-02-15 ENCOUNTER — ANCILLARY PROCEDURE (OUTPATIENT)
Dept: GENERAL RADIOLOGY | Facility: CLINIC | Age: 63
End: 2023-02-15
Attending: PHYSICIAN ASSISTANT
Payer: COMMERCIAL

## 2023-02-15 VITALS
DIASTOLIC BLOOD PRESSURE: 74 MMHG | WEIGHT: 251.7 LBS | SYSTOLIC BLOOD PRESSURE: 120 MMHG | RESPIRATION RATE: 18 BRPM | BODY MASS INDEX: 40.45 KG/M2 | TEMPERATURE: 97.3 F | HEART RATE: 71 BPM | OXYGEN SATURATION: 98 % | HEIGHT: 66 IN

## 2023-02-15 DIAGNOSIS — M79.642 PAIN OF LEFT HAND: ICD-10-CM
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
## 2023-02-15 DIAGNOSIS — M19.042 OSTEOARTHRITIS OF LEFT HAND, UNSPECIFIED OSTEOARTHRITIS TYPE: ICD-10-CM

## 2023-02-15 PROCEDURE — 73130 X-RAY EXAM OF HAND: CPT | Mod: TC | Performed by: RADIOLOGY

## 2023-02-15 PROCEDURE — 99213 OFFICE O/P EST LOW 20 MIN: CPT | Performed by: PHYSICIAN ASSISTANT

## 2023-02-15 ASSESSMENT — PAIN SCALES - GENERAL: PAINLEVEL: NO PAIN (0)
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
## 2023-02-21 NOTE — RESULT ENCOUNTER NOTE
Note to staff: Please call the patient to explain results.    The results from your recent imaging:    Hand XR did not show signs of any broken bones.  There are arthritic changes that we discussed.  If you are still having trouble with the hand, Hand therapy may be a good option for you based on the XR findings.  Referral has been placed.       Thank you for choosing [facility] for your health care needs,      Odilia Morgan PA-C

## 2023-02-21 NOTE — TELEPHONE ENCOUNTER
Per result note:    Odilia Morgan PA-C   2/21/2023  8:46 AM CST Back to Top      Note to staff: Please call the patient to explain results.     The results from your recent imaging:     Hand XR did not show signs of any broken bones.  There are arthritic changes that we discussed.  If you are still having trouble with the hand, Hand therapy may be a good option for you based on the XR findings.  Referral has been placed.        Thank you for choosing [facility] for your health care needs,        Odilia Morgan PA-C     Called patient, advised of recommendation above. He will see if it improves.    JUDITH Campos on 2/21/2023 at 11:17 AM

## 2023-03-01 ENCOUNTER — OFFICE VISIT (OUTPATIENT)
Dept: PEDIATRICS | Facility: CLINIC | Age: 63
End: 2023-03-01
Payer: COMMERCIAL

## 2023-03-01 VITALS
OXYGEN SATURATION: 96 % | TEMPERATURE: 97.7 F | WEIGHT: 246.4 LBS | SYSTOLIC BLOOD PRESSURE: 128 MMHG | RESPIRATION RATE: 18 BRPM | DIASTOLIC BLOOD PRESSURE: 68 MMHG | BODY MASS INDEX: 39.77 KG/M2 | HEART RATE: 69 BPM

## 2023-03-01 DIAGNOSIS — J20.9 ACUTE BRONCHITIS WITH COEXISTING CONDITION REQUIRING PROPHYLACTIC TREATMENT: ICD-10-CM

## 2023-03-01 DIAGNOSIS — Z00.00 ROUTINE GENERAL MEDICAL EXAMINATION AT A HEALTH CARE FACILITY: Primary | ICD-10-CM

## 2023-03-01 DIAGNOSIS — E66.01 MORBID OBESITY (H): ICD-10-CM

## 2023-03-01 DIAGNOSIS — Z12.5 PROSTATE CANCER SCREENING: ICD-10-CM

## 2023-03-01 DIAGNOSIS — G43.809 OTHER MIGRAINE WITHOUT STATUS MIGRAINOSUS, NOT INTRACTABLE: ICD-10-CM

## 2023-03-01 DIAGNOSIS — I10 HTN, GOAL BELOW 140/90: ICD-10-CM

## 2023-03-01 DIAGNOSIS — F32.A DEPRESSION, UNSPECIFIED DEPRESSION TYPE: ICD-10-CM

## 2023-03-01 DIAGNOSIS — I72.9 ANEURYSM (H): ICD-10-CM

## 2023-03-01 DIAGNOSIS — K21.9 GASTROESOPHAGEAL REFLUX DISEASE WITHOUT ESOPHAGITIS: ICD-10-CM

## 2023-03-01 DIAGNOSIS — R53.81 PHYSICAL DECONDITIONING: ICD-10-CM

## 2023-03-01 DIAGNOSIS — F33.0 MAJOR DEPRESSIVE DISORDER, RECURRENT EPISODE, MILD (H): ICD-10-CM

## 2023-03-01 PROCEDURE — 99396 PREV VISIT EST AGE 40-64: CPT | Mod: 25 | Performed by: INTERNAL MEDICINE

## 2023-03-01 PROCEDURE — 90471 IMMUNIZATION ADMIN: CPT | Performed by: INTERNAL MEDICINE

## 2023-03-01 PROCEDURE — 90715 TDAP VACCINE 7 YRS/> IM: CPT | Performed by: INTERNAL MEDICINE

## 2023-03-01 RX ORDER — ATENOLOL 50 MG/1
50 TABLET ORAL DAILY
Qty: 90 TABLET | Refills: 3 | Status: SHIPPED | OUTPATIENT
Start: 2023-03-01 | End: 2024-03-06

## 2023-03-01 RX ORDER — LOSARTAN POTASSIUM 25 MG/1
25 TABLET ORAL DAILY
Qty: 90 TABLET | Refills: 3 | Status: SHIPPED | OUTPATIENT
Start: 2023-03-01 | End: 2024-05-06

## 2023-03-01 RX ORDER — TRAZODONE HYDROCHLORIDE 50 MG/1
50-100 TABLET, FILM COATED ORAL AT BEDTIME
Qty: 180 TABLET | Refills: 3 | Status: SHIPPED | OUTPATIENT
Start: 2023-03-01 | End: 2024-03-04

## 2023-03-01 RX ORDER — SUMATRIPTAN 50 MG/1
TABLET, FILM COATED ORAL
Qty: 18 TABLET | Refills: 3 | Status: SHIPPED | OUTPATIENT
Start: 2023-03-01

## 2023-03-01 RX ORDER — ALBUTEROL SULFATE 90 UG/1
2 AEROSOL, METERED RESPIRATORY (INHALATION) EVERY 6 HOURS PRN
Qty: 36 G | Refills: 3 | Status: SHIPPED | OUTPATIENT
Start: 2023-03-01

## 2023-03-01 RX ORDER — SERTRALINE HYDROCHLORIDE 100 MG/1
TABLET, FILM COATED ORAL
Qty: 135 TABLET | Refills: 1 | Status: SHIPPED | OUTPATIENT
Start: 2023-03-01 | End: 2023-05-11

## 2023-03-01 ASSESSMENT — ENCOUNTER SYMPTOMS
PALPITATIONS: 0
FEVER: 0
CHILLS: 0
JOINT SWELLING: 1
NAUSEA: 0
HEADACHES: 0
DIARRHEA: 1
SHORTNESS OF BREATH: 0
SORE THROAT: 0
DYSURIA: 0
DIZZINESS: 0
ARTHRALGIAS: 1
HEMATOCHEZIA: 0
NERVOUS/ANXIOUS: 1
WEAKNESS: 0
FREQUENCY: 1
ABDOMINAL PAIN: 0
HEARTBURN: 0
HEMATURIA: 0
EYE PAIN: 0
COUGH: 0
MYALGIAS: 0
CONSTIPATION: 0
PARESTHESIAS: 1

## 2023-03-01 ASSESSMENT — ANXIETY QUESTIONNAIRES
GAD7 TOTAL SCORE: 6
7. FEELING AFRAID AS IF SOMETHING AWFUL MIGHT HAPPEN: SEVERAL DAYS
GAD7 TOTAL SCORE: 6
5. BEING SO RESTLESS THAT IT IS HARD TO SIT STILL: NOT AT ALL
3. WORRYING TOO MUCH ABOUT DIFFERENT THINGS: SEVERAL DAYS
6. BECOMING EASILY ANNOYED OR IRRITABLE: SEVERAL DAYS
4. TROUBLE RELAXING: SEVERAL DAYS
8. IF YOU CHECKED OFF ANY PROBLEMS, HOW DIFFICULT HAVE THESE MADE IT FOR YOU TO DO YOUR WORK, TAKE CARE OF THINGS AT HOME, OR GET ALONG WITH OTHER PEOPLE?: SOMEWHAT DIFFICULT
IF YOU CHECKED OFF ANY PROBLEMS ON THIS QUESTIONNAIRE, HOW DIFFICULT HAVE THESE PROBLEMS MADE IT FOR YOU TO DO YOUR WORK, TAKE CARE OF THINGS AT HOME, OR GET ALONG WITH OTHER PEOPLE: SOMEWHAT DIFFICULT
GAD7 TOTAL SCORE: 6
1. FEELING NERVOUS, ANXIOUS, OR ON EDGE: SEVERAL DAYS
2. NOT BEING ABLE TO STOP OR CONTROL WORRYING: SEVERAL DAYS
7. FEELING AFRAID AS IF SOMETHING AWFUL MIGHT HAPPEN: SEVERAL DAYS

## 2023-03-01 ASSESSMENT — PAIN SCALES - GENERAL: PAINLEVEL: MILD PAIN (2)

## 2023-03-01 ASSESSMENT — PATIENT HEALTH QUESTIONNAIRE - PHQ9
10. IF YOU CHECKED OFF ANY PROBLEMS, HOW DIFFICULT HAVE THESE PROBLEMS MADE IT FOR YOU TO DO YOUR WORK, TAKE CARE OF THINGS AT HOME, OR GET ALONG WITH OTHER PEOPLE: SOMEWHAT DIFFICULT
SUM OF ALL RESPONSES TO PHQ QUESTIONS 1-9: 5
SUM OF ALL RESPONSES TO PHQ QUESTIONS 1-9: 5

## 2023-03-01 ASSESSMENT — ASTHMA QUESTIONNAIRES: ACT_TOTALSCORE: 22
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
## 2023-03-01 NOTE — LETTER
My Asthma Action Plan    Name: Fabio Logan   YOB: 1960  Date: 3/1/2023   My doctor: Manoj Armenta MD   My clinic: [facility]        My Rescue Medicine:   Albuterol inhaler (Proair/Ventolin/Proventil HFA)  2-4 puffs EVERY 4 HOURS as needed. Use a spacer if recommended by your provider.   My Asthma Severity:   Intermittent / Exercise Induced  Know your asthma triggers: upper respiratory infections             GREEN ZONE   Good Control    I feel good    No cough or wheeze    Can work, sleep and play without asthma symptoms       Take your asthma control medicine every day.     1. If exercise triggers your asthma, take your rescue medication    15 minutes before exercise or sports, and    During exercise if you have asthma symptoms  2. Spacer to use with inhaler: If you have a spacer, make sure to use it with your inhaler             YELLOW ZONE Getting Worse  I have ANY of these:    I do not feel good    Cough or wheeze    Chest feels tight    Wake up at night   1. Keep taking your Green Zone medications  2. Start taking your rescue medicine:    every 20 minutes for up to 1 hour. Then every 4 hours for 24-48 hours.  3. If you stay in the Yellow Zone for more than 12-24 hours, contact your doctor.  4. If you do not return to the Green Zone in 12-24 hours or you get worse, start taking your oral steroid medicine if prescribed by your provider.           RED ZONE Medical Alert - Get Help  I have ANY of these:    I feel awful    Medicine is not helping    Breathing getting harder    Trouble walking or talking    Nose opens wide to breathe       1. Take your rescue medicine NOW  2. If your provider has prescribed an oral steroid medicine, start taking it NOW  3. Call your doctor NOW  4. If you are still in the Red Zone after 20 minutes and you have not reached your doctor:    Take your rescue medicine again and    Call 911 or go to the emergency room right away    See your  regular doctor within 2 weeks of an Emergency Room or Urgent Care visit for follow-up treatment.          Annual Reminders:  Meet with Asthma Educator,  Flu Shot in the Fall, consider Pneumonia Vaccination for patients with asthma (aged 19 and older).    Pharmacy:    [id number] - [place], [place] - [address]  WRITTEN PRESCRIPTION REQUESTED    Electronically signed by Manoj Armenta MD   Date: 03/01/23                    Asthma Triggers  How To Control Things That Make Your Asthma Worse    Triggers are things that make your asthma worse.  Look at the list below to help you find your triggers and   what you can do about them. You can help prevent asthma flare-ups by staying away from your triggers.      Trigger                                                          What you can do   Cigarette Smoke  Tobacco smoke can make asthma worse. Do not allow smoking in your home, car or around you.  Be sure no one smokes at a child s day care or school.  If you smoke, ask your health care provider for ways to help you quit.  Ask family members to quit too.  Ask your health care provider for a referral to Quit Plan to help you quit smoking, or call 2-496-289-PLAN.     Colds, Flu, Bronchitis  These are common triggers of asthma. Wash your hands often.  Don t touch your eyes, nose or mouth.  Get a flu shot every year.     Dust Mites  These are tiny bugs that live in cloth or carpet. They are too small to see. Wash sheets and blankets in hot water every week.   Encase pillows and mattress in dust mite proof covers.  Avoid having carpet if you can. If you have carpet, vacuum weekly.   Use a dust mask and HEPA vacuum.   Pollen and Outdoor Mold  Some people are allergic to trees, grass, or weed pollen, or molds. Try to keep your windows closed.  Limit time out doors when pollen count is high.   Ask you health care provider about taking medicine during allergy season.     Animal Dander  Some people are allergic to  skin flakes, urine or saliva from pets with fur or feathers. Keep pets with fur or feathers out of your home.    If you can t keep the pet outdoors, then keep the pet out of your bedroom.  Keep the bedroom door closed.  Keep pets off cloth furniture and away from stuffed toys.     Mice, Rats, and Cockroaches  Some people are allergic to the waste from these pests.   Cover food and garbage.  Clean up spills and food crumbs.  Store grease in the refrigerator.   Keep food out of the bedroom.   Indoor Mold  This can be a trigger if your home has high moisture. Fix leaking faucets, pipes, or other sources of water.   Clean moldy surfaces.  Dehumidify basement if it is damp and smelly.   Smoke, Strong Odors, and Sprays  These can reduce air quality. Stay away from strong odors and sprays, such as perfume, powder, hair spray, paints, smoke incense, paint, cleaning products, candles and new carpet.   Exercise or Sports  Some people with asthma have this trigger. Be active!  Ask your doctor about taking medicine before sports or exercise to prevent symptoms.    Warm up for 5-10 minutes before and after sports or exercise.     Other Triggers of Asthma  Cold air:  Cover your nose and mouth with a scarf.  Sometimes laughing or crying can be a trigger.  Some medicines and food can trigger asthma.
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
## 2023-03-10 ENCOUNTER — THERAPY VISIT (OUTPATIENT)
Dept: OCCUPATIONAL THERAPY | Facility: CLINIC | Age: 63
End: 2023-03-10
Attending: PHYSICIAN ASSISTANT
Payer: COMMERCIAL

## 2023-03-10 DIAGNOSIS — M79.642 PAIN OF LEFT HAND: ICD-10-CM

## 2023-03-10 DIAGNOSIS — M19.042 OSTEOARTHRITIS OF LEFT HAND, UNSPECIFIED OSTEOARTHRITIS TYPE: ICD-10-CM

## 2023-03-10 PROCEDURE — 97110 THERAPEUTIC EXERCISES: CPT | Mod: GO | Performed by: OCCUPATIONAL THERAPIST

## 2023-03-10 PROCEDURE — 97140 MANUAL THERAPY 1/> REGIONS: CPT | Mod: GO | Performed by: OCCUPATIONAL THERAPIST

## 2023-03-10 PROCEDURE — 97165 OT EVAL LOW COMPLEX 30 MIN: CPT | Mod: GO | Performed by: OCCUPATIONAL THERAPIST

## 2023-03-13 NOTE — PROGRESS NOTES
"SOAP Note Objective Information for 3/16/2023    Diagnosis: Left Hand Pain  DOI: 2/7/23 (MD order 2/15/23)  Post:  1 Month    Pain Level (Scale 0-10)   3/10/2023 3/16/23   At Rest 1-2 1   With Use 6 4-5     Pain Description  Date 3/10/2023   Location Left - Small Finger, hypothenars, into medial elbow   Pain Quality \"feels like the joint is not right\" aggravating pain, difficult to explain   Frequency Intermittent, daily, constant   Pain is worst daytime   Exacerbated by Spreading fingers, weight bearing into hand, driving/gripping the steering wheel   Relieved by rest   Progression Unchanged, fluctuates     ROM  Hand 3/10/2023 3/10/2023 3/16/23   AROM(PROM) Right Left Left   Index MP /75 /70 NT   PIP /85 +/77    DIP /55 /52    BRADFORD      Long MP /80 /70 NT   PIP +/88 +/85    DIP /65 /70    BR[place]FORD      Ring MP /80 /52 /75   PIP +/85 +/85 /90   DIP /75 /70 /60   BRADFORD      Small MP /75 /45 /75   PIP /82 /75 /90   DIP /60 /67 /52   BRADFORD        Home Program:  Epsom Salt Soaks  Lakhwinder strap RF to SF  STM to hypothenars and flexors with golf ball  FA flexor stretch  Tendon gliding fist series  Ice     Next Visit:  Laser trial to dorsal RF/SF MCP, hypothenars   STM  Add hook fist with marker?  Future visits: thumb stability     Please refer to the daily flowsheet for treatment today, total treatment time and time spent performing 1:1 timed codes.       "

## 2023-03-16 ENCOUNTER — THERAPY VISIT (OUTPATIENT)
Dept: OCCUPATIONAL THERAPY | Facility: CLINIC | Age: 63
End: 2023-03-16
Payer: COMMERCIAL

## 2023-03-16 DIAGNOSIS — M79.642 PAIN OF LEFT HAND: Primary | ICD-10-CM

## 2023-03-16 DIAGNOSIS — M19.042 OSTEOARTHRITIS OF LEFT HAND, UNSPECIFIED OSTEOARTHRITIS TYPE: ICD-10-CM

## 2023-03-16 PROCEDURE — 97140 MANUAL THERAPY 1/> REGIONS: CPT | Mod: GO | Performed by: OCCUPATIONAL THERAPIST

## 2023-03-16 PROCEDURE — 97026 INFRARED THERAPY: CPT | Mod: GO | Performed by: OCCUPATIONAL THERAPIST

## 2023-03-16 PROCEDURE — 97110 THERAPEUTIC EXERCISES: CPT | Mod: GO | Performed by: OCCUPATIONAL THERAPIST

## 2023-03-17 ENCOUNTER — NURSE TRIAGE (OUTPATIENT)
Dept: PEDIATRICS | Facility: CLINIC | Age: 63
End: 2023-03-17
Payer: COMMERCIAL

## 2023-03-17 ENCOUNTER — OFFICE VISIT (OUTPATIENT)
Dept: URGENT CARE | Facility: URGENT CARE | Age: 63
End: 2023-03-17
Payer: COMMERCIAL

## 2023-03-17 VITALS
HEART RATE: 63 BPM | DIASTOLIC BLOOD PRESSURE: 91 MMHG | OXYGEN SATURATION: 97 % | SYSTOLIC BLOOD PRESSURE: 163 MMHG | RESPIRATION RATE: 16 BRPM | TEMPERATURE: 97.9 F

## 2023-03-17 DIAGNOSIS — R05.1 ACUTE COUGH: ICD-10-CM

## 2023-03-17 DIAGNOSIS — J45.21 MILD INTERMITTENT ASTHMA WITH EXACERBATION: Primary | ICD-10-CM

## 2023-03-17 LAB
FLUAV AG SPEC QL IA: NEGATIVE
FLUBV AG SPEC QL IA: NEGATIVE

## 2023-03-17 PROCEDURE — U0003 INFECTIOUS AGENT DETECTION BY NUCLEIC ACID (DNA OR RNA); SEVERE ACUTE RESPIRATORY SYNDROME CORONAVIRUS 2 (SARS-COV-2) (CORONAVIRUS DISEASE [COVID-19]), AMPLIFIED PROBE TECHNIQUE, MAKING USE OF HIGH THROUGHPUT TECHNOLOGIES AS DESCRIBED BY CMS-2020-01-R: HCPCS | Performed by: FAMILY MEDICINE

## 2023-03-17 PROCEDURE — 99214 OFFICE O/P EST MOD 30 MIN: CPT | Mod: CS | Performed by: FAMILY MEDICINE

## 2023-03-17 PROCEDURE — U0005 INFEC AGEN DETEC AMPLI PROBE: HCPCS | Performed by: FAMILY MEDICINE

## 2023-03-17 PROCEDURE — 87804 INFLUENZA ASSAY W/OPTIC: CPT | Performed by: FAMILY MEDICINE

## 2023-03-17 RX ORDER — PREDNISONE 20 MG/1
TABLET ORAL
Qty: 30 TABLET | Refills: 0 | Status: SHIPPED | OUTPATIENT
Start: 2023-03-17 | End: 2023-05-11

## 2023-03-17 NOTE — TELEPHONE ENCOUNTER
"    Reason for Disposition    MILD difficulty breathing (e.g., minimal/no SOB at rest, SOB with walking, pulse < 100) of new-onset or worse than normal    Additional Information    Negative: SEVERE difficulty breathing (e.g., struggling for each breath, speaks in single words, pulse > 120)    Negative: Breathing stopped and hasn't returned    Negative: Choking on something    Negative: Bluish (or gray) lips or face    Negative: Difficult to awaken or acting confused (e.g., disoriented, slurred speech)    Negative: Passed out (i.e., fainted, collapsed and was not responding)    Negative: Wheezing started suddenly after medicine, an allergic food, or bee sting    Negative: Stridor    Negative: Slow, shallow and weak breathing    Negative: Sounds like a life-threatening emergency to the triager    Negative: Chest pain    Negative: Wheezing (high pitched whistling sound) and previous asthma attacks or use of asthma medicines    Negative: Difficulty breathing and within 14 days of COVID-19 Exposure    Negative: Difficulty breathing and only present when coughing    Negative: Difficulty breathing and only from stuffy nose    Negative: Difficulty breathing and only from stuffy nose or runny nose from common cold    Negative: MODERATE difficulty breathing (e.g., speaks in phrases, SOB even at rest, pulse 100-120) of new-onset or worse than normal    Negative: Oxygen level (e.g., pulse oximetry) 90 percent or lower    Negative: Wheezing can be heard across the room    Negative: Drooling or spitting out saliva (because can't swallow)    Negative: Any history of prior \"blood clot\" in leg or lungs    Negative: Illness requiring prolonged bedrest in past month (e.g., immobilization, long hospital stay)    Negative: Hip or leg fracture (broken bone) in past month (or had cast on leg or ankle in past month)    Negative: Major surgery in the past month    Negative: Long-distance travel in past month (e.g., car, bus, train, plane; " "with trip lasting 6 or more hours)    Negative: Cancer treatment in past six months (or has cancer now)    Negative: Extra heart beats OR irregular heart beating (i.e., \"palpitations\")    Negative: Fever > 103 F (39.4 C)    Negative: Fever > 101 F (38.3 C) and over 60 years of age    Negative: Fever > 100.0 F (37.8 C) and bedridden (e.g., nursing home patient, stroke, chronic illness, recovering from surgery)    Negative: Fever > 100.0 F (37.8 C) and diabetes mellitus or weak immune system (e.g., HIV positive, cancer chemo, splenectomy, organ transplant, chronic steroids)    Negative: Periods where breathing stops and then resumes normally and bedridden (e.g., nursing home patient, CVA)    Negative: Pregnant or postpartum (from 0 to 6 weeks after delivery)    Negative: Patient sounds very sick or weak to the triager    Answer Assessment - Initial Assessment Questions  1. RESPIRATORY STATUS: \"Describe your breathing?\" (e.g., wheezing, shortness of breath, unable to speak, severe coughing)       Wheezing, short of breath and dizzy, states disoriented when walks, severe coughing  2. ONSET: \"When did this breathing problem begin?\"       2 weeks or more ago  3. PATTERN \"Does the difficult breathing come and go, or has it been constant since it started?\"       constantt  4. SEVERITY: \"How bad is your breathing?\" (e.g., mild, moderate, severe)     - MILD: No SOB at rest, mild SOB with walking, speaks normally in sentences, can lie down, no retractions, pulse < 100.     - MODERATE: SOB at rest, SOB with minimal exertion and prefers to sit, cannot lie down flat, speaks in phrases, mild retractions, audible wheezing, pulse 100-120.     - SEVERE: Very SOB at rest, speaks in single words, struggling to breathe, sitting hunched forward, retractions, pulse > 120       moderate  5. RECURRENT SYMPTOM: \"Have you had difficulty breathing before?\" If Yes, ask: \"When was the last time?\" and \"What happened that time?\"       Yes, does " "not remember  6. CARDIAC HISTORY: \"Do you have any history of heart disease?\" (e.g., heart attack, angina, bypass surgery, angioplasty)       no  7. LUNG HISTORY: \"Do you have any history of lung disease?\"  (e.g., pulmonary embolus, asthma, emphysema)      ? asthma  8. CAUSE: \"What do you think is causing the breathing problem?\"       virus  9. OTHER SYMPTOMS: \"Do you have any other symptoms? (e.g., dizziness, runny nose, cough, chest pain, fever)      Chest pain, see aboe  10. O2 SATURATION MONITOR:  \"Do you use an oxygen saturation monitor (pulse oximeter) at home?\" If Yes, \"What is your reading (oxygen level) today?\" \"What is your usual oxygen saturation reading?\" (e.g., 95%)        93-94%  11. PREGNANCY: \"Is there any chance you are pregnant?\" \"When was your last menstrual period?\"        n/a  12. TRAVEL: \"Have you traveled out of the country in the last month?\" (e.g., travel history, exposures)        no    Protocols used: BREATHING DIFFICULTY-A-OH      "

## 2023-03-17 NOTE — PATIENT INSTRUCTIONS
Increase the frequency of the Albuterol inhaler to two puffs every 4-6 hours as needed for cough/shortness of breath/chest tightness/wheezing.      Go to the emergency room if you develop severe, worsening shortness of breath.

## 2023-03-17 NOTE — PROGRESS NOTES
SUBJECTIVE:   Fabio Logan is a 62 year old male with a history of multiple medical problems including mild intermittent asthma.  He is presenting with a chief complaint of severe cough (nonproductive, getting better during the day, but worse with exertion and at night), wheezing (when breathing out), shortness of breath (worse with walking and mild exertion), feeling fatigued with work.   There has been off-and-on chest tightness for the past three weeks.      Onset of symptoms was three weeks ago.  Course of illness is worsening. .    Current and Associated symptoms:   No stuffy nose/runny nose  No fevers  No bluish lips/toes/fingers  No loss of smell/taste  There has been pressure at the forehead.  No vomiting  There has been some diarrhea off and on for years.      Treatment measures tried include Albuterol inhaler (about once a day over the past week), Mucinex, Princess-Avella Plus.    .  Predisposing factors include none.  No sick contacts       No new leg swelling.      Past Medical History:   Diagnosis Date     Anxiety      Depression      Diverticulitis      h/o Mumps      Hypertension      Kidney stone      Testicular cancer - Right 1998   Mild Intermittent Asthma      Current Outpatient Medications   Medication Sig Dispense Refill     albuterol (PROAIR HFA/PROVENTIL HFA/VENTOLIN HFA) 108 (90 Base) MCG/ACT inhaler Inhale 2 puffs into the lungs every 6 hours as needed for shortness of breath or wheezing 36 g 3     atenolol (TENORMIN) 50 MG tablet Take 1 tablet (50 mg) by mouth daily 90 tablet 3     ketoconazole (NIZORAL) 2 % external cream Apply topically daily 60 g 3     losartan (COZAAR) 25 MG tablet Take 1 tablet (25 mg) by mouth daily 90 tablet 3     omeprazole (PRILOSEC) 20 MG DR capsule Take 1 capsule (20 mg) by mouth daily 90 capsule 3     OXcarbazepine (TRILEPTAL) 300 MG tablet Take 1.5 tablets (450 mg) by mouth 2 times daily 270 tablet 3     sertraline (ZOLOFT) 100 MG tablet TAKE ONE AND ONE-HALF  TABLETS BY MOUTH DAILY. 135 tablet 1     SUMAtriptan (IMITREX) 50 MG tablet Take 1 tablet (50 mg) by mouth at onset of headache for migraine (may repeat dose in 2 hours. Do not exceed 200mg in 24 hours Strength: 50 mg 18 tablet 3     traZODone (DESYREL) 50 MG tablet Take 1-2 tablets ( mg) by mouth At Bedtime 180 tablet 3     triamcinolone (KENALOG) 0.1 % ointment Apply topically 2 times daily as needed for irritation       Social History     Tobacco Use     Smoking status: Former     Types: Cigarettes     Quit date: 1997     Years since quittin.7     Smokeless tobacco: Former   Substance Use Topics     Alcohol use: No     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       ROS:  CONSTITUTIONAL:negative for fevers.    INTEGUMENTARY/SKIN: no cyanosis.    RESP: positive for shortness of breath, cough, wheezing.      OBJECTIVE:  BP (!) 163/91 (BP Location: Right arm)   Pulse 63   Temp 97.9  F (36.6  C) (Oral)   Resp 16   SpO2 97%   GENERAL APPEARANCE: healthy, alert and no distress.  Patient can speak in full sentences without difficulty.    HENT: TM's normal bilaterally, nasal turbinates erythematous, swollen and oral mucous membranes moist, no erythema noted  RESP: There are expiratory wheezes present in all lung fields.    CV: regular rates and rhythm, normal S1 S2, no murmur noted  SKIN: no cyanosis/pallor.    LEGS:  No edema/cyanosis in the lower legs.      LAB:    Results for orders placed or performed in visit on 23   Influenza A & B Antigen - Clinic Collect     Status: Normal    Specimen: Nasopharyngeal; Swab   Result Value Ref Range    Influenza A antigen Negative Negative    Influenza B antigen Negative Negative    Narrative    Test results must be correlated with clinical data. If necessary, results should be confirmed by a molecular assay or viral culture.         ASSESSMENT:  Cough  Mild Intermittent Asthma with Acute Exacerbation    PLAN:  Increase the frequency of the Albuterol MDI to two puffs  every 4-6 hours PRN   Rx:  Prednisone  follow up if not better in 4 days.    Go to the emergency room if developing severe, worsening shortness of breath.     Darryl Youngblood MD

## 2023-03-18 LAB — SARS-COV-2 RNA RESP QL NAA+PROBE: NEGATIVE

## 2023-03-24 ENCOUNTER — LAB (OUTPATIENT)
Dept: LAB | Facility: CLINIC | Age: 63
End: 2023-03-24
Payer: COMMERCIAL

## 2023-03-24 DIAGNOSIS — Z12.5 PROSTATE CANCER SCREENING: ICD-10-CM

## 2023-03-24 DIAGNOSIS — I72.9 ANEURYSM (H): ICD-10-CM

## 2023-03-24 DIAGNOSIS — Z00.00 ROUTINE GENERAL MEDICAL EXAMINATION AT A HEALTH CARE FACILITY: ICD-10-CM

## 2023-03-24 DIAGNOSIS — I10 HTN, GOAL BELOW 140/90: ICD-10-CM

## 2023-03-24 DIAGNOSIS — E66.01 MORBID OBESITY (H): ICD-10-CM

## 2023-03-24 LAB
ALBUMIN SERPL BCG-MCNC: 4.4 G/DL (ref 3.5–5.2)
ALP SERPL-CCNC: 91 U/L (ref 40–129)
ALT SERPL W P-5'-P-CCNC: 30 U/L (ref 10–50)
ANION GAP SERPL CALCULATED.3IONS-SCNC: 10 MMOL/L (ref 7–15)
AST SERPL W P-5'-P-CCNC: 22 U/L (ref 10–50)
BILIRUB SERPL-MCNC: 0.3 MG/DL
BUN SERPL-MCNC: 24.4 MG/DL (ref 8–23)
CALCIUM SERPL-MCNC: 10.3 MG/DL (ref 8.8–10.2)
CHLORIDE SERPL-SCNC: 102 MMOL/L (ref 98–107)
CHOLEST SERPL-MCNC: 216 MG/DL
CREAT SERPL-MCNC: 0.83 MG/DL (ref 0.67–1.17)
DEPRECATED HCO3 PLAS-SCNC: 27 MMOL/L (ref 22–29)
GFR SERPL CREATININE-BSD FRML MDRD: >90 ML/MIN/1.73M2
GLUCOSE SERPL-MCNC: 102 MG/DL (ref 70–99)
HDLC SERPL-MCNC: 54 MG/DL
LDLC SERPL CALC-MCNC: 129 MG/DL
NONHDLC SERPL-MCNC: 162 MG/DL
POTASSIUM SERPL-SCNC: 4 MMOL/L (ref 3.4–5.3)
PROT SERPL-MCNC: 7.6 G/DL (ref 6.4–8.3)
PSA SERPL DL<=0.01 NG/ML-MCNC: 0.13 NG/ML (ref 0–4.5)
SODIUM SERPL-SCNC: 139 MMOL/L (ref 136–145)
TRIGL SERPL-MCNC: 163 MG/DL

## 2023-03-24 PROCEDURE — 36415 COLL VENOUS BLD VENIPUNCTURE: CPT

## 2023-03-24 PROCEDURE — G0103 PSA SCREENING: HCPCS

## 2023-03-24 PROCEDURE — 80053 COMPREHEN METABOLIC PANEL: CPT

## 2023-03-24 PROCEDURE — 80061 LIPID PANEL: CPT

## 2023-03-28 NOTE — PROGRESS NOTES
"SOAP Note Objective Information for 3/31/2023    Diagnosis: Left Hand Pain  DOI: 2/7/23 (MD order 2/15/23)  Post:  1 Month    Pain Level (Scale 0-10)   3/10/2023 3/16/23 3/31/23   At Rest 1-2 1 1   With Use 6 4-5 2-4     Pain Description  Date 3/10/2023   Location Left - Small Finger, hypothenars, into medial elbow   Pain Quality \"feels like the joint is not right\" aggravating pain, difficult to explain   Frequency Intermittent, daily, constant   Pain is worst daytime   Exacerbated by Spreading fingers, weight bearing into hand, driving/gripping the steering wheel   Relieved by rest   Progression Unchanged, fluctuates     ROM  Hand 3/10/2023 3/10/2023 3/16/23 3/31/23   AROM(PROM) Right Left Left Left   Index MP /75 /70 NT    PIP /85 +/77     DIP /55 /52     BR[place]FORD       Long MP /80 /70 NT    PIP +/88 +/85     DIP /65 /70     [facility]ORD       Ring MP /80 /52 /75 /80   PIP +/85 +/85 /90 /95   DIP /75 /70 /60 /73   BR[facility]ORD       Small MP /75 /45 /75 /80   PIP /82 /75 /90 /85   DIP /60 /67 /52 /73   BRADFORD         Home Program:  Epsom Salt Soaks  Lakhwinder strap RF to SF  STM to hypothenars and flexors with golf ball  FA flexor stretch  Tendon gliding fist series  Hook fist with marker  Ice     Next Visit:  Check response to hook fist with marker  Laser to dorsal RF/SF MCP, hypothenars   STM  Progress to gentle isometric  if able to do without pain  Future visits: thumb stability     Please refer to the daily flowsheet for treatment today, total treatment time and time spent performing 1:1 timed codes.       "

## 2023-03-31 ENCOUNTER — THERAPY VISIT (OUTPATIENT)
Dept: OCCUPATIONAL THERAPY | Facility: CLINIC | Age: 63
End: 2023-03-31
Payer: COMMERCIAL

## 2023-03-31 DIAGNOSIS — M19.042 OSTEOARTHRITIS OF LEFT HAND, UNSPECIFIED OSTEOARTHRITIS TYPE: ICD-10-CM

## 2023-03-31 DIAGNOSIS — M79.642 PAIN OF LEFT HAND: Primary | ICD-10-CM

## 2023-03-31 PROCEDURE — 97110 THERAPEUTIC EXERCISES: CPT | Mod: GO | Performed by: OCCUPATIONAL THERAPIST

## 2023-03-31 PROCEDURE — 97140 MANUAL THERAPY 1/> REGIONS: CPT | Mod: GO | Performed by: OCCUPATIONAL THERAPIST

## 2023-04-14 NOTE — PROGRESS NOTES
"SOAP Note Objective Information for 4/18/2023    Diagnosis: Left Hand Pain  DOI: 2/7/23 (MD order 2/15/23)  Post:  1 Month    Pain Level (Scale 0-10)   3/10/2023 3/16/23 3/31/23 4/18/23   At Rest 1-2 1 1 4-5   With Use 6 4-5 2-4 6-7     Pain Description  Date 3/10/2023   Location Left - Small Finger, hypothenars, into medial elbow   Pain Quality \"feels like the joint is not right\" aggravating pain, difficult to explain   Frequency Intermittent, daily, constant   Pain is worst daytime   Exacerbated by Spreading fingers, weight bearing into hand, driving/gripping the steering wheel   Relieved by rest   Progression Unchanged, fluctuates     ROM  Hand 3/10/2023 3/10/2023 3/16/23 3/31/23 4/18/23   AROM(PROM) Right Left Left Left Left   Index MP /75 /70 NT     PIP /85 +/77      DIP /55 /52      BR[place]ORD        Long MP /80 /70 NT     PIP +/88 +/85      DIP /65 /70      [facility]        Ring MP /80 /52 /75 /80 /80   PIP +/85 +/85 /90 /95 /95   DIP /75 /70 /60 /73 /75   [facility]ORD        Small MP /75 /45 /75 /80 /75   PIP /82 /75 /90 /85 /90   DIP /60 /67 /52 /73 /75   BRADFORD          Strength   (Measured in pounds)  Pain Report: - none  + mild    ++ moderate    +++ severe    3/10/2023 3/10/2023 4/18/23   Trials Right Left Left   1  2  3 60 40+ 55+   Average 60 40 55     Home Program:  Epsom Salt Soaks  Lakhwinder strap RF to SF  STM to hypothenars and flexors with golf ball  FA flexor stretch  Tendon gliding fist series  Hook fist with marker  Ice     Next Visit:  Check response to more consistency at home with HEP - if not improving, consider referral to ortho  Laser to dorsal RF/SF MCP, hypothenars   STM  Progress to gentle isometric  if able to do without pain  Future visits: thumb stability     Please refer to the daily flowsheet for treatment today, total treatment time and time spent performing 1:1 timed codes.       "

## 2023-04-18 ENCOUNTER — THERAPY VISIT (OUTPATIENT)
Dept: OCCUPATIONAL THERAPY | Facility: CLINIC | Age: 63
End: 2023-04-18
Payer: COMMERCIAL

## 2023-04-18 DIAGNOSIS — M79.642 PAIN OF LEFT HAND: Primary | ICD-10-CM

## 2023-04-18 DIAGNOSIS — M19.042 OSTEOARTHRITIS OF LEFT HAND, UNSPECIFIED OSTEOARTHRITIS TYPE: ICD-10-CM

## 2023-04-18 PROCEDURE — 97140 MANUAL THERAPY 1/> REGIONS: CPT | Mod: GO | Performed by: OCCUPATIONAL THERAPIST

## 2023-04-18 PROCEDURE — 97110 THERAPEUTIC EXERCISES: CPT | Mod: GO | Performed by: OCCUPATIONAL THERAPIST

## 2023-04-18 PROCEDURE — 97026 INFRARED THERAPY: CPT | Mod: GO | Performed by: OCCUPATIONAL THERAPIST

## 2023-04-27 NOTE — PROGRESS NOTES
"SOAP Note Objective Information for 5/1/2023    Diagnosis: Left Hand Pain  DOI: 2/7/23 (MD order 2/15/23)  Post:  1 Month    Pain Level (Scale 0-10)   3/10/2023 3/16/23 3/31/23 4/18/23 5/1/23   At Rest 1-2 1 1 4-5 1-3   With Use 6 4-5 2-4 6-7 5-6     Pain Description  Date 3/10/2023   Location Left - Small Finger, hypothenars, into medial elbow   Pain Quality \"feels like the joint is not right\" aggravating pain, difficult to explain   Frequency Intermittent, daily, constant   Pain is worst daytime   Exacerbated by Spreading fingers, weight bearing into hand, driving/gripping the steering wheel   Relieved by rest   Progression Unchanged, fluctuates       Home Program:  Epsom Salt Soaks  Lakhwinder strap RF to SF  STM to hypothenars and flexors with golf ball  FA flexor stretch  Tendon gliding fist series  Hook fist with marker  Ice     Next Visit:  Patient will work on independent HEP - if not improving, recommend reaching out to referring MD for a referral to ortho   Laser to dorsal RF/SF MCP, hypothenars   STM  Progress to gentle isometric  if able to do without pain   Future visits: thumb stability     Please refer to the daily flowsheet for treatment today, total treatment time and time spent performing 1:1 timed codes.       "

## 2023-04-28 ENCOUNTER — APPOINTMENT (OUTPATIENT)
Dept: GENERAL RADIOLOGY | Facility: CLINIC | Age: 63
End: 2023-04-28
Attending: EMERGENCY MEDICINE
Payer: COMMERCIAL

## 2023-04-28 ENCOUNTER — HOSPITAL ENCOUNTER (EMERGENCY)
Facility: CLINIC | Age: 63
Discharge: HOME | End: 2023-04-28
Attending: EMERGENCY MEDICINE | Admitting: EMERGENCY MEDICINE
Payer: COMMERCIAL

## 2023-04-28 VITALS
TEMPERATURE: 98.1 F | SYSTOLIC BLOOD PRESSURE: 149 MMHG | HEART RATE: 54 BPM | RESPIRATION RATE: 20 BRPM | OXYGEN SATURATION: 100 % | DIASTOLIC BLOOD PRESSURE: 80 MMHG

## 2023-04-28 DIAGNOSIS — R07.9 CHEST PAIN, UNSPECIFIED TYPE: ICD-10-CM

## 2023-04-28 LAB
ALBUMIN SERPL BCG-MCNC: 4.3 G/DL (ref 3.5–5.2)
ALP SERPL-CCNC: 100 U/L (ref 40–129)
ALT SERPL W P-5'-P-CCNC: 23 U/L (ref 10–50)
ANION GAP SERPL CALCULATED.3IONS-SCNC: 8 MMOL/L (ref 7–15)
AST SERPL W P-5'-P-CCNC: 32 U/L (ref 10–50)
ATRIAL RATE - MUSE: 65 BPM
BASOPHILS # BLD AUTO: 0 10E3/UL (ref 0–0.2)
BASOPHILS NFR BLD AUTO: 1 %
BILIRUB DIRECT SERPL-MCNC: <0.2 MG/DL (ref 0–0.3)
BILIRUB SERPL-MCNC: 0.3 MG/DL
BUN SERPL-MCNC: 15.7 MG/DL (ref 8–23)
CALCIUM SERPL-MCNC: 10.1 MG/DL (ref 8.8–10.2)
CHLORIDE SERPL-SCNC: 101 MMOL/L (ref 98–107)
CREAT SERPL-MCNC: 0.83 MG/DL (ref 0.67–1.17)
DEPRECATED HCO3 PLAS-SCNC: 28 MMOL/L (ref 22–29)
DIASTOLIC BLOOD PRESSURE - MUSE: NORMAL MMHG
EOSINOPHIL # BLD AUTO: 0.1 10E3/UL (ref 0–0.7)
EOSINOPHIL NFR BLD AUTO: 1 %
ERYTHROCYTE [DISTWIDTH] IN BLOOD BY AUTOMATED COUNT: 12.9 % (ref 10–15)
GFR SERPL CREATININE-BSD FRML MDRD: >90 ML/MIN/1.73M2
GLUCOSE SERPL-MCNC: 97 MG/DL (ref 70–99)
HCT VFR BLD AUTO: 42.1 % (ref 40–53)
HGB BLD-MCNC: 14 G/DL (ref 13.3–17.7)
HOLD SPECIMEN: NORMAL
IMM GRANULOCYTES # BLD: 0 10E3/UL
IMM GRANULOCYTES NFR BLD: 1 %
INTERPRETATION ECG - MUSE: NORMAL
LIPASE SERPL-CCNC: 22 U/L (ref 13–60)
LYMPHOCYTES # BLD AUTO: 1.8 10E3/UL (ref 0.8–5.3)
LYMPHOCYTES NFR BLD AUTO: 28 %
MCH RBC QN AUTO: 30.2 PG (ref 26.5–33)
MCHC RBC AUTO-ENTMCNC: 33.3 G/DL (ref 31.5–36.5)
MCV RBC AUTO: 91 FL (ref 78–100)
MONOCYTES # BLD AUTO: 0.7 10E3/UL (ref 0–1.3)
MONOCYTES NFR BLD AUTO: 10 %
NEUTROPHILS # BLD AUTO: 3.7 10E3/UL (ref 1.6–8.3)
NEUTROPHILS NFR BLD AUTO: 59 %
NRBC # BLD AUTO: 0 10E3/UL
NRBC BLD AUTO-RTO: 0 /100
P AXIS - MUSE: 40 DEGREES
PLATELET # BLD AUTO: 192 10E3/UL (ref 150–450)
POTASSIUM SERPL-SCNC: 4.1 MMOL/L (ref 3.4–5.3)
PR INTERVAL - MUSE: 144 MS
PROT SERPL-MCNC: 7.3 G/DL (ref 6.4–8.3)
QRS DURATION - MUSE: 94 MS
QT - MUSE: 390 MS
QTC - MUSE: 405 MS
R AXIS - MUSE: -27 DEGREES
RBC # BLD AUTO: 4.64 10E6/UL (ref 4.4–5.9)
SODIUM SERPL-SCNC: 137 MMOL/L (ref 136–145)
SYSTOLIC BLOOD PRESSURE - MUSE: NORMAL MMHG
T AXIS - MUSE: -1 DEGREES
TROPONIN T SERPL HS-MCNC: 8 NG/L
TROPONIN T SERPL HS-MCNC: 8 NG/L
VENTRICULAR RATE- MUSE: 65 BPM
WBC # BLD AUTO: 6.2 10E3/UL (ref 4–11)

## 2023-04-28 PROCEDURE — 83690 ASSAY OF LIPASE: CPT | Performed by: EMERGENCY MEDICINE

## 2023-04-28 PROCEDURE — 36415 COLL VENOUS BLD VENIPUNCTURE: CPT | Performed by: EMERGENCY MEDICINE

## 2023-04-28 PROCEDURE — 82248 BILIRUBIN DIRECT: CPT | Performed by: EMERGENCY MEDICINE

## 2023-04-28 PROCEDURE — 99285 EMERGENCY DEPT VISIT HI MDM: CPT | Mod: 25

## 2023-04-28 PROCEDURE — 93005 ELECTROCARDIOGRAM TRACING: CPT

## 2023-04-28 PROCEDURE — 80053 COMPREHEN METABOLIC PANEL: CPT | Performed by: EMERGENCY MEDICINE

## 2023-04-28 PROCEDURE — 84484 ASSAY OF TROPONIN QUANT: CPT | Mod: 91 | Performed by: EMERGENCY MEDICINE

## 2023-04-28 PROCEDURE — 71046 X-RAY EXAM CHEST 2 VIEWS: CPT

## 2023-04-28 PROCEDURE — 85025 COMPLETE CBC W/AUTO DIFF WBC: CPT | Performed by: EMERGENCY MEDICINE

## 2023-04-28 ASSESSMENT — ENCOUNTER SYMPTOMS
NAUSEA: 0
BACK PAIN: 1
DIZZINESS: 1
ABDOMINAL PAIN: 0
DIAPHORESIS: 0
BLOOD IN STOOL: 0
DIARRHEA: 0
SHORTNESS OF BREATH: 1

## 2023-04-28 ASSESSMENT — ACTIVITIES OF DAILY LIVING (ADL): ADLS_ACUITY_SCORE: 35
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
## 2023-04-28 NOTE — ED TRIAGE NOTES
Patient reports 2 weeks of ongoing chest pain. Patient states it feels likes someone is sitting on my chest. Patient states the pain has been the same over the last 2 weeks. Patient states the pain is worse with movement and radiates into his bilateral arms.      Triage Assessment     Row Name 04/28/23 0852       Triage Assessment (Adult)    Airway WDL WDL       Respiratory WDL    Respiratory WDL WDL       Skin Circulation/Temperature WDL    Skin Circulation/Temperature WDL WDL       Cardiac WDL    Cardiac WDL WDL       Peripheral/Neurovascular WDL    Peripheral Neurovascular WDL WDL       Cognitive/Neuro/Behavioral WDL    Cognitive/Neuro/Behavioral WDL WDL               A/P: 48y Female with R ankle fracture  Plan for surgery tomorrow with Dr. Vargas, 12/22/17  Pain control  NPO after midnight except meds  IVFs while NPO  Hold chemical dvt prophylaxis after midnight  SCDs  NWB R LE in splint, keep c/d/I, cane/crutches/walker as needed  Ice/elevation  FU labs

## 2023-05-01 ENCOUNTER — THERAPY VISIT (OUTPATIENT)
Dept: OCCUPATIONAL THERAPY | Facility: CLINIC | Age: 63
End: 2023-05-01
Payer: COMMERCIAL

## 2023-05-01 DIAGNOSIS — M19.042 OSTEOARTHRITIS OF LEFT HAND, UNSPECIFIED OSTEOARTHRITIS TYPE: ICD-10-CM

## 2023-05-01 DIAGNOSIS — M79.642 PAIN OF LEFT HAND: Primary | ICD-10-CM

## 2023-05-01 PROCEDURE — 97140 MANUAL THERAPY 1/> REGIONS: CPT | Mod: GO | Performed by: OCCUPATIONAL THERAPIST

## 2023-05-01 PROCEDURE — 97110 THERAPEUTIC EXERCISES: CPT | Mod: GO | Performed by: OCCUPATIONAL THERAPIST

## 2023-05-09 ENCOUNTER — HOSPITAL ENCOUNTER (OUTPATIENT)
Dept: CARDIOLOGY | Facility: CLINIC | Age: 63
Discharge: HOME | End: 2023-05-09
Attending: EMERGENCY MEDICINE
Payer: COMMERCIAL

## 2023-05-09 ENCOUNTER — HOSPITAL ENCOUNTER (OUTPATIENT)
Dept: NUCLEAR MEDICINE | Facility: CLINIC | Age: 63
Setting detail: NUCLEAR MEDICINE
Discharge: HOME | End: 2023-05-09
Attending: EMERGENCY MEDICINE
Payer: COMMERCIAL

## 2023-05-09 DIAGNOSIS — R07.9 CHEST PAIN, UNSPECIFIED TYPE: ICD-10-CM

## 2023-05-09 LAB
CV STRESS MAX HR HE: 141
NUC STRESS EJECTION FRACTION: 65 %
RATE PRESSURE PRODUCT: NORMAL
STRESS ANGINA INDEX: 1
STRESS ECHO BASELINE DIASTOLIC HE: 84
STRESS ECHO BASELINE HR: 93 BPM
STRESS ECHO BASELINE SYSTOLIC BP: 124
STRESS ECHO CALCULATED PERCENT HR: 89 %
STRESS ECHO LAST STRESS DIASTOLIC BP: 84
STRESS ECHO LAST STRESS SYSTOLIC BP: 200
STRESS ECHO POST ESTIMATED WORKLOAD: 7 METS
STRESS ECHO POST EXERCISE DUR MIN: 5 MIN
STRESS ECHO POST EXERCISE DUR SEC: 31 SEC
STRESS ECHO TARGET HR: 158

## 2023-05-09 PROCEDURE — A9502 TC99M TETROFOSMIN: HCPCS | Performed by: EMERGENCY MEDICINE

## 2023-05-09 PROCEDURE — 78452 HT MUSCLE IMAGE SPECT MULT: CPT

## 2023-05-09 PROCEDURE — 343N000001 HC RX 343: Performed by: EMERGENCY MEDICINE

## 2023-05-09 PROCEDURE — 93017 CV STRESS TEST TRACING ONLY: CPT

## 2023-05-09 PROCEDURE — 93016 CV STRESS TEST SUPVJ ONLY: CPT | Performed by: INTERNAL MEDICINE

## 2023-05-09 PROCEDURE — 93018 CV STRESS TEST I&R ONLY: CPT | Performed by: INTERNAL MEDICINE

## 2023-05-09 PROCEDURE — 78452 HT MUSCLE IMAGE SPECT MULT: CPT | Mod: 26 | Performed by: INTERNAL MEDICINE

## 2023-05-09 RX ADMIN — TETROFOSMIN 33 MILLICURIE: 1.38 INJECTION, POWDER, LYOPHILIZED, FOR SOLUTION INTRAVENOUS at 09:14

## 2023-05-09 RX ADMIN — TETROFOSMIN 10.5 MILLICURIE: 1.38 INJECTION, POWDER, LYOPHILIZED, FOR SOLUTION INTRAVENOUS at 07:48

## 2023-05-11 ENCOUNTER — OFFICE VISIT (OUTPATIENT)
Dept: PEDIATRICS | Facility: CLINIC | Age: 63
End: 2023-05-11
Payer: COMMERCIAL

## 2023-05-11 VITALS
TEMPERATURE: 98.5 F | OXYGEN SATURATION: 97 % | SYSTOLIC BLOOD PRESSURE: 122 MMHG | DIASTOLIC BLOOD PRESSURE: 78 MMHG | HEIGHT: 67 IN | WEIGHT: 244.6 LBS | RESPIRATION RATE: 18 BRPM | HEART RATE: 77 BPM | BODY MASS INDEX: 38.39 KG/M2

## 2023-05-11 DIAGNOSIS — F33.0 MAJOR DEPRESSIVE DISORDER, RECURRENT EPISODE, MILD (H): ICD-10-CM

## 2023-05-11 DIAGNOSIS — F41.9 ANXIETY: ICD-10-CM

## 2023-05-11 DIAGNOSIS — J30.2 SEASONAL ALLERGIC RHINITIS, UNSPECIFIED TRIGGER: ICD-10-CM

## 2023-05-11 DIAGNOSIS — J45.20 MILD INTERMITTENT ASTHMA WITHOUT COMPLICATION: ICD-10-CM

## 2023-05-11 DIAGNOSIS — R07.89 ATYPICAL CHEST PAIN: Primary | ICD-10-CM

## 2023-05-11 PROCEDURE — 99214 OFFICE O/P EST MOD 30 MIN: CPT | Performed by: INTERNAL MEDICINE

## 2023-05-11 RX ORDER — FLUTICASONE PROPIONATE 50 MCG
SPRAY, SUSPENSION (ML) NASAL
Qty: 16 G | Refills: 3 | Status: SHIPPED | OUTPATIENT
Start: 2023-05-11 | End: 2024-03-06

## 2023-05-11 RX ORDER — BUDESONIDE AND FORMOTEROL FUMARATE DIHYDRATE 80; 4.5 UG/1; UG/1
2 AEROSOL RESPIRATORY (INHALATION) 2 TIMES DAILY
Qty: 10.2 G | Refills: 3 | Status: SHIPPED | OUTPATIENT
Start: 2023-05-11 | End: 2024-03-06

## 2023-05-11 RX ORDER — SERTRALINE HYDROCHLORIDE 100 MG/1
200 TABLET, FILM COATED ORAL DAILY
Qty: 135 TABLET | Refills: 1 | COMMUNITY
Start: 2023-05-11 | End: 2023-07-03

## 2023-05-11 NOTE — PATIENT INSTRUCTIONS
1) the heart tests look fine. Chest pain is not due to your heart  2) atypical chest pain and shortness of breath.  Recommend starting Symbicort twice daily for asthma.  Continue albuterol as needed and follow-up in 4 weeks  3) allergies-start flonase

## 2023-06-08 ENCOUNTER — OFFICE VISIT (OUTPATIENT)
Dept: ORTHOPEDICS | Facility: CLINIC | Age: 63
End: 2023-06-08
Payer: COMMERCIAL

## 2023-06-08 VITALS
SYSTOLIC BLOOD PRESSURE: 146 MMHG | HEIGHT: 66 IN | BODY MASS INDEX: 39.57 KG/M2 | WEIGHT: 246.2 LBS | DIASTOLIC BLOOD PRESSURE: 84 MMHG

## 2023-06-08 DIAGNOSIS — M79.642 CHRONIC PAIN OF LEFT HAND: Primary | ICD-10-CM

## 2023-06-08 DIAGNOSIS — G89.29 CHRONIC PAIN OF LEFT HAND: Primary | ICD-10-CM

## 2023-06-08 PROCEDURE — 99203 OFFICE O/P NEW LOW 30 MIN: CPT | Performed by: FAMILY MEDICINE

## 2023-06-08 NOTE — PATIENT INSTRUCTIONS
Thank you for choosing [facility]    DR [facility] CLIN[facility] LOCATIONS  [place] [address] [address] [facility], 4th [place]   [place], [place], [address] [place] [address]   [phone]       APPOINTMENTS: [phone]    CARE QUESTIONS: [phone],    BILLING QUESTIONS: [phone]    FAX NUMBER: [phone]        Follow up: Dr. Helton will send you a MR Presta message after completion of your MRI to discuss results and recommendations.      1. Chronic pain of left hand        An order for an MRI was placed today. You may call directly to schedule at [phone] at your convenience.
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
## 2023-06-16 ENCOUNTER — ANCILLARY PROCEDURE (OUTPATIENT)
Dept: MRI IMAGING | Facility: CLINIC | Age: 63
End: 2023-06-16
Attending: FAMILY MEDICINE
Payer: COMMERCIAL

## 2023-06-16 DIAGNOSIS — G89.29 CHRONIC PAIN OF LEFT HAND: ICD-10-CM

## 2023-06-16 DIAGNOSIS — M79.642 CHRONIC PAIN OF LEFT HAND: ICD-10-CM

## 2023-06-16 PROCEDURE — 73218 MRI UPPER EXTREMITY W/O DYE: CPT | Mod: LT

## 2023-06-26 ENCOUNTER — OFFICE VISIT (OUTPATIENT)
Dept: URGENT CARE | Facility: URGENT CARE | Age: 63
End: 2023-06-26
Payer: COMMERCIAL

## 2023-06-26 VITALS
BODY MASS INDEX: 39.87 KG/M2 | DIASTOLIC BLOOD PRESSURE: 88 MMHG | TEMPERATURE: 98 F | WEIGHT: 247 LBS | SYSTOLIC BLOOD PRESSURE: 150 MMHG | HEART RATE: 80 BPM | OXYGEN SATURATION: 95 %

## 2023-06-26 DIAGNOSIS — H60.391 INFECTIVE OTITIS EXTERNA, RIGHT: Primary | ICD-10-CM

## 2023-06-26 PROCEDURE — 99213 OFFICE O/P EST LOW 20 MIN: CPT | Performed by: PHYSICIAN ASSISTANT

## 2023-06-26 RX ORDER — NEOMYCIN SULFATE, POLYMYXIN B SULFATE, HYDROCORTISONE 3.5; 10000; 1 MG/ML; [USP'U]/ML; MG/ML
3 SOLUTION/ DROPS AURICULAR (OTIC) 3 TIMES DAILY
Qty: 10 ML | Refills: 0 | Status: SHIPPED | OUTPATIENT
Start: 2023-06-26 | End: 2023-10-31

## 2023-06-26 NOTE — PROGRESS NOTES
SUBJECTIVE:  Fabio Logan is a 62 year old male comes in with a 1 week history of right ear pain and pressure.  Patient states that it is tender to the touch.  He does admit that he does swim jogging in the pool and has been doing some labs also recently.  He denies any cough or cold symptoms.  No drainage from the ear.  He has not had any fevers.  He has been taking some over-the-counter ibuprofen and Tylenol for symptomatic relief.  He is otherwise in normal state of health.    Past Medical History:   Diagnosis Date     Anxiety      Depression      Diverticulitis      h/o Mumps      Hypertension      Kidney stone      Testicular cancer - Right 1998     Patient Active Problem List   Diagnosis     CARDIOVASCULAR SCREENING; LDL GOAL LESS THAN 160     Mild major depression (H)     Obese     HTN, goal below 140/90     H/O testicular cancer     Diverticulosis of large intestine     Esophageal reflux     Chest pain     Migraine     Skin cancer screening     Skin eruption     Lentigines     Chronic dermatitis     Mild intermittent asthma without complication     Essential hypertension with goal blood pressure less than 140/90     Major depression in complete remission (H)     Impaired fasting glucose     Malignant neoplasm of testis, unspecified laterality, unspecified whether descended or undescended     Morbid obesity (H)     Symptomatic cholelithiasis     Partial seizure (H)     Pain of left hand     Osteoarthritis of left hand, unspecified osteoarthritis type     Current Outpatient Medications   Medication     albuterol (PROAIR HFA/PROVENTIL HFA/VENTOLIN HFA) 108 (90 Base) MCG/ACT inhaler     atenolol (TENORMIN) 50 MG tablet     budesonide-formoterol (SYMBICORT) 80-4.5 MCG/ACT Inhaler     fluticasone (FLONASE) 50 MCG/ACT nasal spray     ketoconazole (NIZORAL) 2 % external cream     losartan (COZAAR) 25 MG tablet     omeprazole (PRILOSEC) 20 MG DR capsule     OXcarbazepine (TRILEPTAL) 300 MG tablet     sertraline  (ZOLOFT) 100 MG tablet     simvastatin (ZOCOR) 20 MG tablet     SUMAtriptan (IMITREX) 50 MG tablet     traZODone (DESYREL) 50 MG tablet     triamcinolone (KENALOG) 0.1 % ointment     No current facility-administered medications for this visit.     Social History     Socioeconomic History     Marital status: Single     Spouse name: Not on file     Number of children: Not on file     Years of education: Not on file     Highest education level: GED or equivalent   Occupational History     Not on file   Tobacco Use     Smoking status: Former     Types: Cigarettes     Quit date: 1997     Years since quittin.0     Smokeless tobacco: Former   Vaping Use     Vaping Use: Never used   Substance and Sexual Activity     Alcohol use: No     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     Drug use: No     Sexual activity: Yes     Partners: Female   Other Topics Concern     Parent/sibling w/ CABG, MI or angioplasty before 65F 55M? No   Social History Narrative     Not on file     Social Determinants of Health     Financial Resource Strain: Medium Risk (2019)    Overall Financial Resource Strain (CARDIA)      Difficulty of Paying Living Expenses: Somewhat hard   Food Insecurity: No Food Insecurity (2019)    Hunger Vital Sign      Worried About Running Out of Food in the Last Year: Never true      Ran Out of Food in the Last Year: Never true   Transportation Needs: No Transportation Needs (2019)    PRAPARE - Transportation      Lack of Transportation (Medical): No      Lack of Transportation (Non-Medical): No   Physical Activity: Insufficiently Active (2019)    Exercise Vital Sign      Days of Exercise per Week: 2 days      Minutes of Exercise per Session: 30 min   Stress: Stress Concern Present (2019)    [employer] [place] [employer] - Occupational Stress Questionnaire      Feeling of Stress : Very much   Social Connections: Moderately Integrated (2019)    Social Connection and Isolation Panel  [NHANES]      Frequency of Communication with Friends and Family: More than three times a week      Frequency of Social Gatherings with Friends and Family: More than three times a week      Attends [employer] Services: More than 4 times per year      Active Member of Clubs or Organizations: Yes      Attends Club or Organization Meetings: More than 4 times per year      Marital Status: Never    Intimate Partner Violence: Not on file   Housing Stability: Not on file     ROS  Negative other than stated above    Exam:  GENERAL APPEARANCE: healthy, alert and no distress  EYES: EOMI,  PERRL  HENT: Left TM and canals clear.  Right TM is clear.  Canal is erythematous and swollen with tenderness to palpation.  RESP: lungs clear to auscultation - no rales, rhonchi or wheezes  CV: regular rates and rhythm, normal S1 S2, no S3 or S4 and no murmur, click or rub -  SKIN: no suspicious lesions or rashes    assessment/plan:  (H60.391) Infective otitis externa, right  (primary encounter diagnosis)  Comment:   Plan: neomycin-polymyxin-hydrocortisone (CORTISPORIN)        [id number] otic solution          Patient with 1 week history of right ear pain.  He does swim.  Appears to have mild otitis externa on the right.  Cortisporin otic drops as directed.  Prevention was reviewed.  May continue with over-the-counter med as needed for symptomatic relief.  Follow-up with primary symptoms worsen or new symptoms develop

## 2023-06-30 DIAGNOSIS — F33.0 MAJOR DEPRESSIVE DISORDER, RECURRENT EPISODE, MILD (H): ICD-10-CM

## 2023-07-03 RX ORDER — SERTRALINE HYDROCHLORIDE 100 MG/1
TABLET, FILM COATED ORAL
Qty: 135 TABLET | Refills: 0 | Status: SHIPPED | OUTPATIENT
Start: 2023-07-03 | End: 2023-10-03

## 2023-07-03 NOTE — TELEPHONE ENCOUNTER
Routing refill request to provider for review/approval because:  Labs out of range:  PHQ-9  Medication is reported/historical        7/14/2021     2:53 PM 2/8/2023    10:56 AM 3/1/2023     3:32 PM   PHQ   PHQ-9 Total Score 4 4 5   Q9: Thoughts of better off dead/self-harm past 2 weeks Not at all Not at all Not at all     Dale LAUREN RN 7/3/2023 at 10:52 AM

## 2023-09-30 DIAGNOSIS — F33.0 MAJOR DEPRESSIVE DISORDER, RECURRENT EPISODE, MILD (H): ICD-10-CM

## 2023-10-03 RX ORDER — SERTRALINE HYDROCHLORIDE 100 MG/1
TABLET, FILM COATED ORAL
Qty: 135 TABLET | Refills: 0 | Status: SHIPPED | OUTPATIENT
Start: 2023-10-03 | End: 2024-01-02

## 2023-10-03 NOTE — TELEPHONE ENCOUNTER
Routing refill request to provider for review/approval because:  Patient does not have a PHQ-9 score less than 5 on file in the past 6 months.         3/1/2023     3:32 PM   PHQ   PHQ-9 Total Score 5   Q9: Thoughts of better off dead/self-harm past 2 weeks Not at all     Caitlin PEREZ RN   [facility]

## 2023-10-06 ENCOUNTER — MYC MEDICAL ADVICE (OUTPATIENT)
Dept: PEDIATRICS | Facility: CLINIC | Age: 63
End: 2023-10-06
Payer: COMMERCIAL

## 2023-10-31 ENCOUNTER — ANCILLARY PROCEDURE (OUTPATIENT)
Dept: GENERAL RADIOLOGY | Facility: CLINIC | Age: 63
End: 2023-10-31
Attending: NURSE PRACTITIONER
Payer: COMMERCIAL

## 2023-10-31 ENCOUNTER — OFFICE VISIT (OUTPATIENT)
Dept: PEDIATRICS | Facility: CLINIC | Age: 63
End: 2023-10-31
Payer: COMMERCIAL

## 2023-10-31 VITALS
OXYGEN SATURATION: 97 % | DIASTOLIC BLOOD PRESSURE: 80 MMHG | HEART RATE: 64 BPM | HEIGHT: 66 IN | BODY MASS INDEX: 39.53 KG/M2 | WEIGHT: 246 LBS | RESPIRATION RATE: 20 BRPM | SYSTOLIC BLOOD PRESSURE: 130 MMHG | TEMPERATURE: 97.5 F

## 2023-10-31 DIAGNOSIS — G89.29 CHRONIC BILATERAL LOW BACK PAIN, UNSPECIFIED WHETHER SCIATICA PRESENT: ICD-10-CM

## 2023-10-31 DIAGNOSIS — M25.551 HIP PAIN, RIGHT: ICD-10-CM

## 2023-10-31 DIAGNOSIS — M54.50 CHRONIC BILATERAL LOW BACK PAIN, UNSPECIFIED WHETHER SCIATICA PRESENT: ICD-10-CM

## 2023-10-31 DIAGNOSIS — J45.20 MILD INTERMITTENT ASTHMA WITHOUT COMPLICATION: ICD-10-CM

## 2023-10-31 DIAGNOSIS — G89.29 CHRONIC BILATERAL LOW BACK PAIN, UNSPECIFIED WHETHER SCIATICA PRESENT: Primary | ICD-10-CM

## 2023-10-31 DIAGNOSIS — M54.50 CHRONIC BILATERAL LOW BACK PAIN, UNSPECIFIED WHETHER SCIATICA PRESENT: Primary | ICD-10-CM

## 2023-10-31 PROCEDURE — 99214 OFFICE O/P EST MOD 30 MIN: CPT | Performed by: NURSE PRACTITIONER

## 2023-10-31 PROCEDURE — 72100 X-RAY EXAM L-S SPINE 2/3 VWS: CPT | Mod: TC | Performed by: RADIOLOGY

## 2023-10-31 PROCEDURE — 73502 X-RAY EXAM HIP UNI 2-3 VIEWS: CPT | Mod: TC | Performed by: RADIOLOGY

## 2023-10-31 ASSESSMENT — PAIN SCALES - GENERAL: PAINLEVEL: EXTREME PAIN (8)

## 2023-10-31 ASSESSMENT — ASTHMA QUESTIONNAIRES
QUESTION_1 LAST FOUR WEEKS HOW MUCH OF THE TIME DID YOUR ASTHMA KEEP YOU FROM GETTING AS MUCH DONE AT WORK, SCHOOL OR AT HOME: NONE OF THE TIME
QUESTION_3 LAST FOUR WEEKS HOW OFTEN DID YOUR ASTHMA SYMPTOMS (WHEEZING, COUGHING, SHORTNESS OF BREATH, CHEST TIGHTNESS OR PAIN) WAKE YOU UP AT NIGHT OR EARLIER THAN USUAL IN THE MORNING: TWO OR THREE NIGHTS A WEEK
QUESTION_4 LAST FOUR WEEKS HOW OFTEN HAVE YOU USED YOUR RESCUE INHALER OR NEBULIZER MEDICATION (SUCH AS ALBUTEROL): NOT AT ALL
QUESTION_5 LAST FOUR WEEKS HOW WOULD YOU RATE YOUR ASTHMA CONTROL: SOMEWHAT CONTROLLED
EMERGENCY_ROOM_LAST_YEAR_TOTAL: ONE
QUESTION_2 LAST FOUR WEEKS HOW OFTEN HAVE YOU HAD SHORTNESS OF BREATH: ONCE A DAY
ACT_TOTALSCORE: 17
ACT_TOTALSCORE: 17

## 2023-10-31 ASSESSMENT — PATIENT HEALTH QUESTIONNAIRE - PHQ9
10. IF YOU CHECKED OFF ANY PROBLEMS, HOW DIFFICULT HAVE THESE PROBLEMS MADE IT FOR YOU TO DO YOUR WORK, TAKE CARE OF THINGS AT HOME, OR GET ALONG WITH OTHER PEOPLE: NOT DIFFICULT AT ALL
SUM OF ALL RESPONSES TO PHQ QUESTIONS 1-9: 0

## 2023-10-31 ASSESSMENT — ENCOUNTER SYMPTOMS: BACK PAIN: 1

## 2023-10-31 NOTE — LETTER
October 31, 2023      Fabio Logan  [address] [address]        To Whom It May Concern:    Fabio Logan was seen in our clinic. He may return to work with the following: limited to light duty - lifting no greater than 10 pounds from 10/31/23-11/14/23      Sincerely,        Adriana Fletcher, NP
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
## 2023-11-02 ENCOUNTER — HOSPITAL ENCOUNTER (OUTPATIENT)
Dept: CT IMAGING | Facility: HOSPITAL | Age: 63
Discharge: HOME | End: 2023-11-02
Attending: NURSE PRACTITIONER | Admitting: NURSE PRACTITIONER
Payer: COMMERCIAL

## 2023-11-02 ENCOUNTER — OFFICE VISIT (OUTPATIENT)
Dept: PHYSICAL MEDICINE AND REHAB | Facility: CLINIC | Age: 63
End: 2023-11-02
Payer: COMMERCIAL

## 2023-11-02 VITALS
HEIGHT: 66 IN | HEART RATE: 72 BPM | DIASTOLIC BLOOD PRESSURE: 81 MMHG | WEIGHT: 245 LBS | BODY MASS INDEX: 39.37 KG/M2 | SYSTOLIC BLOOD PRESSURE: 164 MMHG

## 2023-11-02 DIAGNOSIS — M54.50 ACUTE LEFT-SIDED LOW BACK PAIN WITHOUT SCIATICA: Primary | ICD-10-CM

## 2023-11-02 DIAGNOSIS — M54.50 ACUTE LEFT-SIDED LOW BACK PAIN WITHOUT SCIATICA: ICD-10-CM

## 2023-11-02 PROCEDURE — 72131 CT LUMBAR SPINE W/O DYE: CPT

## 2023-11-02 PROCEDURE — 99204 OFFICE O/P NEW MOD 45 MIN: CPT | Performed by: NURSE PRACTITIONER

## 2023-11-02 RX ORDER — METHYLPREDNISOLONE 4 MG
TABLET, DOSE PACK ORAL
Qty: 21 TABLET | Refills: 0 | Status: SHIPPED | OUTPATIENT
Start: 2023-11-02 | End: 2023-11-24

## 2023-11-02 ASSESSMENT — PAIN SCALES - GENERAL: PAINLEVEL: EXTREME PAIN (8)

## 2023-11-02 NOTE — PROGRESS NOTES
ASSESSMENT: Fabio Logan is a 63 year old male who presents for consultation at the request of PCP Manoj Armenta, who presents today for new patient evaluation of:    -Low back pain    Patient is neurologically intact on exam. No myelopathic or red flag symptoms.  He has significant point tenderness lower lumbar spine.  His lumbar x-rays are significantly limited in quality.  Recommend lumbar CT for further evaluation, particularly given his recent fall and severity of his pain. I will send over a Medrol Dosepak for pain control.  He will continue over-the-counter Tylenol.  We will review imaging and decide plan.  If no significant concerns, would recommend full course of physical therapy and routine follow-up  in 6-8wks. He would like to review his imaging in person, and we can accommodate this if he would like         No data to display                     Diagnoses and all orders for this visit:  Acute left-sided low back pain without sciatica  -     CT Lumbar Spine w/o Contrast; Future  -     methylPREDNISolone (MEDROL DOSEPAK) 4 MG tablet therapy pack; Follow Package Directions      PLAN:  Reviewed spine anatomy and disease process. Discussed diagnosis and treatment options with the patient today. A shared decision making model was used.  The patient's values and choices were respected. The following represents what was discussed and decided upon by the provider and the patient.      -DIAGNOSTIC TESTS:  Images were personally reviewed and interpreted and explained to patient today using a spine model.   -- I ordered a CT lumbar spine without contrast    -PHYSICAL THERAPY:    -- Hold off on physical therapy for now until results available  --If no significant concerns, would refer for full course of physical therapy  Discussed the importance of core strengthening, ROM, stretching exercises with the patient and how each of these entities is important in decreasing pain.  Explained to the patient that the  purpose of physical therapy is to teach the patient a home exercise program.  These exercises need to be performed every day in order to decrease pain and prevent future occurrences of pain.        -MEDICATIONS:    -- Ordered Medrol Dosepak for patient.  He can take over-the-counter Tylenol as directed.  He will stop NSAIDs and I recommended not resuming them until dark stools evaluated by primary care  Discussed multiple medication options today with patient. Discussed risks, side effects, and proper use of medications. Patient verbalized understanding.    -INTERVENTIONS:    We did not discuss injections today.  Patient would be a good candidate in the future for either epidural steroid injections or medial branch blocks if indicated based on symptoms and supported by imaging results.    -PATIENT EDUCATION:  Total time of 40 minutes, on the day of service, spent with the patient, reviewing the chart, placing orders, and documenting.   -Today we also discussed the pros and cons of the current treatment plan.    -FOLLOW-UP:   Patient would like to return in person to review his imaging    Advised patient to call the [facility] if symptoms worsen, new numbness or weakness develops in the legs, or if they develop new or worsening problems controlling bladder or bowel function.   ______________________________________________________________________    SUBJECTIVE:    HPI:  Fabio Logan  Is a 63 year old male history of hypertension, asthma, anxiety, IBS who presents today for new patient evaluation of low back pain     Lower back pain which began gradually about a week ago after getting a vaccine.  He also endorses a mechanical fall about 2 weeks ago.  He got up afterwards without pain.  He has chronic intermittent low back pain, but this flare is quite significant.  Pain is more right-sided and centrally located.  The pain is worse with lying down, leaning over, stooping, doing his job as a  and  , which she has done for many years.  He could barely get off the xray table on Tuesday. He is having difficulty walking longer distances.  Rates his pain today at a 9 out of 10, at worst a 10 out of 10, at best a 4 out of 10. He has pain radiating into the hips and buttocks and posterior proximal legs to some extent, particularly with sitting. It goes back and forth. He is only able to walk 50 yards.    He does induce that he has had some dark stools lately, and has been taking ibuprofen and Tylenol as needed over-the-counter.    He denies any radicular leg pain, numbness, weakness, changes in bowel or bladder control.  He has been doing chiropractic care for his symptoms without significant relief.  He had lumbar x-rays and x-rays of his right hip done on October 31.  He has not done any physical therapy or had any injections, or previous spine surgeries.    He used to do marathon races etc., and now typically goes to walk in the pool several times a week and does some cardio.    -Treatment to Date:     -Medications:  Tylenol  Ibuprofen    Current Outpatient Medications   Medication    albuterol (PROAIR HFA/PROVENTIL HFA/VENTOLIN HFA) 108 (90 Base) MCG/ACT inhaler    atenolol (TENORMIN) 50 MG tablet    budesonide-formoterol (SYMBICORT) 80-4.5 MCG/ACT Inhaler    fluticasone (FLONASE) 50 MCG/ACT nasal spray    ketoconazole (NIZORAL) 2 % external cream    losartan (COZAAR) 25 MG tablet    methylPREDNISolone (MEDROL DOSEPAK) 4 MG tablet therapy pack    omeprazole (PRILOSEC) 20 MG DR capsule    OXcarbazepine (TRILEPTAL) 300 MG tablet    sertraline (ZOLOFT) 100 MG tablet    simvastatin (ZOCOR) 20 MG tablet    SUMAtriptan (IMITREX) 50 MG tablet    traZODone (DESYREL) 50 MG tablet    triamcinolone (KENALOG) 0.1 % ointment     No current facility-administered medications for this visit.       No Known Allergies    Past Medical History:   Diagnosis Date    Anxiety     Depression     Diverticulitis     h/o  Mumps     Hypertension     Kidney stone     Testicular cancer - Right 1998        Patient Active Problem List   Diagnosis    CARDIOVASCULAR SCREENING; LDL GOAL LESS THAN 160    Mild major depression (H)    Obese    HTN, goal below 140/90    H/O testicular cancer    Diverticulosis of large intestine    Esophageal reflux    Chest pain    Migraine    Skin cancer screening    Skin eruption    Lentigines    Chronic dermatitis    Mild intermittent asthma without complication    Essential hypertension with goal blood pressure less than 140/90    Major depression in complete remission (H)    Impaired fasting glucose    Malignant neoplasm of testis, unspecified laterality, unspecified whether descended or undescended    Morbid obesity (H)    Symptomatic cholelithiasis    Partial seizure (H)    Pain of left hand    Osteoarthritis of left hand, unspecified osteoarthritis type       Past Surgical History:   Procedure Laterality Date    COLONOSCOPY N/A 08/22/2014    Procedure: COLONOSCOPY;  Surgeon: Joe Garcia MD;  Location:  GI    COLONOSCOPY      CRANIOTOMY, REPAIR ANEURYSM, COMBINED Right 05/07/2019    Procedure: RIGHT CRANIOTOMY AND CLIPPING OF COMPLEX MIDDLE CEREBRAL ARTERY ANEURYMS; INTRAOPERATIVE ANGIOGRAM;  Surgeon: Jamir Rebollar MD;  Location: [facility];  Service: Neurosurgery    CYSTOSCOPY      EYE SURGERY      ? not on H&P    IR CEREBRAL ANGIOGRAM  05/07/2019    IR CEREBRAL ANGIOGRAM  05/07/2019    LAPAROSCOPIC CHOLECYSTECTOMY N/A 09/02/2018    Procedure: LAPAROSCOPIC CHOLECYSTECTOMY;  LAPAROSCOPIC CHOLECYSTECTOMY ;  Surgeon: Shannan Chaves MD;  Location:  OR    LAPAROSCOPIC CHOLECYSTECTOMY      ORCHIECTOMY NOS Right 1998    ORCHIECTOMY SCROTAL Right 01/01/1998    TESTICLE SURGERY      TONSILLECTOMY      TONSILLECTOMY         Family History   Problem Relation Age of Onset    Cerebrovascular Disease Mother     Hypertension Mother     Heart Disease Father         had 2 open heart  "surgery    Lipids Father     Hypertension Father     Cancer Maternal Grandfather         skin cancer    Skin Cancer Maternal Grandfather         skin cancer    Heart Disease Sister 45        heart attack    Cancer Sister 46        breast cancer    Skin Cancer Sister         BCC    Varicose Veins Mother     Coronary Artery Disease Father     Cancer Sister     Coronary Artery Disease Sister     Hypertension Other         all siblings       Reviewed past medical, surgical, and family history with patient found on new patient intake packet located in EMR [employer] tab.     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  Specifically negative for bowel/bladder dysfunction, balance changes, headache, dizziness, foot drop, fevers, chills, appetite changes, nausea/vomiting, unexplained weight loss. Otherwise 13 systems reviewed are negative. Please see the patient's intake questionnaire from today for details.    OBJECTIVE:  BP (!) 164/81   Pulse 72   Ht 5' 6\" (1.676 m)   Wt 245 lb (111.1 kg)   BMI 39.54 kg/m      PHYSICAL EXAMINATION:    --CONSTITUTIONAL:  Vital signs as above.  No acute distress.  The patient is well nourished and well groomed.  --PSYCHIATRIC:  Appropriate mood and affect. The patient is awake, alert, oriented to person, place, time and answering questions appropriately with clear speech.    --SKIN:  Skin over the face, bilateral lower extremities, and posterior torso is clean, dry, intact without rashes.    --RESPIRATORY: Normal rhythm and effort. No abnormal accessory muscle breathing patterns noted.   --ABDOMINAL:  Non-distended.    --GROSS MOTOR: Gait is painful, limited.  Arises with pain from a seated position. Toe walking and heel walking are normal without " significant difficulty.      --LOWER EXTREMITY MOTOR TESTING:  Hip flexion: right 5/5, left 5/5  Hip abduction: right 5/5, left 5/5  Hip adduction: right 5/5, left 5/5   Quads: right 5/5, left 5/5  Hamstrings: right 5/5, left 5/5  Dorsiflexion: right 5/5, left 5/5  Plantar flexion: right 5/5, left 5/5    Great toe MTP extension/EHL: right 5/5, left 5/5    --NEUROLOGICAL:  1/4 patellar and achilles reflexes bilaterally. Sensation to light touch is intact throughout both lower extremities. Babinski is negative. No clonus.    --STANDING EXAMINATION:  Normal lumbar lordosis noted, no lateral shift.    --MUSCULOSKELETAL: Lumbar spine inspection reveals no evidence of deformity. Range of motion is  limited in lateral rotation and sidebending due to pain.  Lumbar flexion, extension is full, but painful.  Positive significant midline lower lumbar point tenderness to palpation lumbar spine. No paraspinal musculature tenderness.    Straight leg raising is negative.    --HIPS: Full range of motion bilaterally.  Positive right ZACHARY and Negative FADIR bilaterally. Negative hip joint tenderness to palp.    --SACROILIAC JOINT: One finger point test was negative.  Positive right SI joint tenderness to palpation.  Negative on left.    --VASCULAR:  Bilateral lower extremities are warm without any discoloration.  There is no pitting edema of the bilateral lower extremities.    RESULTS:   Prior medical records from [facility] and [facility]where were reviewed today.    Imaging: Spine imaging was personally reviewed and interpreted today. The images were shown to the patient and the findings were explained using a spine model.      XR Lumbar Spine 2/3 Views    Result Date: 10/31/2023  LUMBAR SPINE TWO TO THREE VIEWS  October 31, 2023 11:10 AM HISTORY: Chronic bilateral low back pain, unspecified whether sciatica present. COMPARISON: None.     IMPRESSION: No fracture is identified. Moderate to severe degenerative change. JC KONG  MD AKIRA   SYSTEM ID:  [id number]    XR Hip Right 2-3 Views    Result Date: 10/31/2023  XR HIP RIGHT 2-3 VIEWS 10/31/2023 11:20 AM HISTORY: Chronic bilateral low back pain, unspecified whether sciatica present; Chronic bilateral low back pain, unspecified whether sciatica present; Hip pain, right COMPARISON: None.     IMPRESSION: There is mild right hip degenerative arthrosis. No acute fracture is seen. There are degenerative changes in the lower lumbar spine and at the right sacroiliac joint. PEGGY MARTINEZ MD   SYSTEM ID:  [id number]          Nena NEELYP-C  [facility]  O. [phone]

## 2023-11-02 NOTE — PATIENT INSTRUCTIONS
Medrol Dose Pack (Prednisone Taper) has been prescribed today for your acute pain.  Please follow the directions on package.  Do not take with NSAIDs (ibuprofen, aleve, nabumetone, diclofenac).  OK to take with tylenol (extra strength or extended release/8hour) over the counter as needed.    OK to continue tylenol over the counter as needed      POSSIBLE STEROID SIDE EFFECTS :  -If you experience these, it should only last for a short period-   Change in menstrual flow   Swelling   Increased appetite   Skin redness (flushness)   Skin rash   Mouth (oral) irritation   Blood sugar (glucose) levels   Sweats   Mood changes       Imaging (Xray, CT, or MRI) has been ordered today.   Radiology will call you to schedule. Please call below if you do not hear from them in the next couple of days.     [facility] Radiology Scheduling:  Please call [phone] to schedule your image(s) (select option #1).    There are 3 different locations:    [address] [facility] - [address]   [place] [address]     ~Please call our [facility] Nurse Navigation line [phone] with any questions or concerns about your treatment plan, if symptoms worsen and you would like to be seen urgently, or if you have any new or worsening numbness, weakness, or problems controlling bladder and bowel function.  ~You are also welcome to contact Nena yLnch via Swiftype, but please be aware that responses to Swiftype message may take 2-3 days due to the high volume of patients seen in clinic.

## 2023-11-02 NOTE — LETTER
11/2/2023         RE: Fabio Logan  [address] [address]        Dear Colleague,    Thank you for referring your patient, Fabio Logan, to the [facility]. Please see a copy of my visit note below.    ASSESSMENT: Fabio Logan is a 63 year old male who presents for consultation at the request of PCP Manoj Armenta, who presents today for new patient evaluation of:    -Low back pain    Patient is neurologically intact on exam. No myelopathic or red flag symptoms.  He has significant point tenderness lower lumbar spine.  His lumbar x-rays are significantly limited in quality.  Recommend lumbar CT for further evaluation, particularly given his recent fall and severity of his pain. I will send over a Medrol Dosepak for pain control.  He will continue over-the-counter Tylenol.  We will review imaging and decide plan.  If no significant concerns, would recommend full course of physical therapy and routine follow-up  in 6-8wks. He would like to review his imaging in person, and we can accommodate this if he would like         No data to display                     Diagnoses and all orders for this visit:  Acute left-sided low back pain without sciatica  -     CT Lumbar Spine w/o Contrast; Future  -     methylPREDNISolone (MEDROL DOSEPAK) 4 MG tablet therapy pack; Follow Package Directions      PLAN:  Reviewed spine anatomy and disease process. Discussed diagnosis and treatment options with the patient today. A shared decision making model was used.  The patient's values and choices were respected. The following represents what was discussed and decided upon by the provider and the patient.      -DIAGNOSTIC TESTS:  Images were personally reviewed and interpreted and explained to patient today using a spine model.   -- I ordered a CT lumbar spine without contrast    -PHYSICAL THERAPY:    -- Hold off on physical therapy for now until results  available  --If no significant concerns, would refer for full course of physical therapy  Discussed the importance of core strengthening, ROM, stretching exercises with the patient and how each of these entities is important in decreasing pain.  Explained to the patient that the purpose of physical therapy is to teach the patient a home exercise program.  These exercises need to be performed every day in order to decrease pain and prevent future occurrences of pain.        -MEDICATIONS:    -- Ordered Medrol Dosepak for patient.  He can take over-the-counter Tylenol as directed.  He will stop NSAIDs and I recommended not resuming them until dark stools evaluated by primary care  Discussed multiple medication options today with patient. Discussed risks, side effects, and proper use of medications. Patient verbalized understanding.    -INTERVENTIONS:    We did not discuss injections today.  Patient would be a good candidate in the future for either epidural steroid injections or medial branch blocks if indicated based on symptoms and supported by imaging results.    -PATIENT EDUCATION:  Total time of 40 minutes, on the day of service, spent with the patient, reviewing the chart, placing orders, and documenting.   -Today we also discussed the pros and cons of the current treatment plan.    -FOLLOW-UP:   Patient would like to return in person to review his imaging    Advised patient to call the [facility] if symptoms worsen, new numbness or weakness develops in the legs, or if they develop new or worsening problems controlling bladder or bowel function.   ______________________________________________________________________    SUBJECTIVE:    HPI:  Fabio GODINEZ Desmond  Is a 63 year old male history of hypertension, asthma, anxiety, IBS who presents today for new patient evaluation of low back pain     Lower back pain which began gradually about a week ago after getting a vaccine.  He also endorses a mechanical fall about  2 weeks ago.  He got up afterwards without pain.  He has chronic intermittent low back pain, but this flare is quite significant.  Pain is more right-sided and centrally located.  The pain is worse with lying down, leaning over, stooping, doing his job as a  and , which she has done for many years.  He could barely get off the xray table on Tuesday. He is having difficulty walking longer distances.  Rates his pain today at a 9 out of 10, at worst a 10 out of 10, at best a 4 out of 10. He has pain radiating into the hips and buttocks and posterior proximal legs to some extent, particularly with sitting. It goes back and forth. He is only able to walk 50 yards.    He does induce that he has had some dark stools lately, and has been taking ibuprofen and Tylenol as needed over-the-counter.    He denies any radicular leg pain, numbness, weakness, changes in bowel or bladder control.  He has been doing chiropractic care for his symptoms without significant relief.  He had lumbar x-rays and x-rays of his right hip done on October 31.  He has not done any physical therapy or had any injections, or previous spine surgeries.    He used to do marathon races etc., and now typically goes to walk in the pool several times a week and does some cardio.    -Treatment to Date:     -Medications:  Tylenol  Ibuprofen    Current Outpatient Medications   Medication     albuterol (PROAIR HFA/PROVENTIL HFA/VENTOLIN HFA) 108 (90 Base) MCG/ACT inhaler     atenolol (TENORMIN) 50 MG tablet     budesonide-formoterol (SYMBICORT) 80-4.5 MCG/ACT Inhaler     fluticasone (FLONASE) 50 MCG/ACT nasal spray     ketoconazole (NIZORAL) 2 % external cream     losartan (COZAAR) 25 MG tablet     methylPREDNISolone (MEDROL DOSEPAK) 4 MG tablet therapy pack     omeprazole (PRILOSEC) 20 MG DR capsule     OXcarbazepine (TRILEPTAL) 300 MG tablet     sertraline (ZOLOFT) 100 MG tablet     simvastatin (ZOCOR) 20 MG tablet      SUMAtriptan (IMITREX) 50 MG tablet     traZODone (DESYREL) 50 MG tablet     triamcinolone (KENALOG) 0.1 % ointment     No current facility-administered medications for this visit.       No Known Allergies    Past Medical History:   Diagnosis Date     Anxiety      Depression      Diverticulitis      h/o Mumps      Hypertension      Kidney stone      Testicular cancer - Right 1998        Patient Active Problem List   Diagnosis     CARDIOVASCULAR SCREENING; LDL GOAL LESS THAN 160     Mild major depression (H)     Obese     HTN, goal below 140/90     H/O testicular cancer     Diverticulosis of large intestine     Esophageal reflux     Chest pain     Migraine     Skin cancer screening     Skin eruption     Lentigines     Chronic dermatitis     Mild intermittent asthma without complication     Essential hypertension with goal blood pressure less than 140/90     Major depression in complete remission (H)     Impaired fasting glucose     Malignant neoplasm of testis, unspecified laterality, unspecified whether descended or undescended     Morbid obesity (H)     Symptomatic cholelithiasis     Partial seizure (H)     Pain of left hand     Osteoarthritis of left hand, unspecified osteoarthritis type       Past Surgical History:   Procedure Laterality Date     COLONOSCOPY N/A 08/22/2014    Procedure: COLONOSCOPY;  Surgeon: Joe Garcia MD;  Location:  GI     COLONOSCOPY       CRANIOTOMY, REPAIR ANEURYSM, COMBINED Right 05/07/2019    Procedure: RIGHT CRANIOTOMY AND CLIPPING OF COMPLEX MIDDLE CEREBRAL ARTERY ANEURYMS; INTRAOPERATIVE ANGIOGRAM;  Surgeon: Jamir Rebollar MD;  Location: [facility];  Service: Neurosurgery     CYSTOSCOPY       EYE SURGERY      ? not on H&P     IR CEREBRAL ANGIOGRAM  05/07/2019     IR CEREBRAL ANGIOGRAM  05/07/2019     LAPAROSCOPIC CHOLECYSTECTOMY N/A 09/02/2018    Procedure: LAPAROSCOPIC CHOLECYSTECTOMY;  LAPAROSCOPIC CHOLECYSTECTOMY ;  Surgeon: Shannan Chaves MD;   "Location: [facility] OR     LAPAROSCOPIC CHOLECYSTECTOMY       ORCHIECTOMY NOS Right 1998     ORCHIECTOMY SCROTAL Right 01/01/1998     TESTICLE SURGERY       TONSILLECTOMY       TONSILLECTOMY         Family History   Problem Relation Age of Onset     Cerebrovascular Disease Mother      Hypertension Mother      Heart Disease Father         had 2 open heart surgery     Lipids Father      Hypertension Father      Cancer Maternal Grandfather         skin cancer     Skin Cancer Maternal Grandfather         skin cancer     Heart Disease Sister 45        heart attack     Cancer Sister 46        breast cancer     Skin Cancer Sister         BCC     Varicose Veins Mother      Coronary Artery Disease Father      Cancer Sister      Coronary Artery Disease Sister      Hypertension Other         all siblings       Reviewed past medical, surgical, and family history with patient found on new patient intake packet located in [employer]MR [employer] tab.     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  Specifically negative for bowel/bladder dysfunction, balance changes, headache, dizziness, foot drop, fevers, chills, appetite changes, nausea/vomiting, unexplained weight loss. Otherwise 13 systems reviewed are negative. Please see the patient's intake questionnaire from today for details.    OBJECTIVE:  BP (!) 164/81   Pulse 72   Ht 5' 6\" (1.676 m)   Wt 245 lb (111.1 kg)   BMI 39.54 kg/m      PHYSICAL EXAMINATION:    --CONSTITUTIONAL:  Vital signs as above.  No acute distress.  The patient is well nourished and well groomed.  --PSYCHIATRIC:  Appropriate mood and affect. The patient is awake, alert, oriented to person, place, time and answering questions appropriately with clear speech.    --SKIN:  Skin over the " face, bilateral lower extremities, and posterior torso is clean, dry, intact without rashes.    --RESPIRATORY: Normal rhythm and effort. No abnormal accessory muscle breathing patterns noted.   --ABDOMINAL:  Non-distended.    --GROSS MOTOR: Gait is painful, limited.  Arises with pain from a seated position. Toe walking and heel walking are normal without significant difficulty.      --LOWER EXTREMITY MOTOR TESTING:  Hip flexion: right 5/5, left 5/5  Hip abduction: right 5/5, left 5/5  Hip adduction: right 5/5, left 5/5   Quads: right 5/5, left 5/5  Hamstrings: right 5/5, left 5/5  Dorsiflexion: right 5/5, left 5/5  Plantar flexion: right 5/5, left 5/5    Great toe MTP extension/EHL: right 5/5, left 5/5    --NEUROLOGICAL:  1/4 patellar and achilles reflexes bilaterally. Sensation to light touch is intact throughout both lower extremities. Babinski is negative. No clonus.    --STANDING EXAMINATION:  Normal lumbar lordosis noted, no lateral shift.    --MUSCULOSKELETAL: Lumbar spine inspection reveals no evidence of deformity. Range of motion is  limited in lateral rotation and sidebending due to pain.  Lumbar flexion, extension is full, but painful.  Positive significant midline lower lumbar point tenderness to palpation lumbar spine. No paraspinal musculature tenderness.    Straight leg raising is negative.    --HIPS: Full range of motion bilaterally.  Positive right ZACHARY and Negative FADIR bilaterally. Negative hip joint tenderness to palp.    --SACROILIAC JOINT: One finger point test was negative.  Positive right SI joint tenderness to palpation.  Negative on left.    --VASCULAR:  Bilateral lower extremities are warm without any discoloration.  There is no pitting edema of the bilateral lower extremities.    RESULTS:   [facility] and [facility]where were reviewed today.    Imaging: Spine imaging was personally reviewed and interpreted today. The images were shown to the patient and  the findings were explained using a spine model.      XR Lumbar Spine 2/3 Views    Result Date: 10/31/2023  LUMBAR SPINE TWO TO THREE VIEWS  October 31, 2023 11:10 AM HISTORY: Chronic bilateral low back pain, unspecified whether sciatica present. COMPARISON: None.     IMPRESSION: No fracture is identified. Moderate to severe degenerative change. JC CHAMPION MD   SYSTEM ID:  [id number]    XR Hip Right 2-3 Views    Result Date: 10/31/2023  XR HIP RIGHT 2-3 VIEWS 10/31/2023 11:20 AM HISTORY: Chronic bilateral low back pain, unspecified whether sciatica present; Chronic bilateral low back pain, unspecified whether sciatica present; Hip pain, right COMPARISON: None.     IMPRESSION: There is mild right hip degenerative arthrosis. No acute fracture is seen. There are degenerative changes in the lower lumbar spine and at the right sacroiliac joint. PEGGY MARTINEZ MD   SYSTEM ID:  [id number]          Nena Lynch FNP-C  [facility]  O. [phone]             Again, thank you for allowing me to participate in the care of your patient.        Sincerely,        HERMILA Reyes CNP

## 2023-11-15 ENCOUNTER — OFFICE VISIT (OUTPATIENT)
Dept: PHYSICAL MEDICINE AND REHAB | Facility: CLINIC | Age: 63
End: 2023-11-15
Payer: COMMERCIAL

## 2023-11-15 VITALS — DIASTOLIC BLOOD PRESSURE: 90 MMHG | SYSTOLIC BLOOD PRESSURE: 168 MMHG | OXYGEN SATURATION: 97 % | HEART RATE: 70 BPM

## 2023-11-15 DIAGNOSIS — M54.50 ACUTE LEFT-SIDED LOW BACK PAIN WITHOUT SCIATICA: Primary | ICD-10-CM

## 2023-11-15 PROCEDURE — 99215 OFFICE O/P EST HI 40 MIN: CPT | Performed by: NURSE PRACTITIONER

## 2023-11-15 RX ORDER — METHOCARBAMOL 500 MG/1
500 TABLET, FILM COATED ORAL 4 TIMES DAILY PRN
Qty: 40 TABLET | Refills: 0 | Status: SHIPPED | OUTPATIENT
Start: 2023-11-15

## 2023-11-15 ASSESSMENT — PAIN SCALES - GENERAL: PAINLEVEL: SEVERE PAIN (6)

## 2023-11-15 NOTE — PATIENT INSTRUCTIONS
Robaxin (muscle relaxant medication) has been prescribed today. Please take this four times daily as needed for your back spasms. This medication may cause drowsiness. Please do not work or drive while taking this medication until you know how it affects you. If it does make you drowsy, you should only take it before bedtime or at times that you do not have to work/drive. If you dizziness worsens, you can stop taking this medication.    Contact your PCP regarding your left shoulder pain, dizziness, and chest pain.  Your blood pressure was slightly high today.    ~You have been referred for Physical Therapy to [facility]. For your Left shoulder. They will call you to schedule an appointment.      Scheduling phone number is [phone], or [place] location.  If you have not heard from the scheduling office within 2 business days, please call [phone] for ALL other locations.    Discussed the importance of core strengthening, ROM, stretching exercises and how each of these entities is important in decreasing pain and improving long term spine health.  The purpose of physical therapy is to teach you an individualized home exercise program.  These exercises need to be performed every day in order to decrease pain and prevent future occurrences of pain.           ~Please call our [facility] Nurse Navigation line [phone] with any questions or concerns about your treatment plan, if symptoms worsen and you would like to be seen urgently, or if you have any new or worsening numbness, weakness, or problems controlling bladder and bowel function.  ~You are also welcome to contact Nena Lynch via [employer], but please be aware that responses to [employer] message may take 2-3 days due to the high volume of patients seen in clinic.

## 2023-11-15 NOTE — LETTER
11/15/2023         RE: Fabio Logan  [address] [facility] [id number]        Dear Colleague,    Thank you for referring your patient, Fabio Logan, to the [facility]. Please see a copy of my visit note below.    ASSESSMENT: Fabio Logan is a 63 year old male who presents for consultation at the request of PCP Manoj Armenta, who presents today for a follow up patient evaluation of:    -Low back pain    Nicolas is intact on exam today. We reviewed his CT imaging. He has varying degrees of multilevel wear and tear degenerative changes of his discs, particularly at  L3-4 and facet arthopathy. He has small osteophytes but no osteophyte fractures. Recommended conservative treatment. Recommended robaxin 500mg QID PRN. Added PT for the left shoulder. He will call his PCP today regarding his dizziness and blood pressure. Recommend full course of PT and follow up in approx 8 wks for further discussion. He can contact me sooner if questions or worsening symptoms           No data to display                     Diagnoses and all orders for this visit:  Acute left-sided low back pain without sciatica  -     Physical Therapy Referral; Future  -     methocarbamol (ROBAXIN) 500 MG tablet; Take 1 tablet (500 mg) by mouth 4 times daily as needed for muscle spasms      PLAN:  Reviewed spine anatomy and disease process. Discussed diagnosis and treatment options with the patient today. A shared decision making model was used.  The patient's values and choices were respected. The following represents what was discussed and decided upon by the provider and the patient.      -DIAGNOSTIC TESTS:  Images were personally reviewed and interpreted and explained to patient today using a spine model.   -- I didn't order any new imaging today    -PHYSICAL THERAPY:    -- Start PT for your lower back and shoulder   Discussed the importance of core strengthening, ROM, stretching exercises  with the patient and how each of these entities is important in decreasing pain.  Explained to the patient that the purpose of physical therapy is to teach the patient a home exercise program.  These exercises need to be performed every day in order to decrease pain and prevent future occurrences of pain.        -MEDICATIONS:    --start robaxin 500mg QID PRN  He can take over-the-counter Tylenol as directed.    He will stop NSAIDs until evaluated by primary care  -completed medrol pack  Discussed multiple medication options today with patient. Discussed risks, side effects, and proper use of medications. Patient verbalized understanding.    -INTERVENTIONS:    We did not discuss injections today.  Patient would be a good candidate in the future for either epidural steroid injections or medial branch blocks if indicated based on symptoms and supported by imaging results.    -PATIENT EDUCATION:  Total time of 40 minutes, on the day of service, spent with the patient, reviewing the chart, placing orders, and documenting.   -Today we also discussed the pros and cons of the current treatment plan.    -FOLLOW-UP:   8wks or prn    Advised patient to call the [facility] if symptoms worsen, new numbness or weakness develops in the legs, or if they develop new or worsening problems controlling bladder or bowel function.   ______________________________________________________________________    SUBJECTIVE:  Nicolas is overall improving. He took the medrol dose pack, feels like it helped to some extent. He has PT booked. He has returned to exercise at the gym and walk/running in the pool. Rates back pain at a 6/10. 4/10 at best. Worse with standing, bending. He has avoided heavy lifting at his job. He denies leg pain, numbness, weakness, or changes in bowel or bladder control. Separately endorses acute on chronic left shoulder pain. Some radiation of pain into the left anterior chest wall. No arm numbness, tingling, weakness.  Some dizziness as well for a few weeks. He is taking tylenol at times. Aleve also, but not in a few days    Per previous visit  HPI:  Fabio Logan  Is a 63 year old male history of hypertension, asthma, anxiety, IBS who presents today for new patient evaluation of low back pain     Lower back pain which began gradually about a week ago after getting a vaccine.  He also endorses a mechanical fall about 2 weeks ago.  He got up afterwards without pain.  He has chronic intermittent low back pain, but this flare is quite significant.  Pain is more right-sided and centrally located.  The pain is worse with lying down, leaning over, stooping, doing his job as a  and , which she has done for many years.  He could barely get off the xray table on Tuesday. He is having difficulty walking longer distances.  Rates his pain today at a 9 out of 10, at worst a 10 out of 10, at best a 4 out of 10. He has pain radiating into the hips and buttocks and posterior proximal legs to some extent, particularly with sitting. It goes back and forth. He is only able to walk 50 yards.    He does induce that he has had some dark stools lately, and has been taking ibuprofen and Tylenol as needed over-the-counter.    He denies any radicular leg pain, numbness, weakness, changes in bowel or bladder control.  He has been doing chiropractic care for his symptoms without significant relief.  He had lumbar x-rays and x-rays of his right hip done on October 31.  He has not done any physical therapy or had any injections, or previous spine surgeries.    He used to do marathon races etc., and now typically goes to walk in the pool several times a week and does some cardio.    -Treatment to Date:     -Medications:  Tylenol  Ibuprofen  Medrol dose pack    Current Outpatient Medications   Medication     albuterol (PROAIR HFA/PROVENTIL HFA/VENTOLIN HFA) 108 (90 Base) MCG/ACT inhaler     atenolol (TENORMIN) 50 MG tablet      budesonide-formoterol (SYMBICORT) 80-4.5 MCG/ACT Inhaler     fluticasone (FLONASE) 50 MCG/ACT nasal spray     ketoconazole (NIZORAL) 2 % external cream     losartan (COZAAR) 25 MG tablet     methocarbamol (ROBAXIN) 500 MG tablet     omeprazole (PRILOSEC) 20 MG DR capsule     OXcarbazepine (TRILEPTAL) 300 MG tablet     sertraline (ZOLOFT) 100 MG tablet     simvastatin (ZOCOR) 20 MG tablet     SUMAtriptan (IMITREX) 50 MG tablet     traZODone (DESYREL) 50 MG tablet     triamcinolone (KENALOG) 0.1 % ointment     methylPREDNISolone (MEDROL DOSEPAK) 4 MG tablet therapy pack     No current facility-administered medications for this visit.       No Known Allergies    Past Medical History:   Diagnosis Date     Anxiety      Depression      Diverticulitis      h/o Mumps      Hypertension      Kidney stone      Testicular cancer - Right 1998        Patient Active Problem List   Diagnosis     CARDIOVASCULAR SCREENING; LDL GOAL LESS THAN 160     Mild major depression (H)     Obese     HTN, goal below 140/90     H/O testicular cancer     Diverticulosis of large intestine     Esophageal reflux     Chest pain     Migraine     Skin cancer screening     Skin eruption     Lentigines     Chronic dermatitis     Mild intermittent asthma without complication     Essential hypertension with goal blood pressure less than 140/90     Major depression in complete remission (H)     Impaired fasting glucose     Malignant neoplasm of testis, unspecified laterality, unspecified whether descended or undescended     Morbid obesity (H)     Symptomatic cholelithiasis     Partial seizure (H)     Pain of left hand     Osteoarthritis of left hand, unspecified osteoarthritis type       Past Surgical History:   Procedure Laterality Date     COLONOSCOPY N/A 08/22/2014    Procedure: COLONOSCOPY;  Surgeon: Joe Garcia MD;  Location:  GI     COLONOSCOPY       CRANIOTOMY, REPAIR ANEURYSM, COMBINED Right 05/07/2019    Procedure: RIGHT CRANIOTOMY AND  CLIPPING OF COMPLEX MIDDLE CEREBRAL ARTERY ANEURYMS; INTRAOPERATIVE ANGIOGRAM;  Surgeon: Jamir Rebollar MD;  Location: [facility] OR;  Service: Neurosurgery     CYSTOSCOPY       EYE SURGERY      ? not on H&P     IR CEREBRAL ANGIOGRAM  05/07/2019     IR CEREBRAL ANGIOGRAM  05/07/2019     LAPAROSCOPIC CHOLECYSTECTOMY N/A 09/02/2018    Procedure: LAPAROSCOPIC CHOLECYSTECTOMY;  LAPAROSCOPIC CHOLECYSTECTOMY ;  Surgeon: Shannan Chaves MD;  Location:  OR     LAPAROSCOPIC CHOLECYSTECTOMY       ORCHIECTOMY NOS Right 1998     ORCHIECTOMY SCROTAL Right 01/01/1998     TESTICLE SURGERY       TONSILLECTOMY       TONSILLECTOMY         Family History   Problem Relation Age of Onset     Cerebrovascular Disease Mother      Hypertension Mother      Heart Disease Father         had 2 open heart surgery     Lipids Father      Hypertension Father      Cancer Maternal Grandfather         skin cancer     Skin Cancer Maternal Grandfather         skin cancer     Heart Disease Sister 45        heart attack     Cancer Sister 46        breast cancer     Skin Cancer Sister         BCC     Varicose Veins Mother      Coronary Artery Disease Father      Cancer Sister      Coronary Artery Disease Sister      Hypertension Other         all siblings       Reviewed past medical, surgical, and family history with patient found on new patient intake packet located in [employer] tab.     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 recreational drug user      OBJECTIVE:  BP (!) 168/90 (BP Location: Right arm, Patient Position: Sitting, Cuff Size: Adult Regular)   Pulse 70   SpO2 97%     PHYSICAL EXAMINATION:    --CONSTITUTIONAL:  Vital signs as above.  No acute distress.  The patient is well nourished and well groomed.  --PSYCHIATRIC:  Appropriate mood and affect. The patient is awake, alert, oriented to person, place, time and answering questions appropriately with clear speech.    --SKIN:  Skin over the face and posterior  torso is clean, dry, intact without rashes.    --RESPIRATORY: Normal rhythm and effort. No abnormal accessory muscle breathing patterns noted.   --ABDOMINAL:  Non-distended.    --GROSS MOTOR: .  Arises from sitting position without pain.      --LOWER EXTREMITY MOTOR TESTING:  Hip flexion: right 5/5, left 5/5  Quads: right 5/5, left 5/5  Hamstrings: right 5/5, left 5/5  Dorsiflexion: right 5/5, left 5/5  Plantar flexion: right 5/5, left 5/5    Great toe MTP extension/EHL: right 5/5, left 5/5    --NEUROLOGICAL:  Sensation to light touch is grossly intact throughout both lower extremities.     --MUSCULOSKELETAL: Lumbar spine inspection reveals no evidence of deformity. negative midline lower lumbar point tenderness to palpation lumbar spine. No paraspinal musculature tenderness.      --SACROILIAC JOINT: One finger point test was negative. Negative bilSI joint tenderness to palpation.        RESULTS:   Prior medical records from [facility] and Care Everywhere were reviewed today.    Imaging: Spine imaging was personally reviewed and interpreted today. The images were shown to the patient and the findings were explained using a spine model.    EXAM: CT LUMBAR SPINE W/O CONTRAST  LOCATION: [facility]  DATE: 11/2/2023     INDICATION: rule out fracture or other injury, lower lumbar point tenderness severe low back pain, reported fall. xr detail poor  COMPARISON: None.  TECHNIQUE: Routine CT Lumbar Spine without IV contrast. Multiplanar reformats. Dose reduction techniques were used.      FINDINGS:  Vertebral body heights are maintained. Multilevel loss of disc height.     Multilevel loss of disc height with lateral osteophytic changes particularly on the left.     L1-L2: No canal stenosis. No significant foraminal narrowing.     L2-L3: Broad-based disc bulge. Mild canal stenosis. Moderate right and mild left foraminal narrowing.     L3-L4: There is a broad-based disc bulge. Ligamentum flavum  hypertrophy and facet arthrosis with mild canal stenosis. Moderate right and left foraminal narrowing.     L4-L5: Broad-based disc bulge. Ligamentum flavum hypertrophy and facet arthrosis. Mild canal stenosis. Moderate bilateral foraminal narrowing.     L5-S1: Broad-based disc bulge. No canal stenosis. Facet disease. Severe bilateral foraminal narrowing.                                                                      IMPRESSION:  1.  No acute lumbar fracture. Degenerative changes in the lumbar spine with severe bilateral foraminal narrowing at L5-S1.    Nena GAY-C  [facility]  O. [phone]               Again, thank you for allowing me to participate in the care of your patient.        Sincerely,        HERMILA Reyes CNP

## 2023-11-15 NOTE — PROGRESS NOTES
ASSESSMENT: Fabio Logan is a 63 year old male who presents for consultation at the request of PCP Manoj Armenta, who presents today for a follow up patient evaluation of:    -Low back pain    Nicolas is intact on exam today. We reviewed his CT imaging. He has varying degrees of multilevel wear and tear degenerative changes of his discs, particularly at  L3-4 and facet arthopathy. He has small osteophytes but no osteophyte fractures. Recommended conservative treatment. Recommended robaxin 500mg QID PRN. Added PT for the left shoulder. He will call his PCP today regarding his dizziness and blood pressure. Recommend full course of PT and follow up in approx 8 wks for further discussion. He can contact me sooner if questions or worsening symptoms           No data to display                     Diagnoses and all orders for this visit:  Acute left-sided low back pain without sciatica  -     Physical Therapy Referral; Future  -     methocarbamol (ROBAXIN) 500 MG tablet; Take 1 tablet (500 mg) by mouth 4 times daily as needed for muscle spasms      PLAN:  Reviewed spine anatomy and disease process. Discussed diagnosis and treatment options with the patient today. A shared decision making model was used.  The patient's values and choices were respected. The following represents what was discussed and decided upon by the provider and the patient.      -DIAGNOSTIC TESTS:  Images were personally reviewed and interpreted and explained to patient today using a spine model.   -- I didn't order any new imaging today    -PHYSICAL THERAPY:    -- Start PT for your lower back and shoulder   Discussed the importance of core strengthening, ROM, stretching exercises with the patient and how each of these entities is important in decreasing pain.  Explained to the patient that the purpose of physical therapy is to teach the patient a home exercise program.  These exercises need to be performed every day in order to decrease pain and  prevent future occurrences of pain.        -MEDICATIONS:    --start robaxin 500mg QID PRN  He can take over-the-counter Tylenol as directed.    He will stop NSAIDs until evaluated by primary care  -completed medrol pack  Discussed multiple medication options today with patient. Discussed risks, side effects, and proper use of medications. Patient verbalized understanding.    -INTERVENTIONS:    We did not discuss injections today.  Patient would be a good candidate in the future for either epidural steroid injections or medial branch blocks if indicated based on symptoms and supported by imaging results.    -PATIENT EDUCATION:  Total time of 40 minutes, on the day of service, spent with the patient, reviewing the chart, placing orders, and documenting.   -Today we also discussed the pros and cons of the current treatment plan.    -FOLLOW-UP:   8wks or prn    Advised patient to call the [facility] if symptoms worsen, new numbness or weakness develops in the legs, or if they develop new or worsening problems controlling bladder or bowel function.   ______________________________________________________________________    SUBJECTIVE:  Nicolas is overall improving. He took the medrol dose pack, feels like it helped to some extent. He has PT booked. He has returned to exercise at the gym and walk/running in the pool. Rates back pain at a 6/10. 4/10 at best. Worse with standing, bending. He has avoided heavy lifting at his job. He denies leg pain, numbness, weakness, or changes in bowel or bladder control. Separately endorses acute on chronic left shoulder pain. Some radiation of pain into the left anterior chest wall. No arm numbness, tingling, weakness. Some dizziness as well for a few weeks. He is taking tylenol at times. Aleve also, but not in a few days    Per previous visit  HPI:  Fabio Logan  Is a 63 year old male history of hypertension, asthma, anxiety, IBS who presents today for new patient evaluation of low  back pain     Lower back pain which began gradually about a week ago after getting a vaccine.  He also endorses a mechanical fall about 2 weeks ago.  He got up afterwards without pain.  He has chronic intermittent low back pain, but this flare is quite significant.  Pain is more right-sided and centrally located.  The pain is worse with lying down, leaning over, stooping, doing his job as a  and , which she has done for many years.  He could barely get off the xray table on Tuesday. He is having difficulty walking longer distances.  Rates his pain today at a 9 out of 10, at worst a 10 out of 10, at best a 4 out of 10. He has pain radiating into the hips and buttocks and posterior proximal legs to some extent, particularly with sitting. It goes back and forth. He is only able to walk 50 yards.    He does induce that he has had some dark stools lately, and has been taking ibuprofen and Tylenol as needed over-the-counter.    He denies any radicular leg pain, numbness, weakness, changes in bowel or bladder control.  He has been doing chiropractic care for his symptoms without significant relief.  He had lumbar x-rays and x-rays of his right hip done on October 31.  He has not done any physical therapy or had any injections, or previous spine surgeries.    He used to do marathon races etc., and now typically goes to walk in the pool several times a week and does some cardio.    -Treatment to Date:     -Medications:  Tylenol  Ibuprofen  Medrol dose pack    Current Outpatient Medications   Medication    albuterol (PROAIR HFA/PROVENTIL HFA/VENTOLIN HFA) 108 (90 Base) MCG/ACT inhaler    atenolol (TENORMIN) 50 MG tablet    budesonide-formoterol (SYMBICORT) 80-4.5 MCG/ACT Inhaler    fluticasone (FLONASE) 50 MCG/ACT nasal spray    ketoconazole (NIZORAL) 2 % external cream    losartan (COZAAR) 25 MG tablet    methocarbamol (ROBAXIN) 500 MG tablet    omeprazole (PRILOSEC) 20 MG DR capsule     OXcarbazepine (TRILEPTAL) 300 MG tablet    sertraline (ZOLOFT) 100 MG tablet    simvastatin (ZOCOR) 20 MG tablet    SUMAtriptan (IMITREX) 50 MG tablet    traZODone (DESYREL) 50 MG tablet    triamcinolone (KENALOG) 0.1 % ointment    methylPREDNISolone (MEDROL DOSEPAK) 4 MG tablet therapy pack     No current facility-administered medications for this visit.       No Known Allergies    Past Medical History:   Diagnosis Date    Anxiety     Depression     Diverticulitis     h/o Mumps     Hypertension     Kidney stone     Testicular cancer - Right 1998        Patient Active Problem List   Diagnosis    CARDIOVASCULAR SCREENING; LDL GOAL LESS THAN 160    Mild major depression (H)    Obese    HTN, goal below 140/90    H/O testicular cancer    Diverticulosis of large intestine    Esophageal reflux    Chest pain    Migraine    Skin cancer screening    Skin eruption    Lentigines    Chronic dermatitis    Mild intermittent asthma without complication    Essential hypertension with goal blood pressure less than 140/90    Major depression in complete remission (H)    Impaired fasting glucose    Malignant neoplasm of testis, unspecified laterality, unspecified whether descended or undescended    Morbid obesity (H)    Symptomatic cholelithiasis    Partial seizure (H)    Pain of left hand    Osteoarthritis of left hand, unspecified osteoarthritis type       Past Surgical History:   Procedure Laterality Date    COLONOSCOPY N/A 08/22/2014    Procedure: COLONOSCOPY;  Surgeon: Joe Garcia MD;  Location:  GI    COLONOSCOPY      CRANIOTOMY, REPAIR ANEURYSM, COMBINED Right 05/07/2019    Procedure: RIGHT CRANIOTOMY AND CLIPPING OF COMPLEX MIDDLE CEREBRAL ARTERY ANEURYMS; INTRAOPERATIVE ANGIOGRAM;  Surgeon: Jamir Rebollar MD;  Location: [facility];  Service: Neurosurgery    CYSTOSCOPY      EYE SURGERY      ? not on H&P    IR CEREBRAL ANGIOGRAM  05/07/2019    IR CEREBRAL ANGIOGRAM  05/07/2019    LAPAROSCOPIC  CHOLECYSTECTOMY N/A 09/02/2018    Procedure: LAPAROSCOPIC CHOLECYSTECTOMY;  LAPAROSCOPIC CHOLECYSTECTOMY ;  Surgeon: Shannan Chaves MD;  Location: [facility] OR    LAPAROSCOPIC CHOLECYSTECTOMY      ORCHIECTOMY NOS Right 1998    ORCHIECTOMY SCROTAL Right 01/01/1998    TESTICLE SURGERY      TONSILLECTOMY      TONSILLECTOMY         Family History   Problem Relation Age of Onset    Cerebrovascular Disease Mother     Hypertension Mother     Heart Disease Father         had 2 open heart surgery    Lipids Father     Hypertension Father     Cancer Maternal Grandfather         skin cancer    Skin Cancer Maternal Grandfather         skin cancer    Heart Disease Sister 45        heart attack    Cancer Sister 46        breast cancer    Skin Cancer Sister         BCC    Varicose Veins Mother     Coronary Artery Disease Father     Cancer Sister     Coronary Artery Disease Sister     Hypertension Other         all siblings       Reviewed past medical, surgical, and family history with patient found on new patient intake packet located in EMR Media tab.     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 recreational drug user      OBJECTIVE:  BP (!) 168/90 (BP Location: Right arm, Patient Position: Sitting, Cuff Size: Adult Regular)   Pulse 70   SpO2 97%     PHYSICAL EXAMINATION:    --CONSTITUTIONAL:  Vital signs as above.  No acute distress.  The patient is well nourished and well groomed.  --PSYCHIATRIC:  Appropriate mood and affect. The patient is awake, alert, oriented to person, place, time and answering questions appropriately with clear speech.    --SKIN:  Skin over the face and posterior torso is clean, dry, intact without rashes.    --RESPIRATORY: Normal rhythm and effort. No abnormal accessory muscle breathing patterns noted.   --ABDOMINAL:  Non-distended.    --GROSS MOTOR: .  Arises from sitting position without pain.      --LOWER EXTREMITY MOTOR TESTING:  Hip flexion: right 5/5, left 5/5  Quads: right 5/5, left  5/5  Hamstrings: right 5/5, left 5/5  Dorsiflexion: right 5/5, left 5/5  Plantar flexion: right 5/5, left 5/5    Great toe MTP extension/EHL: right 5/5, left 5/5    --NEUROLOGICAL:  Sensation to light touch is grossly intact throughout both lower extremities.     --MUSCULOSKELETAL: Lumbar spine inspection reveals no evidence of deformity. negative midline lower lumbar point tenderness to palpation lumbar spine. No paraspinal musculature tenderness.      --SACROILIAC JOINT: One finger point test was negative. Negative bilSI joint tenderness to palpation.        RESULTS:   Prior medical records from [facility] and Care Everywhere were reviewed today.    Imaging: Spine imaging was personally reviewed and interpreted today. The images were shown to the patient and the findings were explained using a spine model.    EXAM: CT LUMBAR SPINE W/O CONTRAST  LOCATION: [facility]  DATE: 11/2/2023     INDICATION: rule out fracture or other injury, lower lumbar point tenderness severe low back pain, reported fall. xr detail poor  COMPARISON: None.  TECHNIQUE: Routine CT Lumbar Spine without IV contrast. Multiplanar reformats. Dose reduction techniques were used.      FINDINGS:  Vertebral body heights are maintained. Multilevel loss of disc height.     Multilevel loss of disc height with lateral osteophytic changes particularly on the left.     L1-L2: No canal stenosis. No significant foraminal narrowing.     L2-L3: Broad-based disc bulge. Mild canal stenosis. Moderate right and mild left foraminal narrowing.     L3-L4: There is a broad-based disc bulge. Ligamentum flavum hypertrophy and facet arthrosis with mild canal stenosis. Moderate right and left foraminal narrowing.     L4-L5: Broad-based disc bulge. Ligamentum flavum hypertrophy and facet arthrosis. Mild canal stenosis. Moderate bilateral foraminal narrowing.     L5-S1: Broad-based disc bulge. No canal stenosis. Facet disease. Severe  bilateral foraminal narrowing.                                                                      IMPRESSION:  1.  No acute lumbar fracture. Degenerative changes in the lumbar spine with severe bilateral foraminal narrowing at L5-S1.    Nena GAY-C  [facility]  O. [phone]

## 2023-11-17 ENCOUNTER — NURSE TRIAGE (OUTPATIENT)
Dept: PEDIATRICS | Facility: CLINIC | Age: 63
End: 2023-11-17
Payer: COMMERCIAL

## 2023-11-17 ENCOUNTER — APPOINTMENT (OUTPATIENT)
Dept: ULTRASOUND IMAGING | Facility: CLINIC | Age: 63
End: 2023-11-17
Attending: EMERGENCY MEDICINE
Payer: COMMERCIAL

## 2023-11-17 ENCOUNTER — APPOINTMENT (OUTPATIENT)
Dept: MRI IMAGING | Facility: CLINIC | Age: 63
End: 2023-11-17
Attending: EMERGENCY MEDICINE
Payer: COMMERCIAL

## 2023-11-17 ENCOUNTER — APPOINTMENT (OUTPATIENT)
Dept: GENERAL RADIOLOGY | Facility: CLINIC | Age: 63
End: 2023-11-17
Attending: EMERGENCY MEDICINE
Payer: COMMERCIAL

## 2023-11-17 ENCOUNTER — HOSPITAL ENCOUNTER (EMERGENCY)
Facility: CLINIC | Age: 63
Discharge: HOME | End: 2023-11-17
Attending: EMERGENCY MEDICINE | Admitting: EMERGENCY MEDICINE
Payer: COMMERCIAL

## 2023-11-17 VITALS
RESPIRATION RATE: 9 BRPM | DIASTOLIC BLOOD PRESSURE: 94 MMHG | TEMPERATURE: 96.9 F | HEART RATE: 60 BPM | OXYGEN SATURATION: 96 % | SYSTOLIC BLOOD PRESSURE: 169 MMHG

## 2023-11-17 DIAGNOSIS — R10.11 RIGHT UPPER QUADRANT ABDOMINAL PAIN: ICD-10-CM

## 2023-11-17 DIAGNOSIS — R42 VERTIGO: ICD-10-CM

## 2023-11-17 DIAGNOSIS — R07.9 CHEST PAIN, UNSPECIFIED TYPE: ICD-10-CM

## 2023-11-17 LAB
ALBUMIN SERPL BCG-MCNC: 4.5 G/DL (ref 3.5–5.2)
ALBUMIN UR-MCNC: NEGATIVE MG/DL
ALP SERPL-CCNC: 95 U/L (ref 40–150)
ALT SERPL W P-5'-P-CCNC: 25 U/L (ref 0–70)
ANION GAP SERPL CALCULATED.3IONS-SCNC: 9 MMOL/L (ref 7–15)
APPEARANCE UR: CLEAR
AST SERPL W P-5'-P-CCNC: 28 U/L (ref 0–45)
BASOPHILS # BLD AUTO: 0 10E3/UL (ref 0–0.2)
BASOPHILS NFR BLD AUTO: 1 %
BILIRUB DIRECT SERPL-MCNC: <0.2 MG/DL (ref 0–0.3)
BILIRUB SERPL-MCNC: 0.2 MG/DL
BILIRUB UR QL STRIP: NEGATIVE
BUN SERPL-MCNC: 14.6 MG/DL (ref 8–23)
CALCIUM SERPL-MCNC: 10 MG/DL (ref 8.8–10.2)
CHLORIDE SERPL-SCNC: 102 MMOL/L (ref 98–107)
COLOR UR AUTO: ABNORMAL
CREAT SERPL-MCNC: 0.79 MG/DL (ref 0.67–1.17)
DEPRECATED HCO3 PLAS-SCNC: 27 MMOL/L (ref 22–29)
EGFRCR SERPLBLD CKD-EPI 2021: >90 ML/MIN/1.73M2
EOSINOPHIL # BLD AUTO: 0.1 10E3/UL (ref 0–0.7)
EOSINOPHIL NFR BLD AUTO: 1 %
ERYTHROCYTE [DISTWIDTH] IN BLOOD BY AUTOMATED COUNT: 12.7 % (ref 10–15)
GLUCOSE SERPL-MCNC: 102 MG/DL (ref 70–99)
GLUCOSE UR STRIP-MCNC: NEGATIVE MG/DL
HCT VFR BLD AUTO: 42.6 % (ref 40–53)
HGB BLD-MCNC: 14.1 G/DL (ref 13.3–17.7)
HGB UR QL STRIP: ABNORMAL
HOLD SPECIMEN: NORMAL
HOLD SPECIMEN: NORMAL
IMM GRANULOCYTES # BLD: 0 10E3/UL
IMM GRANULOCYTES NFR BLD: 0 %
KETONES UR STRIP-MCNC: NEGATIVE MG/DL
LEUKOCYTE ESTERASE UR QL STRIP: NEGATIVE
LYMPHOCYTES # BLD AUTO: 1.5 10E3/UL (ref 0.8–5.3)
LYMPHOCYTES NFR BLD AUTO: 27 %
MCH RBC QN AUTO: 30 PG (ref 26.5–33)
MCHC RBC AUTO-ENTMCNC: 33.1 G/DL (ref 31.5–36.5)
MCV RBC AUTO: 91 FL (ref 78–100)
MONOCYTES # BLD AUTO: 0.5 10E3/UL (ref 0–1.3)
MONOCYTES NFR BLD AUTO: 10 %
NEUTROPHILS # BLD AUTO: 3.4 10E3/UL (ref 1.6–8.3)
NEUTROPHILS NFR BLD AUTO: 61 %
NITRATE UR QL: NEGATIVE
NRBC # BLD AUTO: 0 10E3/UL
NRBC BLD AUTO-RTO: 0 /100
PH UR STRIP: 7 [PH] (ref 5–7)
PLATELET # BLD AUTO: 189 10E3/UL (ref 150–450)
POTASSIUM SERPL-SCNC: 4.2 MMOL/L (ref 3.4–5.3)
PROT SERPL-MCNC: 7.3 G/DL (ref 6.4–8.3)
RBC # BLD AUTO: 4.7 10E6/UL (ref 4.4–5.9)
RBC URINE: 3 /HPF
SODIUM SERPL-SCNC: 138 MMOL/L (ref 135–145)
SP GR UR STRIP: 1.01 (ref 1–1.03)
TROPONIN T SERPL HS-MCNC: 10 NG/L
TROPONIN T SERPL HS-MCNC: 9 NG/L
UROBILINOGEN UR STRIP-MCNC: NORMAL MG/DL
WBC # BLD AUTO: 5.5 10E3/UL (ref 4–11)
WBC URINE: 0 /HPF

## 2023-11-17 PROCEDURE — 84484 ASSAY OF TROPONIN QUANT: CPT | Performed by: EMERGENCY MEDICINE

## 2023-11-17 PROCEDURE — 258N000003 HC RX IP 258 OP 636: Performed by: EMERGENCY MEDICINE

## 2023-11-17 PROCEDURE — 255N000002 HC RX 255 OP 636: Mod: JZ | Performed by: EMERGENCY MEDICINE

## 2023-11-17 PROCEDURE — 82248 BILIRUBIN DIRECT: CPT | Performed by: EMERGENCY MEDICINE

## 2023-11-17 PROCEDURE — 70549 MR ANGIOGRAPH NECK W/O&W/DYE: CPT

## 2023-11-17 PROCEDURE — 93005 ELECTROCARDIOGRAM TRACING: CPT

## 2023-11-17 PROCEDURE — 80053 COMPREHEN METABOLIC PANEL: CPT | Performed by: EMERGENCY MEDICINE

## 2023-11-17 PROCEDURE — 85025 COMPLETE CBC W/AUTO DIFF WBC: CPT | Performed by: EMERGENCY MEDICINE

## 2023-11-17 PROCEDURE — 99285 EMERGENCY DEPT VISIT HI MDM: CPT | Mod: 25

## 2023-11-17 PROCEDURE — A9585 GADOBUTROL INJECTION: HCPCS | Mod: JZ | Performed by: EMERGENCY MEDICINE

## 2023-11-17 PROCEDURE — 36415 COLL VENOUS BLD VENIPUNCTURE: CPT | Performed by: EMERGENCY MEDICINE

## 2023-11-17 PROCEDURE — 96360 HYDRATION IV INFUSION INIT: CPT | Mod: 59

## 2023-11-17 PROCEDURE — 71046 X-RAY EXAM CHEST 2 VIEWS: CPT

## 2023-11-17 PROCEDURE — 70544 MR ANGIOGRAPHY HEAD W/O DYE: CPT | Mod: XU

## 2023-11-17 PROCEDURE — 81001 URINALYSIS AUTO W/SCOPE: CPT | Performed by: EMERGENCY MEDICINE

## 2023-11-17 PROCEDURE — 250N000013 HC RX MED GY IP 250 OP 250 PS 637: Performed by: EMERGENCY MEDICINE

## 2023-11-17 PROCEDURE — 76705 ECHO EXAM OF ABDOMEN: CPT

## 2023-11-17 PROCEDURE — 70553 MRI BRAIN STEM W/O & W/DYE: CPT

## 2023-11-17 RX ORDER — MECLIZINE HYDROCHLORIDE 25 MG/1
25 TABLET ORAL 3 TIMES DAILY PRN
Qty: 20 TABLET | Refills: 0 | Status: SHIPPED | OUTPATIENT
Start: 2023-11-17 | End: 2023-12-07

## 2023-11-17 RX ORDER — GADOBUTROL 604.72 MG/ML
10 INJECTION INTRAVENOUS ONCE
Status: COMPLETED | OUTPATIENT
Start: 2023-11-17 | End: 2023-11-17

## 2023-11-17 RX ORDER — MECLIZINE HYDROCHLORIDE 25 MG/1
25 TABLET ORAL ONCE
Status: COMPLETED | OUTPATIENT
Start: 2023-11-17 | End: 2023-11-17

## 2023-11-17 RX ADMIN — MECLIZINE HYDROCHLORIDE 25 MG: 25 TABLET ORAL at 13:26

## 2023-11-17 RX ADMIN — SODIUM CHLORIDE 1000 ML: 9 INJECTION, SOLUTION INTRAVENOUS at 15:21

## 2023-11-17 RX ADMIN — GADOBUTROL 10 ML: 604.72 INJECTION INTRAVENOUS at 14:29

## 2023-11-17 ASSESSMENT — ACTIVITIES OF DAILY LIVING (ADL)
ADLS_ACUITY_SCORE: 35

## 2023-11-17 NOTE — ED TRIAGE NOTES
Intermittent chest pain the past few weeks now constant and radiating down L arm.  Endorses dizziness the last few weeks also.  Hypertensive in triage.  Hx of brain aneurism.

## 2023-11-17 NOTE — DISCHARGE INSTRUCTIONS
Discharge Instructions  Chest Pain    You have been seen today for chest pain or discomfort.  At this time, your provider has found no signs that your chest pain is due to a serious or life-threatening condition, (or you have declined more testing and/or admission to the hospital). However, sometimes there is a serious problem that does not show up right away. Your evaluation today may not be complete and you may need further testing and evaluation.     Generally, every Emergency Department visit should have a follow-up clinic visit with either a primary or a specialty clinic/provider. Please follow-up as instructed by your emergency provider today.  Return to the Emergency Department if:  Your chest pain changes, gets worse, starts to happen more often, or comes with less activity.  You are newly short of breath.  You get very weak or tired.  You pass out or faint.  You have any new symptoms, like fever, cough, numb legs, or you cough up blood.  You have anything else that worries you.    Until you follow-up with your regular provider, please do the following:  Take one aspirin daily unless you have an allergy or are told not to by your provider.  If a stress test appointment has been made, go to the appointment.  If you have questions, contact your regular provider.  Follow-up with your regular provider/clinic as directed; this is very important.    If you were given a prescription for medicine here today, be sure to read all of the information (including the package insert) that comes with your prescription.  This will include important information about the medicine, its side effects, and any warnings that you need to know about.  The pharmacist who fills the prescription can provide more information and answer questions you may have about the medicine.  If you have questions or concerns that the pharmacist cannot address, please call or return to the Emergency Department.       Remember that you can always come  back to the Emergency Department if you are not able to see your regular provider in the amount of time listed above, if you get any new symptoms, or if there is anything that worries you.    Discharge Instructions  Vertigo  You have been diagnosed with vertigo.  This is a dizzy feeling often described as spinning or that the room is moving around you. You will often have nausea (sick to your stomach), vomiting (throwing up), and balance problems with it.  Vertigo is usually caused by a problem in the inner ear which helps control your balance.  Many things can cause vertigo, including calcium collections in the inner ear, a virus infection of the inner ear, concussion, migraine, and some medicines.  Luckily, these causes are not life threatening and will eventually go away.  However, sometimes there is a serious problem that does not show up right away.  Generally, every Emergency Department visit should have a follow-up clinic visit with either a primary or a specialty clinic/provider. Please follow-up as instructed by your emergency provider today.  Return to the Emergency Department if you have:  New or severe headache.  Double vision (seeing two of things).  Trouble speaking or hearing.  Weakness or trouble moving/using one side of your body.  Passing out.  Numbness or tingling.  Chest pain.  Vomiting that will not stop.    Treatment:  There are several commonly prescribed medications:  Antihistamines such as meclizine (Antivert ), dimenhydrinate (Dramamine ), or diphenhydramine (Benadryl ).  Prescription anti-nausea medicines, such as promethazine (Phenergan ), metoclopramide (Reglan ), or ondansetron (Zofran ).  Prescription sedative medicines, such as diazepam (Valium ), lorazepam (Ativan ), or clonazepam (Klonopin ).  Most of these medicines make you sleepy, and you should not take them before you work or drive. You should only take prescription medicines to treat severe vertigo symptoms, and you should  stop the medicine when your symptoms improve.    Follow Up:  If you have vertigo longer than three days, it is important that you follow up either with your primary provider or an Ear, Nose, and Throat (ENT) specialist.  You may need further testing to evaluate your vertigo and you may also need  vestibular  therapy which is a special form of physical therapy to make the vertigo go away.    If you were given a prescription for medicine here today, be sure to read all of the information (including the package insert) that comes with your prescription.  This will include important information about the medicine, its side effects, and any warnings that you need to know about.  The pharmacist who fills the prescription can provide more information and answer questions you may have about the medicine.  If you have questions or concerns that the pharmacist cannot address, please call or return to the Emergency Department.     Remember that you can always come back to the Emergency Department if you are not able to see your regular provider in the amount of time listed above, if you get any new symptoms, or if there is anything that worries you.

## 2023-11-17 NOTE — TELEPHONE ENCOUNTER
"Reason for Disposition   Systolic BP >= 160 OR Diastolic >= 100, and any cardiac (e.g., breathing difficulty, chest pain) or neurologic symptoms (e.g., new-onset blurred or double vision)    Additional Information   Negative: Sounds like a life-threatening emergency to the triager   Negative: Symptom is main concern (e.g., headache, chest pain)   Negative: Low blood pressure is main concern    Answer Assessment - Initial Assessment Questions  1. BLOOD PRESSURE: \"What is the blood pressure?\" \"Did you take at least two measurements 5 minutes apart?\"      179/90 this am arm cuff  2. ONSET: \"When did you take your blood pressure?\"      This am  3. HOW: \"How did you take your blood pressure?\" (e.g., automatic home BP monitor, visiting nurse)      Arm cuff at home  4. HISTORY: \"Do you have a history of high blood pressure?\"      yes  5. MEDICINES: \"Are you taking any medicines for blood pressure?\" \"Have you missed any doses recently?\"      Yes, no   6. OTHER SYMPTOMS: \"Do you have any symptoms?\" (e.g., blurred vision, chest pain, difficulty breathing, headache, weakness)      Dizziness, chest pain into shoulder and using inhaler for breathing  7. PREGNANCY: \"Is there any chance you are pregnant?\" \"When was your last menstrual period?\"      N/a    Protocols used: Blood Pressure - High-A-[place]    "

## 2023-11-20 LAB
ATRIAL RATE - MUSE: 64 BPM
DIASTOLIC BLOOD PRESSURE - MUSE: NORMAL MMHG
INTERPRETATION ECG - MUSE: NORMAL
P AXIS - MUSE: 40 DEGREES
PR INTERVAL - MUSE: 144 MS
QRS DURATION - MUSE: 98 MS
QT - MUSE: 388 MS
QTC - MUSE: 400 MS
R AXIS - MUSE: -27 DEGREES
SYSTOLIC BLOOD PRESSURE - MUSE: NORMAL MMHG
T AXIS - MUSE: 2 DEGREES
VENTRICULAR RATE- MUSE: 64 BPM

## 2023-11-21 ENCOUNTER — THERAPY VISIT (OUTPATIENT)
Dept: PHYSICAL THERAPY | Facility: CLINIC | Age: 63
End: 2023-11-21
Attending: EMERGENCY MEDICINE
Payer: COMMERCIAL

## 2023-11-21 DIAGNOSIS — R42 DIZZINESS: Primary | ICD-10-CM

## 2023-11-21 DIAGNOSIS — R42 VERTIGO: ICD-10-CM

## 2023-11-21 PROCEDURE — 97162 PT EVAL MOD COMPLEX 30 MIN: CPT | Mod: GP

## 2023-11-21 PROCEDURE — 97112 NEUROMUSCULAR REEDUCATION: CPT | Mod: GP

## 2023-11-21 PROCEDURE — 95992 CANALITH REPOSITIONING PROC: CPT | Mod: GP
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
## 2023-11-24 ENCOUNTER — OFFICE VISIT (OUTPATIENT)
Dept: PEDIATRICS | Facility: CLINIC | Age: 63
End: 2023-11-24
Payer: COMMERCIAL

## 2023-11-24 VITALS
WEIGHT: 242.4 LBS | DIASTOLIC BLOOD PRESSURE: 82 MMHG | RESPIRATION RATE: 20 BRPM | BODY MASS INDEX: 41.38 KG/M2 | TEMPERATURE: 97.7 F | HEART RATE: 65 BPM | SYSTOLIC BLOOD PRESSURE: 139 MMHG | HEIGHT: 64 IN | OXYGEN SATURATION: 98 %

## 2023-11-24 DIAGNOSIS — R07.9 RECURRENT CHEST PAIN: Primary | ICD-10-CM

## 2023-11-24 DIAGNOSIS — Z23 NEED FOR VACCINATION: ICD-10-CM

## 2023-11-24 DIAGNOSIS — R06.00 DYSPNEA, UNSPECIFIED TYPE: ICD-10-CM

## 2023-11-24 DIAGNOSIS — G89.29 CHRONIC ABDOMINAL PAIN: ICD-10-CM

## 2023-11-24 DIAGNOSIS — R42 VERTIGO: ICD-10-CM

## 2023-11-24 DIAGNOSIS — I10 HTN, GOAL BELOW 140/90: ICD-10-CM

## 2023-11-24 DIAGNOSIS — R10.9 CHRONIC ABDOMINAL PAIN: ICD-10-CM

## 2023-11-24 DIAGNOSIS — R10.11 RUQ ABDOMINAL PAIN: ICD-10-CM

## 2023-11-24 PROCEDURE — 99214 OFFICE O/P EST MOD 30 MIN: CPT | Mod: 25 | Performed by: NURSE PRACTITIONER

## 2023-11-24 PROCEDURE — 90677 PCV20 VACCINE IM: CPT | Performed by: NURSE PRACTITIONER

## 2023-11-24 PROCEDURE — 90471 IMMUNIZATION ADMIN: CPT | Performed by: NURSE PRACTITIONER

## 2023-11-24 ASSESSMENT — PAIN SCALES - GENERAL: PAINLEVEL: MODERATE PAIN (4)

## 2023-11-24 NOTE — PROGRESS NOTES
"  Assessment & Plan   Dyspnea, unspecified type  Recurrent chest pain  Has had x2 ER visits in the last 6 mos for chest pain. Normal stress test done May 2023. No current symptoms. Symptoms are both at rest and exertional. Refer to cards-if they do not feel chest pain is cardiac to consider asthma vs GERD vs other as cause?  - Adult Cardiology Eval  Referral; Future    HTN, goal below 140/90  Some high readings in ER, better today. Continue to monitor.    Vertigo  Improving, he is doing PT. Dizziness is positional.  - Adult Cardiology Eval  Referral; Future    RUQ abdominal pain  Chronic abdominal pain  US showed dilated hepatic bile duct but normal LFTs. Hold on any additional work. Not having any acute symptoms today. We did discuss GERD as possible factor (could also cause his atypical chest pain), he is on  PPI -he will meet with GI to discuss next steps.  - Adult GI  Referral - Consult Only; Future    Need for vaccination  Declines RSV and shingles, will do PCV  }  Post Medication Reconciliation Status: patient was not discharged from an inpatient facility or TCU  BMI:   Estimated body mass index is 41.61 kg/m  as calculated from the following:    Height as of this encounter: 1.626 m (5' 4\").    Weight as of this encounter: 110 kg (242 lb 6.4 oz).   Weight management plan: Discussed healthy diet and exercise guidelines    Patient Instructions   Refer to cardiology for chest pain  If pain worsens go to ER    For abdominal pain, see GI specialist    Adriana Fletcher NP  [facility] CANDI Valenzuela is a 63 year old, presenting for the following health issues:  ER F/U      11/24/2023     8:43 AM   Additional Questions   Roomed by Zully Bowling   Accompanied by JAMEEL         11/24/2023     8:43 AM   Patient Reported Additional Medications   Patient reports taking the following new medications No       History of Present Illness       Reason for visit:  ED follow up    He " "eats 0-1 servings of fruits and vegetables daily.He consumes 2 sweetened beverage(s) daily.He exercises with enough effort to increase his heart rate 20 to 29 minutes per day.  He exercises with enough effort to increase his heart rate 3 or less days per week.   He is taking medications regularly.     ED/UC Followup:    Facility:  [facility] Emergency Dept.   Date of visit: 11/17/23  Reason for visit: Vertigo, chest pain, and dizziness  Current Status: Improved    #chest pain  -intermittent  -left side and into neck and shoulder  -sometimes will get this with jogging in the pool but also with rest  -will resolve spontaneously  -was getting some shortness of breath on dyspnea-has used his inhalers. Not new for him either.  -admits to some anxiety as well  -blood pressure was high at PT  -normal stress test 5/9/23  BP Readings from Last 3 Encounters:   11/24/23 139/82   11/17/23 (!) 169/94   11/15/23 (!) 168/90   -BP was high in the /103 but by the time he left ER it was 155/92  -normal CXR  -denies GERD--takes omeprazole    #back pain  -pool 3 days per week  -doing PT    #vertigo  -noticed more with rolling over in bed  -met with PT  -feels this is better  -has more PT sessions scheduled  -less with rolling in bed and standing, some with bending at work-- better    #RUQ pain  -intermittent  -normal US  -feels better    #seizures  -follows with Dr. Perea  -follows annually  -on meds  -gets some shakiness      Review of Systems         Objective    /82 (BP Location: Right arm, Patient Position: Sitting, Cuff Size: Adult Large)   Pulse 65   Temp 97.7  F (36.5  C) (Oral)   Resp 20   Ht 1.626 m (5' 4\")   Wt 110 kg (242 lb 6.4 oz)   SpO2 98%   BMI 41.61 kg/m    Body mass index is 41.61 kg/m .  Physical Exam   GENERAL: healthy, alert and no distress  BREAST: normal without masses, tenderness or nipple discharge and no palpable axillary masses or adenopathy  CV: regular rate and rhythm, " normal S1 S2, no S3 or S4, no murmur, click or rub, no peripheral edema and peripheral pulses strong  ABDOMEN: slight RUQ tenderness, no guarding/rigidity/rebound tenderness/grimacing on exam, abdomen obese

## 2023-11-24 NOTE — PATIENT INSTRUCTIONS
Refer to cardiology for chest pain  If pain worsens go to ER    For abdominal pain, see GI specialist

## 2023-11-28 ENCOUNTER — OFFICE VISIT (OUTPATIENT)
Dept: CARDIOLOGY | Facility: CLINIC | Age: 63
End: 2023-11-28
Attending: NURSE PRACTITIONER
Payer: COMMERCIAL

## 2023-11-28 VITALS
WEIGHT: 246.9 LBS | DIASTOLIC BLOOD PRESSURE: 84 MMHG | HEIGHT: 64 IN | SYSTOLIC BLOOD PRESSURE: 134 MMHG | OXYGEN SATURATION: 98 % | BODY MASS INDEX: 42.15 KG/M2 | HEART RATE: 63 BPM

## 2023-11-28 DIAGNOSIS — R42 VERTIGO: ICD-10-CM

## 2023-11-28 DIAGNOSIS — R07.9 RECURRENT CHEST PAIN: ICD-10-CM

## 2023-11-28 DIAGNOSIS — R06.00 DYSPNEA, UNSPECIFIED TYPE: ICD-10-CM

## 2023-11-28 PROCEDURE — 99205 OFFICE O/P NEW HI 60 MIN: CPT | Performed by: INTERNAL MEDICINE

## 2023-11-28 NOTE — PROGRESS NOTES
HPI and Plan:   Mr Logan is a very pleasant 61-year-old gentleman with history of hypertension, dyslipidemia, dizziness, vertigo who is here for evaluation of chest discomfort.  Patient tells me that he has been having intermittent chest discomfort for several months.  He had an exercise nuclear stress test done in May this year that was negative for inducible ischemia or infarct there was a fixed inferior inferolateral defect likely diaphragmatic attenuation however patient did have some chest pain with exercise and had hypertensive response to exercise.  LV function was normal on stress test.  Recently patient had a visit to the ER because of ongoing dizziness and some chest pain.  He was ruled out for acute coronary syndrome with no acute ST-T changes on EKG high sensitive troponin within reference range and he was eventually diagnosed with vertigo and actually saw a physical therapist and had some vestibular exercise and was felt to be benign positional vertigo and is feeling much better with almost resolution of vertigo sensation.  Patient tells me that her chest discomfort is random in nature he tries to remain fairly active he goes to the gym he swims he usually feels well with physical activity sometimes intermittently he can feel chest discomfort physical activity but it is not consistent.  His father and sister both had heart disease in their 60s and 70s.  Patient does not use any tobacco.  He does have some labile hypertension for which he follows his family physician and is on atenolol and losartan.  He is on simvastatin for dyslipidemia.  He does not use any tobacco.    Assessment and plan  Chest discomfort episodes happening for several months mix of typical and atypical features.  Recently rule out for acute coronary syndrome.  Stress test earlier this year negative for any ischemia or infarct.  Discussed with patient sensitivity and surgery of stress testing.  Discussed option of traditional  coronary angiogram versus coronary CT angiogram.  Given atypical features recommend coronary CT angiogram at this time.  Some dyspnea on exertion, LV function was normal on stress test.  Recommend echocardiogram.  Hypertension with some element of whitecoat hypertension being followed by primary care provider on atenolol and losartan  Dyslipidemia on simvastatin  Recent episodes of vertigo was found to be benign positional vertigo is feeling much better after some vestibular exercises and rehab    Recommendations  Coronary CT angiogram  Echocardiogram  Follow-up with JACK in 2 months  My office going to update him with the results of the coronary CT angiogram and echocardiogram.  Discussed with patient if there is concern for obstructive coronary disease on coronary CT angiogram the neck step would be traditional coronary angiogram.    Orders Placed This Encounter   Procedures    Follow-Up with Cardiology JACK    Echocardiogram Complete       No orders of the defined types were placed in this encounter.      There are no discontinued medications.      Encounter Diagnoses   Name Primary?    Recurrent chest pain     Vertigo     Dyspnea, unspecified type        CURRENT MEDICATIONS:  Current Outpatient Medications   Medication Sig Dispense Refill    albuterol (PROAIR HFA/PROVENTIL HFA/VENTOLIN HFA) 108 (90 Base) MCG/ACT inhaler Inhale 2 puffs into the lungs every 6 hours as needed for shortness of breath or wheezing 36 g 3    atenolol (TENORMIN) 50 MG tablet Take 1 tablet (50 mg) by mouth daily 90 tablet 3    budesonide-formoterol (SYMBICORT) 80-4.5 MCG/ACT Inhaler Inhale 2 puffs into the lungs 2 times daily 10.2 g 3    fluticasone (FLONASE) 50 MCG/ACT nasal spray SPRAY 1 TO 2 SPRAYS INTO BOTH NOSTRISL DAILY 16 g 3    ketoconazole (NIZORAL) 2 % external cream Apply topically daily 60 g 3    losartan (COZAAR) 25 MG tablet Take 1 tablet (25 mg) by mouth daily 90 tablet 3    meclizine (ANTIVERT) 25 MG tablet Take 1 tablet  (25 mg) by mouth 3 times daily as needed for dizziness 20 tablet 0    methocarbamol (ROBAXIN) 500 MG tablet Take 1 tablet (500 mg) by mouth 4 times daily as needed for muscle spasms 40 tablet 0    omeprazole (PRILOSEC) 20 MG DR capsule Take 1 capsule (20 mg) by mouth daily 90 capsule 3    OXcarbazepine (TRILEPTAL) 300 MG tablet Take 1.5 tablets (450 mg) by mouth 2 times daily 270 tablet 3    sertraline (ZOLOFT) 100 MG tablet TAKE ONE AND ONE-HALF TABLETS BY MOUTH DAILY. 135 tablet 0    simvastatin (ZOCOR) 20 MG tablet Take 1 tablet (20 mg) by mouth At Bedtime 90 tablet 3    SUMAtriptan (IMITREX) 50 MG tablet Take 1 tablet (50 mg) by mouth at onset of headache for migraine (may repeat dose in 2 hours. Do not exceed 200mg in 24 hours Strength: 50 mg 18 tablet 3    traZODone (DESYREL) 50 MG tablet Take 1-2 tablets ( mg) by mouth At Bedtime 180 tablet 3    triamcinolone (KENALOG) 0.1 % ointment Apply topically 2 times daily as needed for irritation         ALLERGIES   No Known Allergies    PAST MEDICAL HISTORY:  Past Medical History:   Diagnosis Date    Anxiety     Depression     Diverticulitis     h/o Mumps     Hypertension     Kidney stone     Testicular cancer - Right 1998       PAST SURGICAL HISTORY:  Past Surgical History:   Procedure Laterality Date    COLONOSCOPY N/A 08/22/2014    Procedure: COLONOSCOPY;  Surgeon: Joe Garcia MD;  Location:  GI    COLONOSCOPY      CRANIOTOMY, REPAIR ANEURYSM, COMBINED Right 05/07/2019    Procedure: RIGHT CRANIOTOMY AND CLIPPING OF COMPLEX MIDDLE CEREBRAL ARTERY ANEURYMS; INTRAOPERATIVE ANGIOGRAM;  Surgeon: Jamir Rebollar MD;  Location: [facility];  Service: Neurosurgery    CYSTOSCOPY      EYE SURGERY      ? not on H&P    IR CEREBRAL ANGIOGRAM  05/07/2019    IR CEREBRAL ANGIOGRAM  05/07/2019    LAPAROSCOPIC CHOLECYSTECTOMY N/A 09/02/2018    Procedure: LAPAROSCOPIC CHOLECYSTECTOMY;  LAPAROSCOPIC CHOLECYSTECTOMY ;  Surgeon: Shannan Chaves MD;   Location: [facility] OR    LAPAROSCOPIC CHOLECYSTECTOMY      ORCHIECTOMY NOS Right     ORCHIECTOMY SCROTAL Right 1998    TESTICLE SURGERY      TONSILLECTOMY      TONSILLECTOMY         FAMILY HISTORY:  Family History   Problem Relation Age of Onset    Cerebrovascular Disease Mother     Hypertension Mother     Heart Disease Father         had 2 open heart surgery    Lipids Father     Hypertension Father     Cancer Maternal Grandfather         skin cancer    Skin Cancer Maternal Grandfather         skin cancer    Heart Disease Sister 45        heart attack    Cancer Sister 46        breast cancer    Skin Cancer Sister         BCC    Varicose Veins Mother     Coronary Artery Disease Father     Cancer Sister     Coronary Artery Disease Sister     Hypertension Other         all siblings       SOCIAL HISTORY:  Social History     Socioeconomic History    Marital status: Single     Spouse name: None    Number of children: None    Years of education: None    Highest education level: GED or equivalent   Tobacco Use    Smoking status: Former     Types: Cigarettes     Quit date: 1997     Years since quittin.4    Smokeless tobacco: Former   Vaping Use    Vaping Use: Never used   Substance and Sexual Activity    Alcohol use: No     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 No     Social Determinants of Health     Financial Resource Strain: Unknown (2023)    Financial Resource Strain     Within the past 12 months, have you or your family members you live with been unable to get utilities (heat, electricity) when it was really needed?: Patient refused   Food Insecurity: Unknown (2023)    Food Insecurity     Within the past 12 months, did you worry that your food would run out before you got money to buy more?: Patient refused     Within the past 12 months, did the food you bought  just not last and you didn t have money to get more?: Patient refused   Transportation Needs: Unknown (11/24/2023)    Transportation Needs     Within the past 12 months, has lack of transportation kept you from medical appointments, getting your medicines, non-medical meetings or appointments, work, or from getting things that you need?: Patient refused   Physical Activity: Insufficiently Active (5/23/2019)    Exercise Vital Sign     Days of Exercise per Week: 2 days     Minutes of Exercise per Session: 30 min   Stress: Stress Concern Present (5/23/2019)    [place] [employer] - Occupational Stress Questionnaire     Feeling of Stress : Very much   Social Connections: Moderately Integrated (5/23/2019)    Social Connection and Isolation Panel [NHANES]     Frequency of Communication with Friends and Family: More than three times a week     Frequency of Social Gatherings with Friends and Family: More than three times a week     Attends Yazidi Services: More than 4 times per year     Active Member of Clubs or Organizations: Yes     Attends Club or Organization Meetings: More than 4 times per year     Marital Status: Never    Interpersonal Safety: Low Risk  (11/24/2023)    Interpersonal Safety     Do you feel physically and emotionally safe where you currently live?: Yes     Within the past 12 months, have you been hit, slapped, kicked or otherwise physically hurt by someone?: No     Within the past 12 months, have you been humiliated or emotionally abused in other ways by your partner or ex-partner?: No   Housing Stability: Unknown (11/24/2023)    Housing Stability     Do you have housing? : Patient refused     Are you worried about losing your housing?: Patient refused       Review of Systems:  Skin:          Eyes:         ENT:  Positive for vertigo    Respiratory:  Positive for dyspnea on exertion     Cardiovascular:  Negative;palpitations;lightheadedness chest pain;Positive  "for;dizziness;edema;fatigue    Gastroenterology:        Genitourinary:         Musculoskeletal:         Neurologic:         Psychiatric:         Heme/Lymph/Imm:         Endocrine:  Negative        Physical Exam:  Vitals: /84 (BP Location: Left arm, Patient Position: Sitting)   Pulse 63   Ht 1.626 m (5' 4\")   Wt 112 kg (246 lb 14.4 oz)   SpO2 98%   BMI 42.38 kg/m      General patient appears comfortable  Neck normal JVP  Cardiovascular system S1-S2 normal no murmur rub or gallop  Respiratory system clear to auscultation  Extremities no edema  Neurological alert, oriented        [place]  [address] [id number]                    "

## 2023-11-28 NOTE — LETTER
11/28/2023    Manoj Armenta MD  [address] [place] [address]    RE: Fabio Vazquezgale       Dear Colleague,     I had the pleasure of seeing Fabio Logan in the [facility].  HPI and Plan:   Mr Logan is a very pleasant 61-year-old gentleman with history of hypertension, dyslipidemia, dizziness, vertigo who is here for evaluation of chest discomfort.  Patient tells me that he has been having intermittent chest discomfort for several months.  He had an exercise nuclear stress test done in May this year that was negative for inducible ischemia or infarct there was a fixed inferior inferolateral defect likely diaphragmatic attenuation however patient did have some chest pain with exercise and had hypertensive response to exercise.  LV function was normal on stress test.  Recently patient had a visit to the ER because of ongoing dizziness and some chest pain.  He was ruled out for acute coronary syndrome with no acute ST-T changes on EKG high sensitive troponin within reference range and he was eventually diagnosed with vertigo and actually saw a physical therapist and had some vestibular exercise and was felt to be benign positional vertigo and is feeling much better with almost resolution of vertigo sensation.  Patient tells me that her chest discomfort is random in nature he tries to remain fairly active he goes to the gym he swims he usually feels well with physical activity sometimes intermittently he can feel chest discomfort physical activity but it is not consistent.  His father and sister both had heart disease in their 60s and 70s.  Patient does not use any tobacco.  He does have some labile hypertension for which he follows his family physician and is on atenolol and losartan.  He is on simvastatin for dyslipidemia.  He does not use any tobacco.    Assessment and plan  Chest discomfort episodes happening for several months mix of typical and atypical features.   Recently rule out for acute coronary syndrome.  Stress test earlier this year negative for any ischemia or infarct.  Discussed with patient sensitivity and surgery of stress testing.  Discussed option of traditional coronary angiogram versus coronary CT angiogram.  Given atypical features recommend coronary CT angiogram at this time.  Some dyspnea on exertion, LV function was normal on stress test.  Recommend echocardiogram.  Hypertension with some element of whitecoat hypertension being followed by primary care provider on atenolol and losartan  Dyslipidemia on simvastatin  Recent episodes of vertigo was found to be benign positional vertigo is feeling much better after some vestibular exercises and rehab    Recommendations  Coronary CT angiogram  Echocardiogram  Follow-up with JACK in 2 months  My office going to update him with the results of the coronary CT angiogram and echocardiogram.  Discussed with patient if there is concern for obstructive coronary disease on coronary CT angiogram the neck step would be traditional coronary angiogram.    Orders Placed This Encounter   Procedures    Follow-Up with Cardiology JACK    Echocardiogram Complete       No orders of the defined types were placed in this encounter.      There are no discontinued medications.      Encounter Diagnoses   Name Primary?    Recurrent chest pain     Vertigo     Dyspnea, unspecified type        CURRENT MEDICATIONS:  Current Outpatient Medications   Medication Sig Dispense Refill    albuterol (PROAIR HFA/PROVENTIL HFA/VENTOLIN HFA) 108 (90 Base) MCG/ACT inhaler Inhale 2 puffs into the lungs every 6 hours as needed for shortness of breath or wheezing 36 g 3    atenolol (TENORMIN) 50 MG tablet Take 1 tablet (50 mg) by mouth daily 90 tablet 3    budesonide-formoterol (SYMBICORT) 80-4.5 MCG/ACT Inhaler Inhale 2 puffs into the lungs 2 times daily 10.2 g 3    fluticasone (FLONASE) 50 MCG/ACT nasal spray SPRAY 1 TO 2 SPRAYS INTO BOTH NOSTRISL DAILY  16 g 3    ketoconazole (NIZORAL) 2 % external cream Apply topically daily 60 g 3    losartan (COZAAR) 25 MG tablet Take 1 tablet (25 mg) by mouth daily 90 tablet 3    meclizine (ANTIVERT) 25 MG tablet Take 1 tablet (25 mg) by mouth 3 times daily as needed for dizziness 20 tablet 0    methocarbamol (ROBAXIN) 500 MG tablet Take 1 tablet (500 mg) by mouth 4 times daily as needed for muscle spasms 40 tablet 0    omeprazole (PRILOSEC) 20 MG DR capsule Take 1 capsule (20 mg) by mouth daily 90 capsule 3    OXcarbazepine (TRILEPTAL) 300 MG tablet Take 1.5 tablets (450 mg) by mouth 2 times daily 270 tablet 3    sertraline (ZOLOFT) 100 MG tablet TAKE ONE AND ONE-HALF TABLETS BY MOUTH DAILY. 135 tablet 0    simvastatin (ZOCOR) 20 MG tablet Take 1 tablet (20 mg) by mouth At Bedtime 90 tablet 3    SUMAtriptan (IMITREX) 50 MG tablet Take 1 tablet (50 mg) by mouth at onset of headache for migraine (may repeat dose in 2 hours. Do not exceed 200mg in 24 hours Strength: 50 mg 18 tablet 3    traZODone (DESYREL) 50 MG tablet Take 1-2 tablets ( mg) by mouth At Bedtime 180 tablet 3    triamcinolone (KENALOG) 0.1 % ointment Apply topically 2 times daily as needed for irritation         ALLERGIES   No Known Allergies    PAST MEDICAL HISTORY:  Past Medical History:   Diagnosis Date    Anxiety     Depression     Diverticulitis     h/o Mumps     Hypertension     Kidney stone     Testicular cancer - Right 1998       PAST SURGICAL HISTORY:  Past Surgical History:   Procedure Laterality Date    COLONOSCOPY N/A 08/22/2014    Procedure: COLONOSCOPY;  Surgeon: Joe Garcia MD;  Location:  GI    COLONOSCOPY      CRANIOTOMY, REPAIR ANEURYSM, COMBINED Right 05/07/2019    Procedure: RIGHT CRANIOTOMY AND CLIPPING OF COMPLEX MIDDLE CEREBRAL ARTERY ANEURYMS; INTRAOPERATIVE ANGIOGRAM;  Surgeon: Jamir Rebollar MD;  Location: [facility];  Service: Neurosurgery    CYSTOSCOPY      EYE SURGERY      ? not on H&P    IR CEREBRAL  ANGIOGRAM  2019    IR CEREBRAL ANGIOGRAM  2019    LAPAROSCOPIC CHOLECYSTECTOMY N/A 2018    Procedure: LAPAROSCOPIC CHOLECYSTECTOMY;  LAPAROSCOPIC CHOLECYSTECTOMY ;  Surgeon: Shannan Chaves MD;  Location: [facility] OR    LAPAROSCOPIC CHOLECYSTECTOMY      ORCHIECTOMY NOS Right     ORCHIECTOMY SCROTAL Right 1998    TESTICLE SURGERY      TONSILLECTOMY      TONSILLECTOMY         FAMILY HISTORY:  Family History   Problem Relation Age of Onset    Cerebrovascular Disease Mother     Hypertension Mother     Heart Disease Father         had 2 open heart surgery    Lipids Father     Hypertension Father     Cancer Maternal Grandfather         skin cancer    Skin Cancer Maternal Grandfather         skin cancer    Heart Disease Sister 45        heart attack    Cancer Sister 46        breast cancer    Skin Cancer Sister         BCC    Varicose Veins Mother     Coronary Artery Disease Father     Cancer Sister     Coronary Artery Disease Sister     Hypertension Other         all siblings       SOCIAL HISTORY:  Social History     Socioeconomic History    Marital status: Single     Spouse name: None    Number of children: None    Years of education: None    Highest education level: GED or equivalent   Tobacco Use    Smoking status: Former     Types: Cigarettes     Quit date: 1997     Years since quittin.4    Smokeless tobacco: Former   Vaping Use    Vaping Use: Never used   Substance and Sexual Activity    Alcohol use: No     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 No     Social Determinants of Health     Financial Resource Strain: Unknown (2023)    Financial Resource Strain     Within the past 12 months, have you or your family members you live with been unable to get utilities (heat, electricity) when it was really needed?: Patient refused   Food Insecurity:  Unknown (11/24/2023)    Food Insecurity     Within the past 12 months, did you worry that your food would run out before you got money to buy more?: Patient refused     Within the past 12 months, did the food you bought just not last and you didn t have money to get more?: Patient refused   Transportation Needs: Unknown (11/24/2023)    Transportation Needs     Within the past 12 months, has lack of transportation kept you from medical appointments, getting your medicines, non-medical meetings or appointments, work, or from getting things that you need?: Patient refused   Physical Activity: Insufficiently Active (5/23/2019)    Exercise Vital Sign     Days of Exercise per Week: 2 days     Minutes of Exercise per Session: 30 min   Stress: Stress Concern Present (5/23/2019)    [employer] - Occupational Stress Questionnaire     Feeling of Stress : Very much   Social Connections: Moderately Integrated (5/23/2019)    Social Connection and Isolation Panel [NHANES]     Frequency of Communication with Friends and Family: More than three times a week     Frequency of Social Gatherings with Friends and Family: More than three times a week     Attends Protestant Services: More than 4 times per year     Active Member of Clubs or Organizations: Yes     Attends Club or Organization Meetings: More than 4 times per year     Marital Status: Never    Interpersonal Safety: Low Risk  (11/24/2023)    Interpersonal Safety     Do you feel physically and emotionally safe where you currently live?: Yes     Within the past 12 months, have you been hit, slapped, kicked or otherwise physically hurt by someone?: No     Within the past 12 months, have you been humiliated or emotionally abused in other ways by your partner or ex-partner?: No   Housing Stability: Unknown (11/24/2023)    Housing Stability     Do you have housing? : Patient refused     Are you worried about losing your housing?: Patient refused  "      Review of Systems:  Skin:          Eyes:         ENT:  Positive for vertigo    Respiratory:  Positive for dyspnea on exertion     Cardiovascular:  Negative;palpitations;lightheadedness chest pain;Positive for;dizziness;edema;fatigue    Gastroenterology:        Genitourinary:         Musculoskeletal:         Neurologic:         Psychiatric:         Heme/Lymph/Imm:         Endocrine:  Negative        Physical Exam:  Vitals: /84 (BP Location: Left arm, Patient Position: Sitting)   Pulse 63   Ht 1.626 m (5' 4\")   Wt 112 kg (246 lb 14.4 oz)   SpO2 98%   BMI 42.38 kg/m      General patient appears comfortable  Neck normal JVP  Cardiovascular system S1-S2 normal no murmur rub or gallop  Respiratory system clear to auscultation  Extremities no edema  Neurological alert, oriented        CC  Adriana Fletcher, NP  [address] [place] [address]      Thank you for allowing me to participate in the care of your patient.      Sincerely,     Mp Hardy MD     [facility]  "

## 2023-12-04 ENCOUNTER — TELEPHONE (OUTPATIENT)
Dept: CARDIOLOGY | Facility: CLINIC | Age: 63
End: 2023-12-04

## 2023-12-04 ENCOUNTER — HOSPITAL ENCOUNTER (OUTPATIENT)
Dept: CARDIOLOGY | Facility: CLINIC | Age: 63
Discharge: HOME | End: 2023-12-04
Attending: INTERNAL MEDICINE
Payer: COMMERCIAL

## 2023-12-04 VITALS — HEART RATE: 64 BPM | DIASTOLIC BLOOD PRESSURE: 89 MMHG | SYSTOLIC BLOOD PRESSURE: 178 MMHG

## 2023-12-04 DIAGNOSIS — R06.00 DYSPNEA, UNSPECIFIED TYPE: ICD-10-CM

## 2023-12-04 DIAGNOSIS — R07.9 RECURRENT CHEST PAIN: ICD-10-CM

## 2023-12-04 DIAGNOSIS — R93.1 ABNORMAL CARDIAC CT ANGIOGRAPHY: ICD-10-CM

## 2023-12-04 LAB — LVEF ECHO: NORMAL

## 2023-12-04 PROCEDURE — 250N000011 HC RX IP 250 OP 636: Performed by: INTERNAL MEDICINE

## 2023-12-04 PROCEDURE — 0504T CT FFR: CPT | Performed by: INTERNAL MEDICINE

## 2023-12-04 PROCEDURE — 255N000002 HC RX 255 OP 636: Performed by: INTERNAL MEDICINE

## 2023-12-04 PROCEDURE — 75574 CT ANGIO HRT W/3D IMAGE: CPT

## 2023-12-04 PROCEDURE — 250N000013 HC RX MED GY IP 250 OP 250 PS 637: Performed by: INTERNAL MEDICINE

## 2023-12-04 PROCEDURE — 0503T CT FFR: CPT

## 2023-12-04 PROCEDURE — 93306 TTE W/DOPPLER COMPLETE: CPT | Mod: 26 | Performed by: INTERNAL MEDICINE

## 2023-12-04 PROCEDURE — 999N000208 ECHOCARDIOGRAM COMPLETE

## 2023-12-04 PROCEDURE — 75574 CT ANGIO HRT W/3D IMAGE: CPT | Mod: 26 | Performed by: INTERNAL MEDICINE

## 2023-12-04 RX ORDER — METHYLPREDNISOLONE SODIUM SUCCINATE 125 MG/2ML
125 INJECTION, POWDER, LYOPHILIZED, FOR SOLUTION INTRAMUSCULAR; INTRAVENOUS
Status: DISCONTINUED | OUTPATIENT
Start: 2023-12-04 | End: 2023-12-05 | Stop reason: HOSPADM

## 2023-12-04 RX ORDER — METOPROLOL TARTRATE 25 MG/1
25-100 TABLET, FILM COATED ORAL
Status: COMPLETED | OUTPATIENT
Start: 2023-12-04 | End: 2023-12-04

## 2023-12-04 RX ORDER — DILTIAZEM HCL 60 MG
120 TABLET ORAL
Status: DISCONTINUED | OUTPATIENT
Start: 2023-12-04 | End: 2023-12-05 | Stop reason: HOSPADM

## 2023-12-04 RX ORDER — ACYCLOVIR 200 MG/1
0-1 CAPSULE ORAL
Status: DISCONTINUED | OUTPATIENT
Start: 2023-12-04 | End: 2023-12-05 | Stop reason: HOSPADM

## 2023-12-04 RX ORDER — DILTIAZEM HYDROCHLORIDE 5 MG/ML
10-15 INJECTION INTRAVENOUS
Status: DISCONTINUED | OUTPATIENT
Start: 2023-12-04 | End: 2023-12-05 | Stop reason: HOSPADM

## 2023-12-04 RX ORDER — IOPAMIDOL 755 MG/ML
500 INJECTION, SOLUTION INTRAVASCULAR ONCE
Status: COMPLETED | OUTPATIENT
Start: 2023-12-04 | End: 2023-12-04

## 2023-12-04 RX ORDER — ONDANSETRON 2 MG/ML
4 INJECTION INTRAMUSCULAR; INTRAVENOUS
Status: DISCONTINUED | OUTPATIENT
Start: 2023-12-04 | End: 2023-12-05 | Stop reason: HOSPADM

## 2023-12-04 RX ORDER — IVABRADINE 5 MG/1
5-15 TABLET, FILM COATED ORAL
Status: DISCONTINUED | OUTPATIENT
Start: 2023-12-04 | End: 2023-12-05 | Stop reason: HOSPADM

## 2023-12-04 RX ORDER — METOPROLOL TARTRATE 1 MG/ML
5-15 INJECTION, SOLUTION INTRAVENOUS
Status: DISCONTINUED | OUTPATIENT
Start: 2023-12-04 | End: 2023-12-05 | Stop reason: HOSPADM

## 2023-12-04 RX ORDER — DIPHENHYDRAMINE HCL 25 MG
25 CAPSULE ORAL
Status: DISCONTINUED | OUTPATIENT
Start: 2023-12-04 | End: 2023-12-05 | Stop reason: HOSPADM

## 2023-12-04 RX ORDER — DIPHENHYDRAMINE HYDROCHLORIDE 50 MG/ML
25-50 INJECTION INTRAMUSCULAR; INTRAVENOUS
Status: DISCONTINUED | OUTPATIENT
Start: 2023-12-04 | End: 2023-12-05 | Stop reason: HOSPADM

## 2023-12-04 RX ORDER — NITROGLYCERIN 0.4 MG/1
0.4 TABLET SUBLINGUAL
Status: DISCONTINUED | OUTPATIENT
Start: 2023-12-04 | End: 2023-12-05 | Stop reason: HOSPADM

## 2023-12-04 RX ADMIN — HUMAN ALBUMIN MICROSPHERES AND PERFLUTREN 3 ML: 10; .22 INJECTION, SOLUTION INTRAVENOUS at 12:13

## 2023-12-04 RX ADMIN — METOPROLOL TARTRATE 50 MG: 25 TABLET, FILM COATED ORAL at 10:01

## 2023-12-04 RX ADMIN — IOPAMIDOL 120 ML: 755 INJECTION, SOLUTION INTRAVENOUS at 11:20

## 2023-12-04 RX ADMIN — NITROGLYCERIN 0.4 MG: 0.4 TABLET SUBLINGUAL at 11:04

## 2023-12-04 NOTE — TELEPHONE ENCOUNTER
Received results about CTA and Echocardiogram    CORONARY CALCIUM SCORE: The total Agatston calcium score is 89.3,   Left main: 0, left anterior descendin.4,    circumflex: 4.90,   Right coronary artery: 0.   This places the patient in the 58th percentile.     CORONARY ANGIOGRAPHY :   1. Moderate left anterior descending artery disease. Will check CT FFR (not resulted yet)  2. Trivial circumflex disease  3. Normal nondominant right coronary artery.    Echocardiogram:  Left ventricular systolic function is normal.  The visual ejection fraction is 55-60%.  The right ventricle is normal in structure, function and size.  No significant valve dysfunction.  The inferior vena cava was normal in size with preserved respiratory variability.  There is no pericardial effusion.  No prior for comparison.

## 2023-12-06 ENCOUNTER — TELEPHONE (OUTPATIENT)
Dept: CARDIOLOGY | Facility: CLINIC | Age: 63
End: 2023-12-06
Payer: COMMERCIAL

## 2023-12-06 DIAGNOSIS — I25.10 CORONARY ARTERY DISEASE INVOLVING NATIVE CORONARY ARTERY OF NATIVE HEART WITHOUT ANGINA PECTORIS: ICD-10-CM

## 2023-12-06 DIAGNOSIS — R93.1 ABNORMAL CARDIAC CT ANGIOGRAPHY: Primary | ICD-10-CM

## 2023-12-06 NOTE — TELEPHONE ENCOUNTER
"Team received results today from the FFR  \"MPRESSION:  No flow-limiting lesion present in the left anterior descending artery, circumflex and right coronary artery.\"    Also note that patient called in today to get test results.    Routing to Dr. Hardy for review.    Bridgette Ortiz RN on 12/6/2023 at 4:53 PM    "

## 2023-12-07 ENCOUNTER — THERAPY VISIT (OUTPATIENT)
Dept: PHYSICAL THERAPY | Facility: CLINIC | Age: 63
End: 2023-12-07
Attending: EMERGENCY MEDICINE
Payer: COMMERCIAL

## 2023-12-07 DIAGNOSIS — R42 VERTIGO: Primary | ICD-10-CM

## 2023-12-07 DIAGNOSIS — R42 DIZZINESS: ICD-10-CM

## 2023-12-07 PROCEDURE — 97112 NEUROMUSCULAR REEDUCATION: CPT | Mod: GP

## 2023-12-07 RX ORDER — ATORVASTATIN CALCIUM 40 MG/1
40 TABLET, FILM COATED ORAL DAILY
Qty: 90 TABLET | Refills: 3 | Status: CANCELLED | OUTPATIENT
Start: 2023-12-07

## 2023-12-07 RX ORDER — ATORVASTATIN CALCIUM 40 MG/1
40 TABLET, FILM COATED ORAL DAILY
Qty: 90 TABLET | Refills: 3 | Status: SHIPPED | OUTPATIENT
Start: 2023-12-07 | End: 2024-03-06

## 2023-12-07 NOTE — TELEPHONE ENCOUNTER
Called to Nicolas after team has received information from Dr. Hardy.       Writer informed him about the med change request from Dr. Hardy, entered new medication and sent to [facility] pharmacy per patient request.  Instructed Nicolas we will need to recheck labs in about 2 months.  Lab order is already in there by his PCP.  Also instructed that if his PCP has ordered labs also, he does not need 2 separate lab draws, he can just get everything done with one.   Writer had to repeat all the information a couple more times for him to say that he understood.    Nicolas has apmnt with Dr. Hardy 1\22\24, and with PCP March 2024.  He should get the labs sometime in between those visits.    Writer informed Nicolas about the test results for the CT Calcium score, the CTA, the Echocardiogram, and the Fractional Flow Rate.   He had to ask many times for writer to repeat the information, and he was trying to take notes.     Writer informed Nicolas about the 3mm nodule that was seen on the Radiologist report.  I let him know that the PCP Dr. Armenta, and patient, should have a little conversation just to acknowledge to each other that the nodule is present.  Then Dr. Armenta can watch for any changes or developments in the R lung.  Writer informed Nicolas that I will send a [employer] message with a recap.    Bridgette Ortiz RN on 12/7/2023 at 2:05 PM

## 2023-12-13 ENCOUNTER — THERAPY VISIT (OUTPATIENT)
Dept: PHYSICAL THERAPY | Facility: CLINIC | Age: 63
End: 2023-12-13
Attending: EMERGENCY MEDICINE
Payer: COMMERCIAL

## 2023-12-13 DIAGNOSIS — R42 DIZZINESS: ICD-10-CM

## 2023-12-13 DIAGNOSIS — R42 VERTIGO: Primary | ICD-10-CM

## 2023-12-13 PROCEDURE — 97112 NEUROMUSCULAR REEDUCATION: CPT | Mod: GP

## 2023-12-13 NOTE — PROGRESS NOTES
12/13/23 0700   Signing Clinician's Name / Credentials   Signing clinician's name / credentials Lisset Morgan, PT, DPT, NCS   Dynamic Gait Index (Jignesh and Cadena [place], 1995)   Gait Level Surface 3   Change in Gait Speed 3   Gait and Horizontal Head Turns 2   Gait with Vertical Head Turns 3   Gait and Pivot Turns 3   Step Over Obstacle 3   Step Around Obstacles 3   Steps 3   Total Dynamic Gait Index Score  (A score of 19 or less has been correlated to an increased risk of falls in community dwelling older adults, patients with vestibular disorders, and patients with MS.)   Total Score (out of 24) 23       Dynamic Gait Index (DGI):The DGI is a measure of balance during gait that is reliable and valid for the elderly and individuals with Parkinson's disease, MS, vestibular disorders, or s/p stroke. Gait assistive device used: None    Patient score: 23/24  Scores ?19/24 indicate an increased risk for falls according to Veena et al 2000  Minimal Detectable Change = 2.9 in community dwelling elderly according to Diego et al 2011    Assessment (rationale for performing, application to patient s function & care plan): Pt not at risk for falling. Has vestibular sensitivity only with HHT + ambulation.

## 2023-12-13 NOTE — PROGRESS NOTES
"   12/13/23 0500   Appointment Info   Signing clinician's name / credentials Lisset Morgan, PT, DPT, NCS   Total/Authorized Visits 3 of 3   Visits Used 3   Medical Diagnosis Vertigo   PT Tx Diagnosis BPPV; dizziness with head movement; imbalance   Progress Note/Certification   Start of Care Date 11/21/23   Onset of illness/injury or Date of Surgery 11/17/23   Therapy Frequency 1x/week   Predicted Duration 30 days   GOALS   PT Goals 2;3;4   PT Goal 1   Goal Identifier MET: HEP   Goal Description Pt will demonstrate IND with positional, gaze stabilization/adaptation, visual motion habituation, and static/dynamic balance exercises, to promote reduction of vestibular and dizziness symptoms for improved safety with functional mobility.   Goal Progress 12/13/2023: Pt has begun use of TM at [employer] & has continued using the pool for aerobic activity. He has done his HEP a few days; however, hasn't increased his symptoms.   Target Date 12/20/23   Date Met 12/13/23   PT Goal 2   Goal Identifier MET: DHI   Goal Description Pt will report 18pt decrease on DHI (MCID), for subjective reduction of dizziness symptoms with completion of daily activities.   Goal Progress 12/13/2023: Pt scored 12/100 on DHI; has significantly reduced his dizziness and has met MCID.   Target Date 12/20/23   Date Met 12/13/23   PT Goal 3   Goal Identifier MET: DGI   Goal Description Pt will score >/= 19/24 on DGI to demonstrate improved dynamic balance and postural control with ambulation, and decreased risk for falling with functional mobility.   Goal Progress 12/13/2023: 23/24 points on the DGI; not at risk for falling with limited community ambulation.   Target Date 12/20/23   Date Met 12/13/23   Subjective Report   Subjective Report Pt reporting slight lightheadedness this morning. He feels he often wakes with an \"off,\" feeling - but then it goes away with increased movement during the day. He's not having any spinning dizziness episodes. " Work does not provoke dizziness symptoms.   Treatment Interventions (PT)   Interventions Neuromuscular Re-education   Neuromuscular Re-education   Neuromuscular re-ed of mvmt, balance, coord, kinesthetic sense, posture, proprioception minutes ([id number]) 38   Skilled Intervention DHI; DVA; DGI; gaze stabilization; HEP; pt edu   Patient Response/Progress Completed DHI, DVA, and DGI; see pt's goals above/note attached. Pt demonstrating slight imbalance with gait + HHT; however, reporting no dizziness feelings. Added to pt's HEP. Attempted VORx1 in busy environment for 30s x1 - pt reporting slight dizziness. He's to keep this as part of his HEP to habituate dizziness with head movement and allow for VOR adaptation. Pt in support. Provided pt edu for no need to complete CRMs unless spinning dizziness returns. Proided photographic/written instructions for [employer] in case of return of dizziness. Pt in support. If he's unable to treat BPPV with IND; he's to return to PT at [facility] Clinic. Pt in support. He's in agreement with discharge today, as the dizziness is not impacting his typical daily routine. He's also suspicious if medication could be causing his remaining AM lightheadedness feelings. PT also in support of hypothesis. However, to continue vestibular HEP at least 2-3 days/week to ensure healthy vestibular function. Pt in support.   Education   Learner/Method Patient;Listening;Reading;Demonstration   Plan   Home program See tx detail above.   Plan for next session N/A; discharged.   Total Session Time   Timed Code Treatment Minutes 38   Total Treatment Time (sum of timed and untimed services) 38       DISCHARGE  Reason for Discharge: Patient has met all goals.    Equipment Issued: N/A    Discharge Plan: Patient to continue home program.    Referring Provider:  Kevin Dickerson MD

## 2023-12-30 DIAGNOSIS — F33.0 MAJOR DEPRESSIVE DISORDER, RECURRENT EPISODE, MILD (H): ICD-10-CM

## 2023-12-31 DIAGNOSIS — K21.9 GASTROESOPHAGEAL REFLUX DISEASE WITHOUT ESOPHAGITIS: ICD-10-CM

## 2024-01-02 RX ORDER — SERTRALINE HYDROCHLORIDE 100 MG/1
TABLET, FILM COATED ORAL
Qty: 135 TABLET | Refills: 0 | Status: SHIPPED | OUTPATIENT
Start: 2024-01-02 | End: 2024-03-06

## 2024-01-17 ENCOUNTER — DOCUMENTATION ONLY (OUTPATIENT)
Dept: NEUROLOGY | Facility: CLINIC | Age: 64
End: 2024-01-17

## 2024-01-17 ENCOUNTER — OFFICE VISIT (OUTPATIENT)
Dept: NEUROLOGY | Facility: CLINIC | Age: 64
End: 2024-01-17
Payer: COMMERCIAL

## 2024-01-17 VITALS — HEART RATE: 75 BPM | SYSTOLIC BLOOD PRESSURE: 124 MMHG | DIASTOLIC BLOOD PRESSURE: 84 MMHG | OXYGEN SATURATION: 98 %

## 2024-01-17 DIAGNOSIS — G40.909 SEIZURE DISORDER (H): Primary | ICD-10-CM

## 2024-01-17 DIAGNOSIS — S06.0X0D CONCUSSION WITHOUT LOSS OF CONSCIOUSNESS, SUBSEQUENT ENCOUNTER: ICD-10-CM

## 2024-01-17 DIAGNOSIS — Z98.890 HISTORY OF CEREBRAL ANEURYSM REPAIR: ICD-10-CM

## 2024-01-17 DIAGNOSIS — Z86.79 HISTORY OF CEREBRAL ANEURYSM REPAIR: ICD-10-CM

## 2024-01-17 PROCEDURE — 99215 OFFICE O/P EST HI 40 MIN: CPT | Performed by: PSYCHIATRY & NEUROLOGY

## 2024-01-17 RX ORDER — MULTIVITAMIN
1 TABLET ORAL DAILY
COMMUNITY

## 2024-01-17 RX ORDER — OXCARBAZEPINE 300 MG/1
450 TABLET, FILM COATED ORAL 2 TIMES DAILY
Qty: 270 TABLET | Refills: 3 | Status: SHIPPED | OUTPATIENT
Start: 2024-01-17

## 2024-01-17 ASSESSMENT — ASTHMA QUESTIONNAIRES
QUESTION_2 LAST FOUR WEEKS HOW OFTEN HAVE YOU HAD SHORTNESS OF BREATH: NOT AT ALL
ACT_TOTALSCORE: 24
QUESTION_1 LAST FOUR WEEKS HOW MUCH OF THE TIME DID YOUR ASTHMA KEEP YOU FROM GETTING AS MUCH DONE AT WORK, SCHOOL OR AT HOME: NONE OF THE TIME
ACT_TOTALSCORE: 24
QUESTION_4 LAST FOUR WEEKS HOW OFTEN HAVE YOU USED YOUR RESCUE INHALER OR NEBULIZER MEDICATION (SUCH AS ALBUTEROL): NOT AT ALL
QUESTION_3 LAST FOUR WEEKS HOW OFTEN DID YOUR ASTHMA SYMPTOMS (WHEEZING, COUGHING, SHORTNESS OF BREATH, CHEST TIGHTNESS OR PAIN) WAKE YOU UP AT NIGHT OR EARLIER THAN USUAL IN THE MORNING: NOT AT ALL
QUESTION_5 LAST FOUR WEEKS HOW WOULD YOU RATE YOUR ASTHMA CONTROL: WELL CONTROLLED

## 2024-01-17 NOTE — PROGRESS NOTES
"Fabio Logan is a 63 year old male who presents for:  Chief Complaint   Patient presents with    Follow Up     Annual Seizure- seizure free, patient did fall about a week ago and hit his head         Initial Vitals:  /84 (BP Location: Right arm, Patient Position: Sitting, Cuff Size: Adult Large)   Pulse 75   SpO2 98%  Estimated body mass index is 42.38 kg/m  as calculated from the following:    Height as of 11/28/23: 1.626 m (5' 4\").    Weight as of 11/28/23: 112 kg (246 lb 14.4 oz).. There is no height or weight on file to calculate BSA. BP completed using cuff size: howard Tapia   "

## 2024-01-17 NOTE — PATIENT INSTRUCTIONS
AFTER VISIT SUMMARY (AVS):    At today's visit we thoroughly discussed current symptoms, available treatment options, and the plan.    We decided to continue oxcarbazepine without changes.  I renewed your prescription.    Regarding your head injury/concussion, I gave you excuse from work until Friday when you see a provider at TBI clinic.  As we discussed, it will take a few weeks to heal, sometimes longer, but you need to increase your daily activities gradually and listen to your body to adjust based on symptoms.    Next follow-up appointment is in the next 1 year or earlier if needed.    Please do not hesitate to call me with any questions or concerns.    Thanks.

## 2024-01-17 NOTE — LETTER
2024      Fabio Logan  [address] [id number]          To Whom It May Concern,       Fabio Logan (: 1960) was seen in neurology clinic today.  Due to his ongoing symptoms related to recent head injury, please excuse him from work from today through Friday, 2024.  Please contact my clinic with any additional questions or concerns at the provided above phone number.      Sincerely,          Humberto Clark MD

## 2024-01-17 NOTE — PROGRESS NOTES
ESTABLISHED PATIENT NEUROLOGY NOTE    DATE OF VISIT: 1/17/2024  CLINIC LOCATION: [facility]  MRN: [id number]  PATIENT NAME: Fabio Logan  YOB: 1960    REASON FOR VISIT: No chief complaint on file.    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  The last visit was on 1/18/2023.  Please refer to my initial/other prior notes for further information.    Since the last visit, the patient reports a fall on 1/9/2024 with associated head injury.  Was seen at [facility] emergency department.  Head CT was negative for acute intracranial abnormality.  Chronic changes of right cranioplasty and right MCA aneurysmal clip placement were seen.  CT of the cervical spine was negative for fracture or subluxation as well as significant spinal canal or neuroforaminal stenosis.    The patient denies any seizure recurrence.  He is on 450 mg of Trileptal twice daily.  Latest CMP and CBC from November 2023 were unrevealing.  Latest oxcarbazepine level from February 2023 was in therapeutic range (10).  EXAM:                                                    Physical Exam:   Vitals: There were no vitals taken for this visit.    General: pt is in NAD, cooperative.  Skin: normal turgor, moist mucous membranes, no lesions/rashes noticed.  HEENT: ATNC, white sclera, normal conjunctiva.  Respiratory: Symmetric lung excursion, no accessory respiratory muscle use.  Abdomen: Non distended.  Neurological: awake, cooperative, follows commands, no aphasia or dysarthria noted, cranial nerves II-XII: no ptosis, face is symmetric, equally moves all extremities, no dysmetria bilaterally, casual gait is normal.  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  He remains seizure-free on current therapy, but sustained head injury in January 2024.  He should continue carbamazepine without changes.  I refilled his prescription.    Diagnoses:    ICD-10-CM    1. Seizure disorder (H)  G40.909       2. History of cerebral aneurysm repair  Z98.890     Z86.79         Plan:  There are no Patient Instructions on file for this visit.    Total Time: *** minutes spent on the date of the encounter doing chart review, history and exam, documentation and further activities per the note.    Humberto Clark MD  [facility]  (Chart documentation was completed in part with Dragon voice-recognition software. Even though reviewed, some grammatical, spelling, and word errors may remain.)

## 2024-01-17 NOTE — PROGRESS NOTES
Faxed  Loss of Consciousness form to  [employer]  January 17, 2024 to fax number [phone]    Right Fax confirmed at 9:39:58 AM    Erma Tapia Clinic Assistant

## 2024-01-17 NOTE — LETTER
1/17/2024         RE: Fabio Logan  [address] [id number]        Dear Colleague,    Thank you for referring your patient, Fabio Logan, to the [facility] NEUROLOGY CLINICS [facility]. Please see a copy of my visit note below.    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  The last visit was on 1/18/2023.  Please refer to my initial/other prior notes for further information.    Since the last visit, the patient reports a fall on 1/9/2024 with associated head injury.  Does not remember all details.  Had headache, nausea, and increased sensitivity to light/sound.  Was seen at [facility] [place] department.  Head CT was negative for acute intracranial abnormality.  Chronic changes of right cranioplasty and right MCA aneurysmal clip placement were seen.  CT of the cervical spine was negative for fracture or subluxation as well as significant spinal canal or neuroforaminal stenosis.  Refer to TBI clinic at [facility].    The patient denies any seizure recurrence.  Had an episode of vertigo in November 2023, improved with PT. regarding his head injury, still has staples in his head. Went to work yesterday. It was OK day, but today feels mildly dizzy. Has appointment on Friday at [facility] TBI clinic. He is on 450 mg of Trileptal twice daily.  Latest CMP and CBC from November 2023 were unrevealing.  Latest oxcarbazepine level from February 2023 was in therapeutic range (10).  EXAM:                                                     Physical Exam:   Vitals: /84 (BP Location: Right arm, Patient Position: Sitting, Cuff Size: Adult Large)   Pulse 75   SpO2 98%     General: pt is in NAD, cooperative.  Skin: normal turgor, moist mucous membranes, no lesions/rashes noticed.  HEENT: ATNC, white sclera, normal conjunctiva.  Respiratory: Symmetric lung excursion, no accessory respiratory muscle use.  Abdomen: Non distended.  Neurological: awake, cooperative, follows commands, no aphasia or dysarthria noted, cranial nerves II-XII: no ptosis, face is symmetric, equally moves all extremities, no dysmetria bilaterally, casual gait is normal.  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  He remains seizure-free on current therapy, but sustained head injury in January 2024.  He should continue carbamazepine without changes.  I refilled his prescription.  I also filled his DVS form per his request.    Regarding his recent head injury, we discussed that most likely he suffered a concussion that takes time to heal.  He is already scheduled to see a provider at traumatic brain injury clinic on this coming Friday.  He went to work yesterday, but today feels that his symptoms are coming back, including dizziness and sensitivity to sounds.  I suggested for him to take couple more days off until he is seen at the TBI clinic.  I provided him a note to excuse him from work until then.    Diagnoses:    ICD-10-CM    1. Seizure disorder (H)  G40.909       2. History of cerebral aneurysm repair  Z98.890     Z86.79       3. Concussion without loss of consciousness, subsequent encounter  S06.0X0D         Plan: At today's visit we thoroughly discussed current symptoms, available treatment options, and the plan.    We decided to continue oxcarbazepine without changes.  I renewed his  "prescription.    Regarding head injury/concussion, I gave him an excuse from work until Friday when he will see a provider at TBI clinic.  As we discussed, it will take a few weeks to heal, sometimes longer, but he needs to increase his daily activities gradually and listen to his body to adjust based on symptoms.    Next follow-up appointment is in the next 1 year or earlier if needed.    Total Time: 37 minutes spent on the date of the encounter doing chart review, history and exam, documentation and further activities per the note.    Humberto Clark MD  [facility]  (Chart documentation was completed in part with Dragon voice-recognition software. Even though reviewed, some grammatical, spelling, and word errors may remain.)    Fabio Logan is a 63 year old male who presents for:  Chief Complaint   Patient presents with     Follow Up     Annual Seizure- seizure free, patient did fall about a week ago and hit his head         Initial Vitals:  /84 (BP Location: Right arm, Patient Position: Sitting, Cuff Size: Adult Large)   Pulse 75   SpO2 98%  Estimated body mass index is 42.38 kg/m  as calculated from the following:    Height as of 11/28/23: 1.626 m (5' 4\").    Weight as of 11/28/23: 112 kg (246 lb 14.4 oz).. There is no height or weight on file to calculate BSA. BP completed using cuff size: large    Erma Tapia       Again, thank you for allowing me to participate in the care of your patient.        Sincerely,        Humberto Clark MD  "

## 2024-01-19 ENCOUNTER — OFFICE VISIT (OUTPATIENT)
Dept: PEDIATRICS | Facility: CLINIC | Age: 64
End: 2024-01-19
Payer: OTHER MISCELLANEOUS

## 2024-01-19 VITALS
WEIGHT: 247.2 LBS | DIASTOLIC BLOOD PRESSURE: 72 MMHG | TEMPERATURE: 98.4 F | BODY MASS INDEX: 42.43 KG/M2 | RESPIRATION RATE: 20 BRPM | HEART RATE: 75 BPM | SYSTOLIC BLOOD PRESSURE: 138 MMHG | OXYGEN SATURATION: 98 %

## 2024-01-19 DIAGNOSIS — Z48.02 ENCOUNTER FOR STAPLE REMOVAL: ICD-10-CM

## 2024-01-19 DIAGNOSIS — S01.01XD LACERATION OF SCALP, SUBSEQUENT ENCOUNTER: ICD-10-CM

## 2024-01-19 DIAGNOSIS — S06.9X1D TRAUMATIC BRAIN INJURY, WITH LOSS OF CONSCIOUSNESS OF 30 MINUTES OR LESS, SUBSEQUENT ENCOUNTER: ICD-10-CM

## 2024-01-19 PROCEDURE — 99207 PR DROP WITH A PROCEDURE: CPT | Mod: 25 | Performed by: PHYSICIAN ASSISTANT

## 2024-01-19 NOTE — PROGRESS NOTES
Assessment & Plan     Laceration of scalp, subsequent encounter  Healing well. Wound approximated well. Some scabbing. No evidence for infection    Encounter for staple removal  Tolerated today. No bleeding wound.    Traumatic brain injury, with loss of consciousness of 30 minutes or less, subsequent encounter  Has apt with TBI clinic today. Appears that overall most symptoms are improving. Does have some sxs such as nausea or lighted headed with certain activities.   Hx of seizure- nothing for three years.   No numbness, tingling or weakness.  Minimal headaches.  Tylenol as needed.  Follow up with pcp in March for annual exam                  Subjective   Nicolas is a 63 year old, presenting for the following health issues:  Suture Removal (Work comp related- seen 01/09 ED visit )        1/19/2024    10:57 AM   Additional Questions   Roomed by [facility]   Accompanied by self         1/19/2024    10:57 AM   Patient Reported Additional Medications   Patient reports taking the following new medications none     History of Present Illness       Reason for visit:  Have staples removed from head  Symptoms include:  Head trauma  Symptom intensity:  Moderate  Symptom progression:  Improving  Had these symptoms before:  No  What makes it worse:  Moving yo fast  What makes it better:  Looking forwardHe consumes 2 sweetened beverage(s) daily.He exercises with enough effort to increase his heart rate 60 or more minutes per day.  He exercises with enough effort to increase his heart rate 4 days per week.   He is taking medications regularly.               1. Here for follow up work injury- 1/9/24. On Scissor lift- getting objects, came back down off lift, stepping back, and fell. Hit ground, hit back of head. May have had LOC. Remembers some things, but other things he does not. Taken to [facility]. Imaging of brain- no bleed noted.  Imaging normal. Staples placed.    Hx of seizures, no seizures in three years.    Since the  fall-  Feeling a little bit of mind being slower  Is walking- can make it 30 min around basketball floor, bu has nausea  Is able to drive without nausea  Had headaches the first few days. Nothing since.   Using Tylenol intermittently for tenderness of head.  No vomiting  No numbness, tingling or weakness..  Sleep is good  Eating well  Mood- feels a bit anxious and agitated at times.   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  He does feel like he gets nausea  with looking at computer, but not phone      Has apt with TBI- clinic. Today.      CT brain  Impression    1. No acute intracranial abnormality.  2. Chronic changes of right cranioplasty and right MCA aneurysmal clip placement.  Dorian Traumatic Brain Injury Scale: Diffuse Injury 1  DORIAN DIAGNOSTIC CATEGORIES OF ABNORMALITIES VISUALIZED ON CT SCANNING FOR TRAUMATIC BRAIN INJURY:  Diffuse Injury 1: No visible intracranial pathology seen on CT scan.        CT Cervical spine  Impression    1. No fracture or subluxation of the cervical vertebrae.  2. No significant spinal canal or neural foraminal stenosis.     Abdomen ultrasound   Impression: No Pericardial Effusion identified, No Free Intraperitoneal   Fluid identified, Left sided Pleural Effusion identified, and No   Pneumothorax Identified     CXR  normal            Objective    BP (!) 168/91 (BP Location: Right arm, Patient Position: Sitting, Cuff Size: Adult Large)   Pulse 75   Temp 98.4  F (36.9  C) (Oral)   Resp 20   Wt 112.1 kg (247 lb 3.2 oz)   SpO2 98%   BMI 42.43 kg/m    Body mass index is 42.43 kg/m .  Physical Exam   GENERAL: alert and no distress  EYES: Eyes grossly normal to inspection, PERRL and conjunctivae and sclerae normal  HENT: ear canals and TM's normal, nose and mouth without ulcers or lesions, oropharynx clear, oral mucous membranes moist. Laceration with staples  posterior head, ttp  RESP: lungs clear to auscultation - no rales, rhonchi or wheezes  CV: regular rate and rhythm, normal S1 S2, no S3 or S4, no murmur, click or rub, no peripheral edema  MS: no gross musculoskeletal defects noted, no edema  SKIN: laceration posterior head- 6 staples- removed. No evidence of infection.   NEURO: Normal strength and tone, mentation intact and speech normal  PSYCH: mentation appears normal, affect normal/bright            Signed Electronically by: Birdie Richardson PA-C

## 2024-01-22 ENCOUNTER — OFFICE VISIT (OUTPATIENT)
Dept: CARDIOLOGY | Facility: CLINIC | Age: 64
End: 2024-01-22
Payer: COMMERCIAL

## 2024-01-22 VITALS
HEART RATE: 66 BPM | OXYGEN SATURATION: 95 % | DIASTOLIC BLOOD PRESSURE: 86 MMHG | HEIGHT: 64 IN | SYSTOLIC BLOOD PRESSURE: 136 MMHG | WEIGHT: 249.8 LBS | BODY MASS INDEX: 42.65 KG/M2

## 2024-01-22 DIAGNOSIS — R07.9 RECURRENT CHEST PAIN: ICD-10-CM

## 2024-01-22 DIAGNOSIS — I25.10 CORONARY ARTERY DISEASE INVOLVING NATIVE CORONARY ARTERY OF NATIVE HEART WITHOUT ANGINA PECTORIS: Primary | ICD-10-CM

## 2024-01-22 DIAGNOSIS — R06.00 DYSPNEA, UNSPECIFIED TYPE: ICD-10-CM

## 2024-01-22 PROCEDURE — 99214 OFFICE O/P EST MOD 30 MIN: CPT | Performed by: INTERNAL MEDICINE

## 2024-01-22 NOTE — LETTER
1/22/2024    Manoj Armenta MD  [address] [place] [address]    RE: Fabio Vazquezgale       Dear Colleague,     I had the pleasure of seeing Fabio Logan in the [facility].  HPI and Plan:   Mr Logan is a very pleasant 63-year-old gentleman with history of hypertension, whitecoat hypertension, dyslipidemia, dizziness due to vertigo who I saw about 2 months ago because of evaluation of chest pain.  He had a negative exercise nuclear stress test in May 2023 although he has some chest discomfort with exercise.  LV function was normal on stress test.  He was also having ongoing dizziness due to vertigo-like sensation and was seeing a physical therapist and had some possible exercise and was feeling better in terms of vertigo.  I recommended coronary CT angiogram and echocardiogram.  Coronary CT angiogram showed moderate left anterior descending artery stenosis with total calcium score of 89.3.  CT FFR was in physiological range indicating no flow-limiting lesions.  Echocardiogram showed normal LV function.  3 mm lung nodule was noted incidentally on CT chest.  He was on simvastatin and was recommended to switch to Lipitor 40 mg daily.  Which he did about a month ago.  Lipid panel last year showed LDL of 129.  Today patient is coming for routine follow-up.  He has no specific cardiac complaints.  However he had a fall about 2 weeks ago while climbing down the ladder he had vertigo-like sensation fell backwards hit his head and had 9 staples for laceration.  He also suffered concussion.  No loss of consciousness.  He is still struggling with intermittent vertigo sensation especially if he moves around quickly or moves his head quickly.  Fortunately no recent fall.  He is on baby aspirin 81 mg daily.  He is on losartan and atenolol for hypertension control which is usually well-controlled with some element of whitecoat hypertension.      Assessment and plan  Nonobstructive  coronary disease with moderate left anterior descending artery stenosis.  Recommend continuing aspirin 81 mg daily, Lipitor 40 mg daily.  Normal LV function on echocardiogram.  Dyslipidemia with LDL of 129.  Goal LDL to be around 70.  On Lipitor 40 mg daily.  Recheck lipid panel with ALT in a month.  Hypertension with element of whitecoat hypertension.  On losartan atenolol  History of dizziness vertigo with recent fall still having ongoing vertigo-like sensation.  Patient will be following up with physical therapy and primary care physician.  3 mm lung nodule noted on CT chest incidentally.  Recommend patient to follow-up with PCP.    Recommendations  Continue aspirin 81 mg daily, Lipitor 40 mg daily  Lipid panel with ALT in a month  My office going to update her with results of the lipid panel goal LDL will be around 70.  If needed we may go up further on Lipitor or add other antilipid medication like Zetia  Follow-up in a year, sooner if he notes any change in clinical status.    Today's clinic visit entailed:  Review of the result(s) of each unique test - coronary CT angiogram, CT FFR, lipid panel, echocardiogram        Orders Placed This Encounter   Procedures    Lipid Profile    ALT    Follow-Up with Cardiology       No orders of the defined types were placed in this encounter.      There are no discontinued medications.      Encounter Diagnoses   Name Primary?    Recurrent chest pain     Dyspnea, unspecified type     Coronary artery disease involving native coronary artery of native heart without angina pectoris Yes       CURRENT MEDICATIONS:  Current Outpatient Medications   Medication Sig Dispense Refill    albuterol (PROAIR HFA/PROVENTIL HFA/VENTOLIN HFA) 108 (90 Base) MCG/ACT inhaler Inhale 2 puffs into the lungs every 6 hours as needed for shortness of breath or wheezing 36 g 3    atenolol (TENORMIN) 50 MG tablet Take 1 tablet (50 mg) by mouth daily 90 tablet 3    atorvastatin (LIPITOR) 40 MG tablet Take  1 tablet (40 mg) by mouth daily 90 tablet 3    budesonide-formoterol (SYMBICORT) 80-4.5 MCG/ACT Inhaler Inhale 2 puffs into the lungs 2 times daily 10.2 g 3    fluticasone (FLONASE) 50 MCG/ACT nasal spray SPRAY 1 TO 2 SPRAYS INTO BOTH NOSTRISL DAILY 16 g 3    ketoconazole (NIZORAL) 2 % external cream Apply topically daily 60 g 3    losartan (COZAAR) 25 MG tablet Take 1 tablet (25 mg) by mouth daily 90 tablet 3    methocarbamol (ROBAXIN) 500 MG tablet Take 1 tablet (500 mg) by mouth 4 times daily as needed for muscle spasms 40 tablet 0    multivitamin w/minerals (MULTI-VITAMIN) tablet Take 1 tablet by mouth daily Take 1 tab by mouth daily      omeprazole (PRILOSEC) 20 MG DR capsule Take 1 capsule (20 mg) by mouth daily 90 capsule 3    OXcarbazepine (TRILEPTAL) 300 MG tablet Take 1.5 tablets (450 mg) by mouth 2 times daily 270 tablet 3    sertraline (ZOLOFT) 100 MG tablet TAKE ONE AND ONE-HALF TABLETS BY MOUTH DAILY. 135 tablet 0    SUMAtriptan (IMITREX) 50 MG tablet Take 1 tablet (50 mg) by mouth at onset of headache for migraine (may repeat dose in 2 hours. Do not exceed 200mg in 24 hours Strength: 50 mg 18 tablet 3    traZODone (DESYREL) 50 MG tablet Take 1-2 tablets ( mg) by mouth At Bedtime 180 tablet 3    triamcinolone (KENALOG) 0.1 % ointment Apply topically 2 times daily as needed for irritation         ALLERGIES     Allergies   Allergen Reactions    Iodine Shortness Of Breath     Other reaction(s): Shortness of breath    internal iodine for tests       PAST MEDICAL HISTORY:  Past Medical History:   Diagnosis Date    Anxiety     Depression     Diverticulitis     h/o Mumps     Hypertension     Kidney stone     Testicular cancer - Right 1998       PAST SURGICAL HISTORY:  Past Surgical History:   Procedure Laterality Date    COLONOSCOPY N/A 08/22/2014    Procedure: COLONOSCOPY;  Surgeon: Joe Garcia MD;  Location:  GI    COLONOSCOPY      CRANIOTOMY, REPAIR ANEURYSM, COMBINED Right 05/07/2019     Procedure: RIGHT CRANIOTOMY AND CLIPPING OF COMPLEX MIDDLE CEREBRAL ARTERY ANEURYMS; INTRAOPERATIVE ANGIOGRAM;  Surgeon: Jamir Rebollar MD;  Location: [facility];  Service: Neurosurgery    CYSTOSCOPY      EYE SURGERY      ? not on H&P    IR CEREBRAL ANGIOGRAM  2019    IR CEREBRAL ANGIOGRAM  2019    LAPAROSCOPIC CHOLECYSTECTOMY N/A 2018    Procedure: LAPAROSCOPIC CHOLECYSTECTOMY;  LAPAROSCOPIC CHOLECYSTECTOMY ;  Surgeon: Shannan Chaves MD;  Location:  OR    LAPAROSCOPIC CHOLECYSTECTOMY      ORCHIECTOMY NOS Right     ORCHIECTOMY SCROTAL Right 1998    TESTICLE SURGERY      TONSILLECTOMY      TONSILLECTOMY         FAMILY HISTORY:  Family History   Problem Relation Age of Onset    Cerebrovascular Disease Mother     Hypertension Mother     Heart Disease Father         had 2 open heart surgery    Lipids Father     Hypertension Father     Cancer Maternal Grandfather         skin cancer    Skin Cancer Maternal Grandfather         skin cancer    Heart Disease Sister 45        heart attack    Cancer Sister 46        breast cancer    Skin Cancer Sister         BCC    Varicose Veins Mother     Coronary Artery Disease Father     Cancer Sister     Coronary Artery Disease Sister     Hypertension Other         all siblings       SOCIAL HISTORY:  Social History     Socioeconomic History    Marital status: Single     Spouse name: None    Number of children: None    Years of education: None    Highest education level: GED or equivalent   Tobacco Use    Smoking status: Former     Types: Cigarettes     Quit date: 1997     Years since quittin.6    Smokeless tobacco: Former   Vaping Use    Vaping Use: Never used   Substance and Sexual Activity    Alcohol use: No     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 No     Social Determinants of Health      Financial Resource Strain: Low Risk  (1/17/2024)    Financial Resource Strain     Within the past 12 months, have you or your family members you live with been unable to get utilities (heat, electricity) when it was really needed?: No   Food Insecurity: Low Risk  (1/17/2024)    Food Insecurity     Within the past 12 months, did you worry that your food would run out before you got money to buy more?: No     Within the past 12 months, did the food you bought just not last and you didn t have money to get more?: No   Transportation Needs: Low Risk  (1/17/2024)    Transportation Needs     Within the past 12 months, has lack of transportation kept you from medical appointments, getting your medicines, non-medical meetings or appointments, work, or from getting things that you need?: No   Physical Activity: Insufficiently Active (5/23/2019)    Exercise Vital Sign     Days of Exercise per Week: 2 days     Minutes of Exercise per Session: 30 min   Stress: Stress Concern Present (5/23/2019)    [employer] - Occupational Stress Questionnaire     Feeling of Stress : Very much   Social Connections: Moderately Integrated (5/23/2019)    Social Connection and Isolation Panel [NHANES]     Frequency of Communication with Friends and Family: More than three times a week     Frequency of Social Gatherings with Friends and Family: More than three times a week     Attends Scientologist Services: More than 4 times per year     Active Member of Clubs or Organizations: Yes     Attends Club or Organization Meetings: More than 4 times per year     Marital Status: Never    Interpersonal Safety: Low Risk  (1/19/2024)    Interpersonal Safety     Do you feel physically and emotionally safe where you currently live?: Yes     Within the past 12 months, have you been hit, slapped, kicked or otherwise physically hurt by someone?: No     Within the past 12 months, have you been humiliated or emotionally abused in  "other ways by your partner or ex-partner?: No   Housing Stability: Low Risk  (1/17/2024)    Housing Stability     Do you have housing? : Yes     Are you worried about losing your housing?: No       Review of Systems:  Skin:  Positive for itching     Eyes:  Positive for glasses itching of the right eye  ENT:  Positive for vertigo    Respiratory:  Positive for dyspnea on exertion not as much   Cardiovascular: No exertional chest pain  Gastroenterology:        Genitourinary:         Musculoskeletal:         Neurologic:         Psychiatric:         Heme/Lymph/Imm:         Endocrine:  Negative        Physical Exam:  Vitals: /86 (BP Location: Left arm, Patient Position: Sitting)   Pulse 66   Ht 1.626 m (5' 4\")   Wt 113.3 kg (249 lb 12.8 oz)   SpO2 95%   BMI 42.88 kg/m    General patient appears comfortable  Neck normal JVP  Cardiovascular system S1-S2 normal no murmur rub or gallop  Respiratory system clear to auscultation  Extremities no edema  Neurological alert, oriented  CC  Mp Hardy MD  [address]  [place] [address]                Thank you for allowing me to participate in the care of your patient.      Sincerely,     Mp Hardy MD     [facility]  "

## 2024-01-22 NOTE — PROGRESS NOTES
HPI and Plan:   Mr Logan is a very pleasant 63-year-old gentleman with history of hypertension, whitecoat hypertension, dyslipidemia, dizziness due to vertigo who I saw about 2 months ago because of evaluation of chest pain.  He had a negative exercise nuclear stress test in May 2023 although he has some chest discomfort with exercise.  LV function was normal on stress test.  He was also having ongoing dizziness due to vertigo-like sensation and was seeing a physical therapist and had some possible exercise and was feeling better in terms of vertigo.  I recommended coronary CT angiogram and echocardiogram.  Coronary CT angiogram showed moderate left anterior descending artery stenosis with total calcium score of 89.3.  CT FFR was in physiological range indicating no flow-limiting lesions.  Echocardiogram showed normal LV function.  3 mm lung nodule was noted incidentally on CT chest.  He was on simvastatin and was recommended to switch to Lipitor 40 mg daily.  Which he did about a month ago.  Lipid panel last year showed LDL of 129.  Today patient is coming for routine follow-up.  He has no specific cardiac complaints.  However he had a fall about 2 weeks ago while climbing down the ladder he had vertigo-like sensation fell backwards hit his head and had 9 staples for laceration.  He also suffered concussion.  No loss of consciousness.  He is still struggling with intermittent vertigo sensation especially if he moves around quickly or moves his head quickly.  Fortunately no recent fall.  He is on baby aspirin 81 mg daily.  He is on losartan and atenolol for hypertension control which is usually well-controlled with some element of whitecoat hypertension.      Assessment and plan  Nonobstructive coronary disease with moderate left anterior descending artery stenosis.  Recommend continuing aspirin 81 mg daily, Lipitor 40 mg daily.  Normal LV function on echocardiogram.  Dyslipidemia with LDL of 129.  Goal LDL to be  around 70.  On Lipitor 40 mg daily.  Recheck lipid panel with ALT in a month.  Hypertension with element of whitecoat hypertension.  On losartan atenolol  History of dizziness vertigo with recent fall still having ongoing vertigo-like sensation.  Patient will be following up with physical therapy and primary care physician.  3 mm lung nodule noted on CT chest incidentally.  Recommend patient to follow-up with PCP.    Recommendations  Continue aspirin 81 mg daily, Lipitor 40 mg daily  Lipid panel with ALT in a month  My office going to update her with results of the lipid panel goal LDL will be around 70.  If needed we may go up further on Lipitor or add other antilipid medication like Zetia  Follow-up in a year, sooner if he notes any change in clinical status.    Today's clinic visit entailed:  Review of the result(s) of each unique test - coronary CT angiogram, CT FFR, lipid panel, echocardiogram        Orders Placed This Encounter   Procedures    Lipid Profile    ALT    Follow-Up with Cardiology       No orders of the defined types were placed in this encounter.      There are no discontinued medications.      Encounter Diagnoses   Name Primary?    Recurrent chest pain     Dyspnea, unspecified type     Coronary artery disease involving native coronary artery of native heart without angina pectoris Yes       CURRENT MEDICATIONS:  Current Outpatient Medications   Medication Sig Dispense Refill    albuterol (PROAIR HFA/PROVENTIL HFA/VENTOLIN HFA) 108 (90 Base) MCG/ACT inhaler Inhale 2 puffs into the lungs every 6 hours as needed for shortness of breath or wheezing 36 g 3    atenolol (TENORMIN) 50 MG tablet Take 1 tablet (50 mg) by mouth daily 90 tablet 3    atorvastatin (LIPITOR) 40 MG tablet Take 1 tablet (40 mg) by mouth daily 90 tablet 3    budesonide-formoterol (SYMBICORT) 80-4.5 MCG/ACT Inhaler Inhale 2 puffs into the lungs 2 times daily 10.2 g 3    fluticasone (FLONASE) 50 MCG/ACT nasal spray SPRAY 1 TO 2  SPRAYS INTO BOTH NOSTRISL DAILY 16 g 3    ketoconazole (NIZORAL) 2 % external cream Apply topically daily 60 g 3    losartan (COZAAR) 25 MG tablet Take 1 tablet (25 mg) by mouth daily 90 tablet 3    methocarbamol (ROBAXIN) 500 MG tablet Take 1 tablet (500 mg) by mouth 4 times daily as needed for muscle spasms 40 tablet 0    multivitamin w/minerals (MULTI-VITAMIN) tablet Take 1 tablet by mouth daily Take 1 tab by mouth daily      omeprazole (PRILOSEC) 20 MG DR capsule Take 1 capsule (20 mg) by mouth daily 90 capsule 3    OXcarbazepine (TRILEPTAL) 300 MG tablet Take 1.5 tablets (450 mg) by mouth 2 times daily 270 tablet 3    sertraline (ZOLOFT) 100 MG tablet TAKE ONE AND ONE-HALF TABLETS BY MOUTH DAILY. 135 tablet 0    SUMAtriptan (IMITREX) 50 MG tablet Take 1 tablet (50 mg) by mouth at onset of headache for migraine (may repeat dose in 2 hours. Do not exceed 200mg in 24 hours Strength: 50 mg 18 tablet 3    traZODone (DESYREL) 50 MG tablet Take 1-2 tablets ( mg) by mouth At Bedtime 180 tablet 3    triamcinolone (KENALOG) 0.1 % ointment Apply topically 2 times daily as needed for irritation         ALLERGIES     Allergies   Allergen Reactions    Iodine Shortness Of Breath     Other reaction(s): Shortness of breath    internal iodine for tests       PAST MEDICAL HISTORY:  Past Medical History:   Diagnosis Date    Anxiety     Depression     Diverticulitis     h/o Mumps     Hypertension     Kidney stone     Testicular cancer - Right 1998       PAST SURGICAL HISTORY:  Past Surgical History:   Procedure Laterality Date    COLONOSCOPY N/A 08/22/2014    Procedure: COLONOSCOPY;  Surgeon: Joe Garcia MD;  Location:  GI    COLONOSCOPY      CRANIOTOMY, REPAIR ANEURYSM, COMBINED Right 05/07/2019    Procedure: RIGHT CRANIOTOMY AND 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 not on H&P    IR CEREBRAL ANGIOGRAM  2019    IR CEREBRAL ANGIOGRAM  2019    LAPAROSCOPIC CHOLECYSTECTOMY N/A 2018    Procedure: LAPAROSCOPIC CHOLECYSTECTOMY;  LAPAROSCOPIC CHOLECYSTECTOMY ;  Surgeon: Shannan Chaves MD;  Location: [facility] OR    LAPAROSCOPIC CHOLECYSTECTOMY      ORCHIECTOMY NOS Right     ORCHIECTOMY SCROTAL Right 1998    TESTICLE SURGERY      TONSILLECTOMY      TONSILLECTOMY         FAMILY HISTORY:  Family History   Problem Relation Age of Onset    Cerebrovascular Disease Mother     Hypertension Mother     Heart Disease Father         had 2 open heart surgery    Lipids Father     Hypertension Father     Cancer Maternal Grandfather         skin cancer    Skin Cancer Maternal Grandfather         skin cancer    Heart Disease Sister 45        heart attack    Cancer Sister 46        breast cancer    Skin Cancer Sister         BCC    Varicose Veins Mother     Coronary Artery Disease Father     Cancer Sister     Coronary Artery Disease Sister     Hypertension Other         all siblings       SOCIAL HISTORY:  Social History     Socioeconomic History    Marital status: Single     Spouse name: None    Number of children: None    Years of education: None    Highest education level: GED or equivalent   Tobacco Use    Smoking status: Former     Types: Cigarettes     Quit date: 1997     Years since quittin.6    Smokeless tobacco: Former   Vaping Use    Vaping Use: Never used   Substance and Sexual Activity    Alcohol use: No     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 No     Social Determinants of Health     Financial Resource Strain: Low Risk  (2024)    Financial Resource Strain     Within the past 12 months, have you or your family members you live with been unable to get utilities (heat, electricity) when it was really  needed?: No   Food Insecurity: Low Risk  (1/17/2024)    Food Insecurity     Within the past 12 months, did you worry that your food would run out before you got money to buy more?: No     Within the past 12 months, did the food you bought just not last and you didn t have money to get more?: No   Transportation Needs: Low Risk  (1/17/2024)    Transportation Needs     Within the past 12 months, has lack of transportation kept you from medical appointments, getting your medicines, non-medical meetings or appointments, work, or from getting things that you need?: No   Physical Activity: Insufficiently Active (5/23/2019)    Exercise Vital Sign     Days of Exercise per Week: 2 days     Minutes of Exercise per Session: 30 min   Stress: Stress Concern Present (5/23/2019)    Citizen of [place] [employer] - Occupational Stress Questionnaire     Feeling of Stress : Very much   Social Connections: Moderately Integrated (5/23/2019)    Social Connection and Isolation Panel [NHANES]     Frequency of Communication with Friends and Family: More than three times a week     Frequency of Social Gatherings with Friends and Family: More than three times a week     Attends [employer] Services: More than 4 times per year     Active Member of Clubs or Organizations: Yes     Attends Club or Organization Meetings: More than 4 times per year     Marital Status: Never    Interpersonal Safety: Low Risk  (1/19/2024)    Interpersonal Safety     Do you feel physically and emotionally safe where you currently live?: Yes     Within the past 12 months, have you been hit, slapped, kicked or otherwise physically hurt by someone?: No     Within the past 12 months, have you been humiliated or emotionally abused in other ways by your partner or ex-partner?: No   Housing Stability: Low Risk  (1/17/2024)    Housing Stability     Do you have housing? : Yes     Are you worried about losing your housing?: No       Review of Systems:  Skin:   "Positive for itching     Eyes:  Positive for glasses itching of the right eye  ENT:  Positive for vertigo    Respiratory:  Positive for dyspnea on exertion not as much   Cardiovascular: No exertional chest pain  Gastroenterology:        Genitourinary:         Musculoskeletal:         Neurologic:         Psychiatric:         Heme/Lymph/Imm:         Endocrine:  Negative        Physical Exam:  Vitals: /86 (BP Location: Left arm, Patient Position: Sitting)   Pulse 66   Ht 1.626 m (5' 4\")   Wt 113.3 kg (249 lb 12.8 oz)   SpO2 95%   BMI 42.88 kg/m    General patient appears comfortable  Neck normal JVP  Cardiovascular system S1-S2 normal no murmur rub or gallop  Respiratory system clear to auscultation  Extremities no edema  Neurological alert, oriented  CC  Mp aHrdy MD  [id number]  [place] [id number]                "

## 2024-01-22 NOTE — PATIENT INSTRUCTIONS
Please review 3 mm lung nodule noted incidentally on CT chest with your primary care physician  Lipid panel check in one month

## 2024-02-09 NOTE — TELEPHONE ENCOUNTER
Continue carb controlled diet and monitor sugars with BMP   ORDER FROM: Dr. Rebollar    PRE AUTHORIZATION: No PA needed. Ok to schedule per Zoraida    METHOD OF PATIENT CONTACT: Spoke with Fabio on the phone. Best number to reach: [phone].    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 Manoj Armenta, [facility], [phone].    PRE-OP PHYSICAL: 04/18/19 @ 8:10 AM with Dr. Eligio Bah at the [facility], [phone].    FILM INFO: MRI BRAIN COW: 03/14/19 @ [facility]    SURGICAL LETTER: Mailed to patient 4/2/19

## 2024-03-01 SDOH — HEALTH STABILITY: PHYSICAL HEALTH: ON AVERAGE, HOW MANY DAYS PER WEEK DO YOU ENGAGE IN MODERATE TO STRENUOUS EXERCISE (LIKE A BRISK WALK)?: 3 DAYS

## 2024-03-01 SDOH — HEALTH STABILITY: PHYSICAL HEALTH: ON AVERAGE, HOW MANY MINUTES DO YOU ENGAGE IN EXERCISE AT THIS LEVEL?: 30 MIN

## 2024-03-01 ASSESSMENT — SOCIAL DETERMINANTS OF HEALTH (SDOH): HOW OFTEN DO YOU GET TOGETHER WITH FRIENDS OR RELATIVES?: ONCE A WEEK

## 2024-03-02 DIAGNOSIS — F32.A DEPRESSION, UNSPECIFIED DEPRESSION TYPE: ICD-10-CM

## 2024-03-04 RX ORDER — TRAZODONE HYDROCHLORIDE 50 MG/1
50-100 TABLET, FILM COATED ORAL AT BEDTIME
Qty: 180 TABLET | Refills: 0 | Status: SHIPPED | OUTPATIENT
Start: 2024-03-04 | End: 2024-03-06

## 2024-03-06 ENCOUNTER — OFFICE VISIT (OUTPATIENT)
Dept: PEDIATRICS | Facility: CLINIC | Age: 64
End: 2024-03-06
Payer: COMMERCIAL

## 2024-03-06 VITALS
BODY MASS INDEX: 41.6 KG/M2 | WEIGHT: 243.7 LBS | SYSTOLIC BLOOD PRESSURE: 130 MMHG | TEMPERATURE: 97.5 F | HEIGHT: 64 IN | OXYGEN SATURATION: 96 % | HEART RATE: 65 BPM | DIASTOLIC BLOOD PRESSURE: 79 MMHG | RESPIRATION RATE: 20 BRPM

## 2024-03-06 DIAGNOSIS — I25.10 CORONARY ARTERY DISEASE INVOLVING NATIVE CORONARY ARTERY OF NATIVE HEART WITHOUT ANGINA PECTORIS: ICD-10-CM

## 2024-03-06 DIAGNOSIS — E78.5 HYPERLIPIDEMIA WITH TARGET LDL LESS THAN 100: ICD-10-CM

## 2024-03-06 DIAGNOSIS — C62.90 MALIGNANT NEOPLASM OF TESTICLE, UNSPECIFIED LATERALITY, UNSPECIFIED WHETHER DESCENDED OR UNDESCENDED (H): ICD-10-CM

## 2024-03-06 DIAGNOSIS — J45.20 MILD INTERMITTENT ASTHMA WITHOUT COMPLICATION: ICD-10-CM

## 2024-03-06 DIAGNOSIS — E66.01 MORBID OBESITY (H): ICD-10-CM

## 2024-03-06 DIAGNOSIS — Z00.00 ROUTINE GENERAL MEDICAL EXAMINATION AT A HEALTH CARE FACILITY: Primary | ICD-10-CM

## 2024-03-06 DIAGNOSIS — I72.9 ANEURYSM (H): ICD-10-CM

## 2024-03-06 DIAGNOSIS — S06.9X1D TRAUMATIC BRAIN INJURY, WITH LOSS OF CONSCIOUSNESS OF 30 MINUTES OR LESS, SUBSEQUENT ENCOUNTER: ICD-10-CM

## 2024-03-06 DIAGNOSIS — J30.2 SEASONAL ALLERGIC RHINITIS, UNSPECIFIED TRIGGER: ICD-10-CM

## 2024-03-06 DIAGNOSIS — R73.01 IMPAIRED FASTING GLUCOSE: ICD-10-CM

## 2024-03-06 DIAGNOSIS — Z12.5 PROSTATE CANCER SCREENING: ICD-10-CM

## 2024-03-06 DIAGNOSIS — S06.9X0A MILD TRAUMATIC BRAIN INJURY, WITHOUT LOSS OF CONSCIOUSNESS, INITIAL ENCOUNTER (H): ICD-10-CM

## 2024-03-06 DIAGNOSIS — K21.9 GASTROESOPHAGEAL REFLUX DISEASE WITHOUT ESOPHAGITIS: ICD-10-CM

## 2024-03-06 DIAGNOSIS — R68.89 EAR SYMPTOM: ICD-10-CM

## 2024-03-06 DIAGNOSIS — F32.A DEPRESSION, UNSPECIFIED DEPRESSION TYPE: ICD-10-CM

## 2024-03-06 DIAGNOSIS — F33.0 MAJOR DEPRESSIVE DISORDER, RECURRENT EPISODE, MILD (H): ICD-10-CM

## 2024-03-06 DIAGNOSIS — R93.1 ABNORMAL CARDIAC CT ANGIOGRAPHY: ICD-10-CM

## 2024-03-06 DIAGNOSIS — I10 HTN, GOAL BELOW 140/90: ICD-10-CM

## 2024-03-06 DIAGNOSIS — F33.1 MAJOR DEPRESSIVE DISORDER, RECURRENT EPISODE, MODERATE (H): ICD-10-CM

## 2024-03-06 PROCEDURE — 90678 RSV VACC PREF BIVALENT IM: CPT | Performed by: INTERNAL MEDICINE

## 2024-03-06 PROCEDURE — 90471 IMMUNIZATION ADMIN: CPT | Performed by: INTERNAL MEDICINE

## 2024-03-06 PROCEDURE — 99396 PREV VISIT EST AGE 40-64: CPT | Mod: 25 | Performed by: INTERNAL MEDICINE

## 2024-03-06 PROCEDURE — 99214 OFFICE O/P EST MOD 30 MIN: CPT | Mod: 25 | Performed by: INTERNAL MEDICINE

## 2024-03-06 RX ORDER — ATORVASTATIN CALCIUM 40 MG/1
40 TABLET, FILM COATED ORAL DAILY
Qty: 90 TABLET | Refills: 3 | Status: SHIPPED | OUTPATIENT
Start: 2024-03-06

## 2024-03-06 RX ORDER — ATENOLOL 50 MG/1
50 TABLET ORAL DAILY
Qty: 90 TABLET | Refills: 3 | Status: SHIPPED | OUTPATIENT
Start: 2024-03-06

## 2024-03-06 RX ORDER — FLUTICASONE PROPIONATE 50 MCG
SPRAY, SUSPENSION (ML) NASAL
Qty: 16 G | Refills: 3 | Status: SHIPPED | OUTPATIENT
Start: 2024-03-06

## 2024-03-06 RX ORDER — SERTRALINE HYDROCHLORIDE 100 MG/1
150 TABLET, FILM COATED ORAL DAILY
Qty: 135 TABLET | Refills: 3 | Status: SHIPPED | OUTPATIENT
Start: 2024-03-06

## 2024-03-06 RX ORDER — TRAZODONE HYDROCHLORIDE 50 MG/1
50-100 TABLET, FILM COATED ORAL AT BEDTIME
Qty: 180 TABLET | Refills: 0 | Status: SHIPPED | OUTPATIENT
Start: 2024-03-06

## 2024-03-06 RX ORDER — BUDESONIDE AND FORMOTEROL FUMARATE DIHYDRATE 80; 4.5 UG/1; UG/1
2 AEROSOL RESPIRATORY (INHALATION) 2 TIMES DAILY
Qty: 10.2 G | Refills: 3 | Status: SHIPPED | OUTPATIENT
Start: 2024-03-06 | End: 2024-03-06

## 2024-03-06 NOTE — PROGRESS NOTES
Preventive Care Visit  [facility] CANDI Armenta MD, Internal Medicine - Pediatrics  Mar 6, 2024    Assessment & Plan     Routine general medical examination at a health care facility  d/w pt preventative care measures including seat belt use, bike helmet, moderation of EtOH, avoiding tobacco, avoiding excessive sun exposure/sunscreen, wt management or wt loss if BMI > 30, need to screen for lipid disorders, mood disorders, CAD risk factors, etc. Also discussed accident prevention and future RHM schedule.     Traumatic brain injury, with loss of consciousness of 30 minutes or less, subsequent encounter  discussed with patient (or patient's parents/caregiver) pathophysiology of condition and treatment options.  continue follow-up with [facility], ptt, eye and ent.   - Comprehensive metabolic panel (BMP + Alb, Alk Phos, ALT, AST, Total. Bili, TP); Future    Major depressive disorder, recurrent episode, mild (H24)  discussed with patient (or patient's parents/caregiver) pathophysiology of condition and treatment options.  Well controlled on current medication(s).   Reviewed concept of depression as function of biochemical imbalance of neurotransmitters/rationale for treatment.  Risks and benefits of medication(s) reviewed with patient.  Questions answered.   - Comprehensive metabolic panel (BMP + Alb, Alk Phos, ALT, AST, Total. Bili, TP); Future  - sertraline (ZOLOFT) 100 MG tablet; Take 1.5 tablets (150 mg) by mouth daily    Major depressive disorder, recurrent episode, moderate (H)  as above  - Comprehensive metabolic panel (BMP + Alb, Alk Phos, ALT, AST, Total. Bili, TP); Future    Aneurysm (H24)  discussed with patient (or patient's parents/caregiver) pathophysiology of condition and treatment options.  continue follow-up with neurosurgery as scheduled.   - Comprehensive metabolic panel (BMP + Alb, Alk Phos, ALT, AST, Total. Bili, TP); Future    Mild traumatic brain injury, without  loss of consciousness, initial encounter (H)  discussed with patient (or patient's parents/caregiver) pathophysiology of condition and treatment options.  as above  - Comprehensive metabolic panel (BMP + Alb, Alk Phos, ALT, AST, Total. Bili, TP); Future    Morbid obesity (H)  discussed with patient (or patient's parents/caregiver) pathophysiology of condition and treatment options.  Reviwed BMI and comorbid conditions and how weight can affect these(e.g. lipids, blood pressures), goal for weight management and get BMI < 35. reviwed weight management strategies, etc with patient today. Patient verbalized understanding and is agreeable to this plan.    - Comprehensive metabolic panel (BMP + Alb, Alk Phos, ALT, AST, Total. Bili, TP); Future  - Hemoglobin A1c; Future    Malignant neoplasm of testicle, unspecified laterality, unspecified whether descended or undescended (H)  discussed with patient (or patient's parents/caregiver) pathophysiology of condition and treatment options.  follow-up with Dr Hale as scheduled.   - Comprehensive metabolic panel (BMP + Alb, Alk Phos, ALT, AST, Total. Bili, TP); Future    Ear symptom  as above, will formally refer to ent for evaluation,   - Adult ENT  Referral; Future    Impaired fasting glucose  due for recheck, reviwed diet, etc.   - Hemoglobin A1c; Future    Depression, unspecified depression type  as above. Well controlled on current medication(s).   - traZODone (DESYREL) 50 MG tablet; Take 1-2 tablets ( mg) by mouth at bedtime    Seasonal allergic rhinitis, unspecified trigger  discussed with patient (or patient's parents/caregiver) pathophysiology of condition and treatment options.  Well controlled on current medication(s).   - fluticasone (FLONASE) 50 MCG/ACT nasal spray; SPRAY 1 TO 2 SPRAYS INTO BOTH NOSTRISL DAILY    HTN, goal below 140/90  discussed with patient (or patient's parents/caregiver) pathophysiology of condition and treatment options.  BP  within acceptable limits today. reviwed diet, etc.   - Comprehensive metabolic panel (BMP + Alb, Alk Phos, ALT, AST, Total. Bili, TP); Future  - atenolol (TENORMIN) 50 MG tablet; Take 1 tablet (50 mg) by mouth daily    Hyperlipidemia with target LDL less than 100  du for las, reviwed diet, etc. toelrating statin without side effects  - Lipid panel reflex to direct LDL Fasting; Future  - Comprehensive metabolic panel (BMP + Alb, Alk Phos, ALT, AST, Total. Bili, TP); Future    Prostate cancer screening  - PSA, screen; Future    Abnormal cardiac CT angiography  as above, following with cardiology  - atorvastatin (LIPITOR) 40 MG tablet; Take 1 tablet (40 mg) by mouth daily    Coronary artery disease involving native coronary artery of native heart without angina pectoris  as above  - atorvastatin (LIPITOR) 40 MG tablet; Take 1 tablet (40 mg) by mouth daily    Mild intermittent asthma without complication  discussed with patient (or patient's parents/caregiver) pathophysiology of condition and treatment options.  Well controlled on current medication(s). reviwed action plan, has albuterol    Gastroesophageal reflux disease without esophagitis  discussed with patient (or patient's parents/caregiver) pathophysiology of condition and treatment options.  Well controlled on current medication(s). continue PPI, due for colonoscopy next year and vladimir consider EGD at that time if still taking daily PPI.  reviewed, no concerns. Prescription sent electronically   - omeprazole (PRILOSEC) 20 MG DR capsule; Take 1 capsule (20 mg) by mouth daily    Patient has been advised of split billing requirements and indicates understanding: Yes        Counseling  Appropriate preventive services were discussed with this patient, including applicable screening as appropriate for fall prevention, nutrition, physical activity, Tobacco-use cessation, weight loss and cognition.  Checklist reviewing preventive services available has been given to  the patient.      Return in about 1 year (around 3/6/2025) for Routine Visit, or sooner if symptoms persist or worsen.      Subjective   Nicolas is a 63 year old, presenting for the following:  Physical        3/6/2024     4:06 PM   Additional Questions   Roomed by    Accompanied by self         3/6/2024     4:06 PM   Patient Reported Additional Medications   Patient reports taking the following new medications no        Health Care Directive  Patient does not have a Health Care Directive or Living Will: Patient states has Advance Directive and will bring in a copy to clinic.    HPI        Patient presents today for his annual wellness visit.  He also has several issues to discuss and needs all of his medications refilled.  He is happy to report he is back at work after significant traumatic brain injury several months ago.  He was at work fell backwards and hit his head and was evaluated to have been [facility].  He continues to follow-up with their therapy teams as well as her traumatic brain injury clinic.  He is happy that his clipped aneurysm in his brain was not impacted by this fall and he is taking great steps to avoid any falls in the future.  His work is also very supportive of this and they have adjusted his workload to prevent any risk of falls.  Still also getting vestibular treatments due to dizziness does get some fluttering in his right ear which she has tried eardrops and continues to work with ENT.  Cardiology did increase his cholesterol medications and he is due for labs today.  He does have an eye appointment next week.  He notes overall his mood is good.  He does try to work on diet and exercise is much as he can.  He is taking all medications and no side effects noted.No other concerns or complaints today.         3/1/2024   General Health   How would you rate your overall physical health? (!) FAIR   Feel stress (tense, anxious, or unable to sleep) To some extent   (!) STRESS  CONCERN      3/1/2024   Nutrition   Three or more servings of calcium each day? Yes   Diet: I don't know   How many servings of fruit and vegetables per day? (!) 0-1   How many sweetened beverages each day? (!) 2         3/1/2024   Exercise   Days per week of moderate/strenous exercise 3 days   Average minutes spent exercising at this level 30 min         3/1/2024   Social Factors   Frequency of gathering with friends or relatives Once a week   Worry food won't last until get money to buy more No   Food not last or not have enough money for food? No   Do you have housing?  Yes   Are you worried about losing your housing? No   Lack of transportation? No   Unable to get utilities (heat,electricity)? No         3/6/2024   Fall Risk   Gait Speed Test (Document in seconds) 6.22   Gait Speed Test Interpretation Greater than 5.01 seconds - ABNORMAL          3/1/2024   Dental   Dentist two times every year? Yes         3/1/2024   TB Screening   Were you born outside of [place]?  No         PHQ-2 Score:         3/1/2023     3:38 PM 2021     2:59 PM   PHQ-2 (  [employer])   Q1: Little interest or pleasure in doing things 1 0   Q2: Feeling down, depressed or hopeless 0 0   PHQ-2 Score 1 0   PHQ-2 Total Score (12-17 Years)- Positive if 3 or more points; Administer PHQ-A if positive  0   Q1: Little interest or pleasure in doing things Several days Not at all   Q2: Feeling down, depressed or hopeless Not at all Not at all   PHQ-2 Score 1 0             3/1/2024   Substance Use   Alcohol more than 3/day or more than 7/wk Not Applicable   Do you use any other substances recreationally? No     Social History     Tobacco Use    Smoking status: Former     Types: Cigarettes     Quit date: 1997     Years since quittin.7    Smokeless tobacco: Former   Vaping Use    Vaping Use: Never used   Substance Use Topics    Alcohol use: No     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    Drug use: No           3/1/2024   STI Screening   New sexual  "partner(s) since last STI/HIV test? No   Last PSA:   PSA   Date Value Ref Range Status   05/24/2019 1.07 0 - 4 ug/L Final     Comment:     Assay Method:  Chemiluminescence using Siemens Vista analyzer     Prostate Specific Antigen Screen   Date Value Ref Range Status   03/24/2023 0.13 0.00 - 4.50 ng/mL Final   07/14/2021 0.12 ug/L Final     ASCVD Risk   The 10-year ASCVD risk score (Madison WILSON, et al., 2019) is: 13.2%    Values used to calculate the score:      Age: 63 years      Sex: Male      Is Non- : No      Diabetic: No      Tobacco smoker: No      Systolic Blood Pressure: 130 mmHg      Is BP treated: Yes      HDL Cholesterol: 54 mg/dL      Total Cholesterol: 216 mg/dL           Reviewed and updated as needed this visit by Provider                       Objective    Exam  /79 (BP Location: Right arm, Patient Position: Sitting, Cuff Size: Adult Large)   Pulse 65   Temp 97.5  F (36.4  C) (Tympanic)   Resp 20   Ht 1.62 m (5' 3.78\")   Wt 110.5 kg (243 lb 11.2 oz)   SpO2 96%   BMI 42.12 kg/m     Estimated body mass index is 42.12 kg/m  as calculated from the following:    Height as of this encounter: 1.62 m (5' 3.78\").    Weight as of this encounter: 110.5 kg (243 lb 11.2 oz).    Physical Exam  GENERAL: healthy, alert and no distress  EYES: Eyes grossly normal to inspection, PERRL and conjunctivae and sclerae normal  HENT: ear canals and TM's normal, op clear, mucous membranes moist   NECK: no adenopathy, no asymmetry  RESP: lungs clear to auscultation - no rales, rhonchi or wheezes  CV: regular rate and rhythm, normal S1 S2, no S3 or S4, no murmur, click or rub, no peripheral edema   ABDOMEN: soft, nontender, no hepatosplenomegaly, no masses and bowel sounds normal  MS: no gross musculoskeletal defects noted, no edema  SKIN: no suspicious lesions or rashes; parietal/occipital laceration well healed  NEURO: Normal strength and tone, mentation intact and speech " normal  PSYCH: mentation appears normal, affect normal/bright        Signed Electronically by: Manoj Armenta MD    (*documentation in this chart was partially completed using Dragon PowerMic which could potentially lead to some misspellings or grammar/autocorrect issues in the narrative.)

## 2024-03-06 NOTE — PATIENT INSTRUCTIONS
Preventive Care Advice   This is general advice given by our system to help you stay healthy. However, your care team may have specific advice just for you. Please talk to your care team about your preventive care needs.  Nutrition  Eat 5 or more servings of fruits and vegetables each day.  Try wheat bread, brown rice and whole grain pasta (instead of white bread, rice, and pasta).  Get enough calcium and vitamin D. Check the label on foods and aim for 100% of the RDA (recommended daily allowance).  Lifestyle  Exercise at least 150 minutes each week   (30 minutes a day, 5 days a week).  Do muscle strengthening activities 2 days a week. These help control your weight and prevent disease.  No smoking.  Wear sunscreen to prevent skin cancer.  Have a dental exam and cleaning every 6 months.  Yearly exams  See your health care team every year to talk about:  Any changes in your health.  Any medicines your care team has prescribed.  Preventive care, family planning, and ways to prevent chronic diseases.  Shots (vaccines)   HPV shots (up to age 26), if you've never had them before.  Hepatitis B shots (up to age 59), if you've never had them before.  COVID-19 shot: Get this shot when it's due.  Flu shot: Get a flu shot every year.  Tetanus shot: Get a tetanus shot every 10 years.  Pneumococcal, hepatitis A, and RSV shots: Ask your care team if you need these based on your risk.  Shingles shot (for age 50 and up).  General health tests  Diabetes screening:  Starting at age 35, Get screened for diabetes at least every 3 years.  If you are younger than age 35, ask your care team if you should be screened for diabetes.  Cholesterol test: At age 39, start having a cholesterol test every 5 years, or more often if advised.  Bone density scan (DEXA): At age 50, ask your care team if you should have this scan for osteoporosis (brittle bones).  Hepatitis C: Get tested at least once in your life.  STIs (sexually transmitted  infections)  Before age 24: Ask your care team if you should be screened for STIs.  After age 24: Get screened for STIs if you're at risk. You are at risk for STIs (including HIV) if:  You are sexually active with more than one person.  You don't use condoms every time.  You or a partner was diagnosed with a sexually transmitted infection.  If you are at risk for HIV, ask about PrEP medicine to prevent HIV.  Get tested for HIV at least once in your life, whether you are at risk for HIV or not.  Cancer screening tests  Cervical cancer screening: If you have a cervix, begin getting regular cervical cancer screening tests at age 21. Most people who have regular screenings with normal results can stop after age 65. Talk about this with your provider.  Breast cancer scan (mammogram): If you've ever had breasts, begin having regular mammograms starting at age 40. This is a scan to check for breast cancer.  Colon cancer screening: It is important to start screening for colon cancer at age 45.  Have a colonoscopy test every 10 years (or more often if you're at risk) Or, ask your provider about stool tests like a FIT test every year or Cologuard test every 3 years.  To learn more about your testing options, visit: https://www.[employer]/819377.pdf.  For help making a decision, visit: https://bit.ly/qe61434.  Prostate cancer screening test: If you have a prostate and are age 55 to 69, ask your provider if you would benefit from a yearly prostate cancer screening test.  Lung cancer screening: If you are a current or former smoker age 50 to 80, ask your care team if ongoing lung cancer screenings are right for you.  For informational purposes only. Not to replace the advice of your health care provider. Copyright   2023 [employer]. All rights reserved. Clinically reviewed by the [facility]. [employer] 828060 - REV 01/24.    Preventing Falls: Care Instructions  Injuries and health  problems such as trouble walking or poor eyesight can increase your risk of falling. So can some medicines. But there are things you can do to help prevent falls. You can exercise to get stronger. You can also arrange your home to make it safer.    Talk to your doctor about the medicines you take. Ask if any of them increase the risk of falls and whether they can be changed or stopped.   Try to exercise regularly. It can help improve your strength and balance. This can help lower your risk of falling.     Practice fall safety and prevention.    Wear low-heeled shoes that fit well and give your feet good support. Talk to your doctor if you have foot problems that make this hard.  Carry a cellphone or wear a medical alert device that you can use to call for help.  Use stepladders instead of chairs to reach high objects. Don't climb if you're at risk for falls. Ask for help, if needed.  Wear the correct eyeglasses, if you need them.    Make your home safer.    Remove rugs, cords, clutter, and furniture from walkways.  Keep your house well lit. Use night-lights in hallways and bathrooms.  Install and use sturdy handrails on stairways.  Wear nonskid footwear, even inside. Don't walk barefoot or in socks without shoes.    Be safe outside.    Use handrails, curb cuts, and ramps whenever possible.  Keep your hands free by using a shoulder bag or backpack.  Try to walk in well-lit areas. Watch out for uneven ground, changes in pavement, and debris.  Be careful in the winter. Walk on the grass or gravel when sidewalks are slippery. Use de-icer on steps and walkways. Add non-slip devices to shoes.    Put grab bars and nonskid mats in your shower or tub and near the toilet. Try to use a shower chair or bath bench when bathing.   Get into a tub or shower by putting in your weaker leg first. Get out with your strong side first. Have a phone or medical alert device in the bathroom with you.   Where can you learn more?  Go to  "https://www.[employer].net/patiented  Enter G117 in the search box to learn more about \"Preventing Falls: Care Instructions.\"  Current as of: July 18, 2023               Content Version: 13.8    0390-4169 [employer].   Care instructions adapted under license by your healthcare professional. If you have questions about a medical condition or this instruction, always ask your healthcare professional. [employer] disclaims any warranty or liability for your use of this information.      Learning About Stress  What is stress?     Stress is your body's response to a hard situation. Your body can have a physical, emotional, or mental response. Stress is a fact of life for most people, and it affects everyone differently. What causes stress for you may not be stressful for someone else.  A lot of things can cause stress. You may feel stress when you go on a job interview, take a test, or run a race. This kind of short-term stress is normal and even useful. It can help you if you need to work hard or react quickly. For example, stress can help you finish an important job on time.  Long-term stress is caused by ongoing stressful situations or events. Examples of long-term stress include long-term health problems, ongoing problems at work, or conflicts in your family. Long-term stress can harm your health.  How does stress affect your health?  When you are stressed, your body responds as though you are in danger. It makes hormones that speed up your heart, make you breathe faster, and give you a burst of energy. This is called the fight-or-flight stress response. If the stress is over quickly, your body goes back to normal and no harm is done.  But if stress happens too often or lasts too long, it can have bad effects. Long-term stress can make you more likely to get sick, and it can make symptoms of some diseases worse. If you tense up when you are stressed, you may develop neck, shoulder, or low " back pain. Stress is linked to high blood pressure and heart disease.  Stress also harms your emotional health. It can make you stone, tense, or depressed. Your relationships may suffer, and you may not do well at work or school.  What can you do to manage stress?  You can try these things to help manage stress:   Do something active. Exercise or activity can help reduce stress. Walking is a great way to get started. Even everyday activities such as housecleaning or yard work can help.  Try yoga or edwin chi. These techniques combine exercise and meditation. You may need some training at first to learn them.  Do something you enjoy. For example, listen to music or go to a movie. Practice your hobby or do volunteer work.  Meditate. This can help you relax, because you are not worrying about what happened before or what may happen in the future.  Do guided imagery. Imagine yourself in any setting that helps you feel calm. You can use online videos, books, or a teacher to guide you.  Do breathing exercises. For example:  From a standing position, bend forward from the waist with your knees slightly bent. Let your arms dangle close to the floor.  Breathe in slowly and deeply as you return to a standing position. Roll up slowly and lift your head last.  Hold your breath for just a few seconds in the standing position.  Breathe out slowly and bend forward from the waist.  Let your feelings out. Talk, laugh, cry, and express anger when you need to. Talking with supportive friends or family, a counselor, or a nishant leader about your feelings is a healthy way to relieve stress. Avoid discussing your feelings with people who make you feel worse.  Write. It may help to write about things that are bothering you. This helps you find out how much stress you feel and what is causing it. When you know this, you can find better ways to cope.  What can you do to prevent stress?  You might try some of these things to help prevent  "stress:  Manage your time. This helps you find time to do the things you want and need to do.  Get enough sleep. Your body recovers from the stresses of the day while you are sleeping.  Get support. Your family, friends, and community can make a difference in how you experience stress.  Limit your news feed. Avoid or limit time on social media or news that may make you feel stressed.  Do something active. Exercise or activity can help reduce stress. Walking is a great way to get started.  Where can you learn more?  Go to https://www.[employer].net/patiented  Enter N032 in the search box to learn more about \"Learning About Stress.\"  Current as of: February 26, 2023               Content Version: 13.8    8346-2186 [employer].   Care instructions adapted under license by your healthcare professional. If you have questions about a medical condition or this instruction, always ask your healthcare professional. [employer], [employer] disclaims any warranty or liability for your use of this information.      "

## 2024-03-16 ENCOUNTER — LAB (OUTPATIENT)
Dept: LAB | Facility: CLINIC | Age: 64
End: 2024-03-16
Payer: COMMERCIAL

## 2024-03-16 DIAGNOSIS — F33.0 MAJOR DEPRESSIVE DISORDER, RECURRENT EPISODE, MILD (H): ICD-10-CM

## 2024-03-16 DIAGNOSIS — E78.5 HYPERLIPIDEMIA WITH TARGET LDL LESS THAN 100: ICD-10-CM

## 2024-03-16 DIAGNOSIS — S06.9X1D TRAUMATIC BRAIN INJURY, WITH LOSS OF CONSCIOUSNESS OF 30 MINUTES OR LESS, SUBSEQUENT ENCOUNTER: ICD-10-CM

## 2024-03-16 DIAGNOSIS — R73.01 IMPAIRED FASTING GLUCOSE: ICD-10-CM

## 2024-03-16 DIAGNOSIS — F33.1 MAJOR DEPRESSIVE DISORDER, RECURRENT EPISODE, MODERATE (H): ICD-10-CM

## 2024-03-16 DIAGNOSIS — Z12.5 PROSTATE CANCER SCREENING: ICD-10-CM

## 2024-03-16 DIAGNOSIS — E66.01 MORBID OBESITY (H): ICD-10-CM

## 2024-03-16 DIAGNOSIS — C62.90 MALIGNANT NEOPLASM OF TESTICLE, UNSPECIFIED LATERALITY, UNSPECIFIED WHETHER DESCENDED OR UNDESCENDED (H): ICD-10-CM

## 2024-03-16 DIAGNOSIS — S06.9X0A MILD TRAUMATIC BRAIN INJURY, WITHOUT LOSS OF CONSCIOUSNESS, INITIAL ENCOUNTER (H): ICD-10-CM

## 2024-03-16 DIAGNOSIS — I72.9 ANEURYSM (H): ICD-10-CM

## 2024-03-16 DIAGNOSIS — I10 HTN, GOAL BELOW 140/90: ICD-10-CM

## 2024-03-16 LAB — HBA1C MFR BLD: 5.6 % (ref 0–5.6)

## 2024-03-16 PROCEDURE — 80061 LIPID PANEL: CPT

## 2024-03-16 PROCEDURE — G0103 PSA SCREENING: HCPCS

## 2024-03-16 PROCEDURE — 80053 COMPREHEN METABOLIC PANEL: CPT

## 2024-03-16 PROCEDURE — 36415 COLL VENOUS BLD VENIPUNCTURE: CPT

## 2024-03-16 PROCEDURE — 83036 HEMOGLOBIN GLYCOSYLATED A1C: CPT

## 2024-03-17 LAB
ALBUMIN SERPL BCG-MCNC: 4.2 G/DL (ref 3.5–5.2)
ALP SERPL-CCNC: 110 U/L (ref 40–150)
ALT SERPL W P-5'-P-CCNC: 24 U/L (ref 0–70)
ANION GAP SERPL CALCULATED.3IONS-SCNC: 8 MMOL/L (ref 7–15)
AST SERPL W P-5'-P-CCNC: 25 U/L (ref 0–45)
BILIRUB SERPL-MCNC: 0.3 MG/DL
BUN SERPL-MCNC: 14.5 MG/DL (ref 8–23)
CALCIUM SERPL-MCNC: 9.4 MG/DL (ref 8.8–10.2)
CHLORIDE SERPL-SCNC: 104 MMOL/L (ref 98–107)
CHOLEST SERPL-MCNC: 126 MG/DL
CREAT SERPL-MCNC: 0.8 MG/DL (ref 0.67–1.17)
DEPRECATED HCO3 PLAS-SCNC: 27 MMOL/L (ref 22–29)
EGFRCR SERPLBLD CKD-EPI 2021: >90 ML/MIN/1.73M2
FASTING STATUS PATIENT QL REPORTED: YES
GLUCOSE SERPL-MCNC: 102 MG/DL (ref 70–99)
HDLC SERPL-MCNC: 41 MG/DL
LDLC SERPL CALC-MCNC: 56 MG/DL
NONHDLC SERPL-MCNC: 85 MG/DL
POTASSIUM SERPL-SCNC: 4.1 MMOL/L (ref 3.4–5.3)
PROT SERPL-MCNC: 7.2 G/DL (ref 6.4–8.3)
PSA SERPL DL<=0.01 NG/ML-MCNC: 0.11 NG/ML (ref 0–4.5)
SODIUM SERPL-SCNC: 139 MMOL/L (ref 135–145)
TRIGL SERPL-MCNC: 146 MG/DL

## 2024-04-11 ENCOUNTER — TRANSFERRED RECORDS (OUTPATIENT)
Dept: HEALTH INFORMATION MANAGEMENT | Facility: CLINIC | Age: 64
End: 2024-04-11
Payer: COMMERCIAL

## 2024-05-05 DIAGNOSIS — I10 HTN, GOAL BELOW 140/90: ICD-10-CM

## 2024-05-06 RX ORDER — LOSARTAN POTASSIUM 25 MG/1
25 TABLET ORAL DAILY
Qty: 90 TABLET | Refills: 0 | Status: SHIPPED | OUTPATIENT
Start: 2024-05-06 | End: 2024-08-05

## 2024-05-07 NOTE — MR AVS SNAPSHOT
After Visit Summary   5/18/2018    Fabio Logan    MRN: [id number]           Patient Information     Date Of Birth          1960        Visit Information        Provider Department      5/18/2018 10:40 AM Manoj Armenta MD [facility]        Today's Diagnoses     Encounter for routine adult health examination without abnormal findings    -  1    Benign non-nodular prostatic hyperplasia with lower urinary tract symptoms        Major depressive disorder, recurrent episode, mild (H)        Essential hypertension with goal blood pressure less than 140/90        Morbid obesity (H)        Malignant neoplasm of testis, unspecified laterality, unspecified whether descended or undescended (H)        Post-void dribbling        Candidal intertrigo        Chest pain on exertion        Decreased exercise tolerance        Impaired fasting glucose        Gastroesophageal reflux disease without esophagitis          Care Instructions      Preventive Health Recommendations  Male Ages 50 - 64    Yearly exam:             See your health care provider every year in order to  o   Review health changes.   o   Discuss preventive care.    o   Review your medicines if your doctor has prescribed any.     Have a cholesterol test every 5 years, or more frequently if you are at risk for high cholesterol/heart disease.     Have a diabetes test (fasting glucose) every three years. If you are at risk for diabetes, you should have this test more often.     Have a colonoscopy at age 50, or have a yearly FIT test (stool test). These exams will check for colon cancer.      Talk with your health care provider about whether or not a prostate cancer screening test (PSA) is right for you.    You should be tested each year for STDs (sexually transmitted diseases), if you re at risk.     Shots: Get a flu shot each year. Get a tetanus shot every 10 years.     Nutrition:    Eat at least 5 servings of fruits and vegetables   Pollo Brock M.D.  [facility]   [address] [facility] [place] [address]  Phone: [phone]  Fax: [phone]            Date: 05/07/24    [x] Elizabeth Bautista was seen in my office on the above date, please excuse from school    []  Please excuse Parent/Guardian from work    []  Excused from participating in any physical activity (including recess, sports, and PE) for the following dates:    [] 4 Weeks  []  5 Weeks  []  6 Weeks  []  8 Weeks  []  Other ___________    []  Modified activity limitations for return to PE or work:           []  Self-pace, may sit out or do alternative activity/assignment if unable to run or do other activity that aggravates injury           []  Other:_______________________________________________               ____________________________________________________    []  May return to PE/sports without restrictions    Notes to Physical Therapist:    []  May return to school with the use of crutches and/or a wheelchair.    []  Please allow extra time between classes and an elevator pass if available*    []  Please allow disabled bus access if available*    []  Please Provide second set of book for classroom use    Excused from school:  []  4 Weeks  []  5 Weeks  []  6 Weeks  []  8 Weeks  []  Other ___________    Please provide Home Hospital instruction:  []  4 Weeks  []  5 Weeks  []  6 Weeks  []  8 Weeks  []  Other ___________    Pollo Brock M.D.  Director Pediatric Orthopedics & Scoliosis  Phone: [phone]  Fax:[phone]     daily.     Eat whole-grain bread, whole-wheat pasta and brown rice instead of white grains and rice.     Talk to your provider about Calcium and Vitamin D.     Lifestyle    Exercise for at least 150 minutes a week (30 minutes a day, 5 days a week). This will help you control your weight and prevent disease.     Limit alcohol to one drink per day.     No smoking.     Wear sunscreen to prevent skin cancer.     See your dentist every six months for an exam and cleaning.     See your eye doctor every 1 to 2 years.      You will be called to schedule the stress test    HOLD your Flomax (tumulsosin) for 5-7 days and see if that helps the urine issue, if not I will refer you back to urology for evaluation.               Follow-ups after your visit        Future tests that were ordered for you today     Open Future Orders        Priority Expected Expires Ordered    Exercise Stress Echocardiogram Routine  5/18/2019 5/18/2018            Who to contact     If you have questions or need follow up information about today's clinic visit or your schedule please contact [facility] directly at [phone].  Normal or non-critical lab and imaging results will be communicated to you by [employer], letter or phone within 4 business days after the clinic has received the results. If you do not hear from us within 7 days, please contact the clinic through [employer] or phone. If you have a critical or abnormal lab result, we will notify you by phone as soon as possible.  Submit refill requests through [employer] or call your pharmacy and they will forward the refill request to us. Please allow 3 business days for your refill to be completed.          Additional Information About Your Visit        [employer] Information     [employer] gives you secure access to your electronic health record. If you see a primary care provider, you can also send messages to your care team and make appointments. If you have questions, please call your primary  "care clinic.  If you do not have a primary care provider, please call [phone] and they will assist you.        Care EveryWhere ID     This is your Care EveryWhere ID. This could be used by other organizations to access your [place] medical records  [id number]        Your Vitals Were     Pulse Temperature Respirations Height BMI (Body Mass Index)       72 98  F (36.7  C) (Oral) 16 5' 4\" (1.626 m) 42.23 kg/m2        Blood Pressure from Last 3 Encounters:   05/18/18 118/74   04/02/18 130/72   01/17/18 106/62    Weight from Last 3 Encounters:   05/18/18 246 lb (111.6 kg)   04/02/18 253 lb (114.8 kg)   01/17/18 252 lb (114.3 kg)                 Today's Medication Changes          These changes are accurate as of 5/18/18 11:49 AM.  If you have any questions, ask your nurse or doctor.               These medicines have changed or have updated prescriptions.        Dose/Directions    sertraline 100 MG tablet   Commonly known as:  ZOLOFT   This may have changed:  See the new instructions.   Used for:  Major depressive disorder, recurrent episode, mild (H), Encounter for routine adult health examination without abnormal findings   Changed by:  Manoj Armenta MD        Dose:  100 mg   Take 1 tablet (100 mg) by mouth daily   Quantity:  90 tablet   Refills:  3       tamsulosin 0.4 MG capsule   Commonly known as:  FLOMAX   This may have changed:  See the new instructions.   Used for:  Benign non-nodular prostatic hyperplasia with lower urinary tract symptoms, Encounter for routine adult health examination without abnormal findings   Changed by:  Manoj Armenta MD        Dose:  0.4 mg   Take 1 capsule (0.4 mg) by mouth daily   Quantity:  90 capsule   Refills:  3            Where to get your medicines      These medications were sent to [id number] - [place], [place] - [address]  [address] [address]     Phone:  [phone]     losartan 25 MG tablet    nystatin 930095 UNIT/GM Powd    " omeprazole 20 MG CR capsule    sertraline 100 MG tablet    tamsulosin 0.4 MG capsule                Primary Care Provider Office Phone # Fax #    Manoj Armenta [phone] [address] [place] [address]        Equal Access to Services     [facility]: Hadii aad ku hadasho Soomaali, waaxda luqadaha, qaybta kaalmada adeegyada, waxay idiin hayaan adeeg khpoly lazachn . So [facility] [phone].    ATENCIÓN: Si habla español, tiene a mae disposición servicios gratuitos de asistencia lingüística. Llame al [phone].    We comply with applicable federal civil rights laws and [place] laws. We do not discriminate on the basis of race, color, national origin, age, disability, sex, sexual orientation, or gender identity.            Thank you!     Thank you for choosing [facility]  for your care. Our goal is always to provide you with excellent care. Hearing back from our patients is one way we can continue to improve our services. Please take a few minutes to complete the written survey that you may receive in the mail after your visit with us. Thank you!             Your Updated Medication List - Protect others around you: Learn how to safely use, store and throw away your medicines at www.disposemymeds.org.          This list is accurate as of 5/18/18 11:49 AM.  Always use your most recent med list.                   Brand Name Dispense Instructions for use Diagnosis    albuterol 108 (90 Base) MCG/ACT Inhaler    PROAIR HFA/PROVENTIL HFA/VENTOLIN HFA    1 Inhaler    Inhale 2 puffs into the lungs every 6 hours as needed for shortness of breath / dyspnea or wheezing    Acute bronchitis with coexisting condition requiring prophylactic treatment       atenolol 50 MG tablet    TENORMIN    90 tablet    TAKE ONE TABLET BY MOUTH ONE TIME DAILY    HTN, goal below 140/90       fluticasone 50 MCG/ACT spray    FLONASE    16 g    SPRAY 1 TO 2 SPRAYS INTO BOTH NOSTRISL DAILY     Seasonal allergic rhinitis       Fluticasone-Salmeterol 113-14 MCG/ACT Aepb inhaler    AIRDUO RESPICLICK    1 each    Inhale 1 puff into the lungs 2 times daily    Mild intermittent asthma with (acute) exacerbation       losartan 25 MG tablet    COZAAR    90 tablet    Take 1 tablet (25 mg) by mouth daily    Essential hypertension with goal blood pressure less than 140/90, Encounter for routine adult health examination without abnormal findings       nystatin 250239 UNIT/GM Powd    MYCOSTATIN    60 g    Apply topically 3 times daily as needed (recurrent yeast infection at moist skin creases)    Candidal intertrigo, Encounter for routine adult health examination without abnormal findings       omeprazole 20 MG CR capsule    priLOSEC    90 capsule    Take 1 capsule (20 mg) by mouth daily as needed    Gastroesophageal reflux disease without esophagitis, Encounter for routine adult health examination without abnormal findings       sertraline 100 MG tablet    ZOLOFT    90 tablet    Take 1 tablet (100 mg) by mouth daily    Major depressive disorder, recurrent episode, mild (H), Encounter for routine adult health examination without abnormal findings       sildenafil 100 MG tablet    VIAGRA    4 tablet    Take 0.5-1 tablets ( mg) by mouth daily as needed for erectile dysfunction Take 30 min to 4 hours before sexual activity.  Never use with nitroglycerin, terazosin or doxazosin.    Erectile dysfunction, unspecified erectile dysfunction type       SUMAtriptan 50 MG tablet    IMITREX    18 tablet    TAKE 1 TABLET (50 MG) BY MOUTH AT ONSET OF HEADACHE FOR MIGRAINE MAY REPEAT DOSE IN 2 HOURS.  DO NOT EXCEED 200 MG IN 24 HOURS    Other migraine without status migrainosus, not intractable       tamsulosin 0.4 MG capsule    FLOMAX    90 capsule    Take 1 capsule (0.4 mg) by mouth daily    Benign non-nodular prostatic hyperplasia with lower urinary tract symptoms, Encounter for routine adult health examination without  abnormal findings       traZODone 50 MG tablet    DESYREL    90 tablet    Take 1 tablet (50 mg) by mouth nightly as needed for sleep okay to increase to 100 mg after 1-2 weeks if needed    Major depressive disorder, recurrent episode, mild (H)       triamcinolone 0.1 % ointment    KENALOG    453 g    Apply topically 2 times daily    Skin eruption

## 2024-07-28 DIAGNOSIS — L30.4 INTERTRIGO: ICD-10-CM

## 2024-07-29 RX ORDER — KETOCONAZOLE 20 MG/G
CREAM TOPICAL DAILY
Qty: 60 G | Refills: 3 | Status: SHIPPED | OUTPATIENT
Start: 2024-07-29

## 2024-07-30 NOTE — NURSING NOTE
"Fabio Logan is a 58 year old male who presents for:  Chief Complaint   Patient presents with     Neurologic Problem     Closed wege compression fracture of eight thoracic vertebra        Vitals:    Vitals:    12/20/18 1232   Weight: 249 lb (112.9 kg)   Height: 5' 6\" (1.676 m)       BMI:  Estimated body mass index is 40.19 kg/m  as calculated from the following:    Height as of this encounter: 5' 6\" (1.676 m).    Weight as of this encounter: 249 lb (112.9 kg).    Pain Score:  Extreme Pain (8)      Do you feel safe in your environment?  Yes      Eleanor Pond          "
Otc Regimen: BPO 4% wash chest and groin qod
Continue Regimen: Clindamycin solution apply to affected areas on body qd-bid
Detail Level: Zone
Initiate Treatment: DCN 100mg take 1 pill po bid inbetween meals x 2 months
Otc Regimen: Kerasal intensive foot repair

## 2024-08-03 DIAGNOSIS — I10 HTN, GOAL BELOW 140/90: ICD-10-CM

## 2024-08-05 RX ORDER — LOSARTAN POTASSIUM 25 MG/1
25 TABLET ORAL DAILY
Qty: 90 TABLET | Refills: 1 | Status: SHIPPED | OUTPATIENT
Start: 2024-08-05

## 2024-08-19 ENCOUNTER — PATIENT OUTREACH (OUTPATIENT)
Dept: CARE COORDINATION | Facility: CLINIC | Age: 64
End: 2024-08-19
Payer: COMMERCIAL

## 2024-11-26 DIAGNOSIS — F32.A DEPRESSION, UNSPECIFIED DEPRESSION TYPE: ICD-10-CM

## 2024-11-26 RX ORDER — TRAZODONE HYDROCHLORIDE 50 MG/1
TABLET, FILM COATED ORAL
Qty: 180 TABLET | Refills: 0 | Status: SHIPPED | OUTPATIENT
Start: 2024-11-26

## 2025-01-29 DIAGNOSIS — I10 HTN, GOAL BELOW 140/90: ICD-10-CM

## 2025-01-29 RX ORDER — LOSARTAN POTASSIUM 25 MG/1
25 TABLET ORAL DAILY
Qty: 90 TABLET | Refills: 0 | Status: SHIPPED | OUTPATIENT
Start: 2025-01-29

## 2025-02-02 SDOH — HEALTH STABILITY: PHYSICAL HEALTH: ON AVERAGE, HOW MANY DAYS PER WEEK DO YOU ENGAGE IN MODERATE TO STRENUOUS EXERCISE (LIKE A BRISK WALK)?: 3 DAYS

## 2025-02-02 SDOH — HEALTH STABILITY: PHYSICAL HEALTH: ON AVERAGE, HOW MANY MINUTES DO YOU ENGAGE IN EXERCISE AT THIS LEVEL?: 50 MIN

## 2025-02-02 ASSESSMENT — ASTHMA QUESTIONNAIRES

## 2025-02-02 ASSESSMENT — PATIENT HEALTH QUESTIONNAIRE - PHQ9
SUM OF ALL RESPONSES TO PHQ QUESTIONS 1-9: 8
SUM OF ALL RESPONSES TO PHQ QUESTIONS 1-9: 8
10. IF YOU CHECKED OFF ANY PROBLEMS, HOW DIFFICULT HAVE THESE PROBLEMS MADE IT FOR YOU TO DO YOUR WORK, TAKE CARE OF THINGS AT HOME, OR GET ALONG WITH OTHER PEOPLE: SOMEWHAT DIFFICULT

## 2025-02-02 ASSESSMENT — SOCIAL DETERMINANTS OF HEALTH (SDOH): HOW OFTEN DO YOU GET TOGETHER WITH FRIENDS OR RELATIVES?: PATIENT DECLINED

## 2025-02-03 ENCOUNTER — OFFICE VISIT (OUTPATIENT)
Dept: PEDIATRICS | Facility: CLINIC | Age: 65
End: 2025-02-03
Payer: COMMERCIAL

## 2025-02-03 VITALS
TEMPERATURE: 96.6 F | SYSTOLIC BLOOD PRESSURE: 138 MMHG | OXYGEN SATURATION: 98 % | BODY MASS INDEX: 43.04 KG/M2 | HEART RATE: 61 BPM | HEIGHT: 64 IN | RESPIRATION RATE: 16 BRPM | DIASTOLIC BLOOD PRESSURE: 80 MMHG | WEIGHT: 252.1 LBS

## 2025-02-03 DIAGNOSIS — F33.0 MAJOR DEPRESSIVE DISORDER, RECURRENT EPISODE, MILD: ICD-10-CM

## 2025-02-03 DIAGNOSIS — G43.809 OTHER MIGRAINE WITHOUT STATUS MIGRAINOSUS, NOT INTRACTABLE: ICD-10-CM

## 2025-02-03 DIAGNOSIS — Z00.00 ROUTINE GENERAL MEDICAL EXAMINATION AT A HEALTH CARE FACILITY: Primary | ICD-10-CM

## 2025-02-03 DIAGNOSIS — K21.9 GASTROESOPHAGEAL REFLUX DISEASE WITHOUT ESOPHAGITIS: ICD-10-CM

## 2025-02-03 DIAGNOSIS — E66.01 MORBID OBESITY (H): ICD-10-CM

## 2025-02-03 DIAGNOSIS — R56.9 PARTIAL SEIZURE (H): ICD-10-CM

## 2025-02-03 DIAGNOSIS — R93.1 ABNORMAL CARDIAC CT ANGIOGRAPHY: ICD-10-CM

## 2025-02-03 DIAGNOSIS — Z12.11 SCREEN FOR COLON CANCER: ICD-10-CM

## 2025-02-03 DIAGNOSIS — I25.10 CORONARY ARTERY DISEASE INVOLVING NATIVE CORONARY ARTERY OF NATIVE HEART WITHOUT ANGINA PECTORIS: ICD-10-CM

## 2025-02-03 DIAGNOSIS — I10 HTN, GOAL BELOW 140/90: ICD-10-CM

## 2025-02-03 DIAGNOSIS — Z23 NEED FOR VACCINATION: ICD-10-CM

## 2025-02-03 DIAGNOSIS — F32.A DEPRESSION, UNSPECIFIED DEPRESSION TYPE: ICD-10-CM

## 2025-02-03 PROCEDURE — 90480 ADMN SARSCOV2 VAC 1/ONLY CMP: CPT | Performed by: INTERNAL MEDICINE

## 2025-02-03 PROCEDURE — 99396 PREV VISIT EST AGE 40-64: CPT | Mod: 25 | Performed by: INTERNAL MEDICINE

## 2025-02-03 PROCEDURE — 96127 BRIEF EMOTIONAL/BEHAV ASSMT: CPT | Performed by: INTERNAL MEDICINE

## 2025-02-03 PROCEDURE — 99214 OFFICE O/P EST MOD 30 MIN: CPT | Mod: 25 | Performed by: INTERNAL MEDICINE

## 2025-02-03 PROCEDURE — 90471 IMMUNIZATION ADMIN: CPT | Performed by: INTERNAL MEDICINE

## 2025-02-03 PROCEDURE — 91320 SARSCV2 VAC 30MCG TRS-SUC IM: CPT | Performed by: INTERNAL MEDICINE

## 2025-02-03 PROCEDURE — 90632 HEPA VACCINE ADULT IM: CPT | Performed by: INTERNAL MEDICINE

## 2025-02-03 RX ORDER — ATENOLOL 50 MG/1
50 TABLET ORAL DAILY
Qty: 90 TABLET | Refills: 3 | Status: SHIPPED | OUTPATIENT
Start: 2025-02-03

## 2025-02-03 RX ORDER — SERTRALINE HYDROCHLORIDE 100 MG/1
150 TABLET, FILM COATED ORAL DAILY
Qty: 135 TABLET | Refills: 3 | Status: SHIPPED | OUTPATIENT
Start: 2025-02-03

## 2025-02-03 RX ORDER — SUMATRIPTAN 50 MG/1
TABLET, FILM COATED ORAL
Qty: 18 TABLET | Refills: 3 | Status: SHIPPED | OUTPATIENT
Start: 2025-02-03

## 2025-02-03 RX ORDER — LOSARTAN POTASSIUM 50 MG/1
50 TABLET ORAL DAILY
Qty: 90 TABLET | Refills: 3 | Status: SHIPPED | OUTPATIENT
Start: 2025-02-03

## 2025-02-03 RX ORDER — OMEPRAZOLE 20 MG/1
20 CAPSULE, DELAYED RELEASE ORAL DAILY
Qty: 90 CAPSULE | Refills: 3 | Status: SHIPPED | OUTPATIENT
Start: 2025-02-03

## 2025-02-03 RX ORDER — TRAZODONE HYDROCHLORIDE 50 MG/1
50-100 TABLET ORAL AT BEDTIME
Qty: 180 TABLET | Refills: 3 | Status: SHIPPED | OUTPATIENT
Start: 2025-02-03

## 2025-02-03 RX ORDER — ATORVASTATIN CALCIUM 40 MG/1
40 TABLET, FILM COATED ORAL DAILY
Qty: 90 TABLET | Refills: 3 | Status: SHIPPED | OUTPATIENT
Start: 2025-02-03

## 2025-02-03 ASSESSMENT — PAIN SCALES - GENERAL: PAINLEVEL_OUTOF10: NO PAIN (0)

## 2025-02-03 NOTE — PROGRESS NOTES
Preventive Care Visit  [facility]  Jaron Avelar MD, Internal Medicine - Pediatrics  Feb 3, 2025      Assessment & Plan       ICD-10-CM    1. Routine general medical examination at a health care facility  Z00.00       2. HTN, goal below 140/90  I10 atenolol (TENORMIN) 50 MG tablet     losartan (COZAAR) 50 MG tablet      3. Abnormal cardiac CT angiography  R93.1 atorvastatin (LIPITOR) 40 MG tablet      4. Coronary artery disease involving native coronary artery of native heart without angina pectoris  I25.10 atorvastatin (LIPITOR) 40 MG tablet     Lipid panel reflex to direct LDL Fasting     Lipid panel reflex to direct LDL Fasting      5. Gastroesophageal reflux disease without esophagitis  K21.9 omeprazole (PRILOSEC) 20 MG DR capsule      6. Major depressive disorder, recurrent episode, mild  F33.0 sertraline (ZOLOFT) 100 MG tablet      7. Depression, unspecified depression type  F32.A traZODone (DESYREL) 50 MG tablet      8. Other migraine without status migrainosus, not intractable  G43.809 SUMAtriptan (IMITREX) 50 MG tablet      9. Partial seizure (H)  R56.9       10. Morbid obesity (H)  E66.01       11. Need for vaccination  Z23 COVID-19 12+ (PFIZER)     HEPATITIS A 19+ (HAVRIX/VAQTA)      12. Screen for colon cancer  Z12.11 Colonoscopy Screening  Referral        Here for CPE as well as to review several medical issues.   reviewed. Vaccines ordered as above. Colonoscopy order signed and faxed.  Fasting lab orders signed.    You will be contacted to schedule a colonoscopy    Vaccines today:   COVID   Hepatitis A    You should get a second dose of Hepatitis A in 6+ months    For future, plan to get the Shingles vaccine    HTN, CAD with abnormal cardiac CT angio. No active cardiac symptoms at this time. BP is higher than optimal. Has been high several times recently.   Patient Instructions   Increase Losartan dose to 50 mg once daily.    You may take 2 of the 25 mg tablets until  "they are gone    GERD. No dysphagia sx. Reviewed ongoing treatment options.    Depression. Mood is doing fairly well. Medications refilled.    Migraine HA refilled Rx for Imitrex for prn use.    Seizure history. No recent seizure activity.     Check to see if you need medications to prevent Malaria for your trip   If needed please let me know       BMI  Estimated body mass index is 43.27 kg/m  as calculated from the following:    Height as of this encounter: 1.626 m (5' 4\").    Weight as of this encounter: 114.4 kg (252 lb 1.6 oz).   Weight management plan: Discussed healthy diet and exercise guidelines    Jaron Avelar MD     Filemon Valenzuela is a 64 year old, presenting for the following:  Physical        2/3/2025     7:00 AM   Additional Questions   Roomed by S          HPI  Here for CPE as well as to discuss medical issues. HM reviewed.    Upcoming travel to the [facility]. We reviewed travel recommendations including vaccines and medications.     HTN, CAD with abnormal cardiac CT. No cardiac sx such as CP, palpitations, PND, orthopnea, VELOZ or peripheral edema. SBP is upper normal today on my check. We reviewed management options.  BP Readings from Last 3 Encounters:   02/03/25 138/80   01/17/25 138/82   03/06/24 130/79     GERD. No dysphagia sx.    Depression. Felt to be under good control.       10/31/2023     9:56 AM 10/31/2023     9:59 AM 2/2/2025     1:47 PM   PHQ   PHQ-9 Total Score 0 0 8    Q9: Thoughts of better off dead/self-harm past 2 weeks Not at all Not at all  Not at all       Patient-reported    Proxy-reported     Migraine HA hx. Intermittent HA sx. Would like Imitrex refilled.    Seizure history. Related to prior cerebral aneurysm. Under good control.     Obesity, morbid based on BMI >40. We discussed options to help with weight control.             Health Care Directive  Patient does not have a Health Care Directive: Patient states has Advance Directive and will bring in a copy to " clinic.        2025   General Health   How would you rate your overall physical health? (!) FAIR   Feel stress (tense, anxious, or unable to sleep) Very much   (!) STRESS CONCERN      2025   Nutrition   Three or more servings of calcium each day? Yes   Diet: Regular (no restrictions)   How many servings of fruit and vegetables per day? (!) 2-3   How many sweetened beverages each day? 0-1         2025   Exercise   Days per week of moderate/strenous exercise 3 days   Average minutes spent exercising at this level 50 min         2025   Social Factors   Frequency of gathering with friends or relatives Patient declined   Worry food won't last until get money to buy more No   Food not last or not have enough money for food? No   Do you have housing? (Housing is defined as stable permanent housing and does not include staying ouside in a car, in a tent, in an abandoned building, in an overnight shelter, or couch-surfing.) Yes   Are you worried about losing your housing? No   Lack of transportation? No   Unable to get utilities (heat,electricity)? No         2/3/2025   Fall Risk   Gait Speed Test (Document in seconds) 4.6   Gait Speed Test Interpretation Less than or equal to 5.00 seconds - PASS          2025   Dental   Dentist two times every year? Yes         2025   TB Screening   Were you born outside of the [place]? No       Today's PHQ-9 Score:       2025     1:47 PM   PHQ-9 SCORE   PHQ-9 Total Score MyChart 8 (Mild depression)   PHQ-9 Total Score 8        Patient-reported         2025   Substance Use   Alcohol more than 3/day or more than 7/wk No   Do you use any other substances recreationally? No     Social History     Tobacco Use    Smoking status: Former     Current packs/day: 0.00     Types: Cigarettes     Quit date: 1997     Years since quittin.6     Passive exposure: Never    Smokeless tobacco: Former   Vaping Use    Vaping status: Never Used   Substance Use Topics    Alcohol  "use: No     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    Drug use: No           2/2/2025   STI Screening   New sexual partner(s) since last STI/HIV test? No   Last PSA:   PSA   Date Value Ref Range Status   05/24/2019 1.07 0 - 4 ug/L Final     Comment:     Assay Method:  Chemiluminescence using Siemens Vista analyzer     Prostate Specific Antigen Screen   Date Value Ref Range Status   03/16/2024 0.11 0.00 - 4.50 ng/mL Final   07/14/2021 0.12 ug/L Final     ASCVD Risk   The ASCVD Risk score (Madison WILSON, et al., 2019) failed to calculate for the following reasons:    The valid total cholesterol range is 130 to 320 mg/dL       Objective    Exam  /80   Pulse 61   Temp (!) 96.6  F (35.9  C) (Temporal)   Resp 16   Ht 1.626 m (5' 4\")   Wt 114.4 kg (252 lb 1.6 oz)   SpO2 98%   BMI 43.27 kg/m     Estimated body mass index is 43.27 kg/m  as calculated from the following:    Height as of this encounter: 1.626 m (5' 4\").    Weight as of this encounter: 114.4 kg (252 lb 1.6 oz).    Physical Exam  GENERAL: healthy, alert and no distress  EYES: PERRL, EOMI  HENT: ear canals and TM's normal. No nasal discharge. OP moist.  NECK: no adenopathy  RESP: lungs clear to auscultation - no rales, rhonchi or wheezes  CV: regular rate and rhythm, normal S1 S2, no murmur, no peripheral edema  ABDOMEN: soft, nontender, bowel sounds normal  MS: no gross musculoskeletal defects noted  SKIN: no suspicious lesions or rashes  NEURO: Normal strength and tone  PSYCH: mentation appears normal, affect normal         Signed Electronically by: Jaron Avelar MD    "

## 2025-02-03 NOTE — PATIENT INSTRUCTIONS
Increase Losartan dose to 50 mg once daily.    You may take 2 of the 25 mg tablets until they are gone    You will be contacted to schedule a colonoscopy    Vaccines today:   COVID   Hepatitis A    You should get a second dose of Hepatitis A in 6+ months    For future, plan to get the Shingles vaccine      Check to see if you need medications to prevent Malaria for your trip   If needed please let me know

## 2025-02-04 PROBLEM — C62.90: Status: RESOLVED | Noted: 2018-05-18 | Resolved: 2025-02-04

## 2025-02-04 PROBLEM — F32.9 MAJOR DEPRESSION: Status: RESOLVED | Noted: 2018-01-17 | Resolved: 2025-02-04

## 2025-02-04 PROBLEM — S06.9X0A MILD TRAUMATIC BRAIN INJURY, WITHOUT LOSS OF CONSCIOUSNESS, INITIAL ENCOUNTER (H): Status: RESOLVED | Noted: 2024-03-06 | Resolved: 2025-02-04

## 2025-02-04 PROBLEM — F33.1 MAJOR DEPRESSIVE DISORDER, RECURRENT EPISODE, MODERATE (H): Status: RESOLVED | Noted: 2024-03-06 | Resolved: 2025-02-04

## 2025-02-06 ENCOUNTER — TELEPHONE (OUTPATIENT)
Dept: GASTROENTEROLOGY | Facility: CLINIC | Age: 65
End: 2025-02-06
Payer: COMMERCIAL

## 2025-02-06 NOTE — TELEPHONE ENCOUNTER
"Endoscopy Scheduling Screen    Have you had any respiratory illness or flu-like symptoms in the last 10 days?  No    What is your communication preference for Instructions and/or Bowel Prep?   MyChart    What insurance is in the chart?  Other:  [employer]    Ordering/Referring Provider:   EMILY SIERRA    (If ordering provider performs procedure, schedule with ordering provider unless otherwise instructed. )    BMI: Estimated body mass index is 43.27 kg/m  as calculated from the following:    Height as of 2/3/25: 1.626 m (5' 4\").    Weight as of 2/3/25: 114.4 kg (252 lb 1.6 oz).     Sedation Ordered  moderate sedation.   If patient BMI > 50 do not schedule in [facility].    If patient BMI > 45 do not schedule at [facility].    Are you taking methadone or Suboxone?  NO, No RN review required.    Have you been diagnosed and are being treated for severe PTSD or severe anxiety?  NO, No RN review required.    Are you taking any prescription medications for pain 3 or more times per week?   NO, No RN review required.    Do you have a history of malignant hyperthermia?  No    (Females) Are you currently pregnant?   No     Have you been diagnosed or told you have pulmonary hypertension?   No    Do you have an LVAD?  No    Have you been told you have moderate to severe sleep apnea?  No.    Have you been told you have COPD, asthma, or any other lung disease?  No    Do you have any heart conditions?  No     Have you ever had or are you waiting for an organ transplant?  No. Continue scheduling, no site restrictions.    Have you had a stroke or transient ischemic attack (TIA aka \"mini stroke\" in the last 6 months?   No    Have you been diagnosed with or been told you have cirrhosis of the liver?   No.    Are you currently on dialysis?   No    Do you need assistance transferring?   No    BMI: Estimated body mass index is 43.27 kg/m  as calculated from the following:    Height as of 2/3/25: 1.626 m (5' 4\").    Weight as of 2/3/25: 114.4 kg (252 lb " 1.6 oz).     Is patients BMI > 40 and scheduling location [facility]?  No    Do you take an injectable or oral medication for weight loss or diabetes (excluding insulin)?  No    Do you take the medication Naltrexone?  No    Do you take blood thinners?  No       Prep   Are you currently on dialysis or do you have chronic kidney disease?  No    Do you have a diagnosis of diabetes?  No    Do you have a diagnosis of cystic fibrosis (CF)?  No    On a regular basis do you go 3 -5 days between bowel movements?  No    BMI > 40?  Yes (Extended Prep)    Preferred Pharmacy:    [id number] - [place], [place] - [address]  [address] [address]  Phone: [phone] Fax: [phone]            Final Scheduling Details     Procedure scheduled  Colonoscopy    Surgeon:  Reji     Date of procedure:  4/21     Pre-OP / PAC:   No - Not required for this site.    Location  [facility] - Patient preference.    Sedation   Moderate Sedation - Per order.      Patient Reminders:   You will receive a call from a Nurse to review instructions and health history.  This assessment must be completed prior to your procedure.  Failure to complete the Nurse assessment may result in the procedure being cancelled.      On the day of your procedure, please designate an adult(s) who can drive you home stay with you for the next 24 hours. The medicines used in the exam will make you sleepy. You will not be able to drive.      You cannot take public transportation, ride share services, or non-medical taxi service without a responsible caregiver.  Medical transport services are allowed with the requirement that a responsible caregiver will receive you at your destination.  We require that drivers and caregivers are confirmed prior to your procedure.

## 2025-02-15 ENCOUNTER — LAB (OUTPATIENT)
Dept: LAB | Facility: CLINIC | Age: 65
End: 2025-02-15
Payer: COMMERCIAL

## 2025-02-15 DIAGNOSIS — G40.909 SEIZURE DISORDER (H): ICD-10-CM

## 2025-02-15 DIAGNOSIS — I25.10 CORONARY ARTERY DISEASE INVOLVING NATIVE CORONARY ARTERY OF NATIVE HEART WITHOUT ANGINA PECTORIS: ICD-10-CM

## 2025-02-15 LAB
ALBUMIN SERPL BCG-MCNC: 4.3 G/DL (ref 3.5–5.2)
ALP SERPL-CCNC: 101 U/L (ref 40–150)
ALT SERPL W P-5'-P-CCNC: 29 U/L (ref 0–70)
ANION GAP SERPL CALCULATED.3IONS-SCNC: 13 MMOL/L (ref 7–15)
AST SERPL W P-5'-P-CCNC: 29 U/L (ref 0–45)
BASOPHILS # BLD AUTO: 0 10E3/UL (ref 0–0.2)
BASOPHILS NFR BLD AUTO: 1 %
BILIRUB SERPL-MCNC: 0.3 MG/DL
BUN SERPL-MCNC: 18.7 MG/DL (ref 8–23)
CALCIUM SERPL-MCNC: 9.9 MG/DL (ref 8.8–10.4)
CHLORIDE SERPL-SCNC: 104 MMOL/L (ref 98–107)
CHOLEST SERPL-MCNC: 143 MG/DL
CREAT SERPL-MCNC: 0.78 MG/DL (ref 0.67–1.17)
EGFRCR SERPLBLD CKD-EPI 2021: >90 ML/MIN/1.73M2
EOSINOPHIL # BLD AUTO: 0.1 10E3/UL (ref 0–0.7)
EOSINOPHIL NFR BLD AUTO: 2 %
ERYTHROCYTE [DISTWIDTH] IN BLOOD BY AUTOMATED COUNT: 12.8 % (ref 10–15)
FASTING STATUS PATIENT QL REPORTED: YES
FASTING STATUS PATIENT QL REPORTED: YES
GLUCOSE SERPL-MCNC: 106 MG/DL (ref 70–99)
HCO3 SERPL-SCNC: 25 MMOL/L (ref 22–29)
HCT VFR BLD AUTO: 41.9 % (ref 40–53)
HDLC SERPL-MCNC: 42 MG/DL
HGB BLD-MCNC: 14 G/DL (ref 13.3–17.7)
IMM GRANULOCYTES # BLD: 0 10E3/UL
IMM GRANULOCYTES NFR BLD: 0 %
LDLC SERPL CALC-MCNC: 61 MG/DL
LYMPHOCYTES # BLD AUTO: 1.7 10E3/UL (ref 0.8–5.3)
LYMPHOCYTES NFR BLD AUTO: 35 %
MCH RBC QN AUTO: 29.9 PG (ref 26.5–33)
MCHC RBC AUTO-ENTMCNC: 33.4 G/DL (ref 31.5–36.5)
MCV RBC AUTO: 89 FL (ref 78–100)
MONOCYTES # BLD AUTO: 0.6 10E3/UL (ref 0–1.3)
MONOCYTES NFR BLD AUTO: 12 %
NEUTROPHILS # BLD AUTO: 2.5 10E3/UL (ref 1.6–8.3)
NEUTROPHILS NFR BLD AUTO: 51 %
NONHDLC SERPL-MCNC: 101 MG/DL
PLATELET # BLD AUTO: 160 10E3/UL (ref 150–450)
POTASSIUM SERPL-SCNC: 4.7 MMOL/L (ref 3.4–5.3)
PROT SERPL-MCNC: 7.5 G/DL (ref 6.4–8.3)
RBC # BLD AUTO: 4.69 10E6/UL (ref 4.4–5.9)
SODIUM SERPL-SCNC: 142 MMOL/L (ref 135–145)
TRIGL SERPL-MCNC: 201 MG/DL
WBC # BLD AUTO: 4.8 10E3/UL (ref 4–11)

## 2025-02-15 PROCEDURE — 99000 SPECIMEN HANDLING OFFICE-LAB: CPT

## 2025-02-15 PROCEDURE — 80053 COMPREHEN METABOLIC PANEL: CPT

## 2025-02-15 PROCEDURE — 80183 DRUG SCRN QUANT OXCARBAZEPIN: CPT | Mod: 90

## 2025-02-15 PROCEDURE — 85025 COMPLETE CBC W/AUTO DIFF WBC: CPT

## 2025-02-15 PROCEDURE — 36415 COLL VENOUS BLD VENIPUNCTURE: CPT

## 2025-02-17 ENCOUNTER — E-VISIT (OUTPATIENT)
Dept: PEDIATRICS | Facility: CLINIC | Age: 65
End: 2025-02-17
Payer: COMMERCIAL

## 2025-02-17 DIAGNOSIS — F41.9 ANXIETY: Primary | ICD-10-CM

## 2025-02-17 NOTE — LETTER
2025    Re:  Fabio Logan  :  1960            To Whom It May Concern:    I examined Fabio Logan in clinic on February 3, 2025.  Based on conversations during the visit as well as following the visit, I feel that Mr. Logan is currently unable to embark on foreign travel due to medical issues.    He has been experiencing increased anxiety symptoms which are not improving. His anxiety worsens with thoughts of preparing for and planning his trip.    Please allow him to withdraw from his planned travel due to his symptoms.         Cordially,          Jaron Avelar MD

## 2025-02-18 NOTE — TELEPHONE ENCOUNTER
Provider E-Visit time total (minutes): 5 minutes    A signed copy of this letter is on my desk if needed.    Jaron Avelar

## 2025-02-19 LAB — 10OH-CARBAZEPINE SERPL-MCNC: 12 UG/ML

## 2025-03-23 SDOH — HEALTH STABILITY: PHYSICAL HEALTH: ON AVERAGE, HOW MANY DAYS PER WEEK DO YOU ENGAGE IN MODERATE TO STRENUOUS EXERCISE (LIKE A BRISK WALK)?: 2 DAYS

## 2025-03-23 SDOH — HEALTH STABILITY: PHYSICAL HEALTH: ON AVERAGE, HOW MANY MINUTES DO YOU ENGAGE IN EXERCISE AT THIS LEVEL?: 30 MIN

## 2025-03-23 ASSESSMENT — SOCIAL DETERMINANTS OF HEALTH (SDOH): HOW OFTEN DO YOU GET TOGETHER WITH FRIENDS OR RELATIVES?: TWICE A WEEK

## 2025-03-25 ASSESSMENT — PATIENT HEALTH QUESTIONNAIRE - PHQ9: SUM OF ALL RESPONSES TO PHQ QUESTIONS 1-9: 9

## 2025-03-26 ENCOUNTER — OFFICE VISIT (OUTPATIENT)
Dept: PEDIATRICS | Facility: CLINIC | Age: 65
End: 2025-03-26
Attending: INTERNAL MEDICINE
Payer: COMMERCIAL

## 2025-03-26 VITALS
SYSTOLIC BLOOD PRESSURE: 124 MMHG | WEIGHT: 247.4 LBS | HEART RATE: 70 BPM | RESPIRATION RATE: 17 BRPM | OXYGEN SATURATION: 95 % | DIASTOLIC BLOOD PRESSURE: 74 MMHG | BODY MASS INDEX: 42.24 KG/M2 | TEMPERATURE: 97.9 F | HEIGHT: 64 IN

## 2025-03-26 DIAGNOSIS — E65 ABDOMINAL PANNUS: ICD-10-CM

## 2025-03-26 DIAGNOSIS — E66.01 MORBID OBESITY (H): ICD-10-CM

## 2025-03-26 DIAGNOSIS — R10.32 LEFT GROIN PAIN: Primary | ICD-10-CM

## 2025-03-26 DIAGNOSIS — R10.32 ABDOMINAL PAIN, LEFT LOWER QUADRANT: ICD-10-CM

## 2025-03-26 LAB
BASOPHILS # BLD AUTO: 0 10E3/UL (ref 0–0.2)
BASOPHILS NFR BLD AUTO: 0 %
EOSINOPHIL # BLD AUTO: 0.1 10E3/UL (ref 0–0.7)
EOSINOPHIL NFR BLD AUTO: 1 %
ERYTHROCYTE [DISTWIDTH] IN BLOOD BY AUTOMATED COUNT: 12.3 % (ref 10–15)
ERYTHROCYTE [SEDIMENTATION RATE] IN BLOOD BY WESTERGREN METHOD: 10 MM/HR (ref 0–20)
HCT VFR BLD AUTO: 42.1 % (ref 40–53)
HGB BLD-MCNC: 14 G/DL (ref 13.3–17.7)
IMM GRANULOCYTES # BLD: 0 10E3/UL
IMM GRANULOCYTES NFR BLD: 0 %
LYMPHOCYTES # BLD AUTO: 1.8 10E3/UL (ref 0.8–5.3)
LYMPHOCYTES NFR BLD AUTO: 34 %
MCH RBC QN AUTO: 29.5 PG (ref 26.5–33)
MCHC RBC AUTO-ENTMCNC: 33.3 G/DL (ref 31.5–36.5)
MCV RBC AUTO: 89 FL (ref 78–100)
MONOCYTES # BLD AUTO: 0.5 10E3/UL (ref 0–1.3)
MONOCYTES NFR BLD AUTO: 10 %
NEUTROPHILS # BLD AUTO: 2.8 10E3/UL (ref 1.6–8.3)
NEUTROPHILS NFR BLD AUTO: 54 %
PLATELET # BLD AUTO: 194 10E3/UL (ref 150–450)
RBC # BLD AUTO: 4.74 10E6/UL (ref 4.4–5.9)
WBC # BLD AUTO: 5.2 10E3/UL (ref 4–11)

## 2025-03-26 PROCEDURE — 86140 C-REACTIVE PROTEIN: CPT | Performed by: INTERNAL MEDICINE

## 2025-03-26 PROCEDURE — 99214 OFFICE O/P EST MOD 30 MIN: CPT | Performed by: INTERNAL MEDICINE

## 2025-03-26 PROCEDURE — 85025 COMPLETE CBC W/AUTO DIFF WBC: CPT | Performed by: INTERNAL MEDICINE

## 2025-03-26 PROCEDURE — 1125F AMNT PAIN NOTED PAIN PRSNT: CPT | Performed by: INTERNAL MEDICINE

## 2025-03-26 PROCEDURE — G2211 COMPLEX E/M VISIT ADD ON: HCPCS | Performed by: INTERNAL MEDICINE

## 2025-03-26 PROCEDURE — 85652 RBC SED RATE AUTOMATED: CPT | Performed by: INTERNAL MEDICINE

## 2025-03-26 PROCEDURE — 3078F DIAST BP <80 MM HG: CPT | Performed by: INTERNAL MEDICINE

## 2025-03-26 PROCEDURE — 80053 COMPREHEN METABOLIC PANEL: CPT | Performed by: INTERNAL MEDICINE

## 2025-03-26 PROCEDURE — 36415 COLL VENOUS BLD VENIPUNCTURE: CPT | Performed by: INTERNAL MEDICINE

## 2025-03-26 PROCEDURE — 3074F SYST BP LT 130 MM HG: CPT | Performed by: INTERNAL MEDICINE

## 2025-03-26 ASSESSMENT — ENCOUNTER SYMPTOMS
ANOREXIA: 0
NECK PAIN: 0
JOINT SWELLING: 1
FEVER: 0
WEAKNESS: 0
ABDOMINAL PAIN: 0
VERTIGO: 0
CHILLS: 0
VISUAL CHANGE: 0
CHANGE IN BOWEL HABIT: 0
VOMITING: 0
ARTHRALGIAS: 0
COUGH: 0
DIAPHORESIS: 0
SWOLLEN GLANDS: 0
SORE THROAT: 0
MYALGIAS: 0
HEADACHES: 0
NUMBNESS: 1
NAUSEA: 0
FATIGUE: 1

## 2025-03-26 ASSESSMENT — PAIN SCALES - GENERAL: PAINLEVEL_OUTOF10: SEVERE PAIN (8)

## 2025-03-26 ASSESSMENT — PATIENT HEALTH QUESTIONNAIRE - PHQ9
10. IF YOU CHECKED OFF ANY PROBLEMS, HOW DIFFICULT HAVE THESE PROBLEMS MADE IT FOR YOU TO DO YOUR WORK, TAKE CARE OF THINGS AT HOME, OR GET ALONG WITH OTHER PEOPLE: SOMEWHAT DIFFICULT
SUM OF ALL RESPONSES TO PHQ QUESTIONS 1-9: 9

## 2025-03-26 NOTE — PROGRESS NOTES
Assessment & Plan       ICD-10-CM    1. Left groin pain  R10.32 CT Abdomen Pelvis w Contrast     CBC with platelets and differential     Comprehensive metabolic panel (BMP + Alb, Alk Phos, ALT, AST, Total. Bili, TP)     CRP, inflammation     ESR: Erythrocyte sedimentation rate     CBC with platelets and differential     Comprehensive metabolic panel (BMP + Alb, Alk Phos, ALT, AST, Total. Bili, TP)     CRP, inflammation     ESR: Erythrocyte sedimentation rate      2. Abdominal pannus  E65 CT Abdomen Pelvis w Contrast     CBC with platelets and differential     Comprehensive metabolic panel (BMP + Alb, Alk Phos, ALT, AST, Total. Bili, TP)     CRP, inflammation     ESR: Erythrocyte sedimentation rate     CBC with platelets and differential     Comprehensive metabolic panel (BMP + Alb, Alk Phos, ALT, AST, Total. Bili, TP)     CRP, inflammation     ESR: Erythrocyte sedimentation rate      3. Morbid obesity (H)  E66.01 CT Abdomen Pelvis w Contrast     CBC with platelets and differential     Comprehensive metabolic panel (BMP + Alb, Alk Phos, ALT, AST, Total. Bili, TP)     CRP, inflammation     ESR: Erythrocyte sedimentation rate     CBC with platelets and differential     Comprehensive metabolic panel (BMP + Alb, Alk Phos, ALT, AST, Total. Bili, TP)     CRP, inflammation     ESR: Erythrocyte sedimentation rate      4. Abdominal pain, left lower quadrant  R10.32 CT Abdomen Pelvis w Contrast     CBC with platelets and differential     Comprehensive metabolic panel (BMP + Alb, Alk Phos, ALT, AST, Total. Bili, TP)     CRP, inflammation     ESR: Erythrocyte sedimentation rate     CBC with platelets and differential     Comprehensive metabolic panel (BMP + Alb, Alk Phos, ALT, AST, Total. Bili, TP)     CRP, inflammation     ESR: Erythrocyte sedimentation rate      discussed with patient (or patient's parents/caregiver) pathophysiology of condition and treatment options.  Reviewed history in detail today with the patient.   Patient had symptoms ongoing for weeks to possibly months with slow progression.  Denies any trauma to the area.  He has not denies any B symptoms including no fevers chills night sweats weight loss etc.  Exam today is somewhat complicated by the patient's habitus as well as large pannus that does obscure much of the groin.  The pannus is larger left greater than right and he does have evidence of chronic likely inflammation versus mild panniculitis on the left leg and left pannus.  What I was able to do a  exam today is within acceptable limits and there is no evidence of hernia testicular torsion no abscess or cellulitis or other signs of an occult infection.  I did the patient's risk for a variety of causes for his symptoms will cast a wide net including need a CT scan of the abdomen and pelvis with contrast to evaluate for any vascular abnormalities, masses, or other etiologies that could be causing extrinsic compression of the vessels vs large hernia, omental issues including inflammation panniculitis etc.  Will also get labs including a CBC check electrolytes kidney and liver function as well as markers of inflammation.  With the patient when these results are back we will discuss next steps.  Reviewed if his symptoms get any worse or new symptom develop and recommend he go to the ER for emergent evaluation. The longitudinal plan of care for the diagnosis(es)/condition(s) as documented were addressed during this visit. Due to the added complexity in care, I will continue to support Nicolas in the subsequent management and with ongoing continuity of care.  Patient verbalized understanding and is agreeable to this plan.     Return if symptoms worsen or fail to improve.        Subjective   Nicolas is a 64 year old, presenting for the following health issues:  Pain        3/26/2025     3:16 PM   Additional Questions   Roomed by Zully KAUFMAN         3/26/2025     3:10 PM   Patient Reported Additional Medications   Patient  "reports taking the following new medications NA     History of Present Illness       Reason for visit:  Pain and swelling  Symptom onset:  More than a month  Symptoms include:  Pain and swelling  Symptom intensity:  Severe  Symptom progression:  Worsening  Had these symptoms before:  Yes  Has tried/received treatment for these symptoms:  No  Prior treatment description:  NA  What makes it worse:  Walking  What makes it better:  Rest    He eats 2-3 servings of fruits and vegetables daily.He consumes 0 sweetened beverage(s) daily.He exercises with enough effort to increase his heart rate 20 to 29 minutes per day.  He exercises with enough effort to increase his heart rate 3 or less days per week.   He is taking medications regularly.      Patient presents today for an acute visit.  He did his physical 2 months with Dr. Lee and at that time was feeling quite well.  Did have his blood pressure medications adjusted then does have colonoscopy scheduled in a couple weeks.  Notes that over the last month may be more some left leg pain and left groin and hip pain.  Also seem to be getting worse.  Did have a trip to the [facility] planned but canceled this due to anxiety.  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  He is trying to eat well but notes he exercises less.  The end of the day his body hurts all over and notes that he does have some lower extremity edema the end of the day that is symmetric.  He does use compression stockings at times for this and they seem to work well.  Notes an area on the left leg and groin that feels \"bulge like\" and is somewhat tender to palpation at times.  Denies any skin breakdown.  No history of hernia that he is aware of.  Denies any fevers chills night sweats weight loss nausea vomiting diarrhea constipation chills or any other symptoms.  Denies any dysuria.  Does note that he uses Kenalog " "cream to the areas under his pannus and this works well when there gets irritation there. No other concerns or complaints today.                     Objective    /74   Pulse 70   Temp 97.9  F (36.6  C) (Oral)   Resp 17   Ht 1.618 m (5' 3.7\")   Wt 112.2 kg (247 lb 6.4 oz)   SpO2 95%   BMI 42.87 kg/m    Body mass index is 42.87 kg/m .  Physical Exam     GENERAL: healthy, alert and no distress  HENT: normocephalic and atrumatic, mucous membranes moist   ABDOMEN: obese, soft, nontender bowel sounds normal  MS: no gross musculoskeletal defects noted, no edema and legs symmetric  SKIN: no suspicious lesions or rashes  NEURO: Normal strength and tone, mentation intact and speech normal  PSYCH: mentation appears normal, affect normal/bright  : patient has large pannus L > R that obscures much of the groin. Testicles within normal limits to palpation and no hernia appreciated with Valsalva. some hyperpigmentation of skin on upper L leg and lower panus consistant with likely mild chronic inflammation/panniculitis; no skin breakdown.              Signed Electronically by: Manoj Armenta MD    (*documentation in this chart was partially completed using Dragon PowerMic which could potentially lead to some misspellings or grammar/autocorrect issues in the narrative.)      " Consent: The patient's consent was obtained including but not limited to risks of crusting, scabbing, blistering, scarring, darker or lighter pigmentary change, recurrence, incomplete removal and infection. Render Post-Care Instructions In Note?: no Duration Of Freeze Thaw-Cycle (Seconds): 5 Post-Care Instructions: I reviewed with the patient in detail post-care instructions. Patient is to wear sunprotection, and avoid picking at any of the treated lesions. Pt may apply Vaseline to crusted or scabbing areas. Number Of Freeze-Thaw Cycles: 1 freeze-thaw cycle Detail Level: Detailed

## 2025-03-27 LAB
ALBUMIN SERPL BCG-MCNC: 4.3 G/DL (ref 3.5–5.2)
ALP SERPL-CCNC: 102 U/L (ref 40–150)
ALT SERPL W P-5'-P-CCNC: 29 U/L (ref 0–70)
ANION GAP SERPL CALCULATED.3IONS-SCNC: 10 MMOL/L (ref 7–15)
AST SERPL W P-5'-P-CCNC: 29 U/L (ref 0–45)
BILIRUB SERPL-MCNC: 0.2 MG/DL
BUN SERPL-MCNC: 16.5 MG/DL (ref 8–23)
CALCIUM SERPL-MCNC: 9.9 MG/DL (ref 8.8–10.4)
CHLORIDE SERPL-SCNC: 102 MMOL/L (ref 98–107)
CREAT SERPL-MCNC: 0.82 MG/DL (ref 0.67–1.17)
CRP SERPL-MCNC: <3 MG/L
EGFRCR SERPLBLD CKD-EPI 2021: >90 ML/MIN/1.73M2
GLUCOSE SERPL-MCNC: 97 MG/DL (ref 70–99)
HCO3 SERPL-SCNC: 23 MMOL/L (ref 22–29)
POTASSIUM SERPL-SCNC: 4.2 MMOL/L (ref 3.4–5.3)
PROT SERPL-MCNC: 7.3 G/DL (ref 6.4–8.3)
SODIUM SERPL-SCNC: 135 MMOL/L (ref 135–145)

## 2025-03-28 ENCOUNTER — TELEPHONE (OUTPATIENT)
Dept: GASTROENTEROLOGY | Facility: CLINIC | Age: 65
End: 2025-03-28
Payer: COMMERCIAL

## 2025-03-28 DIAGNOSIS — Z12.11 SCREEN FOR COLON CANCER: Primary | ICD-10-CM

## 2025-03-28 RX ORDER — BISACODYL 5 MG
TABLET, DELAYED RELEASE (ENTERIC COATED) ORAL
Qty: 4 TABLET | Refills: 0 | Status: SHIPPED | OUTPATIENT
Start: 2025-03-28

## 2025-04-03 ENCOUNTER — HOSPITAL ENCOUNTER (OUTPATIENT)
Dept: CT IMAGING | Facility: CLINIC | Age: 65
Discharge: HOME | End: 2025-04-03
Attending: INTERNAL MEDICINE
Payer: COMMERCIAL

## 2025-04-03 ENCOUNTER — ANCILLARY ORDERS (OUTPATIENT)
Dept: PEDIATRICS | Facility: CLINIC | Age: 65
End: 2025-04-03

## 2025-04-03 DIAGNOSIS — E65 ABDOMINAL PANNUS: ICD-10-CM

## 2025-04-03 DIAGNOSIS — R10.32 LEFT GROIN PAIN: Primary | ICD-10-CM

## 2025-04-03 DIAGNOSIS — R10.32 LEFT GROIN PAIN: ICD-10-CM

## 2025-04-03 DIAGNOSIS — R10.32 ABDOMINAL PAIN, LEFT LOWER QUADRANT: ICD-10-CM

## 2025-04-03 DIAGNOSIS — E66.01 MORBID OBESITY (H): ICD-10-CM

## 2025-04-03 PROCEDURE — 74176 CT ABD & PELVIS W/O CONTRAST: CPT

## 2025-04-03 RX ORDER — IOPAMIDOL 755 MG/ML
121 INJECTION, SOLUTION INTRAVASCULAR ONCE
Status: DISCONTINUED | OUTPATIENT
Start: 2025-04-03 | End: 2025-04-03

## 2025-04-08 ENCOUNTER — TELEPHONE (OUTPATIENT)
Dept: PEDIATRICS | Facility: CLINIC | Age: 65
End: 2025-04-08
Payer: COMMERCIAL

## 2025-04-08 DIAGNOSIS — R19.09 INGUINAL MASS: Primary | ICD-10-CM

## 2025-04-08 NOTE — TELEPHONE ENCOUNTER
"Called and spoke with patient to relay provider message below.     States he was thinking about going through with US and hadn't been able to respond yet. States he would like to go through with the US as still has pain.     Per chart review  4/3/25 CT Result Note with Dr. Armenta   \"the GOOD news is that the radiologist didn't see anything severe as a cause of your symptoms. The one thing they did recommend is we consider an ultrasound of the inguinal canal (lateral groin area) as they did note an \"ovoid soft tissue density\" in the left canal that may be your testicle, if this is in the canal it may be causing some of the symptoms you were describing. Please let me know if you are okay with getting the ultrasound as a next step and I'll place the order.\"    Dr. Armenta please see update and order as appropriate.    Caitlin Garcia RN   "

## 2025-04-08 NOTE — TELEPHONE ENCOUNTER
----- Message from Manoj Armenta sent at 4/8/2025 11:42 AM CDT -----  Triage RN: please call patient, he read my result note re: inguinal US but didn't reply or message back if he is okay with proceeding with the ultrasound. thanks!

## 2025-04-14 ENCOUNTER — ANCILLARY PROCEDURE (OUTPATIENT)
Dept: ULTRASOUND IMAGING | Facility: CLINIC | Age: 65
End: 2025-04-14
Attending: INTERNAL MEDICINE
Payer: COMMERCIAL

## 2025-04-14 DIAGNOSIS — R19.09 INGUINAL MASS: ICD-10-CM

## 2025-04-14 PROCEDURE — 93976 VASCULAR STUDY: CPT

## 2025-04-21 ENCOUNTER — HOSPITAL ENCOUNTER (OUTPATIENT)
Facility: CLINIC | Age: 65
Discharge: HOME | End: 2025-04-21
Attending: INTERNAL MEDICINE | Admitting: INTERNAL MEDICINE
Payer: COMMERCIAL

## 2025-04-21 VITALS
TEMPERATURE: 97.5 F | SYSTOLIC BLOOD PRESSURE: 160 MMHG | RESPIRATION RATE: 16 BRPM | OXYGEN SATURATION: 100 % | DIASTOLIC BLOOD PRESSURE: 88 MMHG | HEART RATE: 54 BPM

## 2025-04-21 LAB — COLONOSCOPY: NORMAL

## 2025-04-21 PROCEDURE — 88305 TISSUE EXAM BY PATHOLOGIST: CPT | Mod: 26 | Performed by: PATHOLOGY

## 2025-04-21 PROCEDURE — 45385 COLONOSCOPY W/LESION REMOVAL: CPT | Performed by: INTERNAL MEDICINE

## 2025-04-21 PROCEDURE — 250N000013 HC RX MED GY IP 250 OP 250 PS 637: Performed by: INTERNAL MEDICINE

## 2025-04-21 PROCEDURE — 88305 TISSUE EXAM BY PATHOLOGIST: CPT | Mod: TC | Performed by: INTERNAL MEDICINE

## 2025-04-21 PROCEDURE — 250N000011 HC RX IP 250 OP 636: Mod: JZ | Performed by: INTERNAL MEDICINE

## 2025-04-21 PROCEDURE — G0500 MOD SEDAT ENDO SERVICE >5YRS: HCPCS | Mod: PT | Performed by: INTERNAL MEDICINE

## 2025-04-21 RX ORDER — PROCHLORPERAZINE MALEATE 10 MG
10 TABLET ORAL EVERY 6 HOURS PRN
Status: CANCELLED | OUTPATIENT
Start: 2025-04-21

## 2025-04-21 RX ORDER — NALOXONE HYDROCHLORIDE 0.4 MG/ML
0.2 INJECTION, SOLUTION INTRAMUSCULAR; INTRAVENOUS; SUBCUTANEOUS
Status: DISCONTINUED | OUTPATIENT
Start: 2025-04-21 | End: 2025-04-21 | Stop reason: HOSPADM

## 2025-04-21 RX ORDER — LIDOCAINE 40 MG/G
CREAM TOPICAL
Status: DISCONTINUED | OUTPATIENT
Start: 2025-04-21 | End: 2025-04-21 | Stop reason: HOSPADM

## 2025-04-21 RX ORDER — ONDANSETRON 2 MG/ML
4 INJECTION INTRAMUSCULAR; INTRAVENOUS
Status: DISCONTINUED | OUTPATIENT
Start: 2025-04-21 | End: 2025-04-21 | Stop reason: HOSPADM

## 2025-04-21 RX ORDER — NALOXONE HYDROCHLORIDE 0.4 MG/ML
0.2 INJECTION, SOLUTION INTRAMUSCULAR; INTRAVENOUS; SUBCUTANEOUS
Status: CANCELLED | OUTPATIENT
Start: 2025-04-21

## 2025-04-21 RX ORDER — ATROPINE SULFATE 0.1 MG/ML
1 INJECTION INTRAVENOUS
Status: DISCONTINUED | OUTPATIENT
Start: 2025-04-21 | End: 2025-04-21 | Stop reason: HOSPADM

## 2025-04-21 RX ORDER — ONDANSETRON 2 MG/ML
4 INJECTION INTRAMUSCULAR; INTRAVENOUS EVERY 6 HOURS PRN
Status: CANCELLED | OUTPATIENT
Start: 2025-04-21

## 2025-04-21 RX ORDER — DIPHENHYDRAMINE HYDROCHLORIDE 50 MG/ML
25-50 INJECTION, SOLUTION INTRAMUSCULAR; INTRAVENOUS
Status: DISCONTINUED | OUTPATIENT
Start: 2025-04-21 | End: 2025-04-21 | Stop reason: HOSPADM

## 2025-04-21 RX ORDER — NALOXONE HYDROCHLORIDE 0.4 MG/ML
0.4 INJECTION, SOLUTION INTRAMUSCULAR; INTRAVENOUS; SUBCUTANEOUS
Status: DISCONTINUED | OUTPATIENT
Start: 2025-04-21 | End: 2025-04-21 | Stop reason: HOSPADM

## 2025-04-21 RX ORDER — NALOXONE HYDROCHLORIDE 0.4 MG/ML
0.4 INJECTION, SOLUTION INTRAMUSCULAR; INTRAVENOUS; SUBCUTANEOUS
Status: CANCELLED | OUTPATIENT
Start: 2025-04-21

## 2025-04-21 RX ORDER — ONDANSETRON 4 MG/1
4 TABLET, ORALLY DISINTEGRATING ORAL EVERY 6 HOURS PRN
Status: CANCELLED | OUTPATIENT
Start: 2025-04-21

## 2025-04-21 RX ORDER — FLUMAZENIL 0.1 MG/ML
0.2 INJECTION, SOLUTION INTRAVENOUS
Status: DISCONTINUED | OUTPATIENT
Start: 2025-04-21 | End: 2025-04-21 | Stop reason: HOSPADM

## 2025-04-21 RX ORDER — SIMETHICONE 40MG/0.6ML
133 SUSPENSION, DROPS(FINAL DOSAGE FORM)(ML) ORAL
Status: COMPLETED | OUTPATIENT
Start: 2025-04-21 | End: 2025-04-21

## 2025-04-21 RX ORDER — FLUMAZENIL 0.1 MG/ML
0.2 INJECTION, SOLUTION INTRAVENOUS
Status: CANCELLED | OUTPATIENT
Start: 2025-04-21 | End: 2025-04-21

## 2025-04-21 RX ORDER — FENTANYL CITRATE 50 UG/ML
25-100 INJECTION, SOLUTION INTRAMUSCULAR; INTRAVENOUS EVERY 5 MIN PRN
Status: DISCONTINUED | OUTPATIENT
Start: 2025-04-21 | End: 2025-04-21 | Stop reason: HOSPADM

## 2025-04-21 RX ORDER — EPINEPHRINE 1 MG/ML
0.1 INJECTION, SOLUTION, CONCENTRATE INTRAVENOUS
Status: DISCONTINUED | OUTPATIENT
Start: 2025-04-21 | End: 2025-04-21 | Stop reason: HOSPADM

## 2025-04-21 RX ADMIN — SIMETHICONE 133 MG: 20 SUSPENSION/ DROPS ORAL at 09:52

## 2025-04-21 RX ADMIN — FENTANYL CITRATE 50 MCG: 50 INJECTION, SOLUTION INTRAMUSCULAR; INTRAVENOUS at 09:49

## 2025-04-21 RX ADMIN — MIDAZOLAM 2 MG: 1 INJECTION INTRAMUSCULAR; INTRAVENOUS at 09:49

## 2025-04-21 ASSESSMENT — ACTIVITIES OF DAILY LIVING (ADL)
ADLS_ACUITY_SCORE: 41

## 2025-04-21 NOTE — H&P
[facility]  Pre-Endoscopy History and Physical     Fabio Logan MRN# [id number]   YOB: 1960 Age: 64 year old     Date of Procedure: 2025  Primary care provider: Manoj Aremnta  Type of Endoscopy: colonoscopy  Reason for Procedure: screening  Type of Anesthesia Anticipated: Moderate Sedation    HPI:    Fabio is a 64 year old male who will be undergoing the above procedure.      A history and physical has been performed. The patient's medications and allergies have been reviewed. The risks and benefits of the procedure and the sedation options and risks were discussed with the patient.  All questions were answered and informed consent was obtained.      He denies a personal or family history of anesthesia complications or bleeding disorders.     Allergies   Allergen Reactions    Iodine Shortness Of Breath     Other reaction(s): Shortness of breath    internal iodine for tests        Prior to Admission Medications   Prescriptions Last Dose Informant Patient Reported? Taking?   OXcarbazepine (TRILEPTAL) 300 MG tablet 2025 Morning  No Yes   Sig: Take 1.5 tablets (450 mg) by mouth 2 times daily.   SUMAtriptan (IMITREX) 50 MG tablet Past Month  No Yes   Sig: Take 1 tablet (50 mg) by mouth at onset of headache for migraine (may repeat dose in 2 hours. Do not exceed 200mg in 24 hours Strength: 50 mg   albuterol (PROAIR HFA/PROVENTIL HFA/VENTOLIN HFA) 108 (90 Base) MCG/ACT inhaler   No Yes   Sig: Inhale 2 puffs into the lungs every 6 hours as needed for shortness of breath or wheezing   atenolol (TENORMIN) 50 MG tablet 2025 at  6:00 AM  No Yes   Sig: Take 1 tablet (50 mg) by mouth daily.   atorvastatin (LIPITOR) 40 MG tablet 2025  No Yes   Sig: Take 1 tablet (40 mg) by mouth daily.   bisacodyl (DULCOLAX) 5 MG EC tablet   No No   Si days prior to procedure, take 2 tablets at 4 pm. 1 day prior to procedure, take 2 tablets at 4 pm. For additional instructions  refer to your colonoscopy prep instructions.   fluticasone (FLONASE) 50 MCG/ACT nasal spray Past Week  No Yes   Sig: SPRAY 1 TO 2 SPRAYS INTO BOTH NOSTRISL DAILY   ketoconazole (NIZORAL) 2 % external cream   No No   Sig: Apply topically daily   losartan (COZAAR) 50 MG tablet 4/21/2025 Morning  No Yes   Sig: Take 1 tablet (50 mg) by mouth daily.   methocarbamol (ROBAXIN) 500 MG tablet Unknown  No No   Sig: Take 1 tablet (500 mg) by mouth 4 times daily as needed for muscle spasms   multivitamin w/minerals (MULTI-VITAMIN) tablet Past Month  Yes Yes   Sig: Take 1 tablet by mouth daily Take 1 tab by mouth daily   omeprazole (PRILOSEC) 20 MG DR capsule 4/20/2025  No Yes   Sig: Take 1 capsule (20 mg) by mouth daily.   sertraline (ZOLOFT) 100 MG tablet 4/20/2025  No Yes   Sig: Take 1.5 tablets (150 mg) by mouth daily.   traZODone (DESYREL) 50 MG tablet Past Week  No Yes   Sig: Take 1-2 tablets ( mg) by mouth at bedtime.   triamcinolone (KENALOG) 0.1 % ointment   Yes No   Sig: Apply topically 2 times daily as needed for irritation.      Facility-Administered Medications: None       Patient Active Problem List   Diagnosis    Mild major depression (H)    Obese    HTN, goal below 140/90    H/O testicular cancer    Diverticulosis of large intestine    Esophageal reflux    Chest pain    Migraine    Skin cancer screening    Skin eruption    Lentigines    Chronic dermatitis    Mild intermittent asthma without complication    Essential hypertension with goal blood pressure less than 140/90    Impaired fasting glucose    Morbid obesity (H)    Symptomatic cholelithiasis    Partial seizure (H)    Pain of left hand    Osteoarthritis of left hand, unspecified osteoarthritis type    Major depressive disorder, recurrent episode, mild    Aneurysm        Past Medical History:   Diagnosis Date    Anxiety     Arthritis     COPD (chronic obstructive pulmonary disease) (H)     Depression     Depressive disorder     Diverticulitis     h/o  Mumps     Hypertension     Kidney stone     Malignant neoplasm of testis, unspecified laterality, unspecified whether descended or undescended 2018    IMO Regulatory Load OCT 2020      Mild traumatic brain injury, without loss of consciousness, initial encounter (H) 2024    Testicular cancer - Right         Past Surgical History:   Procedure Laterality Date    COLONOSCOPY N/A 2014    Procedure: COLONOSCOPY;  Surgeon: Joe Garcia MD;  Location:  GI    COLONOSCOPY      CRANIOTOMY, REPAIR ANEURYSM, COMBINED Right 2019    Procedure: RIGHT CRANIOTOMY AND CLIPPING OF COMPLEX MIDDLE CEREBRAL ARTERY ANEURYMS; INTRAOPERATIVE ANGIOGRAM;  Surgeon: Jamir Rebollar MD;  Location: [facility];  Service: Neurosurgery    CYSTOSCOPY      EYE SURGERY      ? not on H&P    IR CEREBRAL ANGIOGRAM  2019    IR CEREBRAL ANGIOGRAM  2019    LAPAROSCOPIC CHOLECYSTECTOMY N/A 2018    Procedure: LAPAROSCOPIC CHOLECYSTECTOMY;  LAPAROSCOPIC CHOLECYSTECTOMY ;  Surgeon: Shannan Chaves MD;  Location:  OR    LAPAROSCOPIC CHOLECYSTECTOMY      ORCHIECTOMY NOS Right     ORCHIECTOMY SCROTAL Right 1998    TESTICLE SURGERY      TONSILLECTOMY      TONSILLECTOMY         Social History     Tobacco Use    Smoking status: Former     Current packs/day: 0.00     Types: Cigarettes     Quit date: 1997     Years since quittin.8     Passive exposure: Never    Smokeless tobacco: Former   Substance Use Topics    Alcohol use: No     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       Family History   Problem Relation Age of Onset    Cerebrovascular Disease Mother     Hypertension Mother     Varicose Veins Mother     Heart Disease Father         had 2 open heart surgery    Lipids Father     Hypertension Father     Coronary Artery Disease Father     Cancer Maternal Grandfather         skin cancer    Skin Cancer Maternal Grandfather         skin cancer    Heart Disease Sister 45        heart  "attack    Cancer Sister 46        breast cancer    Skin Cancer Sister         BCC    Cancer Sister     Coronary Artery Disease Sister     Hypertension Other         all siblings    Colon Cancer No family hx of        REVIEW OF SYSTEMS:     5 point ROS negative except as noted above in HPI, including Gen., Resp., CV, GI &  system review.      PHYSICAL EXAM:   BP (!) 149/83   Pulse 54   Temp 97.5  F (36.4  C) (Temporal)   Resp (!) 9   SpO2 97%  Estimated body mass index is 42.87 kg/m  as calculated from the following:    Height as of 3/26/25: 1.618 m (5' 3.7\").    Weight as of 3/26/25: 112.2 kg (247 lb 6.4 oz).   GENERAL APPEARANCE: healthy, alert, and no distress  MENTAL STATUS: alert  AIRWAY EXAM: Mallampatti Class II (visualization of the soft palate, fauces, and uvula)  RESP: lungs clear to auscultation - no rales, rhonchi or wheezes  CV: regular rates and rhythm      DIAGNOSTICS:    Not indicated      IMPRESSION   ASA Class 2 - Mild systemic disease        PLAN:       Plan for colonoscopy. We discussed the risks, benefits and alternatives and the patient wished to proceed.    The above has been forwarded to the consulting provider.      Signed Electronically by: Krzysztof Mendoza MD  April 21, 2025    Krzysztof Mendoza MD  [facility]  Cell: [phone]  Office Phone: [phone]  Office Fax: [phone]        "

## 2025-04-22 LAB
PATH REPORT.COMMENTS IMP SPEC: NORMAL
PATH REPORT.COMMENTS IMP SPEC: NORMAL
PATH REPORT.FINAL DX SPEC: NORMAL
PATH REPORT.GROSS SPEC: NORMAL
PATH REPORT.MICROSCOPIC SPEC OTHER STN: NORMAL
PATH REPORT.RELEVANT HX SPEC: NORMAL
PHOTO IMAGE: NORMAL

## 2025-04-28 ENCOUNTER — TELEPHONE (OUTPATIENT)
Dept: PEDIATRICS | Facility: CLINIC | Age: 65
End: 2025-04-28
Payer: COMMERCIAL

## 2025-04-28 DIAGNOSIS — R93.819 ABNORMAL RADIOLOGIC FINDINGS ON DIAGNOSTIC IMAGING OF UNSPECIFIED TESTICLE: ICD-10-CM

## 2025-04-28 DIAGNOSIS — R19.09 INGUINAL MASS: Primary | ICD-10-CM

## 2025-04-28 DIAGNOSIS — R10.32 LEFT GROIN PAIN: ICD-10-CM

## 2025-04-28 NOTE — TELEPHONE ENCOUNTER
----- Message from Manoj Armenta sent at 4/28/2025  1:44 PM CDT -----  RN: please call patient. he read my result note but I haven't heard back about weather he would like to see Dr Ivey for a consult and exam. thanks!

## 2025-04-28 NOTE — TELEPHONE ENCOUNTER
Called pt to get an update. Pt says that he was out of town for a , he just came back last night, so he didn't get a chance to look into it. Pt is willing to f/up with urology(Dr. Hale).     Penned referral, please review & sign if appropriate. Pt is aware that  will contact him in 1-2 business days to help schedule an appointment. No other questions or concerns from pt at this time. Thanks.    US result notes:  The Ultrasound did show some fluid around the left testicle. I ran this by one of my urology colleagues (Dr Hale) and while he doesn't think it is likely that this is the cause of your symptoms, it is the only abnormality we have found and it is possible it is contributing. He would be happy to see you in clinic for a consult if you'd like to go that route. Please let me know your thoughts and what questions you have.     Opal FLANNERY  Clinic RN  [facility]

## 2025-04-29 ENCOUNTER — PATIENT OUTREACH (OUTPATIENT)
Dept: CARE COORDINATION | Facility: CLINIC | Age: 65
End: 2025-04-29
Payer: COMMERCIAL

## 2025-05-01 ENCOUNTER — PATIENT OUTREACH (OUTPATIENT)
Dept: CARE COORDINATION | Facility: CLINIC | Age: 65
End: 2025-05-01
Payer: COMMERCIAL

## 2025-05-05 PROBLEM — D12.6 COLON ADENOMAS: Status: ACTIVE | Noted: 2025-05-05

## 2025-05-06 ENCOUNTER — PATIENT OUTREACH (OUTPATIENT)
Dept: GASTROENTEROLOGY | Facility: CLINIC | Age: 65
End: 2025-05-06
Payer: COMMERCIAL

## 2025-05-20 ENCOUNTER — HOSPITAL ENCOUNTER (OUTPATIENT)
Dept: ULTRASOUND IMAGING | Facility: HOSPITAL | Age: 65
Discharge: HOME | End: 2025-05-20
Attending: PHYSICIAN ASSISTANT
Payer: COMMERCIAL

## 2025-05-20 DIAGNOSIS — M79.662 PAIN OF LEFT LOWER LEG: ICD-10-CM

## 2025-05-20 PROCEDURE — 93971 EXTREMITY STUDY: CPT | Mod: LT

## 2025-05-22 ENCOUNTER — RESULTS FOLLOW-UP (OUTPATIENT)
Dept: PEDIATRICS | Facility: CLINIC | Age: 65
End: 2025-05-22
Payer: COMMERCIAL

## 2025-05-22 NOTE — RESULT ENCOUNTER NOTE
Ar Valenzuela ,    The results from your recent lower leg ultrasound shows that you do not have a clot in the leg or any notable cysts.  Please followup as needed for continued symptoms.      Thank you for choosing [facility] for your health care needs,      Odilia Morgan PA-C

## 2025-06-04 PROCEDURE — 99285 EMERGENCY DEPT VISIT HI MDM: CPT | Mod: 25

## 2025-06-04 ASSESSMENT — COLUMBIA-SUICIDE SEVERITY RATING SCALE - C-SSRS
6. HAVE YOU EVER DONE ANYTHING, STARTED TO DO ANYTHING, OR PREPARED TO DO ANYTHING TO END YOUR LIFE?: NO
2. HAVE YOU ACTUALLY HAD ANY THOUGHTS OF KILLING YOURSELF IN THE PAST MONTH?: NO
1. IN THE PAST MONTH, HAVE YOU WISHED YOU WERE DEAD OR WISHED YOU COULD GO TO SLEEP AND NOT WAKE UP?: NO

## 2025-06-05 ENCOUNTER — APPOINTMENT (OUTPATIENT)
Dept: ULTRASOUND IMAGING | Facility: CLINIC | Age: 65
End: 2025-06-05
Attending: EMERGENCY MEDICINE
Payer: COMMERCIAL

## 2025-06-05 ENCOUNTER — HOSPITAL ENCOUNTER (EMERGENCY)
Facility: CLINIC | Age: 65
Discharge: HOME | End: 2025-06-05
Attending: EMERGENCY MEDICINE
Payer: COMMERCIAL

## 2025-06-05 ENCOUNTER — APPOINTMENT (OUTPATIENT)
Dept: CT IMAGING | Facility: CLINIC | Age: 65
End: 2025-06-05
Attending: EMERGENCY MEDICINE
Payer: COMMERCIAL

## 2025-06-05 VITALS
OXYGEN SATURATION: 97 % | DIASTOLIC BLOOD PRESSURE: 66 MMHG | HEIGHT: 64 IN | RESPIRATION RATE: 18 BRPM | TEMPERATURE: 97.8 F | SYSTOLIC BLOOD PRESSURE: 148 MMHG | HEART RATE: 71 BPM | BODY MASS INDEX: 43.25 KG/M2 | WEIGHT: 253.31 LBS

## 2025-06-05 DIAGNOSIS — M54.41 ACUTE RIGHT-SIDED LOW BACK PAIN WITH RIGHT-SIDED SCIATICA: ICD-10-CM

## 2025-06-05 DIAGNOSIS — R10.31 RLQ ABDOMINAL PAIN: ICD-10-CM

## 2025-06-05 DIAGNOSIS — R60.0 LEG EDEMA, LEFT: ICD-10-CM

## 2025-06-05 LAB
ALBUMIN UR-MCNC: NEGATIVE MG/DL
ANION GAP SERPL CALCULATED.3IONS-SCNC: 9 MMOL/L (ref 7–15)
APPEARANCE UR: CLEAR
BASOPHILS # BLD AUTO: 0 10E3/UL (ref 0–0.2)
BASOPHILS NFR BLD AUTO: 0 %
BILIRUB UR QL STRIP: NEGATIVE
BUN SERPL-MCNC: 18.7 MG/DL (ref 8–23)
CALCIUM SERPL-MCNC: 9.8 MG/DL (ref 8.8–10.4)
CHLORIDE SERPL-SCNC: 103 MMOL/L (ref 98–107)
COLOR UR AUTO: ABNORMAL
CREAT SERPL-MCNC: 0.79 MG/DL (ref 0.67–1.17)
EGFRCR SERPLBLD CKD-EPI 2021: >90 ML/MIN/1.73M2
EOSINOPHIL # BLD AUTO: 0.1 10E3/UL (ref 0–0.7)
EOSINOPHIL NFR BLD AUTO: 1 %
ERYTHROCYTE [DISTWIDTH] IN BLOOD BY AUTOMATED COUNT: 12.8 % (ref 10–15)
GLUCOSE SERPL-MCNC: 109 MG/DL (ref 70–99)
GLUCOSE UR STRIP-MCNC: NEGATIVE MG/DL
HCO3 SERPL-SCNC: 26 MMOL/L (ref 22–29)
HCT VFR BLD AUTO: 39 % (ref 40–53)
HGB BLD-MCNC: 13.2 G/DL (ref 13.3–17.7)
HGB UR QL STRIP: ABNORMAL
IMM GRANULOCYTES # BLD: 0 10E3/UL
IMM GRANULOCYTES NFR BLD: 0 %
KETONES UR STRIP-MCNC: NEGATIVE MG/DL
LEUKOCYTE ESTERASE UR QL STRIP: NEGATIVE
LYMPHOCYTES # BLD AUTO: 1.6 10E3/UL (ref 0.8–5.3)
LYMPHOCYTES NFR BLD AUTO: 27 %
MCH RBC QN AUTO: 29.8 PG (ref 26.5–33)
MCHC RBC AUTO-ENTMCNC: 33.8 G/DL (ref 31.5–36.5)
MCV RBC AUTO: 88 FL (ref 78–100)
MONOCYTES # BLD AUTO: 0.6 10E3/UL (ref 0–1.3)
MONOCYTES NFR BLD AUTO: 10 %
NEUTROPHILS # BLD AUTO: 3.5 10E3/UL (ref 1.6–8.3)
NEUTROPHILS NFR BLD AUTO: 61 %
NITRATE UR QL: NEGATIVE
NRBC # BLD AUTO: 0 10E3/UL
NRBC BLD AUTO-RTO: 0 /100
PH UR STRIP: 6.5 [PH] (ref 5–7)
PLATELET # BLD AUTO: 174 10E3/UL (ref 150–450)
POTASSIUM SERPL-SCNC: 4.3 MMOL/L (ref 3.4–5.3)
RBC # BLD AUTO: 4.43 10E6/UL (ref 4.4–5.9)
RBC URINE: 4 /HPF
SODIUM SERPL-SCNC: 138 MMOL/L (ref 135–145)
SP GR UR STRIP: 1.01 (ref 1–1.03)
UROBILINOGEN UR STRIP-MCNC: NORMAL MG/DL
WBC # BLD AUTO: 5.8 10E3/UL (ref 4–11)
WBC URINE: 1 /HPF

## 2025-06-05 PROCEDURE — 36415 COLL VENOUS BLD VENIPUNCTURE: CPT | Performed by: EMERGENCY MEDICINE

## 2025-06-05 PROCEDURE — 82565 ASSAY OF CREATININE: CPT | Performed by: EMERGENCY MEDICINE

## 2025-06-05 PROCEDURE — 85018 HEMOGLOBIN: CPT | Performed by: EMERGENCY MEDICINE

## 2025-06-05 PROCEDURE — 250N000009 HC RX 250: Performed by: EMERGENCY MEDICINE

## 2025-06-05 PROCEDURE — 250N000011 HC RX IP 250 OP 636: Performed by: EMERGENCY MEDICINE

## 2025-06-05 PROCEDURE — 81001 URINALYSIS AUTO W/SCOPE: CPT | Performed by: EMERGENCY MEDICINE

## 2025-06-05 PROCEDURE — 250N000011 HC RX IP 250 OP 636: Mod: JZ | Performed by: EMERGENCY MEDICINE

## 2025-06-05 PROCEDURE — 82435 ASSAY OF BLOOD CHLORIDE: CPT | Performed by: EMERGENCY MEDICINE

## 2025-06-05 PROCEDURE — 96374 THER/PROPH/DIAG INJ IV PUSH: CPT | Mod: 59

## 2025-06-05 PROCEDURE — 250N000013 HC RX MED GY IP 250 OP 250 PS 637: Performed by: EMERGENCY MEDICINE

## 2025-06-05 PROCEDURE — 74177 CT ABD & PELVIS W/CONTRAST: CPT

## 2025-06-05 PROCEDURE — 93971 EXTREMITY STUDY: CPT | Mod: LT

## 2025-06-05 PROCEDURE — 85025 COMPLETE CBC W/AUTO DIFF WBC: CPT | Performed by: EMERGENCY MEDICINE

## 2025-06-05 RX ORDER — MORPHINE SULFATE 4 MG/ML
4 INJECTION, SOLUTION INTRAMUSCULAR; INTRAVENOUS ONCE
Refills: 0 | Status: DISCONTINUED | OUTPATIENT
Start: 2025-06-05 | End: 2025-06-05

## 2025-06-05 RX ORDER — KETOROLAC TROMETHAMINE 15 MG/ML
15 INJECTION, SOLUTION INTRAMUSCULAR; INTRAVENOUS ONCE
Status: COMPLETED | OUTPATIENT
Start: 2025-06-05 | End: 2025-06-05

## 2025-06-05 RX ORDER — METHYLPREDNISOLONE 4 MG/1
TABLET ORAL
Qty: 21 TABLET | Refills: 0 | Status: SHIPPED | OUTPATIENT
Start: 2025-06-05

## 2025-06-05 RX ORDER — OXYCODONE HYDROCHLORIDE 5 MG/1
10 TABLET ORAL
Refills: 0 | Status: DISCONTINUED | OUTPATIENT
Start: 2025-06-05 | End: 2025-06-05 | Stop reason: HOSPADM

## 2025-06-05 RX ORDER — METHOCARBAMOL 1000 MG/1
1000 TABLET, FILM COATED ORAL 3 TIMES DAILY PRN
Qty: 20 TABLET | Refills: 0 | Status: SHIPPED | OUTPATIENT
Start: 2025-06-05

## 2025-06-05 RX ORDER — IOPAMIDOL 755 MG/ML
500 INJECTION, SOLUTION INTRAVASCULAR ONCE
Status: COMPLETED | OUTPATIENT
Start: 2025-06-05 | End: 2025-06-05

## 2025-06-05 RX ORDER — METHOCARBAMOL 750 MG/1
750 TABLET, FILM COATED ORAL ONCE
Status: COMPLETED | OUTPATIENT
Start: 2025-06-05 | End: 2025-06-05

## 2025-06-05 RX ADMIN — SODIUM CHLORIDE 65 ML: 9 INJECTION, SOLUTION INTRAVENOUS at 03:11

## 2025-06-05 RX ADMIN — KETOROLAC TROMETHAMINE 15 MG: 15 INJECTION, SOLUTION INTRAMUSCULAR; INTRAVENOUS at 02:39

## 2025-06-05 RX ADMIN — IOPAMIDOL 100 ML: 755 INJECTION, SOLUTION INTRAVENOUS at 03:09

## 2025-06-05 RX ADMIN — METHOCARBAMOL 750 MG: 750 TABLET ORAL at 02:39

## 2025-06-05 ASSESSMENT — ACTIVITIES OF DAILY LIVING (ADL)
ADLS_ACUITY_SCORE: 41

## 2025-06-05 NOTE — ED PROVIDER NOTES
"  Emergency Department Note      History of Present Illness     Chief Complaint   Hip Pain      HPI   Fabio Logan is a 64 year old male with history of hypertension who presents for hip pain. Patient reports an onset of right hip pain while at work today.  He denies associated trauma or awkward movement.  The pain shoots down his right leg, all the way down to his foot, \"end up to my kidneys\". He also reports left leg swelling for the past few months that is progressively worsening and painful. He has had the leg swelling evaluated within the past 1 month with ultrasound which did not show DVT but no evaluation since and notes his symptoms have worsened since then. Denies chest pain and shortness of breath. Denies history of back surgery.  Nuys hematuria, urinary frequency or pain with urination.  He denies fever or chills.     Independent Historian   None    Review of External Notes   Ultrasound reviewed from 5/20/2025.  No DVT documented at that time.  Office visit reviewed from 5/20/2025.    Past Medical History     Medical History and Problem List   Anxiety  Arthritis  Aneurysm   COPD  Hypertension  Hyperlipidemia   Diverticulitis  Testicular cancer, right  IBS  Mild traumatic brain injury  Kidney stone  Mumps  Esophageal reflux  Migraine  Asthma  Symptomatic cholelithiasis  Osteoarthritis of left hand  Colon adenomas  Partial seizure   Obesity     Medications   Tenormin  Lipitor  Losartan  Prilosec  Trileptal  Zoloft  Imitrex  Desyrel     Surgical History   Colonoscopy   Craniotomy, repair aneurysm, combined  Cystoscopy  Eye surgery  Cerebral angiogram  Laparoscopic cholecystectomy  Orchiectomy   Testicle surgery   Tonsillectomy     Physical Exam     Patient Vitals for the past 24 hrs:   BP Temp Temp src Pulse Resp SpO2 Height Weight   06/05/25 0454 (!) 148/66 -- -- 71 -- -- -- --   06/04/25 2258 (!) 178/98 97.8  F (36.6  C) Oral 78 18 97 % 1.626 m (5' 4\") 114.9 kg (253 lb 4.9 oz)     Physical " Exam  General: Adult, laying on his side  Eyes: PERRL, Conjunctive within normal limits  ENT: Moist mucous membranes, oropharynx clear.   CV: Normal S1S2, no murmur, rub or gallop. Regular rate and rhythm.  DP pulses palpable bilaterally.  Resp: Clear to auscultation bilaterally, no wheezes, rales or rhonchi. Normal respiratory effort.  GI: Abdomen is soft and nondistended.  Right lower quadrant tenderness to palpation.  No palpable masses. No rebound or guarding.  MSK: No back tenderness to palpation.  Mild tenderness in the right buttock region without palpable mass.  Left extremity edema in the calf and foot with associated tenderness in the posterior calf and inner thigh.  No palpable cord or mass.  Nontender right lower extremity. Normal active range of motion.  Strength grossly intact bilaterally.  Skin: Warm and dry. No rashes or lesions or ecchymoses on visible skin.  Neuro: Alert and oriented. Responds appropriately to all questions and commands. No focal findings appreciated. Normal muscle tone.  Sensation intact to light touch over all dermatomes of the bilateral lower extremities.  No saddle anesthesia.  Psych: Normal mood and affect. Pleasant.     Diagnostics     Lab Results   Labs Ordered and Resulted from Time of ED Arrival to Time of ED Departure   BASIC METABOLIC PANEL - Abnormal       Result Value    Sodium 138      Potassium 4.3      Chloride 103      Carbon Dioxide (CO2) 26      Anion Gap 9      Urea Nitrogen 18.7      Creatinine 0.79      GFR Estimate >90      Calcium 9.8      Glucose 109 (*)    ROUTINE UA WITH MICROSCOPIC REFLEX TO CULTURE - Abnormal    Color Urine Straw      Appearance Urine Clear      Glucose Urine Negative      Bilirubin Urine Negative      Ketones Urine Negative      Specific Gravity Urine 1.013      Blood Urine Small (*)     pH Urine 6.5      Protein Albumin Urine Negative      Urobilinogen Urine Normal      Nitrite Urine Negative      Leukocyte Esterase Urine Negative       RBC Urine 4 (*)     WBC Urine 1     CBC WITH PLATELETS AND DIFFERENTIAL - Abnormal    WBC Count 5.8      RBC Count 4.43      Hemoglobin 13.2 (*)     Hematocrit 39.0 (*)     MCV 88      MCH 29.8      MCHC 33.8      RDW 12.8      Platelet Count 174      % Neutrophils 61      % Lymphocytes 27      % Monocytes 10      % Eosinophils 1      % Basophils 0      % Immature Granulocytes 0      NRBCs per 100 WBC 0      Absolute Neutrophils 3.5      Absolute Lymphocytes 1.6      Absolute Monocytes 0.6      Absolute Eosinophils 0.1      Absolute Basophils 0.0      Absolute Immature Granulocytes 0.0      Absolute NRBCs 0.0         Imaging   US Lower Extremity Venous Duplex Left   Final Result   IMPRESSION:   1.  No deep venous thrombosis in the left lower extremity.      CT Abdomen Pelvis w Contrast   Final Result   IMPRESSION:    1.  No cause of acute pain identified in the abdomen or pelvis. Normal appendix.   2.  Colonic diverticulosis, without evidence of diverticulitis.         Independent Interpretation   None    ED Course      Medications Administered   Medications   oxyCODONE (ROXICODONE) tablet 10 mg (has no administration in time range)   ketorolac (TORADOL) injection 15 mg (15 mg Intravenous $Given 6/5/25 0239)   methocarbamol (ROBAXIN) tablet 750 mg (750 mg Oral $Given 6/5/25 0239)   sodium chloride 0.9 % bag for CT scan flush (65 mLs Intravenous $Given 6/5/25 0311)   iopamidol (ISOVUE-370) solution 500 mL (100 mLs Intravenous $Given 6/5/25 0309)     Discussion of Management   None    ED Course   ED Course as of 06/05/25 0514   Thu Jun 05, 2025 0135 I obtained history and examined the patient as noted above.     I reassessed the patient.  His pain is still present but improved after interventions.  I discussed findings of today's evaluation.  He is ambulated without significant limp and is overall well in appearance.    Additional Documentation  None    Medical Decision Making / Diagnosis     CMS Diagnoses:  None    MIPS   None    MDM   Fabio Logan is a 64 year old male who presents for evaluation of back pain and radicular symptoms. They have no significant history of back pain in the past.  He also had right lower quadrant abdominal tenderness on examination and was noting months of left leg swelling, initial evaluation ultrasound 1 month ago with progressively worsening symptoms.  His neurovascular status is normal in both lower extremities.  Due to the abdominal pain in the setting of back pain, multiple other etiologies are considered including renal colic.  CT scan was obtained for this reason but was reassuring with no evidence of acute intra-abdominal pathology as a cause for her symptoms.  This likely represents lumbar radiculopathy.  There is no indication for consultation with neurosurgery or an orthopedic spinal surgeon.  There is no clinical evidence of cauda equina syndrome, discitis, spinal/epidural space hematoma or epidural abscess or other emergently worrisome etiology.     The patient was advised that radiculopathy often takes significant time to resolve, and that follow up with primary care, neurology and/or neurosurgery will be indicated if symptoms do not improve.  See his primary care doctor in 3 to 5 days.  He will be discharged with pain medications to use as directed.  No heavy lifting, bending or twisting. Return if increasing pain, muscular weakness, or bowel or bladder dysfunction.  He felt comfortable this plan.  All questions answered prior to discharge.    Disposition   The patient was discharged.     Diagnosis     ICD-10-CM    1. Acute right-sided low back pain with right-sided sciatica  M54.41       2. RLQ abdominal pain  R10.31       3. Leg edema, left  R60.0            Discharge Medications   Discharge Medication List as of 6/5/2025  4:58 AM        START taking these medications    Details   methylPREDNISolone (MEDROL DOSEPAK) 4 MG tablet therapy pack Follow Package Directions,  Disp-21 tablet, R-0, E-Prescribe             Scribe Disclosure:  I, Genoveva Noble, am serving as a scribe at 1:32 AM on 6/5/2025 to document services personally performed by Armida Malagon MD based on my observations and the provider's statements to me.        Armida Malagon MD  06/05/25 0619

## 2025-06-05 NOTE — Clinical Note
Fabio Desmond was seen and treated in our emergency department on 6/4/2025.  He may return to work on 06/09/2025.       If you have any questions or concerns, please don't hesitate to call.      Armida Malagon MD

## 2025-06-05 NOTE — ED TRIAGE NOTES
Was at work today when he felt a pain in right hip. Does not remember what he did to make it hurt other than he did some lifting some pieces of glass. Pain continued to increase throughout evening   Triage Assessment (Adult)       Row Name 06/04/25 2300          Triage Assessment    Airway WDL WDL        Respiratory WDL    Respiratory WDL WDL        Skin Circulation/Temperature WDL    Skin Circulation/Temperature WDL WDL        Peripheral/Neurovascular WDL    Peripheral Neurovascular WDL WDL        Cognitive/Neuro/Behavioral WDL    Cognitive/Neuro/Behavioral WDL WDL                      no

## 2025-06-06 PROBLEM — M48.061 LUMBAR FORAMINAL STENOSIS: Status: ACTIVE | Noted: 2025-06-06

## 2025-06-09 ENCOUNTER — HOSPITAL ENCOUNTER (OUTPATIENT)
Dept: GENERAL RADIOLOGY | Facility: HOSPITAL | Age: 65
Discharge: HOME | End: 2025-06-09
Attending: NURSE PRACTITIONER | Admitting: NURSE PRACTITIONER
Payer: COMMERCIAL

## 2025-06-09 ENCOUNTER — TELEPHONE (OUTPATIENT)
Dept: PHYSICAL MEDICINE AND REHAB | Facility: CLINIC | Age: 65
End: 2025-06-09

## 2025-06-09 ENCOUNTER — OFFICE VISIT (OUTPATIENT)
Dept: PHYSICAL MEDICINE AND REHAB | Facility: CLINIC | Age: 65
End: 2025-06-09
Payer: COMMERCIAL

## 2025-06-09 VITALS — SYSTOLIC BLOOD PRESSURE: 196 MMHG | DIASTOLIC BLOOD PRESSURE: 98 MMHG | HEART RATE: 68 BPM

## 2025-06-09 DIAGNOSIS — M25.551 HIP PAIN, RIGHT: Primary | ICD-10-CM

## 2025-06-09 DIAGNOSIS — G89.29 ACUTE EXACERBATION OF CHRONIC LOW BACK PAIN: ICD-10-CM

## 2025-06-09 DIAGNOSIS — M54.50 ACUTE EXACERBATION OF CHRONIC LOW BACK PAIN: ICD-10-CM

## 2025-06-09 DIAGNOSIS — M48.061 LUMBAR FORAMINAL STENOSIS: ICD-10-CM

## 2025-06-09 DIAGNOSIS — M25.551 HIP PAIN, RIGHT: ICD-10-CM

## 2025-06-09 PROCEDURE — 73502 X-RAY EXAM HIP UNI 2-3 VIEWS: CPT

## 2025-06-09 PROCEDURE — 3077F SYST BP >= 140 MM HG: CPT | Performed by: NURSE PRACTITIONER

## 2025-06-09 PROCEDURE — 3080F DIAST BP >= 90 MM HG: CPT | Performed by: NURSE PRACTITIONER

## 2025-06-09 PROCEDURE — 1125F AMNT PAIN NOTED PAIN PRSNT: CPT | Performed by: NURSE PRACTITIONER

## 2025-06-09 PROCEDURE — 99215 OFFICE O/P EST HI 40 MIN: CPT | Performed by: NURSE PRACTITIONER

## 2025-06-09 RX ORDER — CYCLOBENZAPRINE HCL 10 MG
10 TABLET ORAL 3 TIMES DAILY PRN
Qty: 45 TABLET | Refills: 0 | Status: SHIPPED | OUTPATIENT
Start: 2025-06-09 | End: 2025-06-11

## 2025-06-09 RX ORDER — PREDNISONE 20 MG/1
20 TABLET ORAL 2 TIMES DAILY
Qty: 10 TABLET | Refills: 0 | Status: SHIPPED | OUTPATIENT
Start: 2025-06-09

## 2025-06-09 ASSESSMENT — PAIN SCALES - GENERAL: PAINLEVEL_OUTOF10: SEVERE PAIN (10)

## 2025-06-09 NOTE — PATIENT INSTRUCTIONS
Imaging (Xray, CT, or MRI) has been ordered today.   Radiology will call you to schedule. Please call below if you do not hear from them in the next couple of days.     [facility] Radiology Scheduling:  Please call [phone] to schedule your image(s) (select option #1).    There are 3 different locations you may go as a walk-in to have same-day Xrays done:    [facility]  [address] [phone]    [facility] [place]  [address], [address]   [place] [facility] [address]    [place] [id number] [place] [id number]     An injection has been ordered today to potentially help with your pain symptoms. These injections do not fix what is going on in your back, therefore they typically do not take away the pain completely, however they can many times help improve symptoms. Injections should always be completed along with other modalities such as physical therapy for the best long term outcomes. If injections alone are done, then pain will likely return.     [facility] Injection Requirements:    A  is required for all fluoroscopically-guided injections.  Injection appointments may be cancelled if there are signs/symptoms of an active infection or if the patient is being actively treated with antibiotics for a diagnosed infection.  Patients may have their steroid injection cancelled if they have had another steroid injection within 2 weeks.  Diabetic patients will have their blood glucose levels checked the day of their injection and the appointment will be rescheduled if the blood glucose level is 300 or higher.  Patients with allergies to cortisone, local anesthetics, iodine, or contrast dye should contact the [facility] Center to further discuss these considerations.  Patients scheduled for medial branch block diagnostic injections should refrain from taking pain medication the day of the procedure.  The medial  branch block injection appointment will be rescheduled if the patient's pain rating is not 5/10 or greater at the time of the procedure.  Patients taking warfarin/Coumadin will have their INR checked the day of the procedure and the procedure may be rescheduled if the INR is greater than 3.0.  Please contact the [facility] Center [phone]) if you are taking any prescription blood-thinning medications (warfarin, Plavix, Lovenox, Eliquis, Brilinta, Effient, etc.) as special dosing adjustments may need to be made depending on the type of injection you are scheduled to receive.  It is recommended that you delay having your steroid injection if you have received a flu shot or shingles vaccine within 2 weeks.       STOP METHOCARBAMOL  STOP METHYLPREDNISOLONE    Start Prednisone 20mg twice daily x 5 days. Please follow the directions on package.  Do not take with NSAIDs (ibuprofen, aleve, nabumetone, diclofenac).  OK to take with tylenol (extra strength or extended release/8hour) over the counter as needed.      POSSIBLE STEROID SIDE EFFECTS :  -If you experience these, it should only last for a short period-   Change in menstrual flow   Swelling   Increased appetite   Skin redness (flushness)   Skin rash   Mouth (oral) irritation   Blood sugar (glucose) levels   Sweats   Mood changes       Flexeril 10mg (muscle relaxant medication) has been prescribed today. Please take one tablet 3x daily as needed for muscle spasms. This medication may cause drowsiness. Please do not work or drive while taking this medication until you know how it affects you. If it does make you drowsy, you should only take it before bedtime or at times that you do not have to work/drive.      Radicular Pain    Radicular pain in either the arm or leg is usually from a bulging disc in the spine. A portion of the herniated disc may press against the nerves as the nerves exit the spine. This causes pain which is felt down the arm or leg. Other causes of  "radicular pain may include:  Fractures.  Heart disease.  Cancer.  An abnormal and usually degenerative state of the nervous system or nerves (neuropathy).    In most cases, radicular pain is treated without imaging unless symptoms do not start to improve. If that is the case, your provider may order a CT or MRI scan to determine the cause.     Nerves in the cervical spine (neck) may cause radicular pain into the outer shoulder and down the arm. It can spread down to the thumb and fingers. The symptoms vary depending on which nerve root has been affected. In most cases, radicular pain improves with conservative treatment such as physical therapy, cervical traction, medications, and epidural steroid injections. A program for neck rehabilitation with stretching and strengthening exercises is an important part of management. Treatment may take months, and surgery may be considered as a last resort if the symptoms do not improve.    Nerves in the lumbar spine (lower back) may cause radicular pain into the hip and down the leg. The commonly used term for this type of pain is \"sciatica\". Conservative treatment is also recommended for this problem. Most patients feel better after 2 to 4 weeks of rest and other supportive care. You should avoid bending, lifting, and all other activities which can make your pain worse. Physical therapy, traction, medications, and epidural steroid injections can be good options to help with your recovery. A program for back injury rehabilitation with stretching and strengthening exercises is an important part of management. Surgery may be considered as a last resort if symptoms do not improve with conservative treatment.     You may take over-the-counter or prescription medicines for pain, discomfort, or fever as directed by your caregiver. Muscle relaxants may help by relieving more severe pain and spasm. Neuropathic medication (such as gabapentin, lyrica, or cymbalta) can help decrease your " symptoms of nerve pain as well. Cold or massage can also give significant relief. Spinal manipulation is not recommended as it can increase the degree of disc protrusion. We do not recommend taking narcotic medication such as percocet, oxycodone, norco, dilaudid, or others unless pain is severe and not controlled with any other oral options.    Epidural steroid injections are often effective treatment for radicular pain. These injections deliver strong anti-inflammatory medicine to the area directly around the nerve root in the space between your back bones (vertebrae). Your provider can give you more information about steroid injections. These injections are most effective when given within two weeks of the onset of acute pain. You should see your provider for follow up care as recommended.     In most cases, radicular pain is treated without imaging unless symptoms do not start to improve. If that is the case, your provider may order a CT or MRI scan to determine the cause.     SEEK IMMEDIATE MEDICAL CARE IF:  You develop increased pain, weakness, or numbness in your arm or leg.  You develop difficulty with bladder or bowel control.  You develop abdominal pain.    Document Released: 01/25/2006 Document Revised: 03/11/2013 Document Reviewed: 04/12/2010  Patient Information  2013 [employer].       ~Please call our [facility] Nurse Navigation line [phone] with any questions or concerns about your treatment plan, if symptoms worsen and you would like to be seen urgently, or if you have any new or worsening numbness, weakness, or problems controlling bladder and bowel function.  ~You are also welcome to contact Nena Lynch via [employer], but please be aware that responses to [employer] message may take 2-3 days due to the high volume of patients seen in clinic.

## 2025-06-09 NOTE — LETTER
June 9, 2025      Fabio Logan  [address] [id number]        To Whom It May Concern:    Fabio Logan was seen in our clinic. I do not currently recommend any lifting, twisting, pushing, pulling, or chorelike activities due to current symptoms. He may return to work without restrictions once feeling better and may attempt to do so on 6/12.       Sincerely,        Nena CANOC  [facility]  O. [phone]

## 2025-06-09 NOTE — PROGRESS NOTES
ASSESSMENT: Fabio Logan is a 63 year old male who presents for consultation at the request of PCP Manoj Armenta, who presents today for a follow up patient evaluation of:    -Low back pain      Severe R gluteal region pain into the R hip, anterior and posterior thigh.   Positive R hip maneuvers  Recommend R hip XR and urgent R greater trochanter hip inj if no concerns on xr. Orders placed. Allergy to iodine listed (many years ago) however pt recently had a CT with IV contrast with no reaction, no pretreatment. We will call with xr results    For now start prednisone 20mg bid and switch muscle relaxers to flexeril 10. Consider adding neuropathic agent but may interact with trileptal trazodone and zoloft will hold off for now. Recommend lumbar MRI if relief is limited with hip inj. Recommended PT but pt in too much pain right now to do PT. Would recommend narcotic refills per PCP. Did discuss work restrictions but he would prefer to try to go back on Thursday          No data to display                     Diagnoses and all orders for this visit:  Hip pain, right  -     XR Hip Right 2-3 Views; Future  -     PAIN Joint Injection Major Joint Right; Future  -     predniSONE (DELTASONE) 20 MG tablet; Take 1 tablet (20 mg) by mouth 2 times daily.  Lumbar foraminal stenosis  -     Spine  Referral  Acute exacerbation of chronic low back pain  -     Spine  Referral  -     predniSONE (DELTASONE) 20 MG tablet; Take 1 tablet (20 mg) by mouth 2 times daily.  -     cyclobenzaprine (FLEXERIL) 10 MG tablet; Take 1 tablet (10 mg) by mouth 3 times daily as needed.      PLAN:  Reviewed spine anatomy and disease process. Discussed diagnosis and treatment options with the patient today. A shared decision making model was used.  The patient's values and choices were respected. The following represents what was discussed and decided upon by the provider and the patient.      -DIAGNOSTIC TESTS:  Images were  personally reviewed and interpreted and explained to patient today using a spine model.   -- I didn't order any new imaging today    -PHYSICAL THERAPY:    --recommended PT however pt in too much pain at this time does decline  Discussed the importance of core strengthening, ROM, stretching exercises with the patient and how each of these entities is important in decreasing pain.  Explained to the patient that the purpose of physical therapy is to teach the patient a home exercise program.  These exercises need to be performed every day in order to decrease pain and prevent future occurrences of pain.        -MEDICATIONS:    --start flexeril 10mg TID PRN  -start prednisone 20mg BID x 5 days  He will stop robaxin and medrol dose pack as he is at the end of his course  He can take over-the-counter Tylenol as directed.    He will stop NSAIDs until evaluated by primary care  -completed medrol pack  Discussed multiple medication options today with patient. Discussed risks, side effects, and proper use of medications. Patient verbalized understanding.    -INTERVENTIONS:    We did discuss injections. Orders placed for R greater trochanter hip inj  will pursue urgent auth given pain level. Patient would be a good candidate in the future for either epidural steroid injections or medial branch blocks if indicated based on symptoms and supported by imaging results.    -PATIENT EDUCATION:  Total time of 40 minutes, on the day of service, spent with the patient, reviewing the chart, placing orders, and documenting.   -Today we also discussed the pros and cons of the current treatment plan.    -FOLLOW-UP:   in person following inj per routine    Advised patient to call the [facility] if symptoms worsen, new numbness or weakness develops in the legs, or if they develop new or worsening problems controlling bladder or bowel function.   ______________________________________________________________________    SUBJECTIVE:    Nicolas  returns for follow up visit today   Severe 10/10 right leg pain. Front and back of the thigh and calf.  Has had to stop while walking very often, like every 30 ft. Also worse the longer the day goes on.prolonged standing. Worse with getting in and out of the car and lifting it up.  Color change redness recently in the left leg, did have US negative recently  Then pain in left and right hips, then couldn't lift either leg from the hip joint.  ED visit 6/5 due to pain, robaxin 500 and medrol pack rx. New lumbar CT performed and left leg US. Later saw pcp and norco added. He is taking two robaxins at a time. He does not feel anything is helping. He had some dizziness when he tried taking two norco. Now does not have any left leg pain.     He goes to the chiropractor, had an adjustment over the weekend that helped a little. He has a very physical job not able to work, has note from pcp to be out currently.   He had been doing pool walking.   He did not go for PT after last visit       Per original visit with updated meds.inj list    HPI:  Fabio Logan  Is a 63 year old male history of hypertension, asthma, anxiety, IBS who presents today for new patient evaluation of low back pain     Lower back pain which began gradually about a week ago after getting a vaccine.  He also endorses a mechanical fall about 2 weeks ago.  He got up afterwards without pain.  He has chronic intermittent low back pain, but this flare is quite significant.  Pain is more right-sided and centrally located.  The pain is worse with lying down, leaning over, stooping, doing his job as a  and , which she has done for many years.  He could barely get off the xray table on Tuesday. He is having difficulty walking longer distances.  Rates his pain today at a 9 out of 10, at worst a 10 out of 10, at best a 4 out of 10. He has pain radiating into the hips and buttocks and posterior proximal legs to some extent,  particularly with sitting. It goes back and forth. He is only able to walk 50 yards.    He does induce that he has had some dark stools lately, and has been taking ibuprofen and Tylenol as needed over-the-counter.    He denies any radicular leg pain, numbness, weakness, changes in bowel or bladder control.  He has been doing chiropractic care for his symptoms without significant relief.  He had lumbar x-rays and x-rays of his right hip done on October 31.  He has not done any physical therapy or had any injections, or previous spine surgeries.    He used to do marathon races etc., and now typically goes to walk in the pool several times a week and does some cardio.    -Treatment to Date:     -Medications:  Tylenol  Ibuprofen  Medrol dose pack  Robaxin 500  norco    Current Outpatient Medications   Medication Sig Dispense Refill    cyclobenzaprine (FLEXERIL) 10 MG tablet Take 1 tablet (10 mg) by mouth 3 times daily as needed. 45 tablet 0    predniSONE (DELTASONE) 20 MG tablet Take 1 tablet (20 mg) by mouth 2 times daily. 10 tablet 0    albuterol (PROAIR HFA/PROVENTIL HFA/VENTOLIN HFA) 108 (90 Base) MCG/ACT inhaler Inhale 2 puffs into the lungs every 6 hours as needed for shortness of breath or wheezing 36 g 3    atenolol (TENORMIN) 50 MG tablet Take 1 tablet (50 mg) by mouth daily. 90 tablet 3    atorvastatin (LIPITOR) 40 MG tablet Take 1 tablet (40 mg) by mouth daily. 90 tablet 3    fluticasone (FLONASE) 50 MCG/ACT nasal spray SPRAY 1 TO 2 SPRAYS INTO BOTH NOSTRISL DAILY 16 g 3    HYDROcodone-acetaminophen (NORCO) 5-325 MG tablet Take 1 tablet by mouth every 6 hours as needed for severe pain. 12 tablet 0    ketoconazole (NIZORAL) 2 % external cream Apply topically daily 60 g 3    losartan (COZAAR) 50 MG tablet Take 1 tablet (50 mg) by mouth daily. 90 tablet 3    methocarbamol (ROBAXIN) 500 MG tablet Take 1,000 mg by mouth 3 times daily.      methocarbamol 1000 MG TABS Take 1,000 mg by mouth 3 times daily as needed  for muscle spasms. 20 tablet 0    methylPREDNISolone (MEDROL DOSEPAK) 4 MG tablet therapy pack Follow Package Directions 21 tablet 0    multivitamin w/minerals (MULTI-VITAMIN) tablet Take 1 tablet by mouth daily Take 1 tab by mouth daily      omeprazole (PRILOSEC) 20 MG DR capsule Take 1 capsule (20 mg) by mouth daily. 90 capsule 3    OXcarbazepine (TRILEPTAL) 300 MG tablet Take 1.5 tablets (450 mg) by mouth 2 times daily. 274 tablet 3    sertraline (ZOLOFT) 100 MG tablet Take 1.5 tablets (150 mg) by mouth daily. 135 tablet 3    SUMAtriptan (IMITREX) 50 MG tablet Take 1 tablet (50 mg) by mouth at onset of headache for migraine (may repeat dose in 2 hours. Do not exceed 200mg in 24 hours Strength: 50 mg 18 tablet 3    traZODone (DESYREL) 50 MG tablet Take 1-2 tablets ( mg) by mouth at bedtime. 180 tablet 3    triamcinolone (KENALOG) 0.1 % ointment Apply topically 2 times daily as needed for irritation.       No current facility-administered medications for this visit.       Allergies   Allergen Reactions    Iodine Shortness Of Breath     Other reaction(s): Shortness of breath    internal iodine for tests       Past Medical History:   Diagnosis Date    Anxiety     Arthritis     COPD (chronic obstructive pulmonary disease) (H)     Depression     Depressive disorder     Diverticulitis     h/o Mumps     Hypertension     Kidney stone     Malignant neoplasm of testis, unspecified laterality, unspecified whether descended or undescended 05/18/2018    IMO Regulatory Load OCT 2020      Mild traumatic brain injury, without loss of consciousness, initial encounter (H) 03/06/2024    Testicular cancer - Right 1998        Patient Active Problem List   Diagnosis    Mild major depression (H)    Obese    HTN, goal below 140/90    H/O testicular cancer    Diverticulosis of large intestine    Esophageal reflux    Chest pain    Migraine    Skin cancer screening    Skin eruption    Lentigines    Chronic dermatitis    Mild  intermittent asthma without complication    Essential hypertension with goal blood pressure less than 140/90    Impaired fasting glucose    Morbid obesity (H)    Symptomatic cholelithiasis    Partial seizure (H)    Pain of left hand    Osteoarthritis of left hand, unspecified osteoarthritis type    Major depressive disorder, recurrent episode, mild    Aneurysm    Colon adenomas    Lumbar foraminal stenosis       Past Surgical History:   Procedure Laterality Date    COLONOSCOPY N/A 08/22/2014    Procedure: COLONOSCOPY;  Surgeon: Joe Garcia MD;  Location:  GI    COLONOSCOPY      COLONOSCOPY N/A 4/21/2025    Procedure: Colonoscopy with polypectomies using cold snare;  Surgeon: Krzysztof Mendoza MD;  Location:  GI    CRANIOTOMY, REPAIR ANEURYSM, COMBINED Right 05/07/2019    Procedure: RIGHT CRANIOTOMY AND CLIPPING OF COMPLEX MIDDLE CEREBRAL ARTERY ANEURYMS; INTRAOPERATIVE ANGIOGRAM;  Surgeon: Jamir Rebollar MD;  Location: [facility];  Service: Neurosurgery    CYSTOSCOPY      EYE SURGERY      ? not on H&P    IR CEREBRAL ANGIOGRAM  05/07/2019    IR CEREBRAL ANGIOGRAM  05/07/2019    LAPAROSCOPIC CHOLECYSTECTOMY N/A 09/02/2018    Procedure: LAPAROSCOPIC CHOLECYSTECTOMY;  LAPAROSCOPIC CHOLECYSTECTOMY ;  Surgeon: Shannan Chaves MD;  Location:  OR    LAPAROSCOPIC CHOLECYSTECTOMY      ORCHIECTOMY NOS Right 1998    ORCHIECTOMY SCROTAL Right 01/01/1998    TESTICLE SURGERY      TONSILLECTOMY      TONSILLECTOMY         Family History   Problem Relation Age of Onset    Cerebrovascular Disease Mother     Hypertension Mother     Varicose Veins Mother     Heart Disease Father         had 2 open heart surgery    Lipids Father     Hypertension Father     Coronary Artery Disease Father     Cancer Maternal Grandfather         skin cancer    Skin Cancer Maternal Grandfather         skin cancer    Heart Disease Sister 45        heart attack    Cancer Sister 46        breast cancer    Skin Cancer Sister          BCC    Cancer Sister     Coronary Artery Disease Sister     Hypertension Other         all siblings    Colon Cancer No family hx of        Reviewed past medical, surgical, and family history with patient found on new patient intake packet located in EMR Media tab.     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 recreational drug user      OBJECTIVE:  BP (!) 196/98   Pulse 68     PHYSICAL EXAMINATION:    --CONSTITUTIONAL:  Vital signs as above.  No acute distress.  The patient is well nourished and well groomed.  --PSYCHIATRIC:  Appropriate mood and affect. The patient is awake, alert, oriented to person, place, time and answering questions appropriately with clear speech.  Uncomfortable dt pain throughout exam  --SKIN:  Skin over the face and posterior torso is clean, dry, intact without rashes.    --RESPIRATORY: Normal rhythm and effort. No abnormal accessory muscle breathing patterns noted.   --ABDOMINAL:  Non-distended.    --GROSS MOTOR: .  Arises from sitting position with pain. R limp     --LOWER EXTREMITY MOTOR TESTING:  Hip flexion: right 5 (painful)/5, left 5/5  Hip adduction 5(painful)/5 right  Hip abduction 5 (painful)/5 right  Quads: right 5/5, left 5/5  Hamstrings: right 5/5, left 5/5  Dorsiflexion: right 5/5, left 5/5  Plantar flexion: right 5/5, left 5/5    Great toe MTP extension/EHL: right 5/5, left 5/5    Positive R ZACHARY and FADIR  Positive R greater trochanter tenderness to palp    R SI joint tenderness to palp    Negative R straight leg raise    --NEUROLOGICAL:  Sensation to light touch is grossly intact throughout both lower extremities.   Reflexes tanika lower ext 2/4 throughout  Negative clonus    --MUSCULOSKELETAL: Lumbar spine inspection reveals no evidence of deformity. negative  point tenderness to palpation lumbar spine. No paraspinal musculature tenderness.        RESULTS:   Prior medical records from [facility]where were reviewed today.    Imaging: Spine  imaging was personally reviewed and interpreted today. The images were shown to the patient and the findings were explained using a spine model.    EXAM: CT ABDOMEN AND PELVIS WITH CONTRAST  LOCATION: [facility]  DATE/TIME: 6/5/2025 3:20 AM CDT     INDICATION: Flank pain.  COMPARISON: 4/3/2025.     TECHNIQUE: CT scan of the abdomen and pelvis was performed following injection of IV contrast. Multiplanar reformats were obtained. Dose reduction techniques were used.  CONTRAST: 100 mL Isovue-370.     FINDINGS:     LOWER CHEST: Unremarkable.     HEPATOBILIARY: Prior cholecystectomy.     SPLEEN: Unremarkable.     PANCREAS: Unremarkable.     ADRENAL GLANDS: Unremarkable.     KIDNEYS/BLADDER: No suspicious renal lesions. 0.3 cm nonobstructing calculus in the interpolar region of the left kidney.     BOWEL: A few colonic diverticula are present, without evidence of diverticulitis. Normal appendix.     LYMPH NODES: Unremarkable.     PELVIC ORGANS: No acute findings.     MUSCULOSKELETAL: No acute findings.     OTHER: Atherosclerotic calcification in the abdominal aorta.                                                                      IMPRESSION:   1.  No cause of acute pain identified in the abdomen or pelvis. Normal appendix.  2.  Colonic diverticulosis, without evidence of diverticulitis.       Narrative & Impression   EXAM: US LOWER EXTREMITY VENOUS DUPLEX LEFT  LOCATION: [facility]  DATE: 6/5/2025     INDICATION: left leg swelling an dpain  COMPARISON: None.  TECHNIQUE: Venous Duplex ultrasound of the left lower extremity with and without compression, augmentation and duplex. Color flow and spectral Doppler with waveform analysis performed.     FINDINGS: Exam includes the common femoral, femoral, popliteal, and contralateral common femoral veins as well as segmentally visualized deep calf veins and greater saphenous vein.      LEFT: No deep vein thrombosis. No superficial  thrombophlebitis. No popliteal cyst.                                                                      IMPRESSION:  1.  No deep venous thrombosis in the left lower extremity.           EXAM: CT LUMBAR SPINE W/O CONTRAST  LOCATION: [facility]  DATE: 11/2/2023     INDICATION: rule out fracture or other injury, lower lumbar point tenderness severe low back pain, reported fall. xr detail poor  COMPARISON: None.  TECHNIQUE: Routine CT Lumbar Spine without IV contrast. Multiplanar reformats. Dose reduction techniques were used.      FINDINGS:  Vertebral body heights are maintained. Multilevel loss of disc height.     Multilevel loss of disc height with lateral osteophytic changes particularly on the left.     L1-L2: No canal stenosis. No significant foraminal narrowing.     L2-L3: Broad-based disc bulge. Mild canal stenosis. Moderate right and mild left foraminal narrowing.     L3-L4: There is a broad-based disc bulge. Ligamentum flavum hypertrophy and facet arthrosis with mild canal stenosis. Moderate right and left foraminal narrowing.     L4-L5: Broad-based disc bulge. Ligamentum flavum hypertrophy and facet arthrosis. Mild canal stenosis. Moderate bilateral foraminal narrowing.     L5-S1: Broad-based disc bulge. No canal stenosis. Facet disease. Severe bilateral foraminal narrowing.                                                                      IMPRESSION:  1.  No acute lumbar fracture. Degenerative changes in the lumbar spine with severe bilateral foraminal narrowing at L5-S1.    Nena NEELYP-C  [facility]  O. [phone]

## 2025-06-09 NOTE — TELEPHONE ENCOUNTER
Procedure ordered? yes    What insurance are we billing for this procedure?  [employer]  IF SCHEDULING AT [facility] OR SPINE PLEASE SCHEDULE AT LEAST 7-10 BUSINESS DAYS OUT SO A PA CAN BE OBTAINED    Is a  PAIN  order available to link to injection appointment or does one need to be transcribed?  YES: right hip joint pain. US Guided right greater trochanter hip joint injection    If needed, route to CN to assist in doing so.    Is  needed?: No   If YES, please initiate in-person  request.    Will patient have a ?  Yes    All fluoro procedures require a .  These US procedures also require a : piriformis, pudendal, scalene, & carpal tunnel.    Is patient taking any blood thinners (i.e. Plavix, coumadin, jantoven, warfarin, heparin, Xarelto, Pradaxa, Eliquis, Brilinta, or Effient, etc)? Yes - he is not sure   If YES, schedule out 2 weeks, and route to RN pool to determine if medication hold is needed.    Is patient taking aspirin? No   For CERVICAL or INTERLAMINAR procedures, route message to Care Navigation so they can seek approval from managing provider to hold aspirin for 6 days prior to the procedure.    Does patient have an allergy to contrast dye or iodine?  No  If YES, OK to schedule. Route to RN pool AND add allergy information to appointment notes    Is patient diabetic? No If YES, blood glucose level will be checked on day of procedure and will need to be 300mg/dL or below (for steroid injections).    Does patient have an active infection or treated for one within the past week? No   If YES, do NOT schedule and route to Care Navigation.     Is patient currently taking any antibiotics or have an active infection?  No  For patients on chronic, preventative, or prophylactic antibiotics, procedures may be scheduled.   For patients on antibiotics for active or recent infection: antibiotic course must have been completed and symptom-free of infection to safely proceed with  injection.  Send to Care Navigation if unsure.    Is patient actively being treated for cancer or immunocompromised? No  If YES, do NOT schedule and route to RN pool.     Any chance of pregnancy? NO   If YES, do NOT schedule and route to RN pool.    Have you had any vaccines in the last 2 weeks? NO  If YES, please route to Care Navigation to discuss before scheduling.     Reminders:  -  If you are started on any steroids or antibiotics between now and your appointment, you must contact us because it may affect our ability to perform your procedure.   reviewed    -  Informed patient that it is OK to take normal medications before the procedure and must hold blood thinners as instructed.  reviewed    -  Patients scheduled for MBBs should not take pain meds prior to injection and pain rating needs to be 5/10 or greater the day of the procedure.    -  Informed cervical injection patients that an IV will be placed as a precautionary measure.  reviewed    -  Patients should eat a light meal prior to their procedure appointment.  reviewed    -  All radiofrequency ablations are in a 40 minute time slot and not to be scheduled until in-basket message has been sent by the procedure team that the PA has been  received.  reviewed     -  Spinal cord stimulator trial scheduling is coordinated by the procedure team.    -  Care Navigation (#[phone]) is available to assist patients with additional questions.  reviewed    -  Cervical TFESIs with Dr. Lowery only.    *PLEASE FORWARD TO CARE NAVIGATION IF INDICATED.  PATIENT SHOULD BE INFORMED THAT THEY WILL BE CONTACTED BY CARE NAVIGATION ONLY IF CHANGES TO MEDICATION/CARE PLAN RECOMMENDATIONS ARE NEEDED.  IF THEY DO NOT HEAR BACK FROM CARE NAVIGATION, THEY ARE FINE TO CONTINUE WITH THEIR CURRENT MEDICATIONS/CARE PLAN.*

## 2025-06-09 NOTE — LETTER
6/9/2025      Fabio Logan  [address]  [place] [address]      Dear Colleague,    Thank you for referring your patient, Fabio Logan, to the [facility]. Please see a copy of my visit note below.    ASSESSMENT: Fabio Logan is a 63 year old male who presents for consultation at the request of PCP Manoj Armenta, who presents today for a follow up patient evaluation of:    -Low back pain      Severe R gluteal region pain into the R hip, anterior and posterior thigh.   Positive R hip maneuvers  Recommend R hip XR and urgent R greater trochanter hip inj if no concerns on xr. Orders placed. Allergy to iodine listed (many years ago) however pt recently had a CT with IV contrast with no reaction, no pretreatment. We will call with xr results    For now start prednisone 20mg bid and switch muscle relaxers to flexeril 10. Consider adding neuropathic agent but may interact with trileptal trazodone and zoloft will hold off for now. Recommend lumbar MRI if relief is limited with hip inj. Recommended PT but pt in too much pain right now to do PT. Would recommend narcotic refills per PCP. Did discuss work restrictions but he would prefer to try to go back on Thursday          No data to display                     Diagnoses and all orders for this visit:  Hip pain, right  -     XR Hip Right 2-3 Views; Future  -     PAIN Joint Injection Major Joint Right; Future  -     predniSONE (DELTASONE) 20 MG tablet; Take 1 tablet (20 mg) by mouth 2 times daily.  Lumbar foraminal stenosis  -     Spine  Referral  Acute exacerbation of chronic low back pain  -     Spine  Referral  -     predniSONE (DELTASONE) 20 MG tablet; Take 1 tablet (20 mg) by mouth 2 times daily.  -     cyclobenzaprine (FLEXERIL) 10 MG tablet; Take 1 tablet (10 mg) by mouth 3 times daily as needed.      PLAN:  Reviewed spine anatomy and disease process. Discussed diagnosis and treatment  options with the patient today. A shared decision making model was used.  The patient's values and choices were respected. The following represents what was discussed and decided upon by the provider and the patient.      -DIAGNOSTIC TESTS:  Images were personally reviewed and interpreted and explained to patient today using a spine model.   -- I didn't order any new imaging today    -PHYSICAL THERAPY:    --recommended PT however pt in too much pain at this time does decline  Discussed the importance of core strengthening, ROM, stretching exercises with the patient and how each of these entities is important in decreasing pain.  Explained to the patient that the purpose of physical therapy is to teach the patient a home exercise program.  These exercises need to be performed every day in order to decrease pain and prevent future occurrences of pain.        -MEDICATIONS:    --start flexeril 10mg TID PRN  -start prednisone 20mg BID x 5 days  He will stop robaxin and medrol dose pack as he is at the end of his course  He can take over-the-counter Tylenol as directed.    He will stop NSAIDs until evaluated by primary care  -completed medrol pack  Discussed multiple medication options today with patient. Discussed risks, side effects, and proper use of medications. Patient verbalized understanding.    -INTERVENTIONS:    We did discuss injections. Orders placed for R greater trochanter hip inj  will pursue urgent auth given pain level. Patient would be a good candidate in the future for either epidural steroid injections or medial branch blocks if indicated based on symptoms and supported by imaging results.    -PATIENT EDUCATION:  Total time of 40 minutes, on the day of service, spent with the patient, reviewing the chart, placing orders, and documenting.   -Today we also discussed the pros and cons of the current treatment plan.    -FOLLOW-UP:   in person following inj per routine    Advised patient to call the Spine  Center if symptoms worsen, new numbness or weakness develops in the legs, or if they develop new or worsening problems controlling bladder or bowel function.   ______________________________________________________________________    SUBJECTIVE:    Nicolas returns for follow up visit today   Severe 10/10 right leg pain. Front and back of the thigh and calf.  Has had to stop while walking very often, like every 30 ft. Also worse the longer the day goes on.prolonged standing. Worse with getting in and out of the car and lifting it up.  Color change redness recently in the left leg, did have US negative recently  Then pain in left and right hips, then couldn't lift either leg from the hip joint.  ED visit 6/5 due to pain, robaxin 500 and medrol pack rx. New lumbar CT performed and left leg US. Later saw pcp and norco added. He is taking two robaxins at a time. He does not feel anything is helping. He had some dizziness when he tried taking two norco. Now does not have any left leg pain.     He goes to the chiropractor, had an adjustment over the weekend that helped a little. He has a very physical job not able to work, has note from pcp to be out currently.   He had been doing pool walking.   He did not go for PT after last visit       Per original visit with updated meds.inj list    HPI:  Fabio Logan  Is a 63 year old male history of hypertension, asthma, anxiety, IBS who presents today for new patient evaluation of low back pain     Lower back pain which began gradually about a week ago after getting a vaccine.  He also endorses a mechanical fall about 2 weeks ago.  He got up afterwards without pain.  He has chronic intermittent low back pain, but this flare is quite significant.  Pain is more right-sided and centrally located.  The pain is worse with lying down, leaning over, stooping, doing his job as a  and , which she has done for many years.  He could barely get off the xray  table on Tuesday. He is having difficulty walking longer distances.  Rates his pain today at a 9 out of 10, at worst a 10 out of 10, at best a 4 out of 10. He has pain radiating into the hips and buttocks and posterior proximal legs to some extent, particularly with sitting. It goes back and forth. He is only able to walk 50 yards.    He does induce that he has had some dark stools lately, and has been taking ibuprofen and Tylenol as needed over-the-counter.    He denies any radicular leg pain, numbness, weakness, changes in bowel or bladder control.  He has been doing chiropractic care for his symptoms without significant relief.  He had lumbar x-rays and x-rays of his right hip done on October 31.  He has not done any physical therapy or had any injections, or previous spine surgeries.    He used to do marathon races etc., and now typically goes to walk in the pool several times a week and does some cardio.    -Treatment to Date:     -Medications:  Tylenol  Ibuprofen  Medrol dose pack  Robaxin 500  norco    Current Outpatient Medications   Medication Sig Dispense Refill     cyclobenzaprine (FLEXERIL) 10 MG tablet Take 1 tablet (10 mg) by mouth 3 times daily as needed. 45 tablet 0     predniSONE (DELTASONE) 20 MG tablet Take 1 tablet (20 mg) by mouth 2 times daily. 10 tablet 0     albuterol (PROAIR HFA/PROVENTIL HFA/VENTOLIN HFA) 108 (90 Base) MCG/ACT inhaler Inhale 2 puffs into the lungs every 6 hours as needed for shortness of breath or wheezing 36 g 3     atenolol (TENORMIN) 50 MG tablet Take 1 tablet (50 mg) by mouth daily. 90 tablet 3     atorvastatin (LIPITOR) 40 MG tablet Take 1 tablet (40 mg) by mouth daily. 90 tablet 3     fluticasone (FLONASE) 50 MCG/ACT nasal spray SPRAY 1 TO 2 SPRAYS INTO BOTH NOSTRISL DAILY 16 g 3     HYDROcodone-acetaminophen (NORCO) 5-325 MG tablet Take 1 tablet by mouth every 6 hours as needed for severe pain. 12 tablet 0     ketoconazole (NIZORAL) 2 % external cream Apply topically  daily 60 g 3     losartan (COZAAR) 50 MG tablet Take 1 tablet (50 mg) by mouth daily. 90 tablet 3     methocarbamol (ROBAXIN) 500 MG tablet Take 1,000 mg by mouth 3 times daily.       methocarbamol 1000 MG TABS Take 1,000 mg by mouth 3 times daily as needed for muscle spasms. 20 tablet 0     methylPREDNISolone (MEDROL DOSEPAK) 4 MG tablet therapy pack Follow Package Directions 21 tablet 0     multivitamin w/minerals (MULTI-VITAMIN) tablet Take 1 tablet by mouth daily Take 1 tab by mouth daily       omeprazole (PRILOSEC) 20 MG DR capsule Take 1 capsule (20 mg) by mouth daily. 90 capsule 3     OXcarbazepine (TRILEPTAL) 300 MG tablet Take 1.5 tablets (450 mg) by mouth 2 times daily. 274 tablet 3     sertraline (ZOLOFT) 100 MG tablet Take 1.5 tablets (150 mg) by mouth daily. 135 tablet 3     SUMAtriptan (IMITREX) 50 MG tablet Take 1 tablet (50 mg) by mouth at onset of headache for migraine (may repeat dose in 2 hours. Do not exceed 200mg in 24 hours Strength: 50 mg 18 tablet 3     traZODone (DESYREL) 50 MG tablet Take 1-2 tablets ( mg) by mouth at bedtime. 180 tablet 3     triamcinolone (KENALOG) 0.1 % ointment Apply topically 2 times daily as needed for irritation.       No current facility-administered medications for this visit.       Allergies   Allergen Reactions     Iodine Shortness Of Breath     Other reaction(s): Shortness of breath    internal iodine for tests       Past Medical History:   Diagnosis Date     Anxiety      Arthritis      COPD (chronic obstructive pulmonary disease) (H)      Depression      Depressive disorder      Diverticulitis      h/o Mumps      Hypertension      Kidney stone      Malignant neoplasm of testis, unspecified laterality, unspecified whether descended or undescended 05/18/2018    IMO Regulatory Load OCT 2020       Mild traumatic brain injury, without loss of consciousness, initial encounter (H) 03/06/2024     Testicular cancer - Right 1998        Patient Active Problem List    Diagnosis     Mild major depression (H)     Obese     HTN, goal below 140/90     H/O testicular cancer     Diverticulosis of large intestine     Esophageal reflux     Chest pain     Migraine     Skin cancer screening     Skin eruption     Lentigines     Chronic dermatitis     Mild intermittent asthma without complication     Essential hypertension with goal blood pressure less than 140/90     Impaired fasting glucose     Morbid obesity (H)     Symptomatic cholelithiasis     Partial seizure (H)     Pain of left hand     Osteoarthritis of left hand, unspecified osteoarthritis type     Major depressive disorder, recurrent episode, mild     Aneurysm     Colon adenomas     Lumbar foraminal stenosis       Past Surgical History:   Procedure Laterality Date     COLONOSCOPY N/A 08/22/2014    Procedure: COLONOSCOPY;  Surgeon: Joe Garcia MD;  Location:  GI     COLONOSCOPY       COLONOSCOPY N/A 4/21/2025    Procedure: Colonoscopy with polypectomies using cold snare;  Surgeon: Krzysztof Mendoza MD;  Location:  GI     CRANIOTOMY, REPAIR ANEURYSM, COMBINED Right 05/07/2019    Procedure: RIGHT CRANIOTOMY AND CLIPPING OF COMPLEX MIDDLE CEREBRAL ARTERY ANEURYMS; INTRAOPERATIVE ANGIOGRAM;  Surgeon: Jamir Rebollar MD;  Location: [facility];  Service: Neurosurgery     CYSTOSCOPY       EYE SURGERY      ? not on H&P     IR CEREBRAL ANGIOGRAM  05/07/2019     IR CEREBRAL ANGIOGRAM  05/07/2019     LAPAROSCOPIC CHOLECYSTECTOMY N/A 09/02/2018    Procedure: LAPAROSCOPIC CHOLECYSTECTOMY;  LAPAROSCOPIC CHOLECYSTECTOMY ;  Surgeon: Shannan Chaves MD;  Location:  OR     LAPAROSCOPIC CHOLECYSTECTOMY       ORCHIECTOMY NOS Right 1998     ORCHIECTOMY SCROTAL Right 01/01/1998     TESTICLE SURGERY       TONSILLECTOMY       TONSILLECTOMY         Family History   Problem Relation Age of Onset     Cerebrovascular Disease Mother      Hypertension Mother      Varicose Veins Mother      Heart Disease Father         had 2  open heart surgery     Lipids Father      Hypertension Father      Coronary Artery Disease Father      Cancer Maternal Grandfather         skin cancer     Skin Cancer Maternal Grandfather         skin cancer     Heart Disease Sister 45        heart attack     Cancer Sister 46        breast cancer     Skin Cancer Sister         BCC     Cancer Sister      Coronary Artery Disease Sister      Hypertension Other         all siblings     Colon Cancer No family hx of        Reviewed past medical, surgical, and family history with patient found on new patient intake packet located in EMR Media tab.     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 recreational drug user      OBJECTIVE:  BP (!) 196/98   Pulse 68     PHYSICAL EXAMINATION:    --CONSTITUTIONAL:  Vital signs as above.  No acute distress.  The patient is well nourished and well groomed.  --PSYCHIATRIC:  Appropriate mood and affect. The patient is awake, alert, oriented to person, place, time and answering questions appropriately with clear speech.  Uncomfortable dt pain throughout exam  --SKIN:  Skin over the face and posterior torso is clean, dry, intact without rashes.    --RESPIRATORY: Normal rhythm and effort. No abnormal accessory muscle breathing patterns noted.   --ABDOMINAL:  Non-distended.    --GROSS MOTOR: .  Arises from sitting position with pain. R limp     --LOWER EXTREMITY MOTOR TESTING:  Hip flexion: right 5 (painful)/5, left 5/5  Hip adduction 5(painful)/5 right  Hip abduction 5 (painful)/5 right  Quads: right 5/5, left 5/5  Hamstrings: right 5/5, left 5/5  Dorsiflexion: right 5/5, left 5/5  Plantar flexion: right 5/5, left 5/5    Great toe MTP extension/EHL: right 5/5, left 5/5    Positive R ZACHARY and FADIR  Positive R greater trochanter tenderness to palp    R SI joint tenderness to palp    Negative R straight leg raise    --NEUROLOGICAL:  Sensation to light touch is grossly intact throughout both lower extremities.   Reflexes tanika lower  ext 2/4 throughout  Negative clonus    --MUSCULOSKELETAL: Lumbar spine inspection reveals no evidence of deformity. negative  point tenderness to palpation lumbar spine. No paraspinal musculature tenderness.        RESULTS:   Prior medical records from [facility] and Care Everywhere were reviewed today.    Imaging: Spine imaging was personally reviewed and interpreted today. The images were shown to the patient and the findings were explained using a spine model.    EXAM: CT ABDOMEN AND PELVIS WITH CONTRAST  LOCATION: [facility]  DATE/TIME: 6/5/2025 3:20 AM CDT     INDICATION: Flank pain.  COMPARISON: 4/3/2025.     TECHNIQUE: CT scan of the abdomen and pelvis was performed following injection of IV contrast. Multiplanar reformats were obtained. Dose reduction techniques were used.  CONTRAST: 100 mL Isovue-370.     FINDINGS:     LOWER CHEST: Unremarkable.     HEPATOBILIARY: Prior cholecystectomy.     SPLEEN: Unremarkable.     PANCREAS: Unremarkable.     ADRENAL GLANDS: Unremarkable.     KIDNEYS/BLADDER: No suspicious renal lesions. 0.3 cm nonobstructing calculus in the interpolar region of the left kidney.     BOWEL: A few colonic diverticula are present, without evidence of diverticulitis. Normal appendix.     LYMPH NODES: Unremarkable.     PELVIC ORGANS: No acute findings.     MUSCULOSKELETAL: No acute findings.     OTHER: Atherosclerotic calcification in the abdominal aorta.                                                                      IMPRESSION:   1.  No cause of acute pain identified in the abdomen or pelvis. Normal appendix.  2.  Colonic diverticulosis, without evidence of diverticulitis.       Narrative & Impression   EXAM: US LOWER EXTREMITY VENOUS DUPLEX LEFT  LOCATION: [facility]  DATE: 6/5/2025     INDICATION: left leg swelling an dpain  COMPARISON: None.  TECHNIQUE: Venous Duplex ultrasound of the left lower extremity with and without  compression, augmentation and duplex. Color flow and spectral Doppler with waveform analysis performed.     FINDINGS: Exam includes the common femoral, femoral, popliteal, and contralateral common femoral veins as well as segmentally visualized deep calf veins and greater saphenous vein.      LEFT: No deep vein thrombosis. No superficial thrombophlebitis. No popliteal cyst.                                                                      IMPRESSION:  1.  No deep venous thrombosis in the left lower extremity.           EXAM: CT LUMBAR SPINE W/O CONTRAST  LOCATION: [facility]  DATE: 11/2/2023     INDICATION: rule out fracture or other injury, lower lumbar point tenderness severe low back pain, reported fall. xr detail poor  COMPARISON: None.  TECHNIQUE: Routine CT Lumbar Spine without IV contrast. Multiplanar reformats. Dose reduction techniques were used.      FINDINGS:  Vertebral body heights are maintained. Multilevel loss of disc height.     Multilevel loss of disc height with lateral osteophytic changes particularly on the left.     L1-L2: No canal stenosis. No significant foraminal narrowing.     L2-L3: Broad-based disc bulge. Mild canal stenosis. Moderate right and mild left foraminal narrowing.     L3-L4: There is a broad-based disc bulge. Ligamentum flavum hypertrophy and facet arthrosis with mild canal stenosis. Moderate right and left foraminal narrowing.     L4-L5: Broad-based disc bulge. Ligamentum flavum hypertrophy and facet arthrosis. Mild canal stenosis. Moderate bilateral foraminal narrowing.     L5-S1: Broad-based disc bulge. No canal stenosis. Facet disease. Severe bilateral foraminal narrowing.                                                                      IMPRESSION:  1.  No acute lumbar fracture. Degenerative changes in the lumbar spine with severe bilateral foraminal narrowing at L5-S1.    Nena Lynch FNP-C  [facility]  O.  [phone]               Again, thank you for allowing me to participate in the care of your patient.        Sincerely,        HERMILA Reyes CNP    Electronically signed

## 2025-06-10 ENCOUNTER — TELEPHONE (OUTPATIENT)
Dept: ADMINISTRATIVE | Facility: CLINIC | Age: 65
End: 2025-06-10
Payer: COMMERCIAL

## 2025-06-10 DIAGNOSIS — G89.29 ACUTE EXACERBATION OF CHRONIC LOW BACK PAIN: ICD-10-CM

## 2025-06-10 DIAGNOSIS — M54.50 ACUTE EXACERBATION OF CHRONIC LOW BACK PAIN: ICD-10-CM

## 2025-06-10 NOTE — TELEPHONE ENCOUNTER
[facility] Call Center    Phone Message    May a detailed message be left on voicemail: yes     Reason for Call: Cyclobenzaprine Is Missing from Pharmacy. Patient Stated He Need This Medication. Please Call Thanks    Action Taken:     [facility]       Travel Screening: Not Applicable     Date of Service:

## 2025-06-11 ENCOUNTER — RESULTS FOLLOW-UP (OUTPATIENT)
Dept: PHYSICAL MEDICINE AND REHAB | Facility: CLINIC | Age: 65
End: 2025-06-11

## 2025-06-11 RX ORDER — CYCLOBENZAPRINE HCL 10 MG
10 TABLET ORAL 3 TIMES DAILY PRN
Qty: 45 TABLET | Refills: 0 | Status: SHIPPED | OUTPATIENT
Start: 2025-06-11

## 2025-06-11 NOTE — TELEPHONE ENCOUNTER
Phone call to patient to inform patient the Rx has been resent to pharmacy. Confirmed receipt also noted. Left detailed message on dedicated voicemail.

## 2025-06-12 ENCOUNTER — RADIOLOGY INJECTION OFFICE VISIT (OUTPATIENT)
Dept: PHYSICAL MEDICINE AND REHAB | Facility: CLINIC | Age: 65
End: 2025-06-12
Attending: NURSE PRACTITIONER
Payer: COMMERCIAL

## 2025-06-12 VITALS
HEART RATE: 74 BPM | TEMPERATURE: 97.8 F | OXYGEN SATURATION: 96 % | DIASTOLIC BLOOD PRESSURE: 81 MMHG | SYSTOLIC BLOOD PRESSURE: 180 MMHG

## 2025-06-12 DIAGNOSIS — M16.11 OSTEOARTHRITIS OF RIGHT HIP: ICD-10-CM

## 2025-06-12 DIAGNOSIS — M16.11 OSTEOARTHRITIS OF RIGHT HIP: Primary | ICD-10-CM

## 2025-06-12 RX ORDER — LIDOCAINE HYDROCHLORIDE 10 MG/ML
INJECTION, SOLUTION EPIDURAL; INFILTRATION; INTRACAUDAL; PERINEURAL
Status: COMPLETED | OUTPATIENT
Start: 2025-06-12 | End: 2025-06-12

## 2025-06-12 RX ORDER — TRIAMCINOLONE ACETONIDE 40 MG/ML
INJECTION, SUSPENSION INTRA-ARTICULAR; INTRAMUSCULAR
Status: COMPLETED | OUTPATIENT
Start: 2025-06-12 | End: 2025-06-12

## 2025-06-12 RX ADMIN — LIDOCAINE HYDROCHLORIDE 1 ML: 10 INJECTION, SOLUTION EPIDURAL; INFILTRATION; INTRACAUDAL; PERINEURAL at 10:26

## 2025-06-12 RX ADMIN — TRIAMCINOLONE ACETONIDE 20 MG: 40 INJECTION, SUSPENSION INTRA-ARTICULAR; INTRAMUSCULAR at 10:27

## 2025-06-12 ASSESSMENT — PAIN SCALES - GENERAL: PAINLEVEL_OUTOF10: SEVERE PAIN (10)

## 2025-06-12 NOTE — PATIENT INSTRUCTIONS
DISCHARGE INSTRUCTIONS  During office hours (8:00 a.m.- 4:00 p.m.) questions or concerns may be answered  by calling Spine Center Navigation Nurses at  [phone].  Messages received after hours will be returned the following business day.      In the case of an emergency, please dial 911 or seek assistance at the nearest Emergency Room/Urgent Care facility.     You may experience an increase in your symptoms for the first 2 days (It may take anywhere between 2 days-2 weeks for the steroid to have maximum effect).    You may use ice on the injection site, as frequently as 20 minutes each hour if needed.    You may take your pain medicine.    You may shower. No swimming, tub bath or hot tub for 48 hours.  You may remove your bandaid/bandage as soon as you are home.    You may resume light activities, as tolerated.    Resume your usual diet as tolerated.    POSSIBLE STEROID SIDE EFFECTS (If steroid/cortisone was used for your procedure)  -If you experience these symptoms, it should only last for a short period  Swelling of the legs              Skin redness (flushing)     Mouth (oral) irritation   Blood sugar (glucose) levels            Sweats                    Mood changes  Headache  Sleeplessness  Weakened immune system for up to 14 days  Decreased effectiveness of vaccines if given within 2 weeks of the steroid.         POSSIBLE PROCEDURE SIDE EFFECTS  -Call the Spine Center if you are concerned  Increased Pain           Increased numbness/tingling      Nausea/Vomiting          Bruising/bleeding at site      Redness or swelling                                              Difficulty walking      Weakness           Fever greater than 100.5

## 2025-06-13 NOTE — TELEPHONE ENCOUNTER
Pre assessment completed for upcoming procedure.   (Please see previous telephone encounter notes for complete details)          Procedure details:    Arrival time and facility location reviewed.    Pre op exam needed? No.    Designated  policy reviewed. Instructed to have someone stay 6  hours post procedure.       Medication review:    Medications reviewed. Please see supporting documentation below. Holding recommendations discussed (if applicable).       Prep for procedure:     Procedure prep instructions reviewed.  Patient will be following the Low volume golytely extended bowel prep, he has obligations on Sat.night and is unable to do 2 days of prep.      Any additional information needed:  N/A      Patient verbalized understanding and had no questions or concerns at this time.      Shannan Westfall LPN  Endoscopy Procedure Pre Assessment   [phone] option 3  
Pre visit planning completed.      Procedure details:    Patient scheduled for Colonoscopy on 04.21.2025.     Arrival time: 0845. Procedure time 0930    Facility location: [facility]; [address] [place] [address]. Check in location: Main entrance, door #1 on the North side of the building under roundabout awning. DO NOT GO TO SURGERY/ED ENTRANCE.     Sedation type: Conscious sedation     Pre op exam needed? No.    Indication for procedure: Screen for colon cancer       Chart review:     Electronic implanted devices? No    Recent diagnosis of diverticulitis within the last 6 weeks? No      Medication review:    Diabetic? No    Anticoagulants? No    Weight loss medication/injectable? No GLP-1 medication per patient's medication list. Nursing to verify with pre-assessment call.    Other medication HOLDING recommendations:  N/A      Prep for procedure:     Bowel prep recommendation: Extended Golytely. Bowel prep sent to    [id number] - [place], [place] - [address]    Due to: BMI > 40    Procedure information and instructions sent via [employer]         Rocio Lynch RN  Endoscopy Procedure Pre Assessment   [phone] option 3   
[id number]

## 2025-06-17 ENCOUNTER — THERAPY VISIT (OUTPATIENT)
Dept: PHYSICAL THERAPY | Facility: CLINIC | Age: 65
End: 2025-06-17
Payer: COMMERCIAL

## 2025-06-17 DIAGNOSIS — M25.551 HIP PAIN, RIGHT: ICD-10-CM

## 2025-06-17 DIAGNOSIS — M48.061 LUMBAR FORAMINAL STENOSIS: ICD-10-CM

## 2025-06-17 PROCEDURE — 97110 THERAPEUTIC EXERCISES: CPT | Mod: GP | Performed by: PHYSICAL THERAPIST

## 2025-06-17 PROCEDURE — 97163 PT EVAL HIGH COMPLEX 45 MIN: CPT | Mod: GP | Performed by: PHYSICAL THERAPIST

## 2025-06-18 ENCOUNTER — APPOINTMENT (OUTPATIENT)
Dept: MRI IMAGING | Facility: CLINIC | Age: 65
End: 2025-06-18
Attending: EMERGENCY MEDICINE
Payer: COMMERCIAL

## 2025-06-18 ENCOUNTER — HOSPITAL ENCOUNTER (EMERGENCY)
Facility: CLINIC | Age: 65
Discharge: HOME | End: 2025-06-18
Attending: EMERGENCY MEDICINE
Payer: COMMERCIAL

## 2025-06-18 VITALS
WEIGHT: 250 LBS | OXYGEN SATURATION: 96 % | HEART RATE: 76 BPM | RESPIRATION RATE: 16 BRPM | HEIGHT: 65 IN | DIASTOLIC BLOOD PRESSURE: 78 MMHG | SYSTOLIC BLOOD PRESSURE: 127 MMHG | BODY MASS INDEX: 41.65 KG/M2 | TEMPERATURE: 97.2 F

## 2025-06-18 DIAGNOSIS — M54.16 LUMBAR RADICULOPATHY: ICD-10-CM

## 2025-06-18 LAB
ALBUMIN SERPL BCG-MCNC: 4.2 G/DL (ref 3.5–5.2)
ALBUMIN UR-MCNC: NEGATIVE MG/DL
ALP SERPL-CCNC: 99 U/L (ref 40–150)
ALT SERPL W P-5'-P-CCNC: 39 U/L (ref 0–70)
ANION GAP SERPL CALCULATED.3IONS-SCNC: 10 MMOL/L (ref 7–15)
APPEARANCE UR: CLEAR
AST SERPL W P-5'-P-CCNC: 23 U/L (ref 0–45)
BASE EXCESS BLDV CALC-SCNC: 0 MMOL/L (ref -3–3)
BASOPHILS # BLD AUTO: 0.1 10E3/UL (ref 0–0.2)
BASOPHILS NFR BLD AUTO: 1 %
BILIRUB SERPL-MCNC: 0.4 MG/DL
BILIRUB UR QL STRIP: NEGATIVE
BUN SERPL-MCNC: 17.3 MG/DL (ref 8–23)
CALCIUM SERPL-MCNC: 9.6 MG/DL (ref 8.8–10.4)
CHLORIDE SERPL-SCNC: 99 MMOL/L (ref 98–107)
COLOR UR AUTO: YELLOW
CREAT SERPL-MCNC: 0.73 MG/DL (ref 0.67–1.17)
CRP SERPL-MCNC: <3 MG/L
EGFRCR SERPLBLD CKD-EPI 2021: >90 ML/MIN/1.73M2
EOSINOPHIL # BLD AUTO: 0.1 10E3/UL (ref 0–0.7)
EOSINOPHIL NFR BLD AUTO: 1 %
ERYTHROCYTE [DISTWIDTH] IN BLOOD BY AUTOMATED COUNT: 13 % (ref 10–15)
ERYTHROCYTE [SEDIMENTATION RATE] IN BLOOD BY WESTERGREN METHOD: 10 MM/HR (ref 0–20)
GLUCOSE SERPL-MCNC: 103 MG/DL (ref 70–99)
GLUCOSE UR STRIP-MCNC: NEGATIVE MG/DL
HCO3 BLDV-SCNC: 26 MMOL/L (ref 21–28)
HCO3 SERPL-SCNC: 26 MMOL/L (ref 22–29)
HCT VFR BLD AUTO: 42.9 % (ref 40–53)
HGB BLD-MCNC: 14.4 G/DL (ref 13.3–17.7)
HGB UR QL STRIP: ABNORMAL
IMM GRANULOCYTES # BLD: 0.1 10E3/UL
IMM GRANULOCYTES NFR BLD: 1 %
KETONES UR STRIP-MCNC: NEGATIVE MG/DL
LACTATE BLD-SCNC: 1 MMOL/L (ref 0.7–2)
LEUKOCYTE ESTERASE UR QL STRIP: NEGATIVE
LYMPHOCYTES # BLD AUTO: 1.8 10E3/UL (ref 0.8–5.3)
LYMPHOCYTES NFR BLD AUTO: 21 %
MCH RBC QN AUTO: 29.9 PG (ref 26.5–33)
MCHC RBC AUTO-ENTMCNC: 33.6 G/DL (ref 31.5–36.5)
MCV RBC AUTO: 89 FL (ref 78–100)
MONOCYTES # BLD AUTO: 0.8 10E3/UL (ref 0–1.3)
MONOCYTES NFR BLD AUTO: 9 %
MUCOUS THREADS #/AREA URNS LPF: PRESENT /LPF
NEUTROPHILS # BLD AUTO: 5.8 10E3/UL (ref 1.6–8.3)
NEUTROPHILS NFR BLD AUTO: 68 %
NITRATE UR QL: NEGATIVE
NRBC # BLD AUTO: 0 10E3/UL
NRBC BLD AUTO-RTO: 0 /100
PCO2 BLDV: 48 MM HG (ref 40–50)
PH BLDV: 7.34 [PH] (ref 7.32–7.43)
PH UR STRIP: 5.5 [PH] (ref 5–7)
PLATELET # BLD AUTO: 202 10E3/UL (ref 150–450)
PO2 BLDV: 33 MM HG (ref 25–47)
POTASSIUM SERPL-SCNC: 4.1 MMOL/L (ref 3.4–5.3)
PROT SERPL-MCNC: 7.3 G/DL (ref 6.4–8.3)
RBC # BLD AUTO: 4.82 10E6/UL (ref 4.4–5.9)
RBC URINE: 5 /HPF
SAO2 % BLDV: 59 % (ref 70–75)
SODIUM SERPL-SCNC: 135 MMOL/L (ref 135–145)
SP GR UR STRIP: 1.02 (ref 1–1.03)
SQUAMOUS EPITHELIAL: <1 /HPF
UROBILINOGEN UR STRIP-MCNC: NORMAL MG/DL
WBC # BLD AUTO: 8.6 10E3/UL (ref 4–11)
WBC URINE: 2 /HPF

## 2025-06-18 PROCEDURE — 72148 MRI LUMBAR SPINE W/O DYE: CPT

## 2025-06-18 PROCEDURE — 96375 TX/PRO/DX INJ NEW DRUG ADDON: CPT

## 2025-06-18 PROCEDURE — 250N000013 HC RX MED GY IP 250 OP 250 PS 637: Performed by: EMERGENCY MEDICINE

## 2025-06-18 PROCEDURE — 85652 RBC SED RATE AUTOMATED: CPT | Performed by: EMERGENCY MEDICINE

## 2025-06-18 PROCEDURE — 85025 COMPLETE CBC W/AUTO DIFF WBC: CPT | Performed by: EMERGENCY MEDICINE

## 2025-06-18 PROCEDURE — 250N000011 HC RX IP 250 OP 636: Performed by: EMERGENCY MEDICINE

## 2025-06-18 PROCEDURE — 80053 COMPREHEN METABOLIC PANEL: CPT | Performed by: EMERGENCY MEDICINE

## 2025-06-18 PROCEDURE — 81003 URINALYSIS AUTO W/O SCOPE: CPT | Performed by: EMERGENCY MEDICINE

## 2025-06-18 PROCEDURE — 99285 EMERGENCY DEPT VISIT HI MDM: CPT | Mod: 25

## 2025-06-18 PROCEDURE — 96374 THER/PROPH/DIAG INJ IV PUSH: CPT

## 2025-06-18 PROCEDURE — 36415 COLL VENOUS BLD VENIPUNCTURE: CPT | Performed by: EMERGENCY MEDICINE

## 2025-06-18 PROCEDURE — 83605 ASSAY OF LACTIC ACID: CPT

## 2025-06-18 PROCEDURE — 86140 C-REACTIVE PROTEIN: CPT | Performed by: EMERGENCY MEDICINE

## 2025-06-18 RX ORDER — DEXAMETHASONE SODIUM PHOSPHATE 10 MG/ML
10 INJECTION, SOLUTION INTRAMUSCULAR; INTRAVENOUS ONCE
Status: COMPLETED | OUTPATIENT
Start: 2025-06-18 | End: 2025-06-18

## 2025-06-18 RX ORDER — HYDROMORPHONE HYDROCHLORIDE 1 MG/ML
0.5 INJECTION, SOLUTION INTRAMUSCULAR; INTRAVENOUS; SUBCUTANEOUS EVERY 30 MIN PRN
Refills: 0 | Status: DISCONTINUED | OUTPATIENT
Start: 2025-06-18 | End: 2025-06-18 | Stop reason: HOSPADM

## 2025-06-18 RX ORDER — OXYCODONE HYDROCHLORIDE 5 MG/1
10 TABLET ORAL ONCE
Refills: 0 | Status: COMPLETED | OUTPATIENT
Start: 2025-06-18 | End: 2025-06-18

## 2025-06-18 RX ADMIN — OXYCODONE HYDROCHLORIDE 10 MG: 5 TABLET ORAL at 13:10

## 2025-06-18 RX ADMIN — DEXAMETHASONE SODIUM PHOSPHATE 10 MG: 10 INJECTION, SOLUTION INTRAMUSCULAR; INTRAVENOUS at 16:43

## 2025-06-18 RX ADMIN — HYDROMORPHONE HYDROCHLORIDE 0.5 MG: 1 INJECTION, SOLUTION INTRAMUSCULAR; INTRAVENOUS; SUBCUTANEOUS at 13:18

## 2025-06-18 ASSESSMENT — ACTIVITIES OF DAILY LIVING (ADL)
ADLS_ACUITY_SCORE: 41

## 2025-06-18 ASSESSMENT — COLUMBIA-SUICIDE SEVERITY RATING SCALE - C-SSRS
6. HAVE YOU EVER DONE ANYTHING, STARTED TO DO ANYTHING, OR PREPARED TO DO ANYTHING TO END YOUR LIFE?: NO
1. IN THE PAST MONTH, HAVE YOU WISHED YOU WERE DEAD OR WISHED YOU COULD GO TO SLEEP AND NOT WAKE UP?: NO
2. HAVE YOU ACTUALLY HAD ANY THOUGHTS OF KILLING YOURSELF IN THE PAST MONTH?: NO

## 2025-06-18 NOTE — ED PROVIDER NOTES
Emergency Department Note      History of Present Illness     Chief Complaint   Leg Pain and Back Pain      HPI   Fabio Logan is a 64 year old male with a history of hyperlipidemia, essential hypertension, malignant tumor of testis, and partial seizures who presents for evaluation of right low back pain. Patient reports he has been experiencing right lower back pain that radiates into his groin and into in his right leg.  This has been ongoing now for the last couple of weeks.  He has been seen a few times in the ER, clinic, and with physical therapy.  He went to the [facility] today and was referred here for an MRI.  He had a steroid injection last week for this as well, and he has tried 2 courses of a Medrol Dosepak.  He also was taking Flexeril for this.  The pain has been worsening, and now he currently has some intermittent weakness in the right lower extremity.  He also has some baseline incontinence of urine, which he believes is unchanged.  He has been having some numbness and tingling in his right foot.  He is now having to use a cane due to the fact that his leg tends to give out on him occasionally as well.  He denies any fevers or changes to his bowel movements, and he has not lost control of his bowels.      Independent Historian   None    Review of External Notes   I reviewed the physical therapy note from 06/17/25.  I reviewed the [facility] ER note from 6/06/25  I reviewed physical medicine clinic note from 6/09/25.  Past Medical History     Medical History and Problem List   Vertigo  Anxiety  Hyperlipidemia  Plantar fascial fibromatosis  Diverticulitis of large intestine  Essential hypertension  Malignant tumor of testis  Partial seizure    Medications   Atenolol  Atorvastatin Calcium  Oxcarbazepine  Sertraline HCl    Surgical History   Orchiectomy  Eye surgery  Laparoscopic cholecystectomy  Colonoscopy  Orchiectomy  Tonsillectomy  Cerebral angiogram  Craniotomy for aneurysm/  "vertebrobasilar/ carotid circulation    Physical Exam     Patient Vitals for the past 24 hrs:   BP Temp Temp src Pulse Resp SpO2 Height Weight   06/18/25 1644 127/78 -- -- 76 16 96 % -- --   06/18/25 1213 137/86 97.2  F (36.2  C) Temporal 72 18 95 % 1.651 m (5' 5\") 113.4 kg (250 lb)     Physical Exam  Nursing note and vitals reviewed.  Constitutional:  Oriented to person, place, and time. Cooperative.  Appears a little uncomfortable.  HENT:   Nose:    Nose normal.   Mouth/Throat:   Mucous membranes are normal.   Eyes:    Conjunctivae normal and EOM are normal.      Pupils are equal, round, and reactive to light.   Neck:    Trachea normal.   Cardiovascular:  Normal rate, regular rhythm, normal heart sounds and normal pulses. No murmur heard.  Pulmonary/Chest:  Effort normal and breath sounds normal.   Abdominal:   Soft. Normal appearance and bowel sounds are normal.      There is no tenderness.      There is no rebound and no CVA tenderness.   Musculoskeletal:  Tenderness to palpation to the right low back region.  Extremities atraumatic x 4.   Neurological:   Alert and oriented to person, place, and time. Normal strength.      No cranial nerve deficit or sensory deficit. GCS eye subscore is 4. GCS verbal subscore is 5. GCS motor subscore is 6. 5/5 strength in all muscle groups of the lower extremities currently.  Subjective decreased sensation to light touch distally in the right lower extremity.  Right patellar reflex is somewhat diminished as compared to the left patellar reflex.  Straight leg raise on the right is positive, negative on the left.  Skin:    Skin is intact. No rash noted.   Psychiatric:   Normal mood and affect.     Diagnostics     Lab Results   Labs Ordered and Resulted from Time of ED Arrival to Time of ED Departure   COMPREHENSIVE METABOLIC PANEL - Abnormal       Result Value    Sodium 135      Potassium 4.1      Carbon Dioxide (CO2) 26      Anion Gap 10      Urea Nitrogen 17.3      Creatinine " 0.73      GFR Estimate >90      Calcium 9.6      Chloride 99      Glucose 103 (*)     Alkaline Phosphatase 99      AST 23      ALT 39      Protein Total 7.3      Albumin 4.2      Bilirubin Total 0.4     ROUTINE UA WITH MICROSCOPIC REFLEX TO CULTURE - Abnormal    Color Urine Yellow      Appearance Urine Clear      Glucose Urine Negative      Bilirubin Urine Negative      Ketones Urine Negative      Specific Gravity Urine 1.025      Blood Urine Moderate (*)     pH Urine 5.5      Protein Albumin Urine Negative      Urobilinogen Urine Normal      Nitrite Urine Negative      Leukocyte Esterase Urine Negative      Mucus Urine Present (*)     RBC Urine 5 (*)     WBC Urine 2      Squamous Epithelials Urine <1     ISTAT GASES LACTATE VENOUS POCT - Abnormal    Lactic Acid POCT 1.0      Bicarbonate Venous POCT 26      O2 Sat, Venous POCT 59 (*)     pCO2 Venous POCT 48      pH Venous POCT 7.34      pO2 Venous POCT 33      Base Excess/Deficit (+/-) POCT 0.0     ERYTHROCYTE SEDIMENTATION RATE AUTO - Normal    Erythrocyte Sedimentation Rate 10     CRP INFLAMMATION - Normal    CRP Inflammation <3.00     CBC WITH PLATELETS AND DIFFERENTIAL    WBC Count 8.6      RBC Count 4.82      Hemoglobin 14.4      Hematocrit 42.9      MCV 89      MCH 29.9      MCHC 33.6      RDW 13.0      Platelet Count 202      % Neutrophils 68      % Lymphocytes 21      % Monocytes 9      % Eosinophils 1      % Basophils 1      % Immature Granulocytes 1      NRBCs per 100 WBC 0      Absolute Neutrophils 5.8      Absolute Lymphocytes 1.8      Absolute Monocytes 0.8      Absolute Eosinophils 0.1      Absolute Basophils 0.1      Absolute Immature Granulocytes 0.1      Absolute NRBCs 0.0         Imaging   Lumbar spine MRI w/o contrast   Final Result   IMPRESSION:   1.  Multilevel lumbar spondylosis including multilevel Modic type I marrow signal changes.   2.  Moderate neural foraminal stenosis on the left at L3-L4 and bilaterally at L4-L5   3.  No high-grade  spinal canal stenosis.          Independent Interpretation   None    ED Course      Medications Administered   Medications   HYDROmorphone (PF) (DILAUDID) injection 0.5 mg (0.5 mg Intravenous $Given 6/18/25 1318)   oxyCODONE (ROXICODONE) tablet 10 mg (10 mg Oral $Given 6/18/25 1310)   dexAMETHasone PF (DECADRON) injection 10 mg (10 mg Intravenous $Given 6/18/25 1643)       Procedures   Procedures     Discussion of Management   Neurosurgery, Eli Lopez PA-C    ED Course   ED Course as of 06/18/25 1602   Wed Jun 18, 2025   1246 I evaluated the patient and obtained the history as noted above             Additional Documentation  None    Medical Decision Making / Diagnosis     CMS Diagnoses: None    MIPS   CT/MRI of the lumbar spine was obtained because of neurologic signs or symptoms (urinary retention/incontinence and lower extremity weakness).    ELA Logan is a 64 year old male who was referred here from the [facility] for an MRI of his lumbar spine.  For me currently, he does not appear to have any focal weakness.  However he has been having intermittent weakness, and he already has some baseline urinary incontinence that may be a little worse.  Therefore I felt it was necessary to proceed with the lumbar spine MRI, in addition to the above blood work and urine.  His MRI does show some abnormalities, however nothing that requires any emergent interventions.  I subsequently spoke with Eli Lopez PA-C, with neurosurgery, and she reviewed the patient's chart and imaging.  She was in agreement that nothing needs to be done emergently, but she did recommend an IV dose of dexamethasone here, which he was provided.  He already has tried 2 courses of a Medrol Dosepak, and therefore I do not feel it is necessary to send him home with that again.  I also explained the [place] chronic pain policy, and therefore I am not able to provide him a prescription to go home with.  I recommended he continue with  ibuprofen and Tylenol as well as ice or heat.  The neurosurgery clinic is going to reach out to him to schedule outpatient follow-up.  He should also follow-up with his physician.  He should return here with any concerns or worsening symptoms.    Disposition   The patient was discharged.     Diagnosis     ICD-10-CM    1. Lumbar radiculopathy  M54.16            Discharge Medications   Discharge Medication List as of 6/18/2025  4:54 PM            Scribe Disclosure:  I, Darian Mccoy, am serving as a scribe at 12:59 PM on 6/18/2025 to document services personally performed by Javed Jolly MD based on my observations and the provider's statements to me.        Javed Jolly MD  06/18/25 [id number]

## 2025-06-18 NOTE — PROGRESS NOTES
Paged from Dr. Jolly regarding patient     64 year old right hip, groin and leg pain who underwent hip injection recently presented with ongoing pain. Lumbar MRI demonstrated below.     No deficit on exam.     Plan  No acute surgery indicated  Can trial IV decadron and if helpful dischage with MDP  We will arrange follow up in our clinic and also recommend ortho eval with recent hip injection if right hip needs further work up     Dr. Jolly in agreement    Dr. Marrero updated. In agreement    Eli Lopez PA-C  [facility] Neurosurgery  [address] [place] [address]  [address] [place] [address]  Tel [phone]  Fax [phone]  Text page via [email or web address] Paging/Directory      Narrative & Impression   EXAM: MR LUMBAR SPINE W/O CONTRAST  LOCATION: [facility]  DATE: 6/18/2025     INDICATION: right low back pain with weakness and radiculopathy  COMPARISON: None.  TECHNIQUE: Routine Lumbar Spine MRI without IV contrast.     FINDINGS:   Nomenclature is based on 5 lumbar type vertebral bodies. Lumbar dextrocurvature apexed at L3-L4. Normal lumbar lordosis. No significant listhesis. Vertebral body heights are maintained. Faint Modic type one marrow signal changes about the L2-L3, L3-L4,   L4-L5, and L5-S1 endplates. Normal distal spinal cord and cauda equina with conus medullaris at L2-L3. No extraspinal abnormality. Unremarkable visualized bony pelvis.     T12-L1: Normal disc height and signal. No herniation. Normal facets. No spinal canal or neural foraminal stenosis.      L1-L2: Normal disc height and signal. No herniation. Normal facets. No spinal canal or neural foraminal stenosis.     L2-L3: Mild to moderate disc height loss. Disc desiccation. Disc bulge. Mild facet arthropathy. Mild spinal canal stenosis. Mild bilateral neural foraminal stenosis.      L3-L4: Moderate to advanced disc height loss, worse on the left. Circumferential disc ossific complex. Mild facet arthropathy. No spinal  canal stenosis. Mild right neural foraminal stenosis. Mild to moderate left neural foraminal stenosis the foraminal   osteophyte contacting the exiting left L3 nerve root.     L4-L5: Moderate disc height loss. Disc desiccation. Disc bulge. Mild facet arthropathy. No spinal canal stenosis. Mild to moderate neural foraminal stenosis bilaterally with the disc osteophyte complexes contacting the exiting L4 nerve roots bilaterally.     L5-S1: Mild disc height loss. Disc desiccation. Disc bulge. Circumferential disc ossific complex. Normal facets. No spinal canal stenosis. Moderate bilateral neural foraminal stenosis.                                                                      IMPRESSION:  1.  Multilevel lumbar spondylosis including multilevel Modic type I marrow signal changes.  2.  Moderate neural foraminal stenosis on the left at L3-L4 and bilaterally at L4-L5  3.  No high-grade spinal canal stenosis.

## 2025-06-18 NOTE — ED TRIAGE NOTES
[facility]  ED Arrival Note    Arrives through triage. ABC's intact. A &O X4. . Pt arrives with c/o back pain and R leg pain since the 5th. Referred to the ED from the urgency room. Patient is ambulatory, limited by pain.       Visitors during triage: Spouse      Triage Interventions: Direct rooming     Ambulatory: Yes    Meets Stroke Criteria?: No    Meets Trauma Criteria?: No    Shock Index (HR/SBP): <0.8, for provider reference    Directed to: Intake    Pronouns: he/him       Triage Assessment (Adult)       Row Name 06/18/25 1214          Triage Assessment    Airway WDL WDL        Respiratory WDL    Respiratory WDL WDL        Skin Circulation/Temperature WDL    Skin Circulation/Temperature WDL WDL        Cardiac WDL    Cardiac WDL WDL        Peripheral/Neurovascular WDL    Peripheral Neurovascular WDL WDL        Cognitive/Neuro/Behavioral WDL    Cognitive/Neuro/Behavioral WDL WDL

## 2025-06-19 ENCOUNTER — TELEPHONE (OUTPATIENT)
Dept: NEUROSURGERY | Facility: CLINIC | Age: 65
End: 2025-06-19
Payer: COMMERCIAL

## 2025-06-19 NOTE — TELEPHONE ENCOUNTER
[facility] Call [facility]    Phone Message    May a detailed message be left on voicemail: yes     Reason for Call: Pt was seen in the ED yesterday.  He was told to call and scheduled a follow up ASAP.  Please call Pt back to schedule.

## 2025-06-19 NOTE — TELEPHONE ENCOUNTER
Returned call to patient, got voicemail. Writer left message for patient to call clinic back when he is able at [phone]. Patient needs ED follow up for lumbar pain, right radicular symptoms. To be scheduled as 'New Neurosurgery' There are openings at [facility] on 6/24/25 with Chavo Griffin PA-C if patient can make this day work.

## 2025-06-23 NOTE — TELEPHONE ENCOUNTER
SPINE PATIENTS - NEW PROTOCOL PREVISIT     RECORDS RECEVEIVED FROM: Referred by Birdie Richardson PA-C   REASON FOR VISIT: ED Follow up; Lumbar foraminal stenosis   PROVIDER: Gaby   DATE OF APPT: 06/24/2025     NOTES (FOR ALL VISITS) STATUS DETAILS   OFFICE NOTE from referring provider  ED follow up; notes in chart   OFFICE NOTE from other specialist     DISCHARGE SUMMARY from hospital  ED visit 06/18/2025   DISCHARGE REPORT from ER     OPERATIVE REPORT     EMG REPORT     Injection     Physical therapy internal PT last completed 06/17/2025     IMAGING  (FOR ALL VISITS) STATUS DETAILS   MRI (HEAD, NECK, SPINE) internal MR Lumbar 06/18/2025   XRAY (SPINE) *NEUROSURGERY*     CT (HEAD, NECK, SPINE)          Does patient have C2C?  Year last updated Action     YES   [x]   2025   Please update at appointment if outdated more than 5 years       NO     []   N/A   Please complete C2C at appointment

## 2025-06-24 ENCOUNTER — TELEPHONE (OUTPATIENT)
Dept: PEDIATRICS | Facility: CLINIC | Age: 65
End: 2025-06-24

## 2025-06-24 ENCOUNTER — OFFICE VISIT (OUTPATIENT)
Dept: NEUROSURGERY | Facility: CLINIC | Age: 65
End: 2025-06-24
Payer: COMMERCIAL

## 2025-06-24 ENCOUNTER — OFFICE VISIT (OUTPATIENT)
Dept: PEDIATRICS | Facility: CLINIC | Age: 65
End: 2025-06-24
Payer: COMMERCIAL

## 2025-06-24 ENCOUNTER — PRE VISIT (OUTPATIENT)
Dept: NEUROSURGERY | Facility: CLINIC | Age: 65
End: 2025-06-24

## 2025-06-24 VITALS
BODY MASS INDEX: 40.59 KG/M2 | DIASTOLIC BLOOD PRESSURE: 87 MMHG | OXYGEN SATURATION: 98 % | WEIGHT: 243.6 LBS | HEIGHT: 65 IN | TEMPERATURE: 98 F | SYSTOLIC BLOOD PRESSURE: 137 MMHG | HEART RATE: 91 BPM | RESPIRATION RATE: 16 BRPM

## 2025-06-24 VITALS — DIASTOLIC BLOOD PRESSURE: 90 MMHG | SYSTOLIC BLOOD PRESSURE: 164 MMHG | OXYGEN SATURATION: 98 % | HEART RATE: 72 BPM

## 2025-06-24 DIAGNOSIS — M79.661 PAIN OF RIGHT LOWER LEG: Primary | ICD-10-CM

## 2025-06-24 DIAGNOSIS — M79.661 PAIN OF RIGHT LOWER LEG: ICD-10-CM

## 2025-06-24 DIAGNOSIS — M51.26 HERNIATED NUCLEUS PULPOSUS, L4-5 RIGHT: Primary | ICD-10-CM

## 2025-06-24 PROCEDURE — 3079F DIAST BP 80-89 MM HG: CPT | Performed by: STUDENT IN AN ORGANIZED HEALTH CARE EDUCATION/TRAINING PROGRAM

## 2025-06-24 PROCEDURE — 1125F AMNT PAIN NOTED PAIN PRSNT: CPT | Performed by: STUDENT IN AN ORGANIZED HEALTH CARE EDUCATION/TRAINING PROGRAM

## 2025-06-24 PROCEDURE — 99213 OFFICE O/P EST LOW 20 MIN: CPT | Performed by: PHYSICIAN ASSISTANT

## 2025-06-24 PROCEDURE — 1125F AMNT PAIN NOTED PAIN PRSNT: CPT | Performed by: PHYSICIAN ASSISTANT

## 2025-06-24 PROCEDURE — G2211 COMPLEX E/M VISIT ADD ON: HCPCS | Performed by: PHYSICIAN ASSISTANT

## 2025-06-24 PROCEDURE — 3077F SYST BP >= 140 MM HG: CPT | Performed by: PHYSICIAN ASSISTANT

## 2025-06-24 PROCEDURE — 3075F SYST BP GE 130 - 139MM HG: CPT | Performed by: STUDENT IN AN ORGANIZED HEALTH CARE EDUCATION/TRAINING PROGRAM

## 2025-06-24 PROCEDURE — 99214 OFFICE O/P EST MOD 30 MIN: CPT | Performed by: STUDENT IN AN ORGANIZED HEALTH CARE EDUCATION/TRAINING PROGRAM

## 2025-06-24 PROCEDURE — 3080F DIAST BP >= 90 MM HG: CPT | Performed by: PHYSICIAN ASSISTANT

## 2025-06-24 RX ORDER — OXYCODONE HYDROCHLORIDE 5 MG/1
5 TABLET ORAL DAILY PRN
Qty: 14 TABLET | Refills: 0 | Status: SHIPPED | OUTPATIENT
Start: 2025-06-24 | End: 2025-06-25

## 2025-06-24 RX ORDER — PREGABALIN 25 MG/1
CAPSULE ORAL
Qty: 126 CAPSULE | Refills: 0 | Status: SHIPPED | OUTPATIENT
Start: 2025-06-24 | End: 2025-08-05

## 2025-06-24 ASSESSMENT — PAIN SCALES - GENERAL
PAINLEVEL_OUTOF10: SEVERE PAIN (10)
PAINLEVEL_OUTOF10: SEVERE PAIN (10)

## 2025-06-24 NOTE — LETTER
6/24/2025      Fabio Logan  [address] [facility] [id number]      Dear Colleague,    Thank you for referring your patient, Fabio Logan, to the [facility]OLOG[facility]. Please see a copy of my visit note below.    NEUROSURGERY CLINIC CONSULT NOTE     DATE OF VISIT: 6/24/2025     SUBJECTIVE:     Fabio Logan is a pleasant 64 year old male who presents to the clinic today for consultation on lumbar spine pain with right leg radiculopathy. He is referred to the Neurosurgery Clinic by Dr. Jolly in Primary Care.   Today, he reports that since 06/05/2025 he has been experiencing a daily with fluctuating intensity, sharp, aching pain that initiates in the midline low lumbar region and radiates distally in what sounds like the right L4 and L5 distributions. This pain is not accompanied by paresthesia, numbness or perceived weakness but he does experience a stiffness and cramping sensation. Prolonged walking and standing aggravate the symptoms, while alleviation is obtained by sitting and positional changes. No mechanism of injury such as trauma or a fall is associated with the onset of the pain. There are no bowel or bladder changes. He denies saddle anesthesia. He denies changes in gait, instability, or falling episodes.     He has participated in conservative therapies to include physical therapy, HEP, SÁNCHEZ, MDP x2 and tylenol. None of these modalities have provided any significant long term relief.          Current Outpatient Medications:      atenolol (TENORMIN) 50 MG tablet, Take 1 tablet (50 mg) by mouth daily., Disp: 90 tablet, Rfl: 3     atorvastatin (LIPITOR) 40 MG tablet, Take 1 tablet (40 mg) by mouth daily., Disp: 90 tablet, Rfl: 3     cyclobenzaprine (FLEXERIL) 10 MG tablet, Take 1 tablet (10 mg) by mouth 3 times daily as needed., Disp: 45 tablet, Rfl: 0     losartan (COZAAR) 50 MG tablet, Take 1 tablet (50 mg) by mouth daily., Disp: 90 tablet, Rfl: 3      omeprazole (PRILOSEC) 20 MG DR capsule, Take 1 capsule (20 mg) by mouth daily., Disp: 90 capsule, Rfl: 3     OXcarbazepine (TRILEPTAL) 300 MG tablet, Take 1.5 tablets (450 mg) by mouth 2 times daily., Disp: 274 tablet, Rfl: 3     sertraline (ZOLOFT) 100 MG tablet, Take 1.5 tablets (150 mg) by mouth daily., Disp: 135 tablet, Rfl: 3     traZODone (DESYREL) 50 MG tablet, Take 1-2 tablets ( mg) by mouth at bedtime., Disp: 180 tablet, Rfl: 3     albuterol (PROAIR HFA/PROVENTIL HFA/VENTOLIN HFA) 108 (90 Base) MCG/ACT inhaler, Inhale 2 puffs into the lungs every 6 hours as needed for shortness of breath or wheezing, Disp: 36 g, Rfl: 3     fluticasone (FLONASE) 50 MCG/ACT nasal spray, SPRAY 1 TO 2 SPRAYS INTO BOTH NOSTRISL DAILY, Disp: 16 g, Rfl: 3     ketoconazole (NIZORAL) 2 % external cream, Apply topically daily, Disp: 60 g, Rfl: 3     methocarbamol (ROBAXIN) 500 MG tablet, Take 1,000 mg by mouth 3 times daily. (Patient not taking: Reported on 6/24/2025), Disp: , Rfl:      methocarbamol 1000 MG TABS, Take 1,000 mg by mouth 3 times daily as needed for muscle spasms., Disp: 20 tablet, Rfl: 0     methylPREDNISolone (MEDROL DOSEPAK) 4 MG tablet therapy pack, Follow Package Directions, Disp: 21 tablet, Rfl: 0     multivitamin w/minerals (MULTI-VITAMIN) tablet, Take 1 tablet by mouth daily Take 1 tab by mouth daily, Disp: , Rfl:      predniSONE (DELTASONE) 20 MG tablet, Take 1 tablet (20 mg) by mouth 2 times daily., Disp: 10 tablet, Rfl: 0     SUMAtriptan (IMITREX) 50 MG tablet, Take 1 tablet (50 mg) by mouth at onset of headache for migraine (may repeat dose in 2 hours. Do not exceed 200mg in 24 hours Strength: 50 mg, Disp: 18 tablet, Rfl: 3     triamcinolone (KENALOG) 0.1 % ointment, Apply topically 2 times daily as needed for irritation., Disp: , Rfl:      Allergies   Allergen Reactions     Iodine Shortness Of Breath     Other reaction(s): Shortness of breath    internal iodine for tests        Past Medical History:    Diagnosis Date     Anxiety      Arthritis      COPD (chronic obstructive pulmonary disease) (H)      Depression      Depressive disorder      Diverticulitis      h/o Mumps      Hypertension      Kidney stone      Malignant neoplasm of testis, unspecified laterality, unspecified whether descended or undescended 2018    IMO Regulatory Load OCT 2020       Mild traumatic brain injury, without loss of consciousness, initial encounter (H) 2024     Testicular cancer - Right         ROS: 10 point review of symptoms are negative other than the symptoms noted above in the HPI.     Family History has been reviewed with the patient, there are no pertinent findings to presenting concern.     Past Surgical History:   Procedure Laterality Date     COLONOSCOPY N/A 2014    Procedure: COLONOSCOPY;  Surgeon: Joe Garcia MD;  Location:  GI     COLONOSCOPY       COLONOSCOPY N/A 2025    Procedure: Colonoscopy with polypectomies using cold snare;  Surgeon: Krzysztof Mendoza MD;  Location:  GI     CRANIOTOMY, REPAIR ANEURYSM, COMBINED Right 2019    Procedure: RIGHT CRANIOTOMY AND CLIPPING OF COMPLEX MIDDLE CEREBRAL ARTERY ANEURYMS; INTRAOPERATIVE ANGIOGRAM;  Surgeon: Jamir Rebollar MD;  Location: [facility];  Service: Neurosurgery     CYSTOSCOPY       EYE SURGERY      ? not on H&P     IR CEREBRAL ANGIOGRAM  2019     IR CEREBRAL ANGIOGRAM  2019     LAPAROSCOPIC CHOLECYSTECTOMY N/A 2018    Procedure: LAPAROSCOPIC CHOLECYSTECTOMY;  LAPAROSCOPIC CHOLECYSTECTOMY ;  Surgeon: Shannan Chaves MD;  Location:  OR     LAPAROSCOPIC CHOLECYSTECTOMY       ORCHIECTOMY NOS Right      ORCHIECTOMY SCROTAL Right 1998     TESTICLE SURGERY       TONSILLECTOMY       TONSILLECTOMY          Social History     Tobacco Use     Smoking status: Former     Current packs/day: 0.00     Types: Cigarettes     Quit date: 1997     Years since quittin.0     Passive  exposure: Past     Smokeless tobacco: Former   Vaping Use     Vaping status: Never Used   Substance Use Topics     Alcohol use: No     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     Drug use: No        OBJECTIVE:   BP (!) 164/90 (BP Location: Left arm, Patient Position: Sitting, Cuff Size: Adult Regular)   Pulse 72   SpO2 98%    There is no height or weight on file to calculate BMI.     Imaging:     MR LUMBAR SPINE W/O CONTRAST  LOCATION: [facility]  DATE: 6/18/2025     INDICATION: right low back pain with weakness and radiculopathy  COMPARISON: None.  TECHNIQUE: Routine Lumbar Spine MRI without IV contrast.     FINDINGS:   Nomenclature is based on 5 lumbar type vertebral bodies. Lumbar dextrocurvature apexed at L3-L4. Normal lumbar lordosis. No significant listhesis. Vertebral body heights are maintained. Faint Modic type one marrow signal changes about the L2-L3, L3-L4,   L4-L5, and L5-S1 endplates. Normal distal spinal cord and cauda equina with conus medullaris at L2-L3. No extraspinal abnormality. Unremarkable visualized bony pelvis.     T12-L1: Normal disc height and signal. No herniation. Normal facets. No spinal canal or neural foraminal stenosis.      L1-L2: Normal disc height and signal. No herniation. Normal facets. No spinal canal or neural foraminal stenosis.     L2-L3: Mild to moderate disc height loss. Disc desiccation. Disc bulge. Mild facet arthropathy. Mild spinal canal stenosis. Mild bilateral neural foraminal stenosis.      L3-L4: Moderate to advanced disc height loss, worse on the left. Circumferential disc ossific complex. Mild facet arthropathy. No spinal canal stenosis. Mild right neural foraminal stenosis. Mild to moderate left neural foraminal stenosis the foraminal   osteophyte contacting the exiting left L3 nerve root.     L4-L5: Moderate disc height loss. Disc desiccation. Disc bulge. Mild facet arthropathy. No spinal canal stenosis. Mild to moderate neural foraminal  stenosis bilaterally with the disc osteophyte complexes contacting the exiting L4 nerve roots bilaterally.     L5-S1: Mild disc height loss. Disc desiccation. Disc bulge. Circumferential disc ossific complex. Normal facets. No spinal canal stenosis. Moderate bilateral neural foraminal stenosis.                                                                   IMPRESSION:  1.  Multilevel lumbar spondylosis including multilevel Modic type I marrow signal changes.  2.  Moderate neural foraminal stenosis on the left at L3-L4 and bilaterally at L4-L5  3.  No high-grade spinal canal stenosis.    Full radiological report in chart. Imaging was reviewed with with patient today.     Exam:     Patient appears comfortable, conversational, and in no apparent distress.   Head: Normocephalic, without obvious abnormality, atraumatic, no facial asymmetry.   Eyes: conjunctivae/corneas clear. PERRL, EOM's intact.   Throat: lips, mucosa, and tongue normal; teeth and gums normal.   Neck: supple, symmetrical, trachea midline, no adenopathy and thyroid: not enlarged, symmetric, no tenderness/mass/nodules.   Lungs: clear to auscultation bilaterally.   Heart: regular rate and rhythm.   Abdomen: soft, non-tender; bowel sounds normal; no masses, no organomegaly.   Pulses: 2+ and symmetric.   Skin: Skin color, texture, turgor normal. No rashes or lesions.     CN II-XII grossly intact, alert and appropriate with conversation and following commands.   Gait is non-antalgic. Able to tandem walk. Able to walk on toes and heels without difficulty.   Cervical spine is non tender to palpation. Appropriate range of motion of neck, not concerning for lhermitte's phenomenon.   Bilateral bicep 2/4 and tricep reflexes 1/4. Sensation intact throughout upper extremities.     UE muscle strength  Right  Left    Deltoid  5/5  5/5    Biceps  5/5  5/5    Triceps  5/5  5/5    Hand intrinsics  5/5  5/5    Hand grasp  5/5  5/5    Burkett signs  neg  neg      Lumbar  spine is non tender to palpation.  Intact sensation throughout lower extremities.   Bilateral patellar 2/4 and achilles reflex 1/4. Negative for pain with straight leg raise.     LE muscle strength  Right  Left    Iliopsoas (hip flexion)  5/5  5-/5    Quad (knee extension)  5/5  5-/5    Hamstring (knee flexion)  5/5  5-/5    Gastrocnemius (PF)  5/5  5-/5    Tibialis Ant. (DF)  5/5  5-/5    EHL  5/5  5-/5      Strength seems to be 2/2 pain.   Negative Babinski bilaterally. Negative for clonus.   Calves are soft and non-tender bilaterally.       ASSESSMENT/PLAN:     Fabio Logan is a 64 year old male who presents to the clinic for consultation on a daily with fluctuating intensity, sharp, aching pain that initiates in the midline low lumbar region and radiates distally in what sounds like the right L4 and L5 distributions. This pain is not accompanied by paresthesia, numbness or perceived weakness but he does experience a stiffness and cramping sensation. The patient's most recent imaging was reviewed with him today. It was explained that images show multilevel lumbar spondylosis including multilevel Modic type I marrow signal changes with moderate neural foraminal stenosis on the left at L3-L4 and bilaterally at L4-L5. On exam, he is noted to have appropriate strength, sensation and range of motion. He has attempted conservative management with physical therapy, HEP, SÁNCHEZ, MDP x2 and tylenol, without resolution of symptoms.     Based on his physical exam, we do feel that it would be in his best interest to obtain lumbar x-rasy to include upright ap/lat/flex/ext views.   He should also continue a conservative approach by participating in a physical therapy program.  We also discussed the possibility of obtaining an epidural steroid injection, which he would to proceed with. This referral has been placed.     We would like to see him back in three months if needed to further discuss other conservative options or  possible surgical interventions. In the event that patient's symptoms worsen or change we would like to see him sooner.        Respectfully,     JOAO Ashley, SHARMILA  [facility] Neurosurgery  [facility]  Tel: [phone]      Dr. Andre padilla.     Again, thank you for allowing me to participate in the care of your patient.        Sincerely,        Jon Griffin PA-C    Electronically signed

## 2025-06-24 NOTE — NURSING NOTE
"Fabio Logan is a 64 year old male who presents for:  Chief Complaint   Patient presents with    Consult     R sided LBP        Initial Vitals:  BP (!) 164/90 (BP Location: Left arm, Patient Position: Sitting, Cuff Size: Adult Regular)   Pulse 72   SpO2 98%  Estimated body mass index is 41.6 kg/m  as calculated from the following:    Height as of 6/18/25: 5' 5\" (1.651 m).    Weight as of 6/18/25: 250 lb (113.4 kg).  BP completed using cuff size: regular  Severe Pain (10)    Rudi Middleton    "

## 2025-06-24 NOTE — PROGRESS NOTES
Assessment & Plan     Pain of right lower leg  Patient with daily right leg pain with fluctuating intensity, sharp, aching pain that initiates in the midline low lumbar region and radiates distally in right L4 and L5 distributions. Had imaging that showed foraminal stenosis at L4/5. Saw neurosurgery earlier today (has also been seen in ED (emergency department) and tried multiple steroid packs) who recommended see primary care provider about pain management while waiting to get injection procedure with Dr. Perdomo.  He has tried additionally tylenol and muscle relaxants and is starting physical therapy. We discussed pain management strategies and that these prescription medications are intended to be temporary, particularly the oxycodone, and not a long term pain management option. If procedural intervention not beneficial I would likely ultimately refer to pain management clinic. In meantime, will start neruopathic pain agent Lyrica with initial low dose and increase weekly to 100mg. Oxycodone PRN provided for severe breakthrough pain until pregabalin titrated up. Patient is very agreeable to not wanting to take this medication long term. His ultimate goal is to return to work. Follow up appointment in 1 month scheduled with primary care provider extender to see how injection went, if needs to continue pregabalin. Goal would be to not need to renew oxycodone.  - pregabalin (LYRICA) 25 MG capsule; Take 1 capsule (25 mg) by mouth at bedtime for 7 days, THEN 2 capsules (50 mg) at bedtime for 7 days, THEN 3 capsules (75 mg) at bedtime for 7 days, THEN 4 capsules (100 mg) at bedtime for 21 days.  - oxyCODONE (ROXICODONE) 5 MG tablet; Take 1 tablet (5 mg) by mouth daily as needed for breakthrough pain.        MED REC REQUIRED  Post Medication Reconciliation Status: patient was not discharged from an inpatient facility or TCU        Subjective   Nicolas is a 64 year old, presenting for the following health issues:  ER  "F/U        6/24/2025     3:00 PM   Additional Questions   Roomed by NE         6/24/2025     3:00 PM   Patient Reported Additional Medications   Patient reports taking the following new medications None     HPI        ED/UC Followup:    Facility:   [facility]  Date of visit:  06/18, 06/05  Reason for visit: Right Hip pain, Right radiculopathy  Current Status: Stable- Right greater trochanter injection; planning TFESI  Has done multiple courses of oral steroids  Has Flexeril (cyclobenzaprine) which may be helping a little  Starting physical therapy  Right leg is problem: has drop foot   -fell off light rail after baseball game the other day      Fabricates metal to build store fronts, entrances, glass sheet cutting              Objective    /87 (BP Location: Right arm, Patient Position: Sitting, Cuff Size: Adult Large)   Pulse 91   Temp 98  F (36.7  C) (Temporal)   Resp 16   Ht 1.651 m (5' 5\")   Wt 110.5 kg (243 lb 9.6 oz)   SpO2 98%   BMI 40.54 kg/m    Body mass index is 40.54 kg/m .  Physical Exam   GEN: Alert and appropriately interactive for age  EYES: Eyes grossly normal to inspection, conjunctivae and sclerae normal  RESP: Breathing comfortably on room air   CV: Warm and well perfused peripheral extremities   MS: no gross musculoskeletal defects noted, no edema  NEURO: Alert and oriented. Speech fluent.  Using cane to ambulate.  PSYCH: Affect normal/bright. Appearance well groomed.               Signed Electronically by: Deirdre E. Milligan, MD    "

## 2025-06-24 NOTE — PROGRESS NOTES
NEUROSURGERY CLINIC CONSULT NOTE     DATE OF VISIT: 6/24/2025     SUBJECTIVE:     Fabio Logan is a pleasant 64 year old male who presents to the clinic today for consultation on lumbar spine pain with right leg radiculopathy. He is referred to the Neurosurgery Clinic by Dr. Jolly in Primary Care.   Today, he reports that since 06/05/2025 he has been experiencing a daily with fluctuating intensity, sharp, aching pain that initiates in the midline low lumbar region and radiates distally in what sounds like the right L4 and L5 distributions. This pain is not accompanied by paresthesia, numbness or perceived weakness but he does experience a stiffness and cramping sensation. Prolonged walking and standing aggravate the symptoms, while alleviation is obtained by sitting and positional changes. No mechanism of injury such as trauma or a fall is associated with the onset of the pain. There are no bowel or bladder changes. He denies saddle anesthesia. He denies changes in gait, instability, or falling episodes.     He has participated in conservative therapies to include physical therapy, HEP, SÁNCHEZ, MDP x2 and tylenol. None of these modalities have provided any significant long term relief.          Current Outpatient Medications:     atenolol (TENORMIN) 50 MG tablet, Take 1 tablet (50 mg) by mouth daily., Disp: 90 tablet, Rfl: 3    atorvastatin (LIPITOR) 40 MG tablet, Take 1 tablet (40 mg) by mouth daily., Disp: 90 tablet, Rfl: 3    cyclobenzaprine (FLEXERIL) 10 MG tablet, Take 1 tablet (10 mg) by mouth 3 times daily as needed., Disp: 45 tablet, Rfl: 0    losartan (COZAAR) 50 MG tablet, Take 1 tablet (50 mg) by mouth daily., Disp: 90 tablet, Rfl: 3    omeprazole (PRILOSEC) 20 MG DR capsule, Take 1 capsule (20 mg) by mouth daily., Disp: 90 capsule, Rfl: 3    OXcarbazepine (TRILEPTAL) 300 MG tablet, Take 1.5 tablets (450 mg) by mouth 2 times daily., Disp: 274 tablet, Rfl: 3    sertraline (ZOLOFT) 100 MG tablet,  Take 1.5 tablets (150 mg) by mouth daily., Disp: 135 tablet, Rfl: 3    traZODone (DESYREL) 50 MG tablet, Take 1-2 tablets ( mg) by mouth at bedtime., Disp: 180 tablet, Rfl: 3    albuterol (PROAIR HFA/PROVENTIL HFA/VENTOLIN HFA) 108 (90 Base) MCG/ACT inhaler, Inhale 2 puffs into the lungs every 6 hours as needed for shortness of breath or wheezing, Disp: 36 g, Rfl: 3    fluticasone (FLONASE) 50 MCG/ACT nasal spray, SPRAY 1 TO 2 SPRAYS INTO BOTH NOSTRISL DAILY, Disp: 16 g, Rfl: 3    ketoconazole (NIZORAL) 2 % external cream, Apply topically daily, Disp: 60 g, Rfl: 3    methocarbamol (ROBAXIN) 500 MG tablet, Take 1,000 mg by mouth 3 times daily. (Patient not taking: Reported on 6/24/2025), Disp: , Rfl:     methocarbamol 1000 MG TABS, Take 1,000 mg by mouth 3 times daily as needed for muscle spasms., Disp: 20 tablet, Rfl: 0    methylPREDNISolone (MEDROL DOSEPAK) 4 MG tablet therapy pack, Follow Package Directions, Disp: 21 tablet, Rfl: 0    multivitamin w/minerals (MULTI-VITAMIN) tablet, Take 1 tablet by mouth daily Take 1 tab by mouth daily, Disp: , Rfl:     predniSONE (DELTASONE) 20 MG tablet, Take 1 tablet (20 mg) by mouth 2 times daily., Disp: 10 tablet, Rfl: 0    SUMAtriptan (IMITREX) 50 MG tablet, Take 1 tablet (50 mg) by mouth at onset of headache for migraine (may repeat dose in 2 hours. Do not exceed 200mg in 24 hours Strength: 50 mg, Disp: 18 tablet, Rfl: 3    triamcinolone (KENALOG) 0.1 % ointment, Apply topically 2 times daily as needed for irritation., Disp: , Rfl:      Allergies   Allergen Reactions    Iodine Shortness Of Breath     Other reaction(s): Shortness of breath    internal iodine for tests        Past Medical History:   Diagnosis Date    Anxiety     Arthritis     COPD (chronic obstructive pulmonary disease) (H)     Depression     Depressive disorder     Diverticulitis     h/o Mumps     Hypertension     Kidney stone     Malignant neoplasm of testis, unspecified laterality, unspecified  whether descended or undescended 2018    IMO Regulatory Load OCT 2020      Mild traumatic brain injury, without loss of consciousness, initial encounter (H) 2024    Testicular cancer - Right         ROS: 10 point review of symptoms are negative other than the symptoms noted above in the HPI.     Family History has been reviewed with the patient, there are no pertinent findings to presenting concern.     Past Surgical History:   Procedure Laterality Date    COLONOSCOPY N/A 2014    Procedure: COLONOSCOPY;  Surgeon: Joe Garcia MD;  Location: [facility]    COLONOSCOPY      COLONOSCOPY N/A 2025    Procedure: Colonoscopy with polypectomies using cold snare;  Surgeon: Krzysztof Mendoza MD;  Location:  GI    CRANIOTOMY, REPAIR ANEURYSM, COMBINED Right 2019    Procedure: RIGHT CRANIOTOMY AND CLIPPING OF COMPLEX MIDDLE CEREBRAL ARTERY ANEURYMS; INTRAOPERATIVE ANGIOGRAM;  Surgeon: Jamir Rebollar MD;  [facility];  Service: Neurosurgery    CYSTOSCOPY      EYE SURGERY      ? not on H&P    IR CEREBRAL ANGIOGRAM  2019    IR CEREBRAL ANGIOGRAM  2019    LAPAROSCOPIC CHOLECYSTECTOMY N/A 2018    Procedure: LAPAROSCOPIC CHOLECYSTECTOMY;  LAPAROSCOPIC CHOLECYSTECTOMY ;  Surgeon: Shannan Chaves MD;  Location:  OR    LAPAROSCOPIC CHOLECYSTECTOMY      ORCHIECTOMY NOS Right     ORCHIECTOMY SCROTAL Right 1998    TESTICLE SURGERY      TONSILLECTOMY      TONSILLECTOMY          Social History     Tobacco Use    Smoking status: Former     Current packs/day: 0.00     Types: Cigarettes     Quit date: 1997     Years since quittin.0     Passive exposure: Past    Smokeless tobacco: Former   Vaping Use    Vaping status: Never Used   Substance Use Topics    Alcohol use: No     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    Drug use: No        OBJECTIVE:   BP (!) 164/90 (BP Location: Left arm, Patient Position: Sitting, Cuff Size: Adult Regular)   Pulse 72    SpO2 98%    There is no height or weight on file to calculate BMI.     Imaging:     MR LUMBAR SPINE W/O CONTRAST  LOCATION: [facility]  DATE: 6/18/2025     INDICATION: right low back pain with weakness and radiculopathy  COMPARISON: None.  TECHNIQUE: Routine Lumbar Spine MRI without IV contrast.     FINDINGS:   Nomenclature is based on 5 lumbar type vertebral bodies. Lumbar dextrocurvature apexed at L3-L4. Normal lumbar lordosis. No significant listhesis. Vertebral body heights are maintained. Faint Modic type one marrow signal changes about the L2-L3, L3-L4,   L4-L5, and L5-S1 endplates. Normal distal spinal cord and cauda equina with conus medullaris at L2-L3. No extraspinal abnormality. Unremarkable visualized bony pelvis.     T12-L1: Normal disc height and signal. No herniation. Normal facets. No spinal canal or neural foraminal stenosis.      L1-L2: Normal disc height and signal. No herniation. Normal facets. No spinal canal or neural foraminal stenosis.     L2-L3: Mild to moderate disc height loss. Disc desiccation. Disc bulge. Mild facet arthropathy. Mild spinal canal stenosis. Mild bilateral neural foraminal stenosis.      L3-L4: Moderate to advanced disc height loss, worse on the left. Circumferential disc ossific complex. Mild facet arthropathy. No spinal canal stenosis. Mild right neural foraminal stenosis. Mild to moderate left neural foraminal stenosis the foraminal   osteophyte contacting the exiting left L3 nerve root.     L4-L5: Moderate disc height loss. Disc desiccation. Disc bulge. Mild facet arthropathy. No spinal canal stenosis. Mild to moderate neural foraminal stenosis bilaterally with the disc osteophyte complexes contacting the exiting L4 nerve roots bilaterally.     L5-S1: Mild disc height loss. Disc desiccation. Disc bulge. Circumferential disc ossific complex. Normal facets. No spinal canal stenosis. Moderate bilateral neural foraminal stenosis.                                                                    IMPRESSION:  1.  Multilevel lumbar spondylosis including multilevel Modic type I marrow signal changes.  2.  Moderate neural foraminal stenosis on the left at L3-L4 and bilaterally at L4-L5  3.  No high-grade spinal canal stenosis.    Full radiological report in chart. Imaging was reviewed with with patient today.     Exam:     Patient appears comfortable, conversational, and in no apparent distress.   Head: Normocephalic, without obvious abnormality, atraumatic, no facial asymmetry.   Eyes: conjunctivae/corneas clear. PERRL, EOM's intact.   Throat: lips, mucosa, and tongue normal; teeth and gums normal.   Neck: supple, symmetrical, trachea midline, no adenopathy and thyroid: not enlarged, symmetric, no tenderness/mass/nodules.   Lungs: clear to auscultation bilaterally.   Heart: regular rate and rhythm.   Abdomen: soft, non-tender; bowel sounds normal; no masses, no organomegaly.   Pulses: 2+ and symmetric.   Skin: Skin color, texture, turgor normal. No rashes or lesions.     CN II-XII grossly intact, alert and appropriate with conversation and following commands.   Gait is non-antalgic. Able to tandem walk. Able to walk on toes and heels without difficulty.   Cervical spine is non tender to palpation. Appropriate range of motion of neck, not concerning for lhermitte's phenomenon.   Bilateral bicep 2/4 and tricep reflexes 1/4. Sensation intact throughout upper extremities.     UE muscle strength  Right  Left    Deltoid  5/5  5/5    Biceps  5/5  5/5    Triceps  5/5  5/5    Hand intrinsics  5/5  5/5    Hand grasp  5/5  5/5    Burkett signs  neg  neg      Lumbar spine is non tender to palpation.  Intact sensation throughout lower extremities.   Bilateral patellar 2/4 and achilles reflex 1/4. Negative for pain with straight leg raise.     LE muscle strength  Right  Left    Iliopsoas (hip flexion)  5/5  5-/5    Quad (knee extension)  5/5  5-/5    Hamstring (knee flexion)   5/5  5-/5    Gastrocnemius (PF)  5/5  5-/5    Tibialis Ant. (DF)  5/5  5-/5    EHL  5/5  5-/5      Strength seems to be 2/2 pain.   Negative Babinski bilaterally. Negative for clonus.   Calves are soft and non-tender bilaterally.       ASSESSMENT/PLAN:     Fabio Logan is a 64 year old male who presents to the clinic for consultation on a daily with fluctuating intensity, sharp, aching pain that initiates in the midline low lumbar region and radiates distally in what sounds like the right L4 and L5 distributions. This pain is not accompanied by paresthesia, numbness or perceived weakness but he does experience a stiffness and cramping sensation. The patient's most recent imaging was reviewed with him today. It was explained that images show multilevel lumbar spondylosis including multilevel Modic type I marrow signal changes with moderate neural foraminal stenosis on the left at L3-L4 and bilaterally at L4-L5. On exam, he is noted to have appropriate strength, sensation and range of motion. He has attempted conservative management with physical therapy, HEP, SÁNCHEZ, MDP x2 and tylenol, without resolution of symptoms.     Based on his physical exam, we do feel that it would be in his best interest to obtain lumbar x-rasy to include upright ap/lat/flex/ext views.   He should also continue a conservative approach by participating in a physical therapy program.  We also discussed the possibility of obtaining an epidural steroid injection, which he would to proceed with. This referral has been placed.     We would like to see him back in three months if needed to further discuss other conservative options or possible surgical interventions. In the event that patient's symptoms worsen or change we would like to see him sooner.        Respectfully,     Chavo Griffin, JOAO, PAASMITA  [facility] Neurosurgery  [facility]  Tel: [phone]      Dr. Andre padilla.

## 2025-06-24 NOTE — PATIENT INSTRUCTIONS
-Order placed for a steroid injection. The steroid can take 10-14 days to reach max effect. Please call our clinic if symptoms persist after this timeframe. Please call [phone] to schedule your injection  with Dr. Perdomo if you do not hear from his team in the next two days.    -Please continue your physical therapy to include lumbar traction.    -You can contact my office at [phone] with any questions.    -Moderate neural foraminal stenosis on the left at L3-L4 and bilaterally at L4-L5.      JOAO Ashley, SHARMILA  [facility] Neurosurgery  [facility]     Tel: [phone]  Fax: [phone]

## 2025-06-24 NOTE — TELEPHONE ENCOUNTER
Pharmacy: Since chronic pain is not stated, can do max 7 days supply per [place] law. Rest is null and void.      oxyCODONE (ROXICODONE) 5 MG tablet 14 tablet 0 6/24/2025 7/8/2025 No   Sig - Route: Take 1 tablet (5 mg) by mouth daily as needed for breakthrough pain. - Oral

## 2025-06-25 ENCOUNTER — ANCILLARY PROCEDURE (OUTPATIENT)
Dept: GENERAL RADIOLOGY | Facility: CLINIC | Age: 65
End: 2025-06-25
Attending: PHYSICIAN ASSISTANT
Payer: COMMERCIAL

## 2025-06-25 ENCOUNTER — TELEPHONE (OUTPATIENT)
Dept: PALLIATIVE MEDICINE | Facility: CLINIC | Age: 65
End: 2025-06-25

## 2025-06-25 DIAGNOSIS — M51.26 HERNIATED NUCLEUS PULPOSUS, L4-5 RIGHT: ICD-10-CM

## 2025-06-25 PROCEDURE — 72110 X-RAY EXAM L-2 SPINE 4/>VWS: CPT | Mod: TC | Performed by: RADIOLOGY

## 2025-06-25 RX ORDER — OXYCODONE HYDROCHLORIDE 5 MG/1
5 TABLET ORAL DAILY PRN
Qty: 7 TABLET | Refills: 0 | Status: SHIPPED | OUTPATIENT
Start: 2025-06-25

## 2025-06-25 NOTE — TELEPHONE ENCOUNTER
"Screening Questions for Radiology Injections: Pt is scheduled with Dr. Perdomo on 7/10     Injection to be done at which interventional clinic site? [facility]    If choosing [facility] for location, please inform patient:  \"[facility] is a Hospital based clinic. Before your visit, you should check with your insurance about how it covers the charges for facility services in a hospital-based clinic.     Procedure ordered by Jon Griffin PA-C in  NEUROSURGERY    Procedure ordered? Right L4-5 Transforaminal SÁNCHEZ  Transforaminal Cervical SÁNCHEZ - Send to [facility] ([facility] [place]) - No [facility] Site providers perform this procedure    What insurance would patient like us to bill for this procedure? [employer]  IF SCHEDULING IN [place] PAIN OR SPINE PLEASE SCHEDULE AT LEAST 7-10 BUSINESS DAYS OUT SO A PA CAN BE OBTAINED  Worker's comp or MVA (motor vehicle accident) -Any injection DO NOT SCHEDULE and route to Valentin Multani    [employer] insurance - For ALL INJECTIONS DO NOT SCHEDULE and route to Kacey Greene.     ALL BCBS, [employer] and [employer] - DO NOT SCHEDULE and route to Kacey Greene  [employer]- ALL INJECTIONS- route to Kacey Greene    Is patient scheduled at [facility]? No    If YES, route every encounter to [facility]INE [facility] CARE NAVIGATION POOL [[phone]]    Is an  needed? No     Patient has a  home? (Review Grid) YES: ok    Any chance of pregnancy? Not Applicable   If YES, do NOT schedule and route to RN pool  - Dr. Rodriguez route to PM&R Nurse  [[id number]]      Is patient actively being treated for cancer or immunocompromised? No  If YES, do NOT schedule and route to RN pool/ Dr. Rodriguez's Team    Does the patient have a bleeding or clotting disorder? No   If YES, okay to schedule AND route to RN nurse / Dr. Rodriguez's Team   (For any patients with platelet count <100, RN must forward to provider)    Is patient taking any Blood Thinners OR Antiplatelet medication?  " No   If hold needed, do NOT schedule, route to RN pool/ Dr. Rodriguez's Team  Examples:   Blood Thinners: (Coumadin, Warfarin, Jantoven, Pradaxa, Xarelto, Eliquis, Edoxaban, Enoxaparin, Lovenox, Heparin, Arixtra, Fondaparinux or Fragmin)  Antiplatelet Medications: (Plavix, Brilinta or Effient)     Is patient taking any aspirin products (includes: Aspirin 81 mg, Excedrin, and Fiorinal)? No.    If yes route to RN pool/ Dr. Rodriguez's Team - Do not schedule    Is patient taking any GLP-1 Antagonist (hold needed for sedation patients only) No   (semaglutide (Ozempic, Wegovy), dulaglutide (Trulicity), exenatide ER (Bydureon), tirzepatide (Mounjaro), Liraglutide (Saxenda, Victoza), semaglutide (Rybelsus), Terzepatide (Zepbound)  If YES, okay to schedule AND route to RN nurse / Dr. Rodriguez's Team      Any allergies to contrast dye, iodine, shellfish, or numbing and steroid medications? No  If YES, schedule and add allergy information to appointment notes AND route to the RN pool/ Dr. Rodriguez's Team  If SÁNCHEZ and Contrast Dye / Iodine Allergy? DO NOT SCHEDULE, route to RN pool/ Dr. Rodriguez's Team  Allergies: Iodine     Does patient have an active infection or treated for one within the past week? No  Is patient currently taking any antibiotics or steroid medications?  No   For patients on chronic, preventative, or prophylactic antibiotics, procedures may be scheduled.   For patients on antibiotics for active or recent infection, schedule 4 days after completed.  For patients on steroid medications, schedule 4 days after completed.     Has the patient had a flu shot or any other vaccinations within the past 7 days? No  If yes, explain that for the vaccine to work best they need to:     wait 1 week before and 1 week after getting any Vaccine  wait 1 week before and 2 weeks after getting any Covid Vaccine   If patient has concerns about the timing, send to RN pool/ Dr. Rodriguez's Team    Does patient have an MRI/CT?  YES: 6/18/25 Include  Date and Check Procedure Scheduling Grid to see if required.  Was the MRI/CT done within the last 3 years?  Yes   If no route to  Yefri/ Dr. Rodriguez's Team  If yes, where was the [facility]CT done? [facility]   Refer to PACS Transmissions list for approved external locations and route to RN Pool High Priority/ Dr. Freemans Team  If MRI was not done at approved external location do NOT schedule and route to RN pool/ Dr. Freemans Team    If patient has an imaging disc, the injection MAY be scheduled but patient must bring disc to appt or appt will be cancelled.    Is patient able to transfer to a procedure table with minimal or no assistance? Yes   If no, do NOT schedule and route to RN Pool/ Dr. Rodriguez's Team    Procedure Specific Instructions:  If celiac plexus block, informed patient NPO for 6 hours and that it is okay to take medications with sips of water, especially blood pressure medications Not Applicable       If this is for a cervical procedure, informed patient that aspirin needs to be held for 6 days.   Not Applicable    Sedation, If Sedation is ordered for any procedure, patient must be NPO for 6 hours prior to procedure Not Applicable    If IV needed:  Do not schedule procedures requiring IV placement in the first appointment of the day or first appointment after lunch. Do NOT schedule at 0745, 0815 or 1245.     Instructed patient to arrive 30 minutes early for IV start if required. (Check Procedure Scheduling Grid)  Not Applicable    Reminders:  If you are started on any steroids or antibiotics between now and your appointment, you must contact us because the procedure may need to be cancelled.  Yes    As a reminder, receiving steroids can decrease your body's ability to fight infection.   Would you still like to move forward with scheduling the injection?  Yes    IV Sedation is not provided for procedures. If oral anti-anxiety medication is needed, the patient should request this from their referring  provider.    Instruct patient to arrive as directed prior to the scheduled appointment time:  If IV needed 30 minutes before appointment time     For patients 85 or older we recommend having an adult stay w/ them for the remainder of the day.     If the patient is Diabetic, remind them to bring their glucometer.    Dr. Harper Pt's - Imaging Orders Needed   Please send all injections to RN Pool Not Applicable   Red Flags? Not Applicable    Does the patient have any questions?  NO  Shannan Milian  [facility]

## 2025-06-25 NOTE — TELEPHONE ENCOUNTER
Order ID: [id number]  Authorized      Approval Valid Through: 07/04/2025 - 07/18/2025.        PATIENT SCHEDULED        Kacey Garcia   [facility]

## 2025-06-26 ENCOUNTER — THERAPY VISIT (OUTPATIENT)
Dept: PHYSICAL THERAPY | Facility: CLINIC | Age: 65
End: 2025-06-26
Payer: COMMERCIAL

## 2025-06-26 DIAGNOSIS — M25.551 HIP PAIN, RIGHT: ICD-10-CM

## 2025-06-26 DIAGNOSIS — M48.061 LUMBAR FORAMINAL STENOSIS: Primary | ICD-10-CM

## 2025-06-26 NOTE — TELEPHONE ENCOUNTER
Called the patient at [phone]   - Informed the patient that Dr. Armenta sent in a prescription for his oxyCODONE (ROXICODONE) 5 MG tablet yesterday to the [id number] - [place], [place] - [address]   - Patient verbalized understanding and states that he picked up the medication     Caitlin PEREZ RN   [facility]

## 2025-07-01 ENCOUNTER — THERAPY VISIT (OUTPATIENT)
Dept: PHYSICAL THERAPY | Facility: CLINIC | Age: 65
End: 2025-07-01
Payer: COMMERCIAL

## 2025-07-01 DIAGNOSIS — M25.551 HIP PAIN, RIGHT: ICD-10-CM

## 2025-07-01 DIAGNOSIS — M48.061 LUMBAR FORAMINAL STENOSIS: Primary | ICD-10-CM

## 2025-07-01 PROCEDURE — 97110 THERAPEUTIC EXERCISES: CPT | Mod: GP | Performed by: PHYSICAL THERAPIST

## 2025-07-03 ENCOUNTER — MYC REFILL (OUTPATIENT)
Dept: PEDIATRICS | Facility: CLINIC | Age: 65
End: 2025-07-03
Payer: COMMERCIAL

## 2025-07-03 DIAGNOSIS — M79.661 PAIN OF RIGHT LOWER LEG: ICD-10-CM

## 2025-07-03 RX ORDER — OXYCODONE HYDROCHLORIDE 5 MG/1
5 TABLET ORAL DAILY PRN
Qty: 7 TABLET | Refills: 0 | Status: SHIPPED | OUTPATIENT
Start: 2025-07-03

## 2025-07-03 NOTE — TELEPHONE ENCOUNTER
reviwed, taking 1 tab per day, has pain appointment in 7 days, will refill to get patient to that appointment/consult.

## 2025-07-09 ENCOUNTER — THERAPY VISIT (OUTPATIENT)
Dept: PHYSICAL THERAPY | Facility: CLINIC | Age: 65
End: 2025-07-09
Payer: COMMERCIAL

## 2025-07-09 DIAGNOSIS — M48.061 LUMBAR FORAMINAL STENOSIS: Primary | ICD-10-CM

## 2025-07-09 DIAGNOSIS — M25.551 HIP PAIN, RIGHT: ICD-10-CM

## 2025-07-09 PROCEDURE — 97110 THERAPEUTIC EXERCISES: CPT | Mod: GP | Performed by: PHYSICAL THERAPIST

## 2025-07-10 ENCOUNTER — RADIOLOGY INJECTION OFFICE VISIT (OUTPATIENT)
Dept: PALLIATIVE MEDICINE | Facility: CLINIC | Age: 65
End: 2025-07-10
Attending: PHYSICIAN ASSISTANT
Payer: COMMERCIAL

## 2025-07-10 VITALS — OXYGEN SATURATION: 95 % | DIASTOLIC BLOOD PRESSURE: 94 MMHG | HEART RATE: 74 BPM | SYSTOLIC BLOOD PRESSURE: 155 MMHG

## 2025-07-10 DIAGNOSIS — M54.17 LUMBOSACRAL RADICULOPATHY: ICD-10-CM

## 2025-07-10 DIAGNOSIS — M51.26 HERNIATED NUCLEUS PULPOSUS, L4-5 RIGHT: ICD-10-CM

## 2025-07-10 RX ORDER — DEXAMETHASONE SODIUM PHOSPHATE 10 MG/ML
10 INJECTION, SOLUTION INTRAMUSCULAR; INTRAVENOUS ONCE
Status: COMPLETED | OUTPATIENT
Start: 2025-07-10 | End: 2025-07-10

## 2025-07-10 RX ORDER — LIDOCAINE HYDROCHLORIDE 10 MG/ML
4.5 INJECTION, SOLUTION EPIDURAL; INFILTRATION; INTRACAUDAL; PERINEURAL ONCE
Status: COMPLETED | OUTPATIENT
Start: 2025-07-10 | End: 2025-07-10

## 2025-07-10 RX ADMIN — LIDOCAINE HYDROCHLORIDE 4.5 ML: 10 INJECTION, SOLUTION EPIDURAL; INFILTRATION; INTRACAUDAL; PERINEURAL at 08:51

## 2025-07-10 RX ADMIN — DEXAMETHASONE SODIUM PHOSPHATE 10 MG: 10 INJECTION, SOLUTION INTRAMUSCULAR; INTRAVENOUS at 08:51

## 2025-07-10 ASSESSMENT — PAIN SCALES - GENERAL
PAINLEVEL_OUTOF10: SEVERE PAIN (8)
PAINLEVEL_OUTOF10: MODERATE PAIN (6)

## 2025-07-10 NOTE — NURSING NOTE
Discharge Information    IV Discontiued Time:  NA    Amount of Fluid Infused:  NA    Discharge Criteria = When patient returns to baseline or as per MD order    Consciousness:  Pt is fully awake    Circulation:  BP +/- 20% of pre-procedure level    Respiration:  Patient is able to breathe deeply    O2 Sat:  Patient is able to maintain O2 Sat >92% on room air    Activity:  Moves 4 extremities on command    Ambulation:  Patient is able to stand and walk or stand and pivot into wheelchair    Dressing:  Clean/dry or No Dressing    Notes:   Discharge instructions and AVS given to patient    Patient meets criteria for discharge?  YES    Admitted to PCU?  No    Responsible adult present to accompany patient home?  Yes    Signature/Title:    Radha Connell RN  RN Care Coordinator  [facility]

## 2025-07-10 NOTE — NURSING NOTE
Pre-procedure Intake  If YES to any questions or NO to having a   Please complete laminated checklist and leave on the computer keyboard for Provider, verbally inform provider if able.    For SCS Trial, RFA's or any sedation procedure:  Have you been fasting? NA  If yes, for how long?     Are you taking any any blood thinners such as Coumadin, Warfarin, Jantoven, Pradaxa Xarelto, Eliquis, Edoxaban, Enoxaparin, Lovenox, Heparin, Arixtra, Fondaparinux, or Fragmin? OR Antiplatelet medication such as Plavix, Brilinta, or Effient?   No   If yes, when did you take your last dose?     Do you take aspirin?  No  If cervical procedure, have you held aspirin for 6 days?   NA    Is the Pt taking any GLP-1 Antagonist (hold needed for sedation patients only)  (semaglutide (Ozempic, Wegovy), dulaglutide (Trulicity), exenatide ER (Bydureon), tirzepatide (Mounjaro), Liraglutide (Saxenda, Victoza), semaglutide (Rybelsus)     NA  If yes, when did you take your last dose?     Do you have any allergies to contrast dye, iodine, steroid and/or numbing medications?  YES: contrast dye    Are you currently taking antibiotics or have an active infection?  NO    Have you had a fever/elevated temperature within the past week? NO    Are you currently taking oral steroids? NO    Do you have a ? Yes    Are you pregnant or breastfeeding?  Not Applicable    Have you received any vaccinations in the last week? NO    Vitals: B/P 146/78    Notify provider and RNs if systolic BP >170, diastolic BP >100, P >100 or O2 sats < 90%      Josette Mathis MA  [facility] [place]

## 2025-07-10 NOTE — PROGRESS NOTES
"PHYSICAL MEDICINE & REHABILITATION / MEDICAL SPINE      PROCEDURE DATE:  Jul 10, 2025      PATIENT NAME:  Fabio Logan  MRN:  [id number]  YOB: 1960      PRE-PROCEDURE DIAGNOSIS:  1. Herniated nucleus pulposus, L4-5 right    2. Lumbosacral radiculopathy        POST-PROCEDURE DIAGNOSIS:  1. Herniated nucleus pulposus, L4-5 right    2. Lumbosacral radiculopathy        PROCEDURE:  Fluoroscopic-guided right L4-L5 transforaminal epidural steroid injection.  (CPT code:  65294)      PROCEDURE IN DETAIL:  Prior to the procedure, educational material was provided for the patient to review and take home.  After a discussion of the risks, benefits, and alternatives to the procedure, the patient expressed understanding and wished to proceed.  Consent form was signed.  The patient was brought to the fluoroscopy suite and placed in the prone position.  Procedural pause was conducted to verify:  patient identity, procedure to be performed, laterality, site, and patient position.    The skin was sterilely prepped using chlorhexidine and allowed to dry.  The skin was draped in the usual sterile fashion.  After identifying the right L4 pedicle with fluoroscopy, the skin and subcutaneous tissue were infiltrated with 2 ml 1% preservative-free lidocaine.  Then, 22g 3.5\" Quincke needle was advanced under fluoroscopic guidance to the posterior aspect of the right L4-L5 neuroforamen.  Appropriate foraminal depth was determined with a lateral fluoroscopic view, and anterior-posterior visualization confirmed needle positioning at approximately the 6 o'clock position relative to the pedicle.  After negative aspiration, 0.3 ml Omnipaque 300 (iohexol) was injected using live fluoroscopy/digital subtraction angiography, confirming appropriate transforaminal spread without evidence of intravascular or intrathecal uptake.  Next, 1 ml 1% lidocaine was injected.  After 90 seconds, a brief assessment of sensation and strength was " performed.  There were no gross changes in sensation or strength.  Then, 10 mg dexamethasone followed by 1 ml 1% lidocaine was injected.  Following the injection, the needle was withdrawn slightly and flushed with 0.5 ml preservative-free 1% lidocaine as it was fully extracted.    The patient tolerated the procedure well, and there were no apparent complications.  The patient was escorted back to the postprocedure room.  The patient was monitored for side effects.  No reactions were noted.  After appropriate observation, the patient was dismissed from the clinic in good condition.    Preprocedure pain level: 8/10.  Postprocedure pain level: 6/10.      Jonathan Perdomo MD

## 2025-07-10 NOTE — PATIENT INSTRUCTIONS
[facility] Procedure Discharge Instructions    Today you saw:   Dr. Alexis Perdomo    Your procedure:  Transforaminal Epidural steroid injection       Medications used:  Lidocaine (anesthetic)  Dexamethasone (steroid)  Omnipaque (contrast)        If you were holding your blood thinning medication, please restart taking it: N/A        Be cautious when walking as numbness and/or weakness in the legs may occur up to 6-8 hours after the procedure due to effect of the local anesthetic  Do not drive for 6 hours. The effect of the local anesthetic could slow your reflexes.   Avoid strenuous activity for the first 24 hours. You may resume your regular activities after that.   You may shower, however avoid swimming, tub baths or hot tubs for 24 hours following your procedure  You may have a mild to moderate increase in pain for several days following the injection.    You may use ice packs for 10-15 minutes, 3 to 4 times a day at the injection site for comfort  Do not use heat to painful areas for 6 to 8 hours. This will give the local anesthetic time to wear off and prevent you from accidentally burning your skin.  Unless you have been directed to avoid the use of anti-inflammatory medications (NSAIDS-ibuprofen, Aleve, Motrin), you may use these medications or Tylenol for pain control if needed.   With diabetes, check your blood sugar more frequently than usual as your blood sugar may be higher than normal for 10-14 days following a steroid injection. Contact your doctor who manages your diabetes if your blood sugar is higher than usual  Possible side effects of steroids that you may experience include flushing, elevated blood pressure, increased appetite, mild headaches and restlessness.  All of these symptoms will get better with time.  It may take up to 14 days for the steroid medication to start working although you may feel the effect as early as a few days after the procedure.   Follow up with  [facility]-Neurosurgery in 2-3 weeks    If you experience any of the following, call the Spine Center line during work hours at [phone]:  -Fever over 100 degree F  -Swelling, bleeding, redness, drainage, warmth at the injection site  -Progressive weakness or numbness in your legs or arms  -Loss of bowel or bladder function  -Unusual headache that is not relieved by Tylenol or your regular headache medication  -Unusual new onset of pain that is not improving

## 2025-07-15 ENCOUNTER — THERAPY VISIT (OUTPATIENT)
Dept: PHYSICAL THERAPY | Facility: CLINIC | Age: 65
End: 2025-07-15
Payer: COMMERCIAL

## 2025-07-15 DIAGNOSIS — M25.551 HIP PAIN, RIGHT: ICD-10-CM

## 2025-07-15 DIAGNOSIS — M48.061 LUMBAR FORAMINAL STENOSIS: Primary | ICD-10-CM

## 2025-07-15 PROCEDURE — 97112 NEUROMUSCULAR REEDUCATION: CPT | Mod: GP | Performed by: PHYSICAL THERAPIST

## 2025-07-15 PROCEDURE — 97110 THERAPEUTIC EXERCISES: CPT | Mod: GP | Performed by: PHYSICAL THERAPIST

## 2025-07-30 ENCOUNTER — OFFICE VISIT (OUTPATIENT)
Dept: NEUROSURGERY | Facility: CLINIC | Age: 65
End: 2025-07-30
Attending: NURSE PRACTITIONER
Payer: COMMERCIAL

## 2025-07-30 VITALS — DIASTOLIC BLOOD PRESSURE: 88 MMHG | OXYGEN SATURATION: 96 % | SYSTOLIC BLOOD PRESSURE: 147 MMHG | HEART RATE: 72 BPM

## 2025-07-30 DIAGNOSIS — M54.16 LUMBAR RADICULOPATHY, CHRONIC: Primary | ICD-10-CM

## 2025-07-30 PROCEDURE — 3077F SYST BP >= 140 MM HG: CPT | Performed by: NURSE PRACTITIONER

## 2025-07-30 PROCEDURE — 1125F AMNT PAIN NOTED PAIN PRSNT: CPT | Performed by: NURSE PRACTITIONER

## 2025-07-30 PROCEDURE — G2211 COMPLEX E/M VISIT ADD ON: HCPCS | Performed by: NURSE PRACTITIONER

## 2025-07-30 PROCEDURE — 3079F DIAST BP 80-89 MM HG: CPT | Performed by: NURSE PRACTITIONER

## 2025-07-30 PROCEDURE — 99214 OFFICE O/P EST MOD 30 MIN: CPT | Performed by: NURSE PRACTITIONER

## 2025-07-30 RX ORDER — METHOCARBAMOL 1000 MG/1
1000 TABLET, FILM COATED ORAL 3 TIMES DAILY PRN
Qty: 20 TABLET | Refills: 0 | Status: SHIPPED | OUTPATIENT
Start: 2025-07-30

## 2025-07-30 ASSESSMENT — PAIN SCALES - GENERAL: PAINLEVEL_OUTOF10: MODERATE PAIN (6)

## 2025-07-30 NOTE — PROGRESS NOTES
Neurosurgery Clinic Follow up    Assessment:  Nicolas presents to clinic today for further evaluation of lumbar spine pain with right leg radicular symptoms.  He is previously saw spine in 2023 on multiple occasions and most recently my colleague on June 24, 2025 and was prescribed epidural steroid injection.  This point patient has participated in conservative therapies including physical therapy, HEP, SÁNCHEZ, multiple Medrol Dosepak's, Tylenol, Lyrica, relaxants provided long-term benefit.  Has been approximately 3 weeks since patient received L4-5 epidural steroid injection which she says offers no relief.  Patient is still very symptomatic unable to work at this point for his job and is open to surgical intervention.    Exam:  Ambulating with use of a cane.  Endorses low back pain with radicular pain radiating into his right lateral and anterior thigh down his calf into his foot.  Patient states that he is unable to walk for prolonged periods of time and is frequently uncomfortable.  He does have some weakness with hip flexion is 5 out of 5 strength in all fields of the lower extremities.  Positive straight leg raise and Achilles reflexes.    Plan:  -Arrange surgical discussion with Dr. Marrero  - Continue to avoid excessive bending lifting twisting for spine and  - Provided light duty  - Refilled Robaxin prescription    Michael Terence  [address] [address]    Tel [phone]    Dragon Disclosure   This was created at least in part with a voice recognition software. Mistakes/typos may be present.

## 2025-07-30 NOTE — NURSING NOTE
"Fabio Logan is a 64 year old male who presents for:  Chief Complaint   Patient presents with    RECHECK     Follow up after injection        Initial Vitals:  BP (!) 147/88   Pulse 72   SpO2 96%  Estimated body mass index is 40.54 kg/m  as calculated from the following:    Height as of 6/24/25: 5' 5\" (1.651 m).    Weight as of 6/24/25: 243 lb 9.6 oz (110.5 kg).. There is no height or weight on file to calculate BSA. BP completed using cuff size: large  Moderate Pain (6)      Yolette Romo   "

## 2025-07-30 NOTE — LETTER
7/30/2025      Fabio Logan  [address]  [place] [address]      Dear Colleague,    Thank you for referring your patient, Fabio Logan, to the [facility]. Please see a copy of my visit note below.    Neurosurgery Clinic Follow up    Assessment:  Nicolas presents to clinic today for further evaluation of lumbar spine pain with right leg radicular symptoms.  He is previously saw spine in 2023 on multiple occasions and most recently my colleague on June 24, 2025 and was prescribed epidural steroid injection.  This point patient has participated in conservative therapies including physical therapy, HEP, SÁNCHEZ, multiple Medrol Dosepak's, Tylenol, Lyrica, relaxants provided long-term benefit.  Has been approximately 3 weeks since patient received L4-5 epidural steroid injection which she says offers no relief.  Patient is still very symptomatic unable to work at this point for his job and is open to surgical intervention.    Exam:  Ambulating with use of a cane.  Endorses low back pain with radicular pain radiating into his right lateral and anterior thigh down his calf into his foot.  Patient states that he is unable to walk for prolonged periods of time and is frequently uncomfortable.  He does have some weakness with hip flexion is 5 out of 5 strength in all fields of the lower extremities.  Positive straight leg raise and Achilles reflexes.    Plan:  -Arrange surgical discussion with Dr. Marrero  - Continue to avoid excessive bending lifting twisting for spine and  - Provided light duty  - Refilled Robaxin prescription    Michael Pratt  [address] [address]    Tel [phone]    Dragon Disclosure   This was created at least in part with a voice recognition software. Mistakes/typos may be present.        Again, thank you for allowing me to participate in the care of your patient.        Sincerely,        Michael  ODIN Pratt    Electronically signed

## 2025-08-04 DIAGNOSIS — M79.661 PAIN OF RIGHT LOWER LEG: ICD-10-CM

## 2025-08-04 RX ORDER — PREGABALIN 25 MG/1
100 CAPSULE ORAL AT BEDTIME
Qty: 126 CAPSULE | Refills: 2 | Status: SHIPPED | OUTPATIENT
Start: 2025-08-04

## 2025-08-05 ENCOUNTER — THERAPY VISIT (OUTPATIENT)
Dept: PHYSICAL THERAPY | Facility: CLINIC | Age: 65
End: 2025-08-05
Payer: COMMERCIAL

## 2025-08-05 DIAGNOSIS — M48.061 LUMBAR FORAMINAL STENOSIS: Primary | ICD-10-CM

## 2025-08-05 DIAGNOSIS — M25.551 HIP PAIN, RIGHT: ICD-10-CM

## 2025-08-05 PROCEDURE — 97110 THERAPEUTIC EXERCISES: CPT | Mod: GP | Performed by: PHYSICAL THERAPIST

## 2025-08-13 ENCOUNTER — MYC REFILL (OUTPATIENT)
Dept: PEDIATRICS | Facility: CLINIC | Age: 65
End: 2025-08-13
Payer: COMMERCIAL

## 2025-08-13 DIAGNOSIS — L30.4 INTERTRIGO: ICD-10-CM

## 2025-08-13 RX ORDER — KETOCONAZOLE 20 MG/G
CREAM TOPICAL DAILY
Qty: 60 G | Refills: 3 | Status: SHIPPED | OUTPATIENT
Start: 2025-08-13

## 2025-08-13 RX ORDER — TRIAMCINOLONE ACETONIDE 1 MG/G
OINTMENT TOPICAL 2 TIMES DAILY PRN
Status: CANCELLED | OUTPATIENT
Start: 2025-08-13

## 2025-08-13 RX ORDER — KETOCONAZOLE 20 MG/G
CREAM TOPICAL DAILY
Qty: 60 G | Refills: 0 | OUTPATIENT
Start: 2025-08-13

## 2025-08-19 ENCOUNTER — PATIENT OUTREACH (OUTPATIENT)
Dept: CARE COORDINATION | Facility: CLINIC | Age: 65
End: 2025-08-19
Payer: COMMERCIAL

## 2025-08-26 ENCOUNTER — OFFICE VISIT (OUTPATIENT)
Dept: NEUROSURGERY | Facility: CLINIC | Age: 65
End: 2025-08-26
Attending: NEUROLOGICAL SURGERY
Payer: COMMERCIAL

## 2025-08-26 VITALS — DIASTOLIC BLOOD PRESSURE: 92 MMHG | OXYGEN SATURATION: 96 % | HEART RATE: 77 BPM | SYSTOLIC BLOOD PRESSURE: 157 MMHG

## 2025-08-26 DIAGNOSIS — M51.16 LUMBAR DISC HERNIATION WITH RADICULOPATHY: Primary | ICD-10-CM

## 2025-08-26 PROCEDURE — 3080F DIAST BP >= 90 MM HG: CPT | Performed by: NEUROLOGICAL SURGERY

## 2025-08-26 PROCEDURE — 99213 OFFICE O/P EST LOW 20 MIN: CPT | Performed by: NEUROLOGICAL SURGERY

## 2025-08-26 PROCEDURE — 99214 OFFICE O/P EST MOD 30 MIN: CPT | Performed by: NEUROLOGICAL SURGERY

## 2025-08-26 PROCEDURE — 3077F SYST BP >= 140 MM HG: CPT | Performed by: NEUROLOGICAL SURGERY

## 2025-08-26 PROCEDURE — 1125F AMNT PAIN NOTED PAIN PRSNT: CPT | Performed by: NEUROLOGICAL SURGERY

## 2025-08-26 ASSESSMENT — PAIN SCALES - GENERAL: PAINLEVEL_OUTOF10: SEVERE PAIN (7)

## 2025-08-27 ENCOUNTER — TELEPHONE (OUTPATIENT)
Dept: NEUROSURGERY | Facility: CLINIC | Age: 65
End: 2025-08-27
Payer: COMMERCIAL

## (undated) DEVICE — GLOVE PROTEXIS POWDER FREE SMT 7.5  2D72PT75X

## (undated) DEVICE — SYR 30ML LL W/O NDL 302832

## (undated) DEVICE — ESU ELEC BLADE 2.75" COATED/INSULATED E1455

## (undated) DEVICE — DECANTER BAG 2002S

## (undated) DEVICE — CATH IV ANGIO INTRO 12GA 382277

## (undated) DEVICE — GLOVE PROTEXIS POWDER FREE 6.5 ORTHOPEDIC 2D73ET65

## (undated) DEVICE — ENDO TROCAR SLEEVE KII Z-THREADED 05X100MM CTS02

## (undated) DEVICE — LINEN HALF SHEET 5512

## (undated) DEVICE — PREP POVIDONE IODINE SCRUB 7.5% 4OZ APL82212

## (undated) DEVICE — SYR 10ML FINGER CONTROL W/O NDL 309695

## (undated) DEVICE — ENDO TROCAR FIRST ENTRY KII FIOS Z-THRD 05X100MM CTF03

## (undated) DEVICE — ESU PENCIL W/HOLSTER E2350H

## (undated) DEVICE — RAD RX ISOVUE 300 (50ML) 61% IOPAMIDOL CHARGE PER ML

## (undated) DEVICE — BAG CLEAR TRASH 1.3M 39X33" P4040C

## (undated) DEVICE — Device

## (undated) DEVICE — CLIP APPLIER ENDO 5MM M/L LIGAMAX EL5ML

## (undated) DEVICE — ESU GROUND PAD ADULT W/CORD E7507

## (undated) DEVICE — SU MONOCRYL 4-0 PS-2 27" UND Y426H

## (undated) DEVICE — DRAPE C-ARM 60X42" 1013

## (undated) DEVICE — BLADE CLIPPER SGL USE 9680

## (undated) DEVICE — CATH CHOLANGIOGRAM 4.5FR TAUT METAL TIP 20018-M55

## (undated) DEVICE — PREP POVIDONE IODINE SOLUTION 10% 4OZ

## (undated) DEVICE — ESU CORD MONOPOLAR 10'  E0510

## (undated) DEVICE — PREP SKIN SCRUB TRAY 4461A

## (undated) DEVICE — CONNECTOR STOPCOCK 3 WAY MALE LL HI-FLO MX9311L

## (undated) DEVICE — GLOVE PROTEXIS BLUE W/NEU-THERA 8.0  2D73EB80

## (undated) DEVICE — GLOVE PROTEXIS BLUE W/NEU-THERA 7.0  2D73EB70

## (undated) DEVICE — TUBING IV EXTENSION SET ANESTHESIA 34" MLL 2C6227

## (undated) DEVICE — SUCTION IRR STRYKERFLOW II W/TIP 250-070-520

## (undated) DEVICE — LINEN TOWEL PACK X5 5464

## (undated) DEVICE — DECANTER VIAL 2006S

## (undated) DEVICE — ENDO SNARE POLYPECTOMY OVAL 15MM LOOP SD-240U-15

## (undated) DEVICE — LINEN FULL SHEET 5511

## (undated) DEVICE — SUCTION CANISTER MEDIVAC LINER 3000ML W/LID 65651-530

## (undated) DEVICE — SOL NACL 0.9% INJ 250ML BAG 2B1322Q

## (undated) DEVICE — ENDO TRAP POLYP QUICK CATCH 710201

## (undated) DEVICE — LINEN POUCH DBL 5427

## (undated) DEVICE — SU VICRYL 0 CT-2 CR 8X18" J727D

## (undated) DEVICE — SOL NACL 0.9% IRRIG 3000ML BAG 2B7477

## (undated) DEVICE — ENDO TROCAR OPTICAL ACCESS KII Z-THRD 12X100MM C0R85

## (undated) DEVICE — DRAPE LEGGINGS 8421

## (undated) RX ORDER — SIMETHICONE 40MG/0.6ML
SUSPENSION, DROPS(FINAL DOSAGE FORM)(ML) ORAL
Status: DISPENSED
Start: 2025-04-21

## (undated) RX ORDER — HYDRALAZINE HYDROCHLORIDE 20 MG/ML
INJECTION INTRAMUSCULAR; INTRAVENOUS
Status: DISPENSED
Start: 2018-09-02

## (undated) RX ORDER — GLYCOPYRROLATE 0.2 MG/ML
INJECTION INTRAMUSCULAR; INTRAVENOUS
Status: DISPENSED
Start: 2018-09-02

## (undated) RX ORDER — METOPROLOL TARTRATE 25 MG/1
TABLET, FILM COATED ORAL
Status: DISPENSED
Start: 2023-12-04

## (undated) RX ORDER — DEXAMETHASONE SODIUM PHOSPHATE 4 MG/ML
INJECTION, SOLUTION INTRA-ARTICULAR; INTRALESIONAL; INTRAMUSCULAR; INTRAVENOUS; SOFT TISSUE
Status: DISPENSED
Start: 2018-09-02

## (undated) RX ORDER — FENTANYL CITRATE 50 UG/ML
INJECTION, SOLUTION INTRAMUSCULAR; INTRAVENOUS
Status: DISPENSED
Start: 2018-09-02

## (undated) RX ORDER — NEOSTIGMINE METHYLSULFATE 1 MG/ML
VIAL (ML) INJECTION
Status: DISPENSED
Start: 2018-09-02

## (undated) RX ORDER — PROPOFOL 10 MG/ML
INJECTION, EMULSION INTRAVENOUS
Status: DISPENSED
Start: 2018-09-02

## (undated) RX ORDER — BUPIVACAINE HYDROCHLORIDE 2.5 MG/ML
INJECTION, SOLUTION EPIDURAL; INFILTRATION; INTRACAUDAL
Status: DISPENSED
Start: 2018-09-02

## (undated) RX ORDER — KETOROLAC TROMETHAMINE 30 MG/ML
INJECTION, SOLUTION INTRAMUSCULAR; INTRAVENOUS
Status: DISPENSED
Start: 2018-09-02

## (undated) RX ORDER — FENTANYL CITRATE 50 UG/ML
INJECTION, SOLUTION INTRAMUSCULAR; INTRAVENOUS
Status: DISPENSED
Start: 2025-04-21

## (undated) RX ORDER — ONDANSETRON 2 MG/ML
INJECTION INTRAMUSCULAR; INTRAVENOUS
Status: DISPENSED
Start: 2018-09-02

## (undated) RX ORDER — TAMSULOSIN HYDROCHLORIDE 0.4 MG/1
CAPSULE ORAL
Status: DISPENSED
Start: 2018-09-02

## (undated) RX ORDER — OXYCODONE HYDROCHLORIDE 5 MG/1
TABLET ORAL
Status: DISPENSED
Start: 2018-09-02

## (undated) RX ORDER — HYDROMORPHONE HYDROCHLORIDE 1 MG/ML
INJECTION, SOLUTION INTRAMUSCULAR; INTRAVENOUS; SUBCUTANEOUS
Status: DISPENSED
Start: 2018-09-02

## (undated) RX ORDER — LIDOCAINE HYDROCHLORIDE 10 MG/ML
INJECTION, SOLUTION EPIDURAL; INFILTRATION; INTRACAUDAL; PERINEURAL
Status: DISPENSED
Start: 2018-09-02